# Patient Record
Sex: FEMALE | Race: WHITE | NOT HISPANIC OR LATINO | Employment: UNEMPLOYED | URBAN - METROPOLITAN AREA
[De-identification: names, ages, dates, MRNs, and addresses within clinical notes are randomized per-mention and may not be internally consistent; named-entity substitution may affect disease eponyms.]

---

## 2017-02-08 DIAGNOSIS — K21.9 GASTROESOPHAGEAL REFLUX DISEASE WITHOUT ESOPHAGITIS: Primary | ICD-10-CM

## 2017-02-08 NOTE — TELEPHONE ENCOUNTER
Omeprazole 20mg      Last Written Prescription Date: 8/25/16  Last Fill Quantity: 30,  # refills: 11  Last Office Visit with [facility], [facility] or [facility] prescribing provider: 12/14/16                                             Thank You-  Leslie Garcia, [facility] [place]

## 2017-02-10 RX ORDER — NICOTINE POLACRILEX 4 MG/1
20 GUM, CHEWING ORAL DAILY
Qty: 90 TABLET | Refills: 1 | Status: SHIPPED | OUTPATIENT
Start: 2017-02-10 | End: 2017-10-10

## 2017-02-10 NOTE — TELEPHONE ENCOUNTER
Prescription approved per [facility] Refill Protocol.    Ivis Boateng RN

## 2017-03-13 ENCOUNTER — OFFICE VISIT (OUTPATIENT)
Dept: FAMILY MEDICINE | Facility: CLINIC | Age: 52
End: 2017-03-13
Payer: COMMERCIAL

## 2017-03-13 VITALS
SYSTOLIC BLOOD PRESSURE: 94 MMHG | DIASTOLIC BLOOD PRESSURE: 60 MMHG | HEART RATE: 60 BPM | HEIGHT: 62 IN | WEIGHT: 120 LBS | TEMPERATURE: 98.1 F | BODY MASS INDEX: 22.08 KG/M2

## 2017-03-13 DIAGNOSIS — K82.9 GALLBLADDER DISEASE: ICD-10-CM

## 2017-03-13 DIAGNOSIS — Z23 ENCOUNTER FOR IMMUNIZATION: ICD-10-CM

## 2017-03-13 DIAGNOSIS — R10.11 RUQ ABDOMINAL PAIN: Primary | ICD-10-CM

## 2017-03-13 PROCEDURE — 99214 OFFICE O/P EST MOD 30 MIN: CPT | Mod: 25 | Performed by: FAMILY MEDICINE

## 2017-03-13 PROCEDURE — 90715 TDAP VACCINE 7 YRS/> IM: CPT | Performed by: FAMILY MEDICINE

## 2017-03-13 PROCEDURE — 90471 IMMUNIZATION ADMIN: CPT | Performed by: FAMILY MEDICINE

## 2017-03-13 NOTE — MR AVS SNAPSHOT
After Visit Summary   3/13/2017    Fatou Simental    MRN: [id number]           Patient Information     Date Of Birth          1965        Visit Information        Provider Department      3/13/2017 2:40 PM Post, BETHANY Rankin MD [facility]        Today's Diagnoses     RUQ abdominal pain    -  1    Encounter for immunization          Care Instructions    Avoid fatty foods and spicy foods until you can arrange for the biliary scan        Follow-ups after your visit        Future tests that were ordered for you today     Open Future Orders        Priority Expected Expires Ordered    NM HepatOBiliary Scan w CCK Routine  3/13/2018 3/13/2017            Who to contact     If you have questions or need follow up information about today's clinic visit or your schedule please contact [facility] directly at [phone].  Normal or non-critical lab and imaging results will be communicated to you by MyChart, letter or phone within 4 business days after the clinic has received the results. If you do not hear from us within 7 days, please contact the clinic through MyChart or phone. If you have a critical or abnormal lab result, we will notify you by phone as soon as possible.  Submit refill requests through [employer] or call your pharmacy and they will forward the refill request to us. Please allow 3 business days for your refill to be completed.          Additional Information About Your Visit        MyChart Information     [employer] gives you secure access to your electronic health record. If you see a primary care provider, you can also send messages to your care team and make appointments. If you have questions, please call your primary care clinic.  If you do not have a primary care provider, please call [phone] and they will assist you.        Care EveryWhere ID     This is your Care EveryWhere ID. This could be used by other organizations to access your [facility]  "medical records  [id number]        Your Vitals Were     Pulse Temperature Height BMI (Body Mass Index)          60 98.1  F (36.7  C) (Tympanic) 5' 2\" (1.575 m) 21.95 kg/m2         Blood Pressure from Last 3 Encounters:   03/13/17 94/60   12/14/16 94/62   12/01/16 93/62    Weight from Last 3 Encounters:   03/13/17 120 lb (54.4 kg)   12/14/16 118 lb (53.5 kg)   12/01/16 117 lb (53.1 kg)              We Performed the Following     TDAP (ADACEL AGES 11-64)     VACCINE ADMINISTRATION, INITIAL        Primary Care Provider Office Phone # Fax #    R Chucho Kelly [phone]       [place] [id number]        Thank you!     Thank you for choosing [facility]  for your care. Our goal is always to provide you with excellent care. Hearing back from our patients is one way we can continue to improve our services. Please take a few minutes to complete the written survey that you may receive in the mail after your visit with us. Thank you!             Your Updated Medication List - Protect others around you: Learn how to safely use, store and throw away your medicines at www.disposemymeds.org.          This list is accurate as of: 3/13/17  3:17 PM.  Always use your most recent med list.                   Brand Name Dispense Instructions for use    BENADRYL PO      Take 50 mg by mouth nightly as needed       cetirizine 10 MG tablet    zyrTEC    30 tablet    Take 1 tablet (10 mg) by mouth every evening       estradiol 1 MG tablet    ESTRACE    90 tablet    Take 1.5 tablets (1.5 mg) by mouth daily       gabapentin 100 MG capsule    NEURONTIN    90 capsule    Take 1 capsule (100 mg) by mouth At Bedtime       LORazepam 0.5 MG tablet    ATIVAN    60 tablet    Take 1 tablet by mouth twice daily as needed       multivitamin, therapeutic Tabs tablet      Take 1 tablet by mouth daily       omeprazole 20 MG tablet     90 tablet    Take 1 tablet (20 mg) by mouth " daily Take 30-60 minutes before a meal.       polyethylene glycol powder    MIRALAX    527 g    Take 17 g (1 capful) by mouth daily       SUMATRIPTAN SUCCINATE PO      Take 50 mg by mouth every 8 hours as needed for migraine       VITAMIN D3 PO      Take 1 capsule by mouth daily

## 2017-03-13 NOTE — NURSING NOTE
"Chief Complaint   Patient presents with     Abdominal Pain       Initial BP 94/60  Pulse 60  Temp 98.1  F (36.7  C) (Tympanic)  Ht 5' 2\" (1.575 m)  Wt 120 lb (54.4 kg)  BMI 21.95 kg/m2 Estimated body mass index is 21.95 kg/(m^2) as calculated from the following:    Height as of this encounter: 5' 2\" (1.575 m).    Weight as of this encounter: 120 lb (54.4 kg).  Medication Reconciliation: complete    Health Maintenance that is potentially due pending provider review:  NONE    n/a    "

## 2017-03-13 NOTE — LETTER
My Depression Action Plan  Name: Fatou Simental   Date of Birth 1965  Date: 3/13/2017    My doctor: BETHANY Kelly   My clinic: [facility]  [address]  [facility] [address]  [phone]          GREEN    ZONE   Good Control    What it looks like:     Things are going generally well. You have normal up s and down s. You may even feel depressed from time to time, but bad moods usually last less than a day.   What you need to do:  1. Continue to care for yourself (see self care plan)  2. Check your depression survival kit and update it as needed  3. Follow your physician s recommendations including any medication.  4. Do not stop taking medication unless you consult with your physician first.           YELLOW         ZONE Getting Worse    What it looks like:     Depression is starting to interfere with your life.     It may be hard to get out of bed; you may be starting to isolate yourself from others.    Symptoms of depression are starting to last most all day and this has happened for several days.     You may have suicidal thoughts but they are not constant.   What you need to do:     1. Call your care team, your response to treatment will improve if you keep your care team informed of your progress. Yellow periods are signs an adjustment may need to be made.     2. Continue your self-care, even if you have to fake it!    3. Talk to someone in your support network    4. Open up your depression survival kit           RED    ZONE Medical Alert - Get Help    What it looks like:     Depression is seriously interfering with your life.     You may experience these or other symptoms: You can t get out of bed most days, can t work or engage in other necessary activities, you have trouble taking care of basic hygiene, or basic responsibilities, thoughts of suicide or death that will not go away, self-injurious behavior.     What you need to do:  1. Call your care team  and request a same-day appointment. If they are not available (weekends or after hours) call your local crisis line, emergency room or 911.      Electronically signed by: Lilly Rajput, March 13, 2017    Depression Self Care Plan / Survival Kit    Self-Care for Depression  Here s the deal. Your body and mind are really not as separate as most people think.  What you do and think affects how you feel and how you feel influences what you do and think. This means if you do things that people who feel good do, it will help you feel better.  Sometimes this is all it takes.  There is also a place for medication and therapy depending on how severe your depression is, so be sure to consult with your medical provider and/ or Behavioral Health Consultant if your symptoms are worsening or not improving.     In order to better manage my stress, I will:    Exercise  Get some form of exercise, every day. This will help reduce pain and release endorphins, the  feel good  chemicals in your brain. This is almost as good as taking antidepressants!  This is not the same as joining a gym and then never going! (they count on that by the way ) It can be as simple as just going for a walk or doing some gardening, anything that will get you moving.      Hygiene   Maintain good hygiene (Get out of bed in the morning, Make your bed, Brush your teeth, Take a shower, and Get dressed like you were going to work, even if you are unemployed).  If your clothes don't fit try to get ones that do.    Diet  I will strive to eat foods that are good for me, drink plenty of water, and avoid excessive sugar, caffeine, alcohol, and other mood-altering substances.  Some foods that are helpful in depression are: complex carbohydrates, B vitamins, flaxseed, fish or fish oil, fresh fruits and vegetables.    Psychotherapy  I agree to participate in Individual Therapy (if recommended).    Medication  If prescribed medications, I agree to take them.  Missing  doses can result in serious side effects.  I understand that drinking alcohol, or other illicit drug use, may cause potential side effects.  I will not stop my medication abruptly without first discussing it with my provider.    Staying Connected With Others  I will stay in touch with my friends, family members, and my primary care provider/team.    Use your imagination  Be creative.  We all have a creative side; it doesn t matter if it s oil painting, sand castles, or mud pies! This will also kick up the endorphins.    Witness Beauty  (AKA stop and smell the roses) Take a look outside, even in mid-winter. Notice colors, textures. Watch the squirrels and birds.     Service to others  Be of service to others.  There is always someone else in need.  By helping others we can  get out of ourselves  and remember the really important things.  This also provides opportunities for practicing all the other parts of the program.    Humor  Laugh and be silly!  Adjust your TV habits for less news and crime-drama and more comedy.    Control your stress  Try breathing deep, massage therapy, biofeedback, and meditation. Find time to relax each day.     My support system    Clinic Contact:  Phone number:    Contact 1:  Phone number:    Contact 2:  Phone number:    Sabianism/:  Phone number:    Therapist:  Phone number:    Local crisis center:    Phone number:    Other community support:  Phone number:

## 2017-03-13 NOTE — PROGRESS NOTES
"  SUBJECTIVE:                                                    Fatou Simental is a 51 year old female who presents to clinic today for the following health issues:      ABDOMINAL PAIN     Onset: month plus maybe 2    Description:   Character: Sharp and Dull ache  Location: right upper quadrant  Radiation: back  ? Spasm like sensation    Intensity: 0/10  But can be 9/10    Progression of Symptoms:  intermittent    Accompanying Signs & Symptoms:  Fever/Chills?: no   Gas/Bloating: no   Nausea: no   Vomitting: no   Diarrhea?: nothing new  Constipation:nothing new  Dysuria or Hematuria: no    History:   Trauma: no   Previous similar pain: YES   Previous tests done: none    Precipitating factors:   Does the pain change with:     Food: YES  Can be worse 1/2 hour after eating    BM: no     Urination: no     Alleviating factors:      Therapies Tried and outcome: Ibuprofen not helpful    LMP:  Hysterectomy           Problem list and histories reviewed & adjusted, as indicated.  Additional history: This is somewhat different than the pain she has had in the past which we have attributed to irritable bowel syndrome. It is located in the right upper quadrant as opposed to the left lower quadrant, and seems to be brought on by spicy or greasy foods. Her gallbladder was visualized in November 2016 with a CT scan and was read as normal at that time        Reviewed and updated as needed this visit by clinical staff  Tobacco  Allergies       Reviewed and updated as needed this visit by Provider               ROS:  Constitutional, HEENT, cardiovascular, pulmonary, gi and gu systems are negative, except as otherwise noted.        OBJECTIVE:                                                    BP 94/60  Pulse 60  Temp 98.1  F (36.7  C) (Tympanic)  Ht 5' 2\" (1.575 m)  Wt 120 lb (54.4 kg)  BMI 21.95 kg/m2    GENERAL: healthy, alert and no distress  EYES: Eyes grossly normal to inspection, extraocular movements - intact, and " PERRL  NECK: no tenderness, no adenopathy, no asymmetry, no masses, no stiffness; thyroid- normal to palpation  RESP: lungs clear to auscultation - no rales, no rhonchi, no wheezes  CV: regular rates and rhythm, normal S1 S2, no S3 or S4 and no murmur, no click or rub -  ABDOMEN: bowel sounds normal, no palpable masses, no organomegaly and tender in the right upper quadrant but no guarding, rigidity, or rebound  MS: extremities- no gross deformities noted, no edema         ASSESSMENT/PLAN:                                                    ASSESSMENT:  1. RUQ abdominal pain    2. Encounter for immunization      Differential diagnosis would include acalculous cholecystitis versus irritable bowel syndrome. We will do the hepatobiliary scan and then decide on further workup after that    PLAN:  Orders Placed This Encounter     NM HepatOBiliary Scan w CCK     TDAP (ADACEL AGES 11-64)     VACCINE ADMINISTRATION, INITIAL       Patient Instructions   Avoid fatty foods and spicy foods until you can arrange for the biliary scan      BETHANY Kelly  [facility]

## 2017-03-23 ENCOUNTER — HOSPITAL ENCOUNTER (OUTPATIENT)
Dept: NUCLEAR MEDICINE | Facility: CLINIC | Age: 52
Setting detail: NUCLEAR MEDICINE
Discharge: HOME | End: 2017-03-23
Attending: FAMILY MEDICINE | Admitting: FAMILY MEDICINE
Payer: COMMERCIAL

## 2017-03-23 DIAGNOSIS — R10.11 RUQ ABDOMINAL PAIN: ICD-10-CM

## 2017-03-23 PROCEDURE — 25000128 H RX IP 250 OP 636: Performed by: FAMILY MEDICINE

## 2017-03-23 PROCEDURE — A9537 TC99M MEBROFENIN: HCPCS | Performed by: FAMILY MEDICINE

## 2017-03-23 PROCEDURE — 78227 HEPATOBIL SYST IMAGE W/DRUG: CPT

## 2017-03-23 PROCEDURE — 34300033 ZZH RX 343: Performed by: FAMILY MEDICINE

## 2017-03-23 RX ORDER — KIT FOR THE PREPARATION OF TECHNETIUM TC 99M MEBROFENIN 45 MG/10ML
5.1 INJECTION, POWDER, LYOPHILIZED, FOR SOLUTION INTRAVENOUS ONCE
Status: COMPLETED | OUTPATIENT
Start: 2017-03-23 | End: 2017-03-23

## 2017-03-23 RX ADMIN — MEBROFENIN 5.1 MILLICURIE: 45 INJECTION, POWDER, LYOPHILIZED, FOR SOLUTION INTRAVENOUS at 08:14

## 2017-03-23 RX ADMIN — SODIUM CHLORIDE 1.1 MCG: 9 INJECTION, SOLUTION INTRAVENOUS at 09:20

## 2017-03-23 NOTE — PROGRESS NOTES
Fatou,  The hepatobiliary scan was abnormal and this was suggest that your gallbladder is not functioning normally. I will put in a referral for you to see one of the general surgeons at [place] for recommendations. Call [phone] to schedule this      Chucho Kelly MD

## 2017-03-29 ENCOUNTER — TELEPHONE (OUTPATIENT)
Dept: SURGERY | Facility: CLINIC | Age: 52
End: 2017-03-29

## 2017-03-29 ENCOUNTER — OFFICE VISIT (OUTPATIENT)
Dept: SURGERY | Facility: CLINIC | Age: 52
End: 2017-03-29
Payer: COMMERCIAL

## 2017-03-29 VITALS
DIASTOLIC BLOOD PRESSURE: 64 MMHG | TEMPERATURE: 97.9 F | BODY MASS INDEX: 21.53 KG/M2 | WEIGHT: 117 LBS | HEIGHT: 62 IN | HEART RATE: 56 BPM | SYSTOLIC BLOOD PRESSURE: 110 MMHG

## 2017-03-29 DIAGNOSIS — R10.10 PAIN OF UPPER ABDOMEN: Primary | ICD-10-CM

## 2017-03-29 DIAGNOSIS — K82.8 BILIARY DYSKINESIA: Primary | ICD-10-CM

## 2017-03-29 PROCEDURE — 99214 OFFICE O/P EST MOD 30 MIN: CPT | Performed by: SURGERY

## 2017-03-29 NOTE — NURSING NOTE
"Initial /64 (BP Location: Right arm, Patient Position: Chair, Cuff Size: Adult Regular)  Pulse 56  Temp 97.9  F (36.6  C) (Oral)  Ht 1.575 m (5' 2\")  Wt 53.1 kg (117 lb)  BMI 21.4 kg/m2 Estimated body mass index is 21.4 kg/(m^2) as calculated from the following:    Height as of this encounter: 1.575 m (5' 2\").    Weight as of this encounter: 53.1 kg (117 lb). .    Sowmya Pike MA    "

## 2017-03-29 NOTE — PROGRESS NOTES
52-year-old female complaining of six-month history of right upper quadrant abdominal pain with radiation to the back. Patient reports chronic nausea as well. Pain is worse with eating, 4-6 out of 10. Pain is crampy and dull. Aggravated by eating and alleviated by rest. Patient denies any history of scleral icterus. She does report a bloated feeling.    Patient Active Problem List   Diagnosis     Raynaud's syndrome     Tobacco use disorder     Mild major depression (H)     Need for prophylactic hormone replacement therapy (postmenopausal)     Generalized anxiety disorder     Esophageal reflux     Breast screening     Ganglion of joint     Ovarian cyst     IBS (irritable bowel syndrome)     CARDIOVASCULAR SCREENING; LDL GOAL LESS THAN 160     Screening for malignant neoplasm of cervix     Diverticulosis       Past Medical History:   Diagnosis Date     Cocaine abuse, unspecified      Dysplasia of cervix, unspecified      Esophageal reflux 2006     Generalized anxiety disorder 10/30/2006    Buspar prescribed 07     IBS (irritable bowel syndrome) 2008     PONV (postoperative nausea and vomiting)        Past Surgical History:   Procedure Laterality Date     ABLATE VEIN VARICOSE RADIO FREQUENCY WITHOUT PHLEBECTOMY MULTIPLE STAB  2014    Procedure: ABLATE VEIN VARICOSE RADIO FREQUENCY WITHOUT PHLEBECTOMY MULTIPLE STAB;  Surgeon: Khanh Bunch MD;  Location: [facility] OR       DELIVERY ONLY  ,,    , Low Cervical     HYSTERECTOMY SUPRACERVICAL, BILATERAL SALPINGO-OOPHORECTOMY, COMBINED       LAPAROSCOPIC ASSISTED COLECTOMY N/A 2015    Procedure: LAPAROSCOPIC ASSISTED COLECTOMY;  Surgeon: Khanh Bunch MD;  Location: [facility] OR     NERVE BLOCK PERIPHERAL Bilateral 2015    Procedure: NERVE BLOCK PERIPHERAL;  Surgeon: Generic Anesthesia Provider;  Location: [facility] OR     SURGICAL HISTORY OF -       Breast implants     SURGICAL HISTORY OF -        Carpal  tunnel r hand       Family History   Problem Relation Age of Onset     CANCER Father      liver     CANCER Brother      lung, asbestos     CANCER Mother      uterine     CANCER Sister      uterine     Breast Cancer Other      Breast Cancer Other        Social History   Substance Use Topics     Smoking status: Former Smoker     Packs/day: 0.25     Years: 20.00     Types: Cigarettes     Quit date: 1/26/2015     Smokeless tobacco: Never Used     Alcohol use No      Comment: Social. 1-2 cans of beer or 2 glasses of wine once per month        Smoking - Counseled patient on the effects of smoking on surgical issues such as wound healing and infection rates.    History   Drug Use No       Current Outpatient Prescriptions   Medication Sig Dispense Refill     omeprazole 20 MG tablet Take 1 tablet (20 mg) by mouth daily Take 30-60 minutes before a meal. 90 tablet 1     polyethylene glycol (MIRALAX) powder Take 17 g (1 capful) by mouth daily 527 g 3     SUMATRIPTAN SUCCINATE PO Take 50 mg by mouth every 8 hours as needed for migraine       LORazepam (ATIVAN) 0.5 MG tablet Take 1 tablet by mouth twice daily as needed 60 tablet 4     Cholecalciferol (VITAMIN D3 PO) Take 1 capsule by mouth daily       gabapentin (NEURONTIN) 100 MG capsule Take 1 capsule (100 mg) by mouth At Bedtime 90 capsule 3     cetirizine (ZYRTEC) 10 MG tablet Take 1 tablet (10 mg) by mouth every evening 30 tablet 5     multivitamin, therapeutic (THERA-VIT) TABS Take 1 tablet by mouth daily       estradiol (ESTRACE) 1 MG tablet Take 1.5 tablets (1.5 mg) by mouth daily 90 tablet 3     DiphenhydrAMINE HCl (BENADRYL PO) Take 50 mg by mouth nightly as needed          Allergies   Allergen Reactions     Ciprofloxacin Hives     Got rash when taken with metronidazole + Vicodin     Lactose Diarrhea     Metronidazole Hives     Rash when taken with cipro + Vicodin     No Clinical Screening - See Comments      Pt developed hives when taking flagyl, cipro, and  vicodin, doesn't know which she is allergic to      Vicodin [Hydrocodone-Acetaminophen] Hives     Rash when taken with cipro and metronidazole   CBC  Recent Labs   Lab Test  11/14/16   1504   WBC  4.0   RBC  4.24   HGB  12.8   HCT  37.6   MCV  89   MCH  30.2   MCHC  34.0   RDW  12.4   PLT  254       BMP  Recent Labs   Lab Test  11/14/16   1504   NA  140   POTASSIUM  3.8   YON  8.7   CHLORIDE  105   CO2  30   BUN  14   CR  0.59   GLC  88       LFTs  Recent Labs   Lab Test  11/14/16   1504   PROTTOTAL  7.0   ALBUMIN  3.9   BILITOTAL  0.3   ALKPHOS  53   AST  26   ALT  30       Results for orders placed or performed during the hospital encounter of 03/23/17   NM HepatOBiliary Scan w CCK    Narrative    NM HEPATOBILIARY SCAN WITH GB EF 3/23/2017 10:12 AM    HISTORY: Right upper quadrant pain.    PROCEDURE: 5.1 mCi of Tc 99m Mebrofenin is given intravenously for  this study. For the gallbladder ejection fraction portion of the  study, 1.1 mcg of CCK is given intravenously.    FINDINGS: Gallbladder activity is identified at 35 minutes into the  study. Bowel activity is identified at 20 minutes into the study. The  gallbladder ejection fraction is calculated to be 11%. Usually greater  than 35% is considered normal.      Impression    IMPRESSION:  1. Normal visualization of gallbladder.  2. Gallbladder ejection fraction is abnormally low, suggesting  gallbladder dyskinesis.  3. The patient had there is similar pain on a scale of 10+/10 with the  CCK injection.    SHANTEL SIMS MD     ROS  Constitutional - Denies fevers, weight loss, malaise, lethargy  Neuro - Denies tremors or seizures  Pulmon - Denies SOB, dyspnea, hemoptysis, chronic cough or use of an inhaler  CV - Denies CP, SOB, lower extremity edema, difficulty w/ stairs, has never used NTG  GI - Denies hematemesis, BRBPR, melena, chronic diarrhea or epigastric pain   - Denies hematuria, difficulty voiding, h/o STDs  Hematology - Denies blood clotting disorders,  "chronic anemias  Dermatology - No melanomas or skin cancers  Rheumatology - No h/o RA  Pysch - Denies depression, bipolar d/o or schizophrenia    Exam:  Patient Vitals for the past 24 hrs:   BP Temp Temp src Pulse Height Weight   03/29/17 1115 110/64 97.9  F (36.6  C) Oral 56 1.575 m (5' 2\") 53.1 kg (117 lb)       General - Alert and Oriented X4, NAD, well nourished  HEENT - Normocephalic, atraumatic, PERRLA, Nose midline, Throat without lesions  Neck - supple, no LAD, Thyroid normal, Carotids without bruits  Lungs - Clear to auscultation bilaterally with good inspiratory effort, no tactile fremitus  CV - Heart RRR, no lift's, thrills, murmurs, rubs, or gallops. Carotid, radial, and femoral pulses 2+ bilaterally  Abdomen - Soft, non-tender, +BS, no hepatosplenomegaly, no palpable masses  Neuro - Full ROM, Strength 5/5 and major muscle groups, sensation intact  Extremities - No cyanosis, clubbing or edema    Assessment and plan: 52-year-old female with biliary dyskinesia. Patient is good candidate for laparoscopic cholecystectomy. Risks benefits alternatives and complications were discussed with the patient including the possibility of infection, bleeding, or bile leak. Patient understood and wished to proceed. PATIENT IS CLEARED FOR SURGERY.    Levi Louis MD   "

## 2017-03-29 NOTE — LETTER
SURGERYPLANNING/SCHEDULING WORKSHEET                              [facility]  [address] [address] [phone]                          Fatou Simental                :  1965  MRN:  [id number]  Phone: [phone] (home)     Same Day Surgery   Surgeon: Levi Louis MD  Diagnosis:   Biliary dyskinesia  Allergies:  Ciprofloxacin; Lactose; Metronidazole; No clinical screening - see comments; and Vicodin [hydrocodone-acetaminophen]   A preoperative evaluation and physical will be done by this office.   ====================================================  Surgical Procedure:  General Surgery:lap radha  Length of Procedure:  1 hour  Type of anesthesia:  General  The proposed surgical procedure is considered LOW risk.  Date of Procedure:__17_____    Time: ___2:00________________       Special Equipment: None  Informed Consent Obtained and Signed:  NO  ====================================================  Instructions to Same Day Surgery Staff  Pneumoboots  Preop Antibiotic:  Ancef 2 gm IV  pre-op within one hour prior to incision For > 80 kg  Preop Pain Meds:  None  Preop Orders:  Routine Standing Orders.  ====================================================  Instructions to the patient:  Preop physical exam scheduled (within 30 days or 7 days prior) with:  Dr. Ponce__________________  Clinic:  ____________________                                         Date______________Time_________________________  Come to the hospital at: _[facility] will call with arrival time_______________  HOME PREPARATION:   Shower with Hibiclens the night before or the morning of surgery, gently cleaning skin from neck to feet  Bathe and brush teeth the morning of surgery.  Take medications with a sip of water the morning of surgery:   Check with  if taking insulin.  May have  a light meal, toast and clear liquids, up to 8 hrs before  surgery  May have clear liquids (liquids one can read through) up to 4 hrs before surgery  NOTHING after 4 hrs before surgery  Stop aspirin 7-10 days before surgery  Stop NSAIDS (Ibuproven, Naproxen, etc) 5 days before surgery  Stop Plavix 7-10 days before surgery  Need to arrange for a     Levi Louis MD    3/29/2017  This form was electronically signed at chart closure                                                                        Chart Copy

## 2017-03-29 NOTE — TELEPHONE ENCOUNTER
Pt calling to see if the RX Vicodin was sent?  She would like it sent to the [facility] pharmacy.      Please call~    Sowmya Calderon  Clinic Station

## 2017-03-29 NOTE — MR AVS SNAPSHOT
After Visit Summary   3/29/2017    Fatou Simental    MRN: [id number]           Patient Information     Date Of Birth          1965        Visit Information        Provider Department      3/29/2017 11:00 AM Levi Louis MD [facility]        Today's Diagnoses     Biliary dyskinesia    -  1      Care Instructions    Per Dr. Louis's instructions        Follow-ups after your visit        Your next 10 appointments already scheduled     Apr 11, 2017   Procedure with Levi Louis MD   [facility] PeriOP [facility] (--)    [address] [id number] [phone]           The medical center is located at [address] (between [address] and [address] in [place], [place] miles north of [place]).              Who to contact     If you have questions or need follow up information about today's clinic visit or your schedule please contact [facility] directly at [phone].  Normal or non-critical lab and imaging results will be communicated to you by MyChart, letter or phone within 4 business days after the clinic has received the results. If you do not hear from us within 7 days, please contact the clinic through iCat[employer]ulthart or phone. If you have a critical or abnormal lab result, we will notify you by phone as soon as possible.  Submit refill requests through [employer] or call your pharmacy and they will forward the refill request to us. Please allow 3 business days for your refill to be completed.          Additional Information About Your Visit        MyChart Information     [employer] gives you secure access to your electronic health record. If you see a primary care provider, you can also send messages to your care team and make appointments. If you have questions, please call your primary care clinic.  If you do not have a primary care provider, please call [phone] and they will assist you.        Care EveryWhere ID     This is your Care EveryWhere  "ID. This could be used by other organizations to access your [facility] medical records  [id number]        Your Vitals Were     Pulse Temperature Height BMI (Body Mass Index)          56 97.9  F (36.6  C) (Oral) 1.575 m (5' 2\") 21.4 kg/m2         Blood Pressure from Last 3 Encounters:   03/29/17 110/64   03/13/17 94/60   12/14/16 94/62    Weight from Last 3 Encounters:   03/29/17 53.1 kg (117 lb)   03/13/17 54.4 kg (120 lb)   12/14/16 53.5 kg (118 lb)              Today, you had the following     No orders found for display         Today's Medication Changes          These changes are accurate as of: 3/29/17 11:59 PM.  If you have any questions, ask your nurse or doctor.               Start taking these medicines.        Dose/Directions    HYDROcodone-acetaminophen 5-325 MG per tablet   Commonly known as:  NORCO   Used for:  Pain of upper abdomen   Started by:  Levi Louis MD        Dose:  1-2 tablet   Take 1-2 tablets by mouth every 4 hours as needed for moderate to severe pain   Quantity:  30 tablet   Refills:  0            Where to get your medicines      Some of these will need a paper prescription and others can be bought over the counter.  Ask your nurse if you have questions.     Bring a paper prescription for each of these medications     HYDROcodone-acetaminophen 5-325 MG per tablet                Primary Care Provider Office Phone # Fax #    R Chucho Kelly [phone]       [place] [address]        Thank you!     Thank you for choosing [facility]  for your care. Our goal is always to provide you with excellent care. Hearing back from our patients is one way we can continue to improve our services. Please take a few minutes to complete the written survey that you may receive in the mail after your visit with us. Thank you!             Your Updated Medication List - Protect others around you: Learn how to safely use, store and " throw away your medicines at www.disposemymeds.org.          This list is accurate as of: 3/29/17 11:59 PM.  Always use your most recent med list.                   Brand Name Dispense Instructions for use    BENADRYL PO      Take 50 mg by mouth nightly as needed       cetirizine 10 MG tablet    zyrTEC    30 tablet    Take 1 tablet (10 mg) by mouth every evening       estradiol 1 MG tablet    ESTRACE    90 tablet    Take 1.5 tablets (1.5 mg) by mouth daily       gabapentin 100 MG capsule    NEURONTIN    90 capsule    Take 1 capsule (100 mg) by mouth At Bedtime       HYDROcodone-acetaminophen 5-325 MG per tablet    NORCO    30 tablet    Take 1-2 tablets by mouth every 4 hours as needed for moderate to severe pain       LORazepam 0.5 MG tablet    ATIVAN    60 tablet    Take 1 tablet by mouth twice daily as needed       multivitamin, therapeutic Tabs tablet      Take 1 tablet by mouth daily       omeprazole 20 MG tablet     90 tablet    Take 1 tablet (20 mg) by mouth daily Take 30-60 minutes before a meal.       polyethylene glycol powder    MIRALAX    527 g    Take 17 g (1 capful) by mouth daily       SUMATRIPTAN SUCCINATE PO      Take 50 mg by mouth every 8 hours as needed for migraine       VITAMIN D3 PO      Take 1 capsule by mouth daily

## 2017-03-30 RX ORDER — HYDROCODONE BITARTRATE AND ACETAMINOPHEN 5; 325 MG/1; MG/1
1-2 TABLET ORAL EVERY 4 HOURS PRN
Qty: 30 TABLET | Refills: 0 | Status: SHIPPED | OUTPATIENT
Start: 2017-03-30 | End: 2017-04-13

## 2017-03-30 NOTE — TELEPHONE ENCOUNTER
No mention of Pain med on her med list  No mention of providing Hydrocodone pain med  Nor providing it despite the listed   (but unconfirmed)  allergy to it in her EMR,  and not providing the previously tolerated Percocet.    Call placed to patient and she states they had a quick conversation about it and that he wanted to provide Vicodin to give a trial to see if truly allergic.  And that the Advil was not cutting it,  despite avoiding certain food  (patient states she can  hardly eat anything)  But then never got an rx given to her at end of visit.  She assumed it was just sent to the Pharmacy and not done on a paper script to hand carry as usual.    Advised Provider not available and not on call,  Advised we could speak with the on call provider but could not provide any solid confirmation we could prove the rx today.    Holly Auguste RN  [facility]

## 2017-04-10 ENCOUNTER — ANESTHESIA EVENT (OUTPATIENT)
Dept: SURGERY | Facility: CLINIC | Age: 52
End: 2017-04-10
Payer: COMMERCIAL

## 2017-04-11 ENCOUNTER — TELEPHONE (OUTPATIENT)
Dept: SURGERY | Facility: CLINIC | Age: 52
End: 2017-04-11

## 2017-04-11 ENCOUNTER — HOSPITAL ENCOUNTER (OUTPATIENT)
Facility: CLINIC | Age: 52
Discharge: HOME | End: 2017-04-11
Attending: SURGERY | Admitting: SURGERY
Payer: COMMERCIAL

## 2017-04-11 ENCOUNTER — ANESTHESIA (OUTPATIENT)
Dept: SURGERY | Facility: CLINIC | Age: 52
End: 2017-04-11
Payer: COMMERCIAL

## 2017-04-11 VITALS
SYSTOLIC BLOOD PRESSURE: 98 MMHG | TEMPERATURE: 97.7 F | RESPIRATION RATE: 16 BRPM | HEIGHT: 62 IN | HEART RATE: 79 BPM | BODY MASS INDEX: 21.16 KG/M2 | DIASTOLIC BLOOD PRESSURE: 60 MMHG | WEIGHT: 115 LBS | OXYGEN SATURATION: 96 %

## 2017-04-11 DIAGNOSIS — G89.18 POST-OP PAIN: Primary | ICD-10-CM

## 2017-04-11 PROCEDURE — 71000012 ZZH RECOVERY PHASE 1 LEVEL 1 FIRST HR: Performed by: SURGERY

## 2017-04-11 PROCEDURE — 27110028 ZZH OR GENERAL SUPPLY NON-STERILE: Performed by: SURGERY

## 2017-04-11 PROCEDURE — 25000125 ZZHC RX 250: Performed by: NURSE ANESTHETIST, CERTIFIED REGISTERED

## 2017-04-11 PROCEDURE — 71000027 ZZH RECOVERY PHASE 2 EACH 15 MINS: Performed by: SURGERY

## 2017-04-11 PROCEDURE — 36000058 ZZH SURGERY LEVEL 3 EA 15 ADDTL MIN: Performed by: SURGERY

## 2017-04-11 PROCEDURE — 25800025 ZZH RX 258: Performed by: NURSE ANESTHETIST, CERTIFIED REGISTERED

## 2017-04-11 PROCEDURE — 27210995 ZZH RX 272: Performed by: SURGERY

## 2017-04-11 PROCEDURE — 47562 LAPAROSCOPIC CHOLECYSTECTOMY: CPT | Mod: AS | Performed by: PHYSICIAN ASSISTANT

## 2017-04-11 PROCEDURE — 37000008 ZZH ANESTHESIA TECHNICAL FEE, 1ST 30 MIN: Performed by: SURGERY

## 2017-04-11 PROCEDURE — 25000128 H RX IP 250 OP 636: Performed by: NURSE ANESTHETIST, CERTIFIED REGISTERED

## 2017-04-11 PROCEDURE — 37000009 ZZH ANESTHESIA TECHNICAL FEE, EACH ADDTL 15 MIN: Performed by: SURGERY

## 2017-04-11 PROCEDURE — 71000013 ZZH RECOVERY PHASE 1 LEVEL 1 EA ADDTL HR: Performed by: SURGERY

## 2017-04-11 PROCEDURE — 40000305 ZZH STATISTIC PRE PROC ASSESS I: Performed by: SURGERY

## 2017-04-11 PROCEDURE — 36000056 ZZH SURGERY LEVEL 3 1ST 30 MIN: Performed by: SURGERY

## 2017-04-11 PROCEDURE — 88304 TISSUE EXAM BY PATHOLOGIST: CPT | Mod: 26 | Performed by: SURGERY

## 2017-04-11 PROCEDURE — 25000132 ZZH RX MED GY IP 250 OP 250 PS 637: Performed by: SURGERY

## 2017-04-11 PROCEDURE — 88304 TISSUE EXAM BY PATHOLOGIST: CPT | Performed by: SURGERY

## 2017-04-11 PROCEDURE — 27210794 ZZH OR GENERAL SUPPLY STERILE: Performed by: SURGERY

## 2017-04-11 PROCEDURE — 47562 LAPAROSCOPIC CHOLECYSTECTOMY: CPT | Performed by: SURGERY

## 2017-04-11 PROCEDURE — 25000128 H RX IP 250 OP 636: Performed by: SURGERY

## 2017-04-11 PROCEDURE — 25000125 ZZHC RX 250: Performed by: SURGERY

## 2017-04-11 RX ORDER — SCOLOPAMINE TRANSDERMAL SYSTEM 1 MG/1
1 PATCH, EXTENDED RELEASE TRANSDERMAL ONCE
Status: DISCONTINUED | OUTPATIENT
Start: 2017-04-11 | End: 2017-04-11 | Stop reason: HOSPADM

## 2017-04-11 RX ORDER — NALOXONE HYDROCHLORIDE 0.4 MG/ML
.1-.4 INJECTION, SOLUTION INTRAMUSCULAR; INTRAVENOUS; SUBCUTANEOUS
Status: DISCONTINUED | OUTPATIENT
Start: 2017-04-11 | End: 2017-04-11 | Stop reason: HOSPADM

## 2017-04-11 RX ORDER — DEXAMETHASONE SODIUM PHOSPHATE 4 MG/ML
INJECTION, SOLUTION INTRA-ARTICULAR; INTRALESIONAL; INTRAMUSCULAR; INTRAVENOUS; SOFT TISSUE PRN
Status: DISCONTINUED | OUTPATIENT
Start: 2017-04-11 | End: 2017-04-11

## 2017-04-11 RX ORDER — DROPERIDOL 2.5 MG/ML
0.62 INJECTION, SOLUTION INTRAMUSCULAR; INTRAVENOUS
Status: DISCONTINUED | OUTPATIENT
Start: 2017-04-11 | End: 2017-04-11 | Stop reason: RX

## 2017-04-11 RX ORDER — SODIUM CHLORIDE, SODIUM LACTATE, POTASSIUM CHLORIDE, CALCIUM CHLORIDE 600; 310; 30; 20 MG/100ML; MG/100ML; MG/100ML; MG/100ML
INJECTION, SOLUTION INTRAVENOUS CONTINUOUS
Status: DISCONTINUED | OUTPATIENT
Start: 2017-04-11 | End: 2017-04-11 | Stop reason: HOSPADM

## 2017-04-11 RX ORDER — OXYCODONE AND ACETAMINOPHEN 5; 325 MG/1; MG/1
1-2 TABLET ORAL EVERY 6 HOURS PRN
Qty: 45 TABLET | Refills: 0 | Status: SHIPPED | OUTPATIENT
Start: 2017-04-11 | End: 2017-06-26

## 2017-04-11 RX ORDER — GLYCOPYRROLATE 0.2 MG/ML
INJECTION, SOLUTION INTRAMUSCULAR; INTRAVENOUS PRN
Status: DISCONTINUED | OUTPATIENT
Start: 2017-04-11 | End: 2017-04-11

## 2017-04-11 RX ORDER — ONDANSETRON 2 MG/ML
4 INJECTION INTRAMUSCULAR; INTRAVENOUS EVERY 30 MIN PRN
Status: DISCONTINUED | OUTPATIENT
Start: 2017-04-11 | End: 2017-04-11 | Stop reason: HOSPADM

## 2017-04-11 RX ORDER — LIDOCAINE HYDROCHLORIDE 10 MG/ML
INJECTION, SOLUTION INFILTRATION; PERINEURAL PRN
Status: DISCONTINUED | OUTPATIENT
Start: 2017-04-11 | End: 2017-04-11

## 2017-04-11 RX ORDER — KETOROLAC TROMETHAMINE 30 MG/ML
30 INJECTION, SOLUTION INTRAMUSCULAR; INTRAVENOUS EVERY 6 HOURS PRN
Status: DISCONTINUED | OUTPATIENT
Start: 2017-04-11 | End: 2017-04-11 | Stop reason: HOSPADM

## 2017-04-11 RX ORDER — DEXAMETHASONE SODIUM PHOSPHATE 4 MG/ML
4 INJECTION, SOLUTION INTRA-ARTICULAR; INTRALESIONAL; INTRAMUSCULAR; INTRAVENOUS; SOFT TISSUE EVERY 10 MIN PRN
Status: DISCONTINUED | OUTPATIENT
Start: 2017-04-11 | End: 2017-04-11 | Stop reason: HOSPADM

## 2017-04-11 RX ORDER — ONDANSETRON 2 MG/ML
INJECTION INTRAMUSCULAR; INTRAVENOUS PRN
Status: DISCONTINUED | OUTPATIENT
Start: 2017-04-11 | End: 2017-04-11

## 2017-04-11 RX ORDER — KETOROLAC TROMETHAMINE 30 MG/ML
INJECTION, SOLUTION INTRAMUSCULAR; INTRAVENOUS PRN
Status: DISCONTINUED | OUTPATIENT
Start: 2017-04-11 | End: 2017-04-11

## 2017-04-11 RX ORDER — HYDROMORPHONE HYDROCHLORIDE 1 MG/ML
.3-.5 INJECTION, SOLUTION INTRAMUSCULAR; INTRAVENOUS; SUBCUTANEOUS EVERY 10 MIN PRN
Status: DISCONTINUED | OUTPATIENT
Start: 2017-04-11 | End: 2017-04-11 | Stop reason: HOSPADM

## 2017-04-11 RX ORDER — FENTANYL CITRATE 50 UG/ML
25-50 INJECTION, SOLUTION INTRAMUSCULAR; INTRAVENOUS
Status: DISCONTINUED | OUTPATIENT
Start: 2017-04-11 | End: 2017-04-11 | Stop reason: HOSPADM

## 2017-04-11 RX ORDER — CEFAZOLIN SODIUM 1 G/3ML
1 INJECTION, POWDER, FOR SOLUTION INTRAMUSCULAR; INTRAVENOUS SEE ADMIN INSTRUCTIONS
Status: DISCONTINUED | OUTPATIENT
Start: 2017-04-11 | End: 2017-04-11 | Stop reason: HOSPADM

## 2017-04-11 RX ORDER — LIDOCAINE 40 MG/G
CREAM TOPICAL
Status: DISCONTINUED | OUTPATIENT
Start: 2017-04-11 | End: 2017-04-11 | Stop reason: HOSPADM

## 2017-04-11 RX ORDER — FENTANYL CITRATE 50 UG/ML
INJECTION, SOLUTION INTRAMUSCULAR; INTRAVENOUS PRN
Status: DISCONTINUED | OUTPATIENT
Start: 2017-04-11 | End: 2017-04-11

## 2017-04-11 RX ORDER — BUPIVACAINE HYDROCHLORIDE AND EPINEPHRINE 2.5; 5 MG/ML; UG/ML
INJECTION, SOLUTION INFILTRATION; PERINEURAL PRN
Status: DISCONTINUED | OUTPATIENT
Start: 2017-04-11 | End: 2017-04-11 | Stop reason: HOSPADM

## 2017-04-11 RX ORDER — MEPERIDINE HYDROCHLORIDE 25 MG/ML
12.5 INJECTION INTRAMUSCULAR; INTRAVENOUS; SUBCUTANEOUS
Status: DISCONTINUED | OUTPATIENT
Start: 2017-04-11 | End: 2017-04-11 | Stop reason: HOSPADM

## 2017-04-11 RX ORDER — CEFAZOLIN SODIUM 2 G/100ML
2 INJECTION, SOLUTION INTRAVENOUS
Status: COMPLETED | OUTPATIENT
Start: 2017-04-11 | End: 2017-04-11

## 2017-04-11 RX ORDER — OXYCODONE AND ACETAMINOPHEN 10; 325 MG/1; MG/1
1 TABLET ORAL EVERY 4 HOURS PRN
Status: DISCONTINUED | OUTPATIENT
Start: 2017-04-11 | End: 2017-04-11 | Stop reason: HOSPADM

## 2017-04-11 RX ORDER — ONDANSETRON 4 MG/1
4 TABLET, ORALLY DISINTEGRATING ORAL EVERY 30 MIN PRN
Status: DISCONTINUED | OUTPATIENT
Start: 2017-04-11 | End: 2017-04-11 | Stop reason: HOSPADM

## 2017-04-11 RX ORDER — PROPOFOL 10 MG/ML
INJECTION, EMULSION INTRAVENOUS PRN
Status: DISCONTINUED | OUTPATIENT
Start: 2017-04-11 | End: 2017-04-11

## 2017-04-11 RX ORDER — PROPOFOL 10 MG/ML
INJECTION, EMULSION INTRAVENOUS CONTINUOUS PRN
Status: DISCONTINUED | OUTPATIENT
Start: 2017-04-11 | End: 2017-04-11

## 2017-04-11 RX ADMIN — SODIUM CHLORIDE, POTASSIUM CHLORIDE, SODIUM LACTATE AND CALCIUM CHLORIDE: 600; 310; 30; 20 INJECTION, SOLUTION INTRAVENOUS at 13:03

## 2017-04-11 RX ADMIN — GLYCOPYRROLATE 0.2 MG: 0.2 INJECTION, SOLUTION INTRAMUSCULAR; INTRAVENOUS at 13:28

## 2017-04-11 RX ADMIN — DEXAMETHASONE SODIUM PHOSPHATE 4 MG: 4 INJECTION, SOLUTION INTRA-ARTICULAR; INTRALESIONAL; INTRAMUSCULAR; INTRAVENOUS; SOFT TISSUE at 13:34

## 2017-04-11 RX ADMIN — PROPOFOL 80 MG: 10 INJECTION, EMULSION INTRAVENOUS at 14:33

## 2017-04-11 RX ADMIN — PROPOFOL 150 MCG/KG/MIN: 10 INJECTION, EMULSION INTRAVENOUS at 13:29

## 2017-04-11 RX ADMIN — PROPOFOL 150 MG: 10 INJECTION, EMULSION INTRAVENOUS at 13:28

## 2017-04-11 RX ADMIN — FENTANYL CITRATE 50 MCG: 50 INJECTION INTRAMUSCULAR; INTRAVENOUS at 15:02

## 2017-04-11 RX ADMIN — KETOROLAC TROMETHAMINE 30 MG: 30 INJECTION, SOLUTION INTRAMUSCULAR at 14:09

## 2017-04-11 RX ADMIN — FENTANYL CITRATE 50 MCG: 50 INJECTION INTRAMUSCULAR; INTRAVENOUS at 15:23

## 2017-04-11 RX ADMIN — ROCURONIUM BROMIDE 25 MG: 10 INJECTION INTRAVENOUS at 13:28

## 2017-04-11 RX ADMIN — HYDROMORPHONE HYDROCHLORIDE 1 MG: 1 INJECTION, SOLUTION INTRAMUSCULAR; INTRAVENOUS; SUBCUTANEOUS at 13:47

## 2017-04-11 RX ADMIN — MIDAZOLAM HYDROCHLORIDE 2 MG: 1 INJECTION, SOLUTION INTRAMUSCULAR; INTRAVENOUS at 13:25

## 2017-04-11 RX ADMIN — FENTANYL CITRATE 100 MCG: 50 INJECTION, SOLUTION INTRAMUSCULAR; INTRAVENOUS at 13:28

## 2017-04-11 RX ADMIN — FENTANYL CITRATE 100 MCG: 50 INJECTION, SOLUTION INTRAMUSCULAR; INTRAVENOUS at 13:26

## 2017-04-11 RX ADMIN — LIDOCAINE HYDROCHLORIDE 50 MG: 10 INJECTION, SOLUTION INFILTRATION; PERINEURAL at 13:28

## 2017-04-11 RX ADMIN — MIDAZOLAM HYDROCHLORIDE 2 MG: 1 INJECTION, SOLUTION INTRAMUSCULAR; INTRAVENOUS at 13:28

## 2017-04-11 RX ADMIN — LIDOCAINE HYDROCHLORIDE 1 ML: 10 INJECTION, SOLUTION INFILTRATION; PERINEURAL at 13:03

## 2017-04-11 RX ADMIN — HYDROMORPHONE HYDROCHLORIDE 0.5 MG: 1 INJECTION, SOLUTION INTRAMUSCULAR; INTRAVENOUS; SUBCUTANEOUS at 15:31

## 2017-04-11 RX ADMIN — SODIUM CHLORIDE, POTASSIUM CHLORIDE, SODIUM LACTATE AND CALCIUM CHLORIDE: 600; 310; 30; 20 INJECTION, SOLUTION INTRAVENOUS at 13:56

## 2017-04-11 RX ADMIN — OXYCODONE AND ACETAMINOPHEN 1 TABLET: 10; 325 TABLET ORAL at 17:08

## 2017-04-11 RX ADMIN — ONDANSETRON 4 MG: 2 INJECTION INTRAMUSCULAR; INTRAVENOUS at 13:34

## 2017-04-11 RX ADMIN — CEFAZOLIN SODIUM 2 G: 2 INJECTION, SOLUTION INTRAVENOUS at 13:25

## 2017-04-11 RX ADMIN — FENTANYL CITRATE 50 MCG: 50 INJECTION, SOLUTION INTRAMUSCULAR; INTRAVENOUS at 13:25

## 2017-04-11 RX ADMIN — MIDAZOLAM HYDROCHLORIDE 1 MG: 1 INJECTION, SOLUTION INTRAMUSCULAR; INTRAVENOUS at 13:34

## 2017-04-11 ASSESSMENT — LIFESTYLE VARIABLES: TOBACCO_USE: 1

## 2017-04-11 NOTE — DISCHARGE INSTRUCTIONS
Same Day Surgery Discharge Instructions  Special Precautions After Surgery - Adult    1. It is not unusual to feel lightheaded or faint, up to 24 hours after surgery or while taking pain medication.  If you have these symptoms; sit for a few minutes before standing and have someone assist you when getting up.  2. You should rest and relax for the next 24 hours and must have someone stay with you for at least 24 hours after your discharge.  3. DO NOT DRIVE any vehicle or operate mechanical equipment for 24 hours following the end of your surgery.  DO NOT DRIVE while taking narcotic pain medications that have been prescribed by your physician.  If you had a limb operated on, you must be able to use it fully to drive.  4. DO NOT drink alcoholic beverages for 24 hours following surgery or while taking prescription pain medication.  5. Drink clear liquids (apple juice, ginger ale, broth, 7-Up, etc.).  Progress to your regular diet as you feel able.  6. Any questions call your physician and do not make important decisions for 24 hours.      Surgery Specialty Clinic:  [phone]     Same Day Surgery [phone], Monday thru Friday 6am-9pm.      Wash incisions daily with soap and water. Some mild redness or swelling is expected. If draining, cover with dry gauze.    No heavy lifting (less than 30 pounds) for 1 month.    Okay to use ice pack over wound as necessary for comfort.    Use pain medication as necessary but avoid constipating side effects. Ibuprofen okay but avoid Tylenol as your pain medication already contains this.    Diet as tolerated. No restrictions.    Follow up with Dr Louis in 1-2 weeks.    Most people take the rest of the week off and return to work the following Monday. You may return sooner as pain allows. During your follow-up appointment, Dr. Louis will give you a formal letter for your work with any restrictions detailed.  All disability or other such paperwork will be addressed at  that time.

## 2017-04-11 NOTE — IP AVS SNAPSHOT
[facility] PreOP/Phase II    [address] [address]    Phone:  [phone]    Fax:  [phone]                                       After Visit Summary   4/11/2017    Fatou Simental    MRN: [id number]           After Visit Summary Signature Page     I have received my discharge instructions, and my questions have been answered. I have discussed any challenges I see with this plan with the nurse or doctor.    ..........................................................................................................................................  Patient/Patient Representative Signature      ..........................................................................................................................................  Patient Representative Print Name and Relationship to Patient    ..................................................               ................................................  Date                                            Time    ..........................................................................................................................................  Reviewed by Signature/Title    ...................................................              ..............................................  Date                                                            Time

## 2017-04-11 NOTE — IP AVS SNAPSHOT
MRN:[id number]                      After Visit Summary   4/11/2017    Fatou Simental    MRN: [id number]           Thank you!     Thank you for choosing [facility] for your care. Our goal is always to provide you with excellent care. Hearing back from our patients is one way we can continue to improve our services. Please take a few minutes to complete the written survey that you may receive in the mail after you visit with us. Thank you!        Patient Information     Date Of Birth          1965        About your hospital stay     You were admitted on:  April 11, 2017 You last received care in the:  [facility] PreOP/Phase II    You were discharged on:  April 11, 2017       Who to Call     For medical emergencies, please call 911.  For non-urgent questions about your medical care, please call your primary care provider or clinic, [phone]  For questions related to your surgery, please call your surgery clinic        Attending Provider     Provider Specialty    Levi Louis MD Surgery       Primary Care Provider Office Phone # Fax #    R Chucho Kelly [phone]       [place] [address]        Further instructions from your care team                         Same Day Surgery Discharge Instructions  Special Precautions After Surgery - Adult    1. It is not unusual to feel lightheaded or faint, up to 24 hours after surgery or while taking pain medication.  If you have these symptoms; sit for a few minutes before standing and have someone assist you when getting up.  2. You should rest and relax for the next 24 hours and must have someone stay with you for at least 24 hours after your discharge.  3. DO NOT DRIVE any vehicle or operate mechanical equipment for 24 hours following the end of your surgery.  DO NOT DRIVE while taking narcotic pain medications that have been prescribed by your physician.  If you had a limb  "operated on, you must be able to use it fully to drive.  4. DO NOT drink alcoholic beverages for 24 hours following surgery or while taking prescription pain medication.  5. Drink clear liquids (apple juice, ginger ale, broth, 7-Up, etc.).  Progress to your regular diet as you feel able.  6. Any questions call your physician and do not make important decisions for 24 hours.      Surgery Specialty Clinic:  [phone]     Same Day Surgery [phone], Monday thru Friday 6am-9pm.      Wash incisions daily with soap and water. Some mild redness or swelling is expected. If draining, cover with dry gauze.    No heavy lifting (less than 30 pounds) for 1 month.    Okay to use ice pack over wound as necessary for comfort.    Use pain medication as necessary but avoid constipating side effects. Ibuprofen okay but avoid Tylenol as your pain medication already contains this.    Diet as tolerated. No restrictions.    Follow up with Dr Louis in 1-2 weeks.    Most people take the rest of the week off and return to work the following Monday. You may return sooner as pain allows. During your follow-up appointment, Dr. Louis will give you a formal letter for your work with any restrictions detailed.  All disability or other such paperwork will be addressed at that time.                  Pending Results     Date and Time Order Name Status Description    4/11/2017 1428 Surgical pathology exam In process             Admission Information     Date & Time Provider Department Dept. Phone    4/11/2017 Levi Louis MD [facility] PreOP/Phase [phone]      Your Vitals Were     Blood Pressure Pulse Temperature Respirations Height Weight    98/60 79 97.7  F (36.5  C) (Oral) 16 1.575 m (5' 2.01\") 52.2 kg (115 lb)    Pulse Oximetry BMI (Body Mass Index)                96% 21.03 kg/m2          [facility] Information     [employer] gives you secure access to your electronic health record. If you see a primary care provider, you can also send " messages to your care team and make appointments. If you have questions, please call your primary care clinic.  If you do not have a primary care provider, please call [phone] and they will assist you.        Care EveryWhere ID     This is your Care EveryWhere ID. This could be used by other organizations to access your [place] medical records  [id number]           Review of your medicines      START taking        Dose / Directions    oxyCODONE-acetaminophen 5-325 MG per tablet   Commonly known as:  PERCOCET   Used for:  Post-op pain   Notes to Patient:  You had 1 percocet at 5:15 pm. 10mg/325mg.        Dose:  1-2 tablet   Take 1-2 tablets by mouth every 6 hours as needed   Quantity:  45 tablet   Refills:  0         CONTINUE these medicines which have NOT CHANGED        Dose / Directions    BENADRYL PO        Dose:  50 mg   Take 50 mg by mouth nightly as needed   Refills:  0       cetirizine 10 MG tablet   Commonly known as:  zyrTEC   Used for:  Seasonal allergic rhinitis        Dose:  10 mg   Take 1 tablet (10 mg) by mouth every evening   Quantity:  30 tablet   Refills:  5       estradiol 1 MG tablet   Commonly known as:  ESTRACE   Used for:  Need for prophylactic hormone replacement therapy (postmenopausal)        Dose:  1.5 mg   Take 1.5 tablets (1.5 mg) by mouth daily   Quantity:  90 tablet   Refills:  3       gabapentin 100 MG capsule   Commonly known as:  NEURONTIN   Used for:  Vulvodynia        Dose:  100 mg   Take 1 capsule (100 mg) by mouth At Bedtime   Quantity:  90 capsule   Refills:  3       HYDROcodone-acetaminophen 5-325 MG per tablet   Commonly known as:  NORCO   Used for:  Pain of upper abdomen        Dose:  1-2 tablet   Take 1-2 tablets by mouth every 4 hours as needed for moderate to severe pain   Quantity:  30 tablet   Refills:  0       LORazepam 0.5 MG tablet   Commonly known as:  ATIVAN   Used for:  Generalized anxiety disorder        Take 1 tablet by mouth twice daily as needed    Quantity:  60 tablet   Refills:  4       multivitamin, therapeutic Tabs tablet        Dose:  1 tablet   Take 1 tablet by mouth daily   Refills:  0       omeprazole 20 MG tablet   Used for:  Gastroesophageal reflux disease without esophagitis        Dose:  20 mg   Take 1 tablet (20 mg) by mouth daily Take 30-60 minutes before a meal.   Quantity:  90 tablet   Refills:  1       polyethylene glycol powder   Commonly known as:  MIRALAX   Used for:  Irritable bowel syndrome, unspecified type        Dose:  1 capful   Take 17 g (1 capful) by mouth daily   Quantity:  527 g   Refills:  3       SUMATRIPTAN SUCCINATE PO        Dose:  50 mg   Take 50 mg by mouth every 8 hours as needed for migraine   Refills:  0       VITAMIN D3 PO        Dose:  1 capsule   Take 1 capsule by mouth daily   Refills:  0            Where to get your medicines      Some of these will need a paper prescription and others can be bought over the counter. Ask your nurse if you have questions.     Bring a paper prescription for each of these medications     oxyCODONE-acetaminophen 5-325 MG per tablet                Protect others around you: Learn how to safely use, store and throw away your medicines at www.disposemymeds.org.             Medication List: This is a list of all your medications and when to take them. Check marks below indicate your daily home schedule. Keep this list as a reference.      Medications           Morning Afternoon Evening Bedtime As Needed    BENADRYL PO   Take 50 mg by mouth nightly as needed                                cetirizine 10 MG tablet   Commonly known as:  zyrTEC   Take 1 tablet (10 mg) by mouth every evening                                estradiol 1 MG tablet   Commonly known as:  ESTRACE   Take 1.5 tablets (1.5 mg) by mouth daily                                gabapentin 100 MG capsule   Commonly known as:  NEURONTIN   Take 1 capsule (100 mg) by mouth At Bedtime                                 HYDROcodone-acetaminophen 5-325 MG per tablet   Commonly known as:  NORCO   Take 1-2 tablets by mouth every 4 hours as needed for moderate to severe pain                                LORazepam 0.5 MG tablet   Commonly known as:  ATIVAN   Take 1 tablet by mouth twice daily as needed                                multivitamin, therapeutic Tabs tablet   Take 1 tablet by mouth daily                                omeprazole 20 MG tablet   Take 1 tablet (20 mg) by mouth daily Take 30-60 minutes before a meal.                                oxyCODONE-acetaminophen 5-325 MG per tablet   Commonly known as:  PERCOCET   Take 1-2 tablets by mouth every 6 hours as needed   Notes to Patient:  You had 1 percocet at 5:15 pm. 10mg/325mg.                                polyethylene glycol powder   Commonly known as:  MIRALAX   Take 17 g (1 capful) by mouth daily                                SUMATRIPTAN SUCCINATE PO   Take 50 mg by mouth every 8 hours as needed for migraine                                VITAMIN D3 PO   Take 1 capsule by mouth daily

## 2017-04-11 NOTE — ANESTHESIA CARE TRANSFER NOTE
Patient: Fatou Simental    Procedure(s):  Laparoscopic Cholecystectomy - Wound Class: II-Clean Contaminated    Diagnosis: BILIARY DYSKINESIA  Diagnosis Additional Information: No value filed.    Anesthesia Type:   General, ETT     Note:  Airway :Nasal Cannula  Patient transferred to:PACU        Vitals: (Last set prior to Anesthesia Care Transfer)    CRNA VITALS  4/11/2017 1411 - 4/11/2017 1441      4/11/2017             Pulse: 76    SpO2: 98 %                Electronically Signed By: ANGEL Berkowitz CRNA  April 11, 2017  2:41 PM

## 2017-04-11 NOTE — TELEPHONE ENCOUNTER
Called and spoke with pt regarding note below. Pt states she has a chronic headache and would like to take Tylenol. Instructed that this is fine with sips of water up to 4 hrs prior to surgery. Pt also has concerns over chronic constipation and would like this addressed after surgery. Instructed pt to talk with pre and post op nurses. Pt states she did take Miralax and 2 stool softners yesterday with very little results.   No further questions or concerns.  Yulia BUNCH RN  Specialty Flex

## 2017-04-11 NOTE — TELEPHONE ENCOUNTER
Reason for Call:  Other prescription    Detailed comments: pt calling stating she is having surgery at 2 pm today, she has a headache and would like to know what she can take to help w/ this.   She has also been constipated this morning as well.     Phone Number Patient can be reached at: Home number on file [phone] (home)    Best Time: any     Can we leave a detailed message on this number? YES    Call taken on 4/11/2017 at 8:52 AM by Sydney Gregory

## 2017-04-11 NOTE — ANESTHESIA POSTPROCEDURE EVALUATION
Patient: Fatou Simental    Procedure(s):  Laparoscopic Cholecystectomy - Wound Class: II-Clean Contaminated    Diagnosis:BILIARY DYSKINESIA  Diagnosis Additional Information: No value filed.    Anesthesia Type:  General, ETT    Note:  Anesthesia Post Evaluation    Patient location during evaluation: Phase 2 and Bedside  Patient participation: Able to fully participate in evaluation  Level of consciousness: awake and alert  Pain management: adequate  Airway patency: patent  Cardiovascular status: acceptable  Respiratory status: acceptable  Hydration status: acceptable  PONV: none     Anesthetic complications: None          Last vitals:  Vitals:    04/11/17 1545 04/11/17 1604 04/11/17 1624   BP: (!) 122/99 111/61 104/51   Pulse: 79     Resp: 16 16 16   Temp:      SpO2: 100% 100% 97%         Electronically Signed By: Keshav Ling CRNA, APRN CRNA  April 11, 2017  4:30 PM

## 2017-04-11 NOTE — ANESTHESIA PREPROCEDURE EVALUATION
Anesthesia Evaluation     . Pt has had prior anesthetic. Type: General and Regional    History of anesthetic complications   - PONV        ROS/MED HX    ENT/Pulmonary:     (+)tobacco use, Past use , . .   Asthma: Quit 2015.   Neurologic:  - neg neurologic ROS     Cardiovascular:         METS/Exercise Tolerance:  >4 METS   Hematologic:  - neg hematologic  ROS       Musculoskeletal:  - neg musculoskeletal ROS       GI/Hepatic:     (+) GERD Asymptomatic on medication,       Renal/Genitourinary:  - ROS Renal section negative       Endo:  - neg endo ROS       Psychiatric:     (+) psychiatric history depression and anxiety      Infectious Disease:  - neg infectious disease ROS       Malignancy:      - no malignancy   Other:    - neg other ROS                 Physical Exam  Normal systems: cardiovascular and dental    Airway   Mallampati: II  TM distance: >3 FB  Neck ROM: full    Dental     Cardiovascular       Pulmonary                     Anesthesia Plan      History & Physical Review  History and physical reviewed and following examination; no interval change.    ASA Status:  2 .    NPO Status:  > 8 hours    Plan for General and ETT with Intravenous and Propofol induction. Maintenance will be TIVA.    PONV prophylaxis:  Ondansetron (or other 5HT-3) and Dexamethasone or Solumedrol  Additional equipment: Videolaryngoscope      Postoperative Care  Postoperative pain management:  IV analgesics and Oral pain medications.      Consents  Anesthetic plan, risks, benefits and alternatives discussed with:  Patient..                          .

## 2017-04-11 NOTE — BRIEF OP NOTE
PreOp Dx: Biliary dyskinesia    PostOp Dx: Same    Procedure: lap radha    Surgeon: VICTOR MANUEL Louis    Anesthesia: VERONICA anglin CRNA    Findings: Gb with adhered omentum    IVF: 1500ml    UOP: n/a    EBL: 100ml      Levi Louis MD

## 2017-04-12 ENCOUNTER — TELEPHONE (OUTPATIENT)
Dept: SURGERY | Facility: CLINIC | Age: 52
End: 2017-04-12

## 2017-04-12 NOTE — TELEPHONE ENCOUNTER
Reason for Call:  Other call back    Detailed comments: pt calling stating she would like Dr. Louis to look at the photos from the scope done yesterday. She states there is a spot on her liver she wants him to look at and let her know if she needs to be seen     Phone Number Patient can be reached at: Home number on file [phone] (home)    Best Time: any     Can we leave a detailed message on this number? YES    Call taken on 4/12/2017 at 9:09 AM by Sydney Gregory

## 2017-04-12 NOTE — OP NOTE
DATE OF PROCEDURE:  04/11/2017      PREOPERATIVE DIAGNOSIS:  Biliary dyskinesia.      POSTOPERATIVE DIAGNOSIS:  Biliary dyskinesia.      PROCEDURE:  Laparoscopic cholecystectomy.      SURGEON:  Levi Louis MD      ASSISTANT:  YAMILEX Burr (needed for expertise in camera operation, retraction, hemostasis and wound closure).      ANESTHESIA:  General.      ANESTHESIOLOGIST:  Izzy Peña CRNA      FINDINGS:  Uninflamed gallbladder scarred to omentum, successfully removed laparoscopically.      INDICATIONS:  Fatou Simental is a 52-year-old female seen in clinic complaining of symptoms consistent with biliary dyskinesia.  She had a HIDA scan done showing a low ejection fraction.      CONSENT:  Risks, benefits, alternatives and complications were discussed with the patient, including the possibility of infection, bleeding or bile leak.  The patient understood and wished to proceed.      PROCEDURE:  The patient was taken to the operating room, placed in supine position.  General endotracheal anesthesia was induced.  Surgical timeout was performed.  Two grams of Ancef were used as perioperative antibiotics.  Her abdomen was cleaned and draped in a sterile manner.  Marcaine 0.25% with epinephrine was used to anesthetize all port sites.  A small subumbilical curvilinear incision was made and the subcutaneous tissues dissected to the fascia.  The fascia was grasped with two Kocher clamps, elevated and opened sharply.  A 12 mm Ita trocar was inserted and carbon dioxide was insufflated to a pressure of 15 mmHg.  Under direct vision, a separate subxiphoid 11 mm trocar was placed as were 2 right-sided 5 mm trocars.  Attention was then turned to the right upper quadrant.  The gallbladder was immediately located was grasped and elevated.  There was quite a bit of adhered omentum scarred down to the gallbladder.  This was taken down using both blunt dissection and electrocautery.  The duodenum was also adhered  to the gallbladder.  The fat surrounding the neck of the gallbladder was dissected free until the cystic duct was located.  It was encircled, clipped twice proximally, once distally and ligated.  In a similar fashion, 2 separate branches used the cystic artery were both clipped and ligated.  The gallbladder was then removed from the liver bed using electrocautery.  There was a moderate amount of bleeding from the liver bed in 2 locations.  These were both controlled with electrocautery and Surgicel.  In addition, a large piece of Surgicel was draped in and around the liver bed and FloSeal was used in and around the radha hepatitis where the larger of the two bleeding spots was.  When complete however, there was no additional bleeding and we had adequate hemostasis.  Finally, all trocars were removed under direct vision and the air allowed to desufflate.  The fascial defect of the subumbilical port was closed using an 0 Vicryl suture in a figure-of-eight fashion, and this was bolstered with a second 0 Vicryl on top of that and reinjected with Marcaine.  All wounds were irrigated with normal saline and the skin closed using 4-0 Vicryl in a subcuticular fashion and dressed with Dermabond.  The patient tolerated the procedure well, was extubated on the table and transferred to the PACU in stable condition.      PLAN:  Following a stable postoperative course, the patient will be discharged home with a regular diet.      ACTIVITY:  No heavy lifting for 1 month.      DISABILITY:  None.      MEDICATIONS:  Prescription written for Percocet.      INSTRUCTIONS:  The patient advised to wash her wounds daily with soap and water.  Follow up with me in 1-2 weeks.      ESTIMATED BLOOD LOSS:  100 mL.      INTRAVENOUS FLUID:  1500 mL.         ROXIE HUA             D: 2017 14:35   T: 2017 23:45   MT: [id number]      Name:     AUGUSTO ROSS   MRN:      [id number]        Account:        [id number]   :       1965           Procedure Date: 04/11/2017      Document: [id number]       cc: BETHANY Kelly MD

## 2017-04-12 NOTE — TELEPHONE ENCOUNTER
I spoke with patient on the phone. Unfortunately, the images from surgery have not been scanned into [facility] yet. Patient will come in tomorrow at 1 PM with her copies of the photos and we will review them together.    Levi Louis MD

## 2017-04-13 ENCOUNTER — OFFICE VISIT (OUTPATIENT)
Dept: SURGERY | Facility: CLINIC | Age: 52
End: 2017-04-13
Payer: COMMERCIAL

## 2017-04-13 VITALS
SYSTOLIC BLOOD PRESSURE: 97 MMHG | DIASTOLIC BLOOD PRESSURE: 57 MMHG | HEART RATE: 80 BPM | TEMPERATURE: 98.5 F | RESPIRATION RATE: 14 BRPM

## 2017-04-13 DIAGNOSIS — K58.9 IRRITABLE BOWEL SYNDROME, UNSPECIFIED TYPE: ICD-10-CM

## 2017-04-13 DIAGNOSIS — R11.2 POST-OPERATIVE NAUSEA AND VOMITING: Primary | ICD-10-CM

## 2017-04-13 DIAGNOSIS — K82.8 BILIARY DYSKINESIA: ICD-10-CM

## 2017-04-13 DIAGNOSIS — Z98.890 POST-OPERATIVE NAUSEA AND VOMITING: Primary | ICD-10-CM

## 2017-04-13 PROCEDURE — 99024 POSTOP FOLLOW-UP VISIT: CPT | Performed by: SURGERY

## 2017-04-13 RX ORDER — ONDANSETRON 4 MG/1
4-8 TABLET, ORALLY DISINTEGRATING ORAL EVERY 8 HOURS PRN
Qty: 20 TABLET | Refills: 1 | Status: SHIPPED | OUTPATIENT
Start: 2017-04-13 | End: 2017-06-26

## 2017-04-13 RX ORDER — POLYETHYLENE GLYCOL 3350 17 G/17G
1 POWDER, FOR SOLUTION ORAL DAILY
Qty: 527 G | Refills: 3 | Status: SHIPPED | OUTPATIENT
Start: 2017-04-13 | End: 2017-06-26

## 2017-04-13 ASSESSMENT — PAIN SCALES - GENERAL: PAINLEVEL: EXTREME PAIN (8)

## 2017-04-13 NOTE — NURSING NOTE
"Chief Complaint   Patient presents with     Surgical Followup     lap radha 4/11/2017       Initial BP 97/57 (BP Location: Left arm, Patient Position: Chair, Cuff Size: Adult Regular)  Pulse 80  Temp 98.5  F (36.9  C) (Oral)  Resp 14 Estimated body mass index is 21.03 kg/(m^2) as calculated from the following:    Height as of 4/11/17: 5' 2.01\" (1.575 m).    Weight as of 4/11/17: 115 lb (52.2 kg).  Medication Reconciliation: complete   Sowmya Villafana LPN    "

## 2017-04-13 NOTE — MR AVS SNAPSHOT
After Visit Summary   4/13/2017    Fatou Simental    MRN: [id number]           Patient Information     Date Of Birth          1965        Visit Information        Provider Department      4/13/2017 1:00 PM Levi Louis MD [facility]        Today's Diagnoses     Post-operative nausea and vomiting    -  1    Irritable bowel syndrome, unspecified type        Biliary dyskinesia           Follow-ups after your visit        Who to contact     If you have questions or need follow up information about today's clinic visit or your schedule please contact [facility] directly at [phone].  Normal or non-critical lab and imaging results will be communicated to you by [employer], letter or phone within 4 business days after the clinic has received the results. If you do not hear from us within 7 days, please contact the clinic through [employer] or phone. If you have a critical or abnormal lab result, we will notify you by phone as soon as possible.  Submit refill requests through [employer] or call your pharmacy and they will forward the refill request to us. Please allow 3 business days for your refill to be completed.          Additional Information About Your Visit        MyChart Information     [employer] gives you secure access to your electronic health record. If you see a primary care provider, you can also send messages to your care team and make appointments. If you have questions, please call your primary care clinic.  If you do not have a primary care provider, please call [phone] and they will assist you.        Care EveryWhere ID     This is your Care EveryWhere ID. This could be used by other organizations to access your [facility] medical records  [id number]        Your Vitals Were     Pulse Temperature Respirations             80 98.5  F (36.9  C) (Oral) 14          Blood Pressure from Last 3 Encounters:   04/13/17 97/57   04/11/17 98/60   03/29/17 110/64     Weight from Last 3 Encounters:   04/11/17 52.2 kg (115 lb)   03/29/17 53.1 kg (117 lb)   03/13/17 54.4 kg (120 lb)              Today, you had the following     No orders found for display         Today's Medication Changes          These changes are accurate as of: 4/13/17  1:28 PM.  If you have any questions, ask your nurse or doctor.               Start taking these medicines.        Dose/Directions    ondansetron 4 MG ODT tab   Commonly known as:  ZOFRAN ODT   Used for:  Post-operative nausea and vomiting   Started by:  Levi Louis MD        Dose:  4-8 mg   Take 1-2 tablets (4-8 mg) by mouth every 8 hours as needed for nausea   Quantity:  20 tablet   Refills:  1            Where to get your medicines      These medications were sent to [facility] [address]  [address] [address]     Phone:  [phone]     ondansetron 4 MG ODT tab    polyethylene glycol powder                Primary Care Provider Office Phone # Fax #    R Chucho Kelyl [phone]       [address] [address]        Thank you!     Thank you for choosing [facility]  for your care. Our goal is always to provide you with excellent care. Hearing back from our patients is one way we can continue to improve our services. Please take a few minutes to complete the written survey that you may receive in the mail after your visit with us. Thank you!             Your Updated Medication List - Protect others around you: Learn how to safely use, store and throw away your medicines at www.disposemymeds.org.          This list is accurate as of: 4/13/17  1:28 PM.  Always use your most recent med list.                   Brand Name Dispense Instructions for use    BENADRYL PO      Take 50 mg by mouth nightly as needed       cetirizine 10 MG tablet    zyrTEC    30 tablet    Take 1 tablet (10 mg) by mouth every evening       estradiol  1 MG tablet    ESTRACE    90 tablet    Take 1.5 tablets (1.5 mg) by mouth daily       gabapentin 100 MG capsule    NEURONTIN    90 capsule    Take 1 capsule (100 mg) by mouth At Bedtime       LORazepam 0.5 MG tablet    ATIVAN    60 tablet    Take 1 tablet by mouth twice daily as needed       multivitamin, therapeutic Tabs tablet      Take 1 tablet by mouth daily       omeprazole 20 MG tablet     90 tablet    Take 1 tablet (20 mg) by mouth daily Take 30-60 minutes before a meal.       ondansetron 4 MG ODT tab    ZOFRAN ODT    20 tablet    Take 1-2 tablets (4-8 mg) by mouth every 8 hours as needed for nausea       oxyCODONE-acetaminophen 5-325 MG per tablet    PERCOCET    45 tablet    Take 1-2 tablets by mouth every 6 hours as needed       polyethylene glycol powder    MIRALAX    527 g    Take 17 g (1 capful) by mouth daily       SUMATRIPTAN SUCCINATE PO      Take 50 mg by mouth every 8 hours as needed for migraine       VITAMIN D3 PO      Take 1 capsule by mouth daily

## 2017-04-13 NOTE — PROGRESS NOTES
52-year-old female now postoperative day 2 following laparoscopic cholecystectomy. Patient has some concerns about the photos that were taken intraoperatively and I assured her that the lesion on her liver was a liver capsule tear from the falciform ligament and nothing to worry about. She does complain of constipation and I have renewed her prescription for MiraLAX also advised her to start a high-fiber diet as well as Metamucil. I wrote her prescription for Zofran. She will follow-up with me next week.    Levi Louis MD

## 2017-04-17 DIAGNOSIS — Z79.890 NEED FOR PROPHYLACTIC HORMONE REPLACEMENT THERAPY (POSTMENOPAUSAL): ICD-10-CM

## 2017-04-17 LAB — COPATH REPORT: NORMAL

## 2017-04-17 NOTE — TELEPHONE ENCOUNTER
Estradiol 1mg      Last Written Prescription Date: 4/10/16  Last Fill Quantity: 90, # refills: 3  Last Office Visit with [facility], [facility] or [facility] prescribing provider: 11/11/16  Next 5 appointments (look out 90 days)     Apr 19, 2017  1:15 PM CDT   [facility] MD   [facility]    [address] [address]   [phone]                   BP Readings from Last 3 Encounters:   04/13/17 97/57   04/11/17 98/60   03/29/17 110/64     Thank You-  Leslie Garcia CPhT  [facility]- [place]

## 2017-04-18 RX ORDER — ESTRADIOL 1 MG/1
1.5 TABLET ORAL DAILY
Qty: 90 TABLET | Refills: 3 | Status: SHIPPED | OUTPATIENT
Start: 2017-04-18 | End: 2017-10-10

## 2017-04-21 ENCOUNTER — OFFICE VISIT (OUTPATIENT)
Dept: SURGERY | Facility: CLINIC | Age: 52
End: 2017-04-21
Payer: COMMERCIAL

## 2017-04-21 VITALS
BODY MASS INDEX: 21.16 KG/M2 | SYSTOLIC BLOOD PRESSURE: 98 MMHG | HEART RATE: 64 BPM | TEMPERATURE: 97.6 F | WEIGHT: 115 LBS | DIASTOLIC BLOOD PRESSURE: 64 MMHG | HEIGHT: 62 IN

## 2017-04-21 DIAGNOSIS — G89.18 POST-OP PAIN: Primary | ICD-10-CM

## 2017-04-21 DIAGNOSIS — K82.8 BILIARY DYSKINESIA: ICD-10-CM

## 2017-04-21 PROCEDURE — 99024 POSTOP FOLLOW-UP VISIT: CPT | Performed by: SURGERY

## 2017-04-21 RX ORDER — OXYCODONE AND ACETAMINOPHEN 5; 325 MG/1; MG/1
1 TABLET ORAL EVERY 4 HOURS PRN
Qty: 30 TABLET | Refills: 0 | Status: SHIPPED | OUTPATIENT
Start: 2017-04-21 | End: 2017-06-26

## 2017-04-21 NOTE — MR AVS SNAPSHOT
"              After Visit Summary   4/21/2017    Fatou Simental    MRN: [id number]           Patient Information     Date Of Birth          1965        Visit Information        Provider Department      4/21/2017 7:30 AM Levi Louis MD [facility]        Today's Diagnoses     Post-op pain    -  1    Biliary dyskinesia          Care Instructions    Per Dr. Louis's instructions        Follow-ups after your visit        Follow-up notes from your care team     Return if symptoms worsen or fail to improve.      Who to contact     If you have questions or need follow up information about today's clinic visit or your schedule please contact [facility] directly at [phone].  Normal or non-critical lab and imaging results will be communicated to you by MyChart, letter or phone within 4 business days after the clinic has received the results. If you do not hear from us within 7 days, please contact the clinic through [employer] or phone. If you have a critical or abnormal lab result, we will notify you by phone as soon as possible.  Submit refill requests through [employer] or call your pharmacy and they will forward the refill request to us. Please allow 3 business days for your refill to be completed.          Additional Information About Your Visit        MyChart Information     [employer] gives you secure access to your electronic health record. If you see a primary care provider, you can also send messages to your care team and make appointments. If you have questions, please call your primary care clinic.  If you do not have a primary care provider, please call [phone] and they will assist you.        Care EveryWhere ID     This is your Care EveryWhere ID. This could be used by other organizations to access your [facility] medical records  [id number]        Your Vitals Were     Pulse Temperature Height BMI (Body Mass Index)          64 97.6  F (36.4  C) (Oral) 1.575 m (5' 2\") 21.03 " kg/m2         Blood Pressure from Last 3 Encounters:   04/21/17 98/64   04/13/17 97/57   04/11/17 98/60    Weight from Last 3 Encounters:   04/21/17 52.2 kg (115 lb)   04/11/17 52.2 kg (115 lb)   03/29/17 53.1 kg (117 lb)              Today, you had the following     No orders found for display         Today's Medication Changes          These changes are accurate as of: 4/21/17  7:42 AM.  If you have any questions, ask your nurse or doctor.               These medicines have changed or have updated prescriptions.        Dose/Directions    * oxyCODONE-acetaminophen 5-325 MG per tablet   Commonly known as:  PERCOCET   This may have changed:  Another medication with the same name was added. Make sure you understand how and when to take each.   Used for:  Post-op pain        Dose:  1-2 tablet   Take 1-2 tablets by mouth every 6 hours as needed   Quantity:  45 tablet   Refills:  0       * oxyCODONE-acetaminophen 5-325 MG per tablet   Commonly known as:  PERCOCET   This may have changed:  You were already taking a medication with the same name, and this prescription was added. Make sure you understand how and when to take each.   Used for:  Post-op pain   Changed by:  Levi Louis MD        Dose:  1 tablet   Take 1 tablet by mouth every 4 hours as needed for pain   Quantity:  30 tablet   Refills:  0       * Notice:  This list has 2 medication(s) that are the same as other medications prescribed for you. Read the directions carefully, and ask your doctor or other care provider to review them with you.         Where to get your medicines      Some of these will need a paper prescription and others can be bought over the counter.  Ask your nurse if you have questions.     Bring a paper prescription for each of these medications     oxyCODONE-acetaminophen 5-325 MG per tablet                Primary Care Provider Office Phone # Fax #    BETHANY Kelly [phone]       [facility] [address]  [facility] [address]        Thank you!     Thank you for choosing [facility]  for your care. Our goal is always to provide you with excellent care. Hearing back from our patients is one way we can continue to improve our services. Please take a few minutes to complete the written survey that you may receive in the mail after your visit with us. Thank you!             Your Updated Medication List - Protect others around you: Learn how to safely use, store and throw away your medicines at www.disposemymeds.org.          This list is accurate as of: 4/21/17  7:42 AM.  Always use your most recent med list.                   Brand Name Dispense Instructions for use    BENADRYL PO      Take 50 mg by mouth nightly as needed       cetirizine 10 MG tablet    zyrTEC    30 tablet    Take 1 tablet (10 mg) by mouth every evening       estradiol 1 MG tablet    ESTRACE    90 tablet    Take 1.5 tablets (1.5 mg) by mouth daily       gabapentin 100 MG capsule    NEURONTIN    90 capsule    Take 1 capsule (100 mg) by mouth At Bedtime       LORazepam 0.5 MG tablet    ATIVAN    60 tablet    Take 1 tablet by mouth twice daily as needed       multivitamin, therapeutic Tabs tablet      Take 1 tablet by mouth daily       omeprazole 20 MG tablet     90 tablet    Take 1 tablet (20 mg) by mouth daily Take 30-60 minutes before a meal.       ondansetron 4 MG ODT tab    ZOFRAN ODT    20 tablet    Take 1-2 tablets (4-8 mg) by mouth every 8 hours as needed for nausea       * oxyCODONE-acetaminophen 5-325 MG per tablet    PERCOCET    45 tablet    Take 1-2 tablets by mouth every 6 hours as needed       * oxyCODONE-acetaminophen 5-325 MG per tablet    PERCOCET    30 tablet    Take 1 tablet by mouth every 4 hours as needed for pain       polyethylene glycol powder    MIRALAX    527 g    Take 17 g (1 capful) by mouth daily       SUMATRIPTAN SUCCINATE PO      Take 50 mg by mouth every 8 hours as needed for migraine       VITAMIN D3  PO      Take 1 capsule by mouth daily       * Notice:  This list has 2 medication(s) that are the same as other medications prescribed for you. Read the directions carefully, and ask your doctor or other care provider to review them with you.

## 2017-04-21 NOTE — PROGRESS NOTES
"No complaints.  Pain controlled with oral pain meds.    Smoking Intervention:  Patient was counseled today on the ill effects of smoking and it's effects on wound healing as well as other post-surgical morbidities.    BP 98/64 (BP Location: Right arm, Patient Position: Chair, Cuff Size: Adult Regular)  Pulse 64  Temp 97.6  F (36.4  C) (Oral)  Ht 1.575 m (5' 2\")  Wt 52.2 kg (115 lb)  BMI 21.03 kg/m2    Exam  OSC1GBS  CTAB  RRR  S&NTND+BS, wounds - cdi s erythema  No CCE    A/P s/p lap cdhole healing well.  No heavy lifting for 2 weeks.  RTC prn.    Levi Louis MD   "

## 2017-04-21 NOTE — NURSING NOTE
"Initial BP 98/64 (BP Location: Right arm, Patient Position: Chair, Cuff Size: Adult Regular)  Pulse 64  Temp 97.6  F (36.4  C) (Oral)  Ht 1.575 m (5' 2\")  Wt 52.2 kg (115 lb)  BMI 21.03 kg/m2 Estimated body mass index is 21.03 kg/(m^2) as calculated from the following:    Height as of this encounter: 1.575 m (5' 2\").    Weight as of this encounter: 52.2 kg (115 lb). .    Sowmya Pike MA    "

## 2017-06-19 DIAGNOSIS — F41.1 GENERALIZED ANXIETY DISORDER: ICD-10-CM

## 2017-06-19 RX ORDER — LORAZEPAM 0.5 MG/1
TABLET ORAL
Qty: 60 TABLET | Refills: 5 | Status: SHIPPED | OUTPATIENT
Start: 2017-06-19 | End: 2017-10-10

## 2017-06-19 NOTE — TELEPHONE ENCOUNTER
Ativan 0.5mg      Last Written Prescription Date:  11/28/16  Last Fill Quantity: 60,   # refills: 4  Last Office Visit with [facility], [facility] or [facility] prescribing provider: 3/13/17  Future Office visit:       Routing refill request to provider for review/approval because:  Drug not on the [facility], [facility] or [facility] refill protocol or controlled substance    Thank You-  Leslie Weber, [facility]- [place]

## 2017-06-26 ENCOUNTER — OFFICE VISIT (OUTPATIENT)
Dept: FAMILY MEDICINE | Facility: CLINIC | Age: 52
End: 2017-06-26
Payer: COMMERCIAL

## 2017-06-26 VITALS
DIASTOLIC BLOOD PRESSURE: 62 MMHG | WEIGHT: 117 LBS | HEART RATE: 64 BPM | BODY MASS INDEX: 21.53 KG/M2 | HEIGHT: 62 IN | SYSTOLIC BLOOD PRESSURE: 100 MMHG | RESPIRATION RATE: 18 BRPM

## 2017-06-26 DIAGNOSIS — H81.11 BENIGN PAROXYSMAL POSITIONAL VERTIGO OF RIGHT EAR: Primary | ICD-10-CM

## 2017-06-26 PROCEDURE — 99213 OFFICE O/P EST LOW 20 MIN: CPT | Performed by: FAMILY MEDICINE

## 2017-06-26 RX ORDER — MECLIZINE HYDROCHLORIDE 25 MG/1
25 TABLET ORAL EVERY 6 HOURS PRN
Qty: 30 TABLET | Refills: 1 | Status: SHIPPED | OUTPATIENT
Start: 2017-06-26 | End: 2018-08-03

## 2017-06-26 NOTE — NURSING NOTE
"Chief Complaint   Patient presents with     Dizziness       Initial BP 96/62  Pulse 63  Resp 18  Ht 5' 2\" (1.575 m)  Wt 117 lb (53.1 kg)  Breastfeeding? No  BMI 21.4 kg/m2 Estimated body mass index is 21.4 kg/(m^2) as calculated from the following:    Height as of this encounter: 5' 2\" (1.575 m).    Weight as of this encounter: 117 lb (53.1 kg).  Medication Reconciliation: complete    "

## 2017-06-26 NOTE — LETTER
My Depression Action Plan  Name: Fatou Simental   Date of Birth 1965  Date: 6/26/2017    My doctor: BETHANY Kelly   My clinic: [facility]  [address]  [facility] [address]  [phone]          GREEN    ZONE   Good Control    What it looks like:     Things are going generally well. You have normal up s and down s. You may even feel depressed from time to time, but bad moods usually last less than a day.   What you need to do:  1. Continue to care for yourself (see self care plan)  2. Check your depression survival kit and update it as needed  3. Follow your physician s recommendations including any medication.  4. Do not stop taking medication unless you consult with your physician first.           YELLOW         ZONE Getting Worse    What it looks like:     Depression is starting to interfere with your life.     It may be hard to get out of bed; you may be starting to isolate yourself from others.    Symptoms of depression are starting to last most all day and this has happened for several days.     You may have suicidal thoughts but they are not constant.   What you need to do:     1. Call your care team, your response to treatment will improve if you keep your care team informed of your progress. Yellow periods are signs an adjustment may need to be made.     2. Continue your self-care, even if you have to fake it!    3. Talk to someone in your support network    4. Open up your depression survival kit           RED    ZONE Medical Alert - Get Help    What it looks like:     Depression is seriously interfering with your life.     You may experience these or other symptoms: You can t get out of bed most days, can t work or engage in other necessary activities, you have trouble taking care of basic hygiene, or basic responsibilities, thoughts of suicide or death that will not go away, self-injurious behavior.     What you need to do:  1. Call your care team  and request a same-day appointment. If they are not available (weekends or after hours) call your local crisis line, emergency room or 911.      Electronically signed by: Maday Thomas, June 26, 2017    Depression Self Care Plan / Survival Kit    Self-Care for Depression  Here s the deal. Your body and mind are really not as separate as most people think.  What you do and think affects how you feel and how you feel influences what you do and think. This means if you do things that people who feel good do, it will help you feel better.  Sometimes this is all it takes.  There is also a place for medication and therapy depending on how severe your depression is, so be sure to consult with your medical provider and/ or Behavioral Health Consultant if your symptoms are worsening or not improving.     In order to better manage my stress, I will:    Exercise  Get some form of exercise, every day. This will help reduce pain and release endorphins, the  feel good  chemicals in your brain. This is almost as good as taking antidepressants!  This is not the same as joining a gym and then never going! (they count on that by the way ) It can be as simple as just going for a walk or doing some gardening, anything that will get you moving.      Hygiene   Maintain good hygiene (Get out of bed in the morning, Make your bed, Brush your teeth, Take a shower, and Get dressed like you were going to work, even if you are unemployed).  If your clothes don't fit try to get ones that do.    Diet  I will strive to eat foods that are good for me, drink plenty of water, and avoid excessive sugar, caffeine, alcohol, and other mood-altering substances.  Some foods that are helpful in depression are: complex carbohydrates, B vitamins, flaxseed, fish or fish oil, fresh fruits and vegetables.    Psychotherapy  I agree to participate in Individual Therapy (if recommended).    Medication  If prescribed medications, I agree to take them.  Missing  doses can result in serious side effects.  I understand that drinking alcohol, or other illicit drug use, may cause potential side effects.  I will not stop my medication abruptly without first discussing it with my provider.    Staying Connected With Others  I will stay in touch with my friends, family members, and my primary care provider/team.    Use your imagination  Be creative.  We all have a creative side; it doesn t matter if it s oil painting, sand castles, or mud pies! This will also kick up the endorphins.    Witness Beauty  (AKA stop and smell the roses) Take a look outside, even in mid-winter. Notice colors, textures. Watch the squirrels and birds.     Service to others  Be of service to others.  There is always someone else in need.  By helping others we can  get out of ourselves  and remember the really important things.  This also provides opportunities for practicing all the other parts of the program.    Humor  Laugh and be silly!  Adjust your TV habits for less news and crime-drama and more comedy.    Control your stress  Try breathing deep, massage therapy, biofeedback, and meditation. Find time to relax each day.     My support system    Clinic Contact:  Phone number:    Contact 1:  Phone number:    Contact 2:  Phone number:    Voodoo/:  Phone number:    Therapist:  Phone number:    Local crisis center:    Phone number:    Other community support:  Phone number:

## 2017-06-26 NOTE — MR AVS SNAPSHOT
After Visit Summary   6/26/2017    Fatou Simental    MRN: [id number]           Patient Information     Date Of Birth          1965        Visit Information        Provider Department      6/26/2017 12:40 PM Sowmya Sylvester MD [facility][note 3]        Today's Diagnoses     Benign paroxysmal positional vertigo of right ear    -  1      Care Instructions               Positional Vertigo        What is positional vertigo?   Positional vertigo is an inner ear problem. It causes brief but sometimes severe feelings of spinning. Some people feel that their head or body is spinning. Others feel the room is spinning. People often say they are dizzy, but dizzy is a very general term. Vertigo, on the other hand, is the very specific feeling of uncontrollable spinning.   Symptoms of positional vertigo happen suddenly when you change the position of your head.   How does it occur?   In the inner part of your ear are 3 semicircular canals. Movement of the fluid in these canals helps your brain maintain your balance and know what position you are in (for example, standing up, lying down, or standing on your head).   Sometimes small crystals of calcium develop and float in the fluid in the inner ear. This can happen after a head injury, with a severe cold, or simply as a part of normal aging. The crystals can cause vertigo when you change head position and they strike against nerve endings in the semicircular canals. Usually the calcium crystals dissolve in a few weeks and stop causing vertigo. However, sometimes the crystals do not dissolve and the vertigo returns from time to time.   What are the symptoms?   A sudden feeling that you are spinning, or that the room is spinning, is the main symptom. You may feel the vertigo when you first wake up. It may seem that any turn of your head brings on brief but intense spells of vertigo. It may happen when you tilt your head, look up or down, or roll  over in bed.   You may have nausea and vomiting along with the vertigo. Even if a spell of vertigo is brief, you may have a feeling of queasiness for several minutes or even hours afterward.   How is it diagnosed?   Your healthcare provider will ask about your symptoms and examine you. You may also be given a Gillian-Hallpike position test.   You start the Gillian-Hallpike test by sitting upright on the examining table. Your healthcare provider slowly brings your head down over the edge of the table and turns your head to one side. If you have positional vertigo, your provider will see your eyes making fast, jerky movements called nystagmus. If no nystagmus is seen, your provider will repeat the test, this time turning your head to the opposite side, to test the other inner ear. If you then have nystagmus and vertigo, the ear that is pointing toward the floor is the one causing the problem. The nystagmus and vertigo will slow down and stop after 15 to 20 seconds. If you do not move your head, no more symptoms will occur. When you sit back up, you will have vertigo again, but for a shorter time.   Other tests you may have are:   an ear exam   an audiogram to check your hearing   a test of your nerve responses   an electronystagmogram (ENG) test.   How is it treated?   Mild vertigo is often treated with medicine. The most common medicine for this problem is meclizine. It is taken up to 4 times a day for the vertigo and nausea or vomiting. One of the problems with this medicine is that it causes drowsiness. This is not as much of a problem if you have severe vertigo, which usually requires bed rest. Then the medicine can help you sleep and get relief from the vertigo while you sleep.   Your healthcare provider may recommend techniques that use gravity to move the crystals away from the nerve endings into an area of the inner ear that won't cause any problems. These are called repositioning techniques.   One repositioning  technique is the Epley maneuver. It can be very helpful. Your healthcare provider will move your head into 4 positions. You will hold each position for about 30 seconds.   Your healthcare provider may also suggest that you do Degroot-Daroff exercises. Your provider may recommend that you do these exercises 3 times a day for 2 weeks. To do these exercises:   Start by sitting upright on your bed.   Lie on your left side, with your head angled upward about intermediate. (Imagine that you are looking at the head of someone standing about 6 feet in front of you.) Stay in this position for 30 seconds, or, if you are having vertigo, until the vertigo stops.   Return to the sitting position for 30 seconds.   Lie on your right side, and follow the same routine.   Your healthcare provider may refer you to a physical therapist to learn and practice these repositioning techniques.   Rarely, when repositioning techniques don't help and the vertigo has not gone away after a few weeks, severe cases may eventually require surgery.   How long will the effects last?   Even without treatment, positional vertigo usually goes away within several weeks. Sometimes it recurs despite treatment.   How do I take care of myself?   If your vertigo is mild, you may be able to continue your usual activities, especially if you have opportunities to sit and rest when you have vertigo.   If your vertigo does not allow you to continue your usual routine, you should rest at home.   Use medicine as prescribed by your healthcare provider to help stop symptoms of dizziness, nausea, and vomiting.   Follow your instructions for using the repositioning techniques.   Do not try to drive, operate tools or machinery, or do other tasks, even cooking, that could endanger yourself or others if you suddenly become dizzy.   Follow your healthcare provider's recommendations for follow-up visits.   Contact your healthcare provider if:   Your symptoms seem to be getting  worse, more frequent, or longer lasting.   You develop new symptoms, such as a loss of hearing or severe headache.     Published by [employer].  This content is reviewed periodically and is subject to change as new health information becomes available. The information is intended to inform and educate and is not a replacement for medical evaluation, advice, diagnosis or treatment by a healthcare professional.   Developed by [employer].   ? 2010 [facility] and/or its affiliates. All Rights Reserved.   Copyright   [employer] Reference Systems 2011  Adult Health Advisor            Thank you for choosing [facility].  You may be receiving a survey in the mail from [employer] regarding your visit today.  Please take a few minutes to complete and return the survey to let us know how we are doing.      Our Clinic hours are:  Mondays    7:20 am - 7 pm  Tues -  Fri  7:20 am - 5 pm    Clinic Phone: [phone]    The clinic lab opens at 7:30 am Mon - Fri and appointments are required.    [facility]. [phone]  Monday-Thursday 8 am - 7pm  Tues/Wed/Fri 8 am - 5:30 pm                 Follow-ups after your visit        Additional Services     PHYSICAL THERAPY REFERRAL       *This therapy referral will be filtered to a centralized scheduling office at [facility] and the patient will receive a call to schedule an appointment at a [facility] location most convenient for them. *     [facility] provides Physical Therapy evaluation and treatment and many specialty services across the [facility].  If requesting a specialty program, please choose from the list below.    If you have not heard from the scheduling office within 2 business days, please call [phone] for all locations, with the exception of [place], please call [phone].  Treatment: Evaluation & Treatment  Special Instructions/Modalities:    Special Programs: Balance/Vestibular    Please  "be aware that coverage of these services is subject to the terms and limitations of your health insurance plan.  Call member services at your health plan with any benefit or coverage questions.      **Note to Provider:  If you are referring outside of [place] for the therapy appointment, please list the name of the location in the \"special instructions\" above, print the referral and give to the patient to schedule the appointment.                  Who to contact     If you have questions or need follow up information about today's clinic visit or your schedule please contact [facility] directly at [phone].  Normal or non-critical lab and imaging results will be communicated to you by [employer], letter or phone within 4 business days after the clinic has received the results. If you do not hear from us within 7 days, please contact the clinic through [employer] or phone. If you have a critical or abnormal lab result, we will notify you by phone as soon as possible.  Submit refill requests through [employer] or call your pharmacy and they will forward the refill request to us. Please allow 3 business days for your refill to be completed.          Additional Information About Your Visit        MyChart Information     [employer] gives you secure access to your electronic health record. If you see a primary care provider, you can also send messages to your care team and make appointments. If you have questions, please call your primary care clinic.  If you do not have a primary care provider, please call [phone] and they will assist you.        Care EveryWhere ID     This is your Care EveryWhere ID. This could be used by other organizations to access your [place] medical records  [id number]        Your Vitals Were     Pulse Respirations Height Breastfeeding? BMI (Body Mass Index)       63 18 5' 2\" (1.575 m) No 21.4 kg/m2        Blood Pressure from Last 3 Encounters:   06/26/17 96/62   04/21/17 98/64 "   04/13/17 97/57    Weight from Last 3 Encounters:   06/26/17 117 lb (53.1 kg)   04/21/17 115 lb (52.2 kg)   04/11/17 115 lb (52.2 kg)              We Performed the Following     DEPRESSION ACTION PLAN (DAP)     PHYSICAL THERAPY REFERRAL          Today's Medication Changes          These changes are accurate as of: 6/26/17  1:08 PM.  If you have any questions, ask your nurse or doctor.               Start taking these medicines.        Dose/Directions    meclizine 25 MG tablet   Commonly known as:  ANTIVERT   Used for:  Benign paroxysmal positional vertigo of right ear   Started by:  Sowmya Sylvester MD        Dose:  25 mg   Take 1 tablet (25 mg) by mouth every 6 hours as needed for dizziness   Quantity:  30 tablet   Refills:  1            Where to get your medicines      These medications were sent to [address] [address]     Phone:  [phone]     meclizine 25 MG tablet                Primary Care Provider Office Phone # Fax #    R Chucho Kelly [phone]       [address] [address]        Equal Access to Services     LAURA SHARIF AH: Hadii katelyn ku hadasho Soomaali, waaxda luqadaha, qaybta kaalmada adeegyada, waxay idiin haykarlan devonte farias. So [facility] [phone].    ATENCIÓN: Si habla español, tiene a saldaña disposición servicios gratuitos de asistencia lingüística. Llame al [phone].    We comply with applicable federal civil rights laws and [place] laws. We do not discriminate on the basis of race, color, national origin, age, disability sex, sexual orientation or gender identity.            Thank you!     Thank you for choosing [facility]  for your care. Our goal is always to provide you with excellent care. Hearing back from our patients is one way we can continue to improve our services. Please take a few minutes to  complete the written survey that you may receive in the mail after your visit with us. Thank you!             Your Updated Medication List - Protect others around you: Learn how to safely use, store and throw away your medicines at www.disposemymeds.org.          This list is accurate as of: 6/26/17  1:08 PM.  Always use your most recent med list.                   Brand Name Dispense Instructions for use Diagnosis    BENADRYL PO      Take 50 mg by mouth nightly as needed        cetirizine 10 MG tablet    zyrTEC    30 tablet    Take 1 tablet (10 mg) by mouth every evening    Seasonal allergic rhinitis       estradiol 1 MG tablet    ESTRACE    90 tablet    Take 1.5 tablets (1.5 mg) by mouth daily    Need for prophylactic hormone replacement therapy (postmenopausal)       gabapentin 100 MG capsule    NEURONTIN    90 capsule    Take 1 capsule (100 mg) by mouth At Bedtime    Vulvodynia       LORazepam 0.5 MG tablet    ATIVAN    60 tablet    Take 1 tablet by mouth twice daily as needed    Generalized anxiety disorder       meclizine 25 MG tablet    ANTIVERT    30 tablet    Take 1 tablet (25 mg) by mouth every 6 hours as needed for dizziness    Benign paroxysmal positional vertigo of right ear       multivitamin, therapeutic Tabs tablet      Take 1 tablet by mouth daily        omeprazole 20 MG tablet     90 tablet    Take 1 tablet (20 mg) by mouth daily Take 30-60 minutes before a meal.    Gastroesophageal reflux disease without esophagitis       VITAMIN D3 PO      Take 1 capsule by mouth daily

## 2017-06-26 NOTE — PATIENT INSTRUCTIONS
Positional Vertigo        What is positional vertigo?   Positional vertigo is an inner ear problem. It causes brief but sometimes severe feelings of spinning. Some people feel that their head or body is spinning. Others feel the room is spinning. People often say they are dizzy, but dizzy is a very general term. Vertigo, on the other hand, is the very specific feeling of uncontrollable spinning.   Symptoms of positional vertigo happen suddenly when you change the position of your head.   How does it occur?   In the inner part of your ear are 3 semicircular canals. Movement of the fluid in these canals helps your brain maintain your balance and know what position you are in (for example, standing up, lying down, or standing on your head).   Sometimes small crystals of calcium develop and float in the fluid in the inner ear. This can happen after a head injury, with a severe cold, or simply as a part of normal aging. The crystals can cause vertigo when you change head position and they strike against nerve endings in the semicircular canals. Usually the calcium crystals dissolve in a few weeks and stop causing vertigo. However, sometimes the crystals do not dissolve and the vertigo returns from time to time.   What are the symptoms?   A sudden feeling that you are spinning, or that the room is spinning, is the main symptom. You may feel the vertigo when you first wake up. It may seem that any turn of your head brings on brief but intense spells of vertigo. It may happen when you tilt your head, look up or down, or roll over in bed.   You may have nausea and vomiting along with the vertigo. Even if a spell of vertigo is brief, you may have a feeling of queasiness for several minutes or even hours afterward.   How is it diagnosed?   Your healthcare provider will ask about your symptoms and examine you. You may also be given a Gillian-Hallpike position test.   You start the Napier-Hallpike test by sitting upright on  the examining table. Your healthcare provider slowly brings your head down over the edge of the table and turns your head to one side. If you have positional vertigo, your provider will see your eyes making fast, jerky movements called nystagmus. If no nystagmus is seen, your provider will repeat the test, this time turning your head to the opposite side, to test the other inner ear. If you then have nystagmus and vertigo, the ear that is pointing toward the floor is the one causing the problem. The nystagmus and vertigo will slow down and stop after 15 to 20 seconds. If you do not move your head, no more symptoms will occur. When you sit back up, you will have vertigo again, but for a shorter time.   Other tests you may have are:   an ear exam   an audiogram to check your hearing   a test of your nerve responses   an electronystagmogram (ENG) test.   How is it treated?   Mild vertigo is often treated with medicine. The most common medicine for this problem is meclizine. It is taken up to 4 times a day for the vertigo and nausea or vomiting. One of the problems with this medicine is that it causes drowsiness. This is not as much of a problem if you have severe vertigo, which usually requires bed rest. Then the medicine can help you sleep and get relief from the vertigo while you sleep.   Your healthcare provider may recommend techniques that use gravity to move the crystals away from the nerve endings into an area of the inner ear that won't cause any problems. These are called repositioning techniques.   One repositioning technique is the Epley maneuver. It can be very helpful. Your healthcare provider will move your head into 4 positions. You will hold each position for about 30 seconds.   Your healthcare provider may also suggest that you do Degroot-Daroff exercises. Your provider may recommend that you do these exercises 3 times a day for 2 weeks. To do these exercises:   Start by sitting upright on your bed.    Lie on your left side, with your head angled upward about snf. (Imagine that you are looking at the head of someone standing about 6 feet in front of you.) Stay in this position for 30 seconds, or, if you are having vertigo, until the vertigo stops.   Return to the sitting position for 30 seconds.   Lie on your right side, and follow the same routine.   Your healthcare provider may refer you to a physical therapist to learn and practice these repositioning techniques.   Rarely, when repositioning techniques don't help and the vertigo has not gone away after a few weeks, severe cases may eventually require surgery.   How long will the effects last?   Even without treatment, positional vertigo usually goes away within several weeks. Sometimes it recurs despite treatment.   How do I take care of myself?   If your vertigo is mild, you may be able to continue your usual activities, especially if you have opportunities to sit and rest when you have vertigo.   If your vertigo does not allow you to continue your usual routine, you should rest at home.   Use medicine as prescribed by your healthcare provider to help stop symptoms of dizziness, nausea, and vomiting.   Follow your instructions for using the repositioning techniques.   Do not try to drive, operate tools or machinery, or do other tasks, even cooking, that could endanger yourself or others if you suddenly become dizzy.   Follow your healthcare provider's recommendations for follow-up visits.   Contact your healthcare provider if:   Your symptoms seem to be getting worse, more frequent, or longer lasting.   You develop new symptoms, such as a loss of hearing or severe headache.     Published by [employer].  This content is reviewed periodically and is subject to change as new health information becomes available. The information is intended to inform and educate and is not a replacement for medical evaluation, advice, diagnosis or treatment by a healthcare  professional.   Developed by [employer].   ? 2010 [employer] and/or its affiliates. All Rights Reserved.   Copyright   Clinical Reference Systems 2011  Adult Health Advisor            Thank you for choosing [facility].  You may be receiving a survey in the mail from [employer] regarding your visit today.  Please take a few minutes to complete and return the survey to let us know how we are doing.      Our Clinic hours are:  Mondays    7:20 am - 7 pm  Tues -  Fri  7:20 am - 5 pm    Clinic Phone: [phone]    The clinic lab opens at 7:30 am Mon - Fri and appointments are required.    [facility]  Ph. [phone]  Monday-Thursday 8 am - 7pm  Tues/Wed/Fri 8 am - 5:30 pm

## 2017-06-26 NOTE — PROGRESS NOTES
"  SUBJECTIVE:                                                    Fatou Simental is a 52 year old female who presents to clinic today for the following health issues:      Dizziness  Onset: started a couple hours ago    Description:   Do you feel faint:  YES  Does it feel like the surroundings (bed, room) are moving: YES  Unsteady/off balance: YES  Have you passed out or fallen: no     Intensity: moderate    Progression of Symptoms:  worsening    Accompanying Signs & Symptoms:  Heart palpitations: YES- on and off  Nausea, vomiting: no   Weakness in arms or legs: no   Fatigue: YES  Vision or speech changes: no   Ringing in ears (Tinnitus): no   Has a sore in right ear X5 days that will have drainage and itching.  Hearing Loss: no     History:   Head trauma/concussion hx: yes - yesterday had a metal lid fall from a shelf 3 feet above her head and landed on the top of her head.  No LOC, sore on the top her head today.    Previous similar symptoms: no   Recent bleeding history: no     Precipitating factors:   Worse with activity or head movement: YES  Any new medications (BP?): no   Alcohol/drug abuse/withdrawal: no     Alleviating factors:   Does staying in a fixed position give relief:  Some what      BP 96/62  Pulse 63  Resp 18  Ht 5' 2\" (1.575 m)  Wt 117 lb (53.1 kg)  Breastfeeding? No  BMI 21.4 kg/m2  EXAM: GENERAL APPEARANCE: Alert, no acute distress  HENT: right TM normal, left TM normal, ear canal: normal.      RESP: lungs clear to auscultation   CV: normal rate, regular rhythm, no murmur or gallop  ABDOMEN: soft, no organomegaly, masses or tenderness  NEURO: Alert, oriented, speech and mentation normal, Cranial nerves 2-12 are normal., Strength normal and symmetrical in upper and lower extremities., Reflexes 2+ and symmetrical at biceps, triceps, brachioradialis, knees and ankles, rhomberg negative.   +Gillian hallpike to the right      ASSESSMENT/PLAN:      ICD-10-CM    1. Benign paroxysmal positional " vertigo of right ear H81.11 PHYSICAL THERAPY REFERRAL     meclizine (ANTIVERT) 25 MG tablet     For many people this type of dizziness is self-limited over a period of days or weeks.  Antivert may help but does not treat the problem itself.  Canalith repositioning can solve this problem often in one visit.  A referral to physical therapy along with a summary of the problem were reviewed with the patient.  If the dizziness persists a radiographic work-up or electronystagmogram can be pursued.    Sowmya Sylvester M.D.      Patient Instructions              Positional Vertigo        What is positional vertigo?   Positional vertigo is an inner ear problem. It causes brief but sometimes severe feelings of spinning. Some people feel that their head or body is spinning. Others feel the room is spinning. People often say they are dizzy, but dizzy is a very general term. Vertigo, on the other hand, is the very specific feeling of uncontrollable spinning.   Symptoms of positional vertigo happen suddenly when you change the position of your head.   How does it occur?   In the inner part of your ear are 3 semicircular canals. Movement of the fluid in these canals helps your brain maintain your balance and know what position you are in (for example, standing up, lying down, or standing on your head).   Sometimes small crystals of calcium develop and float in the fluid in the inner ear. This can happen after a head injury, with a severe cold, or simply as a part of normal aging. The crystals can cause vertigo when you change head position and they strike against nerve endings in the semicircular canals. Usually the calcium crystals dissolve in a few weeks and stop causing vertigo. However, sometimes the crystals do not dissolve and the vertigo returns from time to time.   What are the symptoms?   A sudden feeling that you are spinning, or that the room is spinning, is the main symptom. You may feel the vertigo when you first wake  up. It may seem that any turn of your head brings on brief but intense spells of vertigo. It may happen when you tilt your head, look up or down, or roll over in bed.   You may have nausea and vomiting along with the vertigo. Even if a spell of vertigo is brief, you may have a feeling of queasiness for several minutes or even hours afterward.   How is it diagnosed?   Your healthcare provider will ask about your symptoms and examine you. You may also be given a Gillian-Hallpike position test.   You start the Gillian-Hallpike test by sitting upright on the examining table. Your healthcare provider slowly brings your head down over the edge of the table and turns your head to one side. If you have positional vertigo, your provider will see your eyes making fast, jerky movements called nystagmus. If no nystagmus is seen, your provider will repeat the test, this time turning your head to the opposite side, to test the other inner ear. If you then have nystagmus and vertigo, the ear that is pointing toward the floor is the one causing the problem. The nystagmus and vertigo will slow down and stop after 15 to 20 seconds. If you do not move your head, no more symptoms will occur. When you sit back up, you will have vertigo again, but for a shorter time.   Other tests you may have are:   an ear exam   an audiogram to check your hearing   a test of your nerve responses   an electronystagmogram (ENG) test.   How is it treated?   Mild vertigo is often treated with medicine. The most common medicine for this problem is meclizine. It is taken up to 4 times a day for the vertigo and nausea or vomiting. One of the problems with this medicine is that it causes drowsiness. This is not as much of a problem if you have severe vertigo, which usually requires bed rest. Then the medicine can help you sleep and get relief from the vertigo while you sleep.   Your healthcare provider may recommend techniques that use gravity to move the crystals  away from the nerve endings into an area of the inner ear that won't cause any problems. These are called repositioning techniques.   One repositioning technique is the Epley maneuver. It can be very helpful. Your healthcare provider will move your head into 4 positions. You will hold each position for about 30 seconds.   Your healthcare provider may also suggest that you do Degroot-Daroff exercises. Your provider may recommend that you do these exercises 3 times a day for 2 weeks. To do these exercises:   Start by sitting upright on your bed.   Lie on your left side, with your head angled upward about shelter. (Imagine that you are looking at the head of someone standing about 6 feet in front of you.) Stay in this position for 30 seconds, or, if you are having vertigo, until the vertigo stops.   Return to the sitting position for 30 seconds.   Lie on your right side, and follow the same routine.   Your healthcare provider may refer you to a physical therapist to learn and practice these repositioning techniques.   Rarely, when repositioning techniques don't help and the vertigo has not gone away after a few weeks, severe cases may eventually require surgery.   How long will the effects last?   Even without treatment, positional vertigo usually goes away within several weeks. Sometimes it recurs despite treatment.   How do I take care of myself?   If your vertigo is mild, you may be able to continue your usual activities, especially if you have opportunities to sit and rest when you have vertigo.   If your vertigo does not allow you to continue your usual routine, you should rest at home.   Use medicine as prescribed by your healthcare provider to help stop symptoms of dizziness, nausea, and vomiting.   Follow your instructions for using the repositioning techniques.   Do not try to drive, operate tools or machinery, or do other tasks, even cooking, that could endanger yourself or others if you suddenly become dizzy.    Follow your healthcare provider's recommendations for follow-up visits.   Contact your healthcare provider if:   Your symptoms seem to be getting worse, more frequent, or longer lasting.   You develop new symptoms, such as a loss of hearing or severe headache.     Published by [employer].  This content is reviewed periodically and is subject to change as new health information becomes available. The information is intended to inform and educate and is not a replacement for medical evaluation, advice, diagnosis or treatment by a healthcare professional.   Developed by [employer].   ? 2010 [employer] and/or its affiliates. All Rights Reserved.   Copyright   Clinical Reference Systems 2011  Adult Health Advisor            Thank you for choosing [facility].  You may be receiving a survey in the mail from [employer] regarding your visit today.  Please take a few minutes to complete and return the survey to let us know how we are doing.      Our Clinic hours are:  Mondays    7:20 am - 7 pm  Tues -  Fri  7:20 am - 5 pm    Clinic Phone: [phone]    The clinic lab opens at 7:30 am Mon - Fri and appointments are required.    [facility]. [phone]  Monday-Thursday 8 am - 7pm  Tues/Wed/Fri 8 am - 5:30 pm

## 2017-06-27 ENCOUNTER — HOSPITAL ENCOUNTER (OUTPATIENT)
Dept: PHYSICAL THERAPY | Facility: CLINIC | Age: 52
Setting detail: THERAPIES SERIES
End: 2017-06-27
Attending: FAMILY MEDICINE
Payer: COMMERCIAL

## 2017-06-27 PROCEDURE — 97112 NEUROMUSCULAR REEDUCATION: CPT | Mod: GP | Performed by: PHYSICAL THERAPIST

## 2017-06-27 PROCEDURE — 97161 PT EVAL LOW COMPLEX 20 MIN: CPT | Mod: GP | Performed by: PHYSICAL THERAPIST

## 2017-06-27 PROCEDURE — 40000840 ZZHC STATISTIC PT VESTIBULAR VISIT: Performed by: PHYSICAL THERAPIST

## 2017-06-27 ASSESSMENT — PATIENT HEALTH QUESTIONNAIRE - PHQ9: SUM OF ALL RESPONSES TO PHQ QUESTIONS 1-9: 0

## 2017-06-27 NOTE — PROGRESS NOTES
"Fatou TINAJERO Hola  1965 Physical Therapy Initial Evaluation  06/27/17 0700   Quick Adds   Quick Adds Vestibular Eval   General Information   Start of Care Date 06/27/17   Referring Physician Sowmya Sylvester   Orders Evaluate and Treat as Indicated   Order Date 06/26/17   Medical Diagnosis Benign paroxysmal positional vertigo of right ear    Onset of illness/injury or Date of Surgery 06/25/17   Precautions/Limitations no known precautions/limitations   Surgical/Medical history reviewed Yes   Pertinent history of current vestibular problem (include personal factors and/or comorbidities that impact the POC)  Anxiety;Depression   Pertinent history of current problem (include personal factors and/or comorbidities that impact the POC) Started having dizziness yesterday. \"Feels kind of wavy.\" Had dizziness after got to work. Got out of a car and \"things were off\". Feels very unbalanced. Has been moving lately and a lid from a pot hit her on the head 2 days ago. Turning head fast or putting shoes on will increase dizziness. Laying down and sitting up will increase dizziness. Going from sit to stand will also increase dizziness. / Comorbidities - Mild major depression, Generalized anxiety   Prior level of function comment Ind   Patient role/Employment history Employed  (Cleaning)   Living environment [place]   Patient/Family Goals Statement Work without dizziness   Fall Risk Screen   Fall screen completed by PT   Per patient - Fall 2 or more times in past year? No   Per patient - Fall with injury in past year? No   Timed Up and Go score (seconds) 9   Is patient a fall risk? No   System Outcome Measures   Outcome Measures BPPV   Dizziness Handicap Inventory (score out of 100) A decrease in score by 17.18 or greater indicates a clinically significant change in symptoms. 61   Pain   Patient currently in pain No   Cognitive Status Examination   Orientation orientation to person, place and time   Level of Consciousness " alert   Follows Commands and Answers Questions 100% of the time   Personal Safety and Judgment intact   Memory intact   Posture   Posture Other   Posture Comments In sitting - Body sways with head movements   Balance   Balance Comments mCTSIB - Condition 1 - Normal / Condition 2 - Normal / Condition 4 - Normal / Condition 5 - Able to perform for 30 seconds with increased sway   Cervicogenic Screen   Neck ROM Symmetrical B   Vertebral Artery Test Abnormal  (Slight increase in dizziness)   Alar Ligament Test Normal   Transverse Ligament Test Normal   Neck Torsion Test (head still, body rotating) Abnormal   Neck Torsion Test (head still, body rotating) Comments Increase in dizziness   Neck Torsion Test (head and body rotating) Abnormal   Neck Torsion Test (head and body rotating) Comments Increase in dizziness   Oculomotor Exam   Smooth Pursuit Normal   Saccades Abnormal   Saccades Comments Corrective saccade when looks to right   VOR Abnormal   VOR Comments Corrective saccades with head movement to left and right   VOR Cancellation Normal   VOR Cancellation Comments Corrective saccades with head movement to left and right   Rapid Head Thrust Other   Rapid Head Thrust Comments Corrective saccade with head thrust to right and left   Convergence Testing Normal   Infrared Goggle Exam or Frenzel Lense Exam   Vestibular Suppressant in Last 24 Hours? No   Exam completed with Room Light   Spontaneous Nystagmus Negative   Gaze Evoked Nystagmus Other   Gaze Evoked Nystagmus comments Difficulty focusing on left - Eye movement back to center   Dynamic Visual Acuity (DVA)   Static Acuity (LogMar) Line 10   Horizontal Head Movement at 1 Hz (LogMar) Line 8   Modality Interventions   Planned Modality Interventions Comments As needed   Planned Therapy Interventions   Planned Therapy Interventions balance training;gait training;neuromuscular re-education;visual perception;manual therapy;stretching;strengthening;ROM;joint mobilization    Clinical Impression   Criteria for Skilled Therapeutic Interventions Met yes, treatment indicated   PT Diagnosis Dizziness resulting in difficulty changing position and turning head.   Influenced by the following impairments Dizziness   Functional limitations due to impairments Difficulty standing, working, putting shoes on   Clinical Presentation Evolving/Changing   Clinical Presentation Rationale 2 Comorbidities impacting PT / 1 body system / Evolving   Clinical Decision Making (Complexity) Low complexity   Therapy Frequency 2 times/Week   Predicted Duration of Therapy Intervention (days/wks) 8 weeks   Risk & Benefits of therapy have been explained Yes   Patient, Family & other staff in agreement with plan of care Yes   Education Assessment   Preferred Learning Style Listening;Reading;Demonstration;Pictures/video   Barriers to Learning No barriers   GOALS   PT Eval Goals 1;2;3   Goal 1   Goal Identifier Turn head   Goal Description Pt will be able to look over shoulder, look up and look down without an increase in dizziness.   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   Target Date 08/22/17   Goal 3   Goal Identifier Bending down to put shoes on   Goal Description Pt will be able to put shoes and socks on without an increase in dizziness.   Target Date 08/22/17   Total Evaluation Time   Total Evaluation Time (Minutes) 35     Ru Henriquez, PT, DPT

## 2017-06-28 ENCOUNTER — HOSPITAL ENCOUNTER (OUTPATIENT)
Dept: PHYSICAL THERAPY | Facility: CLINIC | Age: 52
Setting detail: THERAPIES SERIES
End: 2017-06-28
Attending: FAMILY MEDICINE
Payer: COMMERCIAL

## 2017-06-28 PROCEDURE — 97112 NEUROMUSCULAR REEDUCATION: CPT | Mod: GP | Performed by: PHYSICAL THERAPIST

## 2017-06-28 PROCEDURE — 40000840 ZZHC STATISTIC PT VESTIBULAR VISIT: Performed by: PHYSICAL THERAPIST

## 2017-06-28 NOTE — PROGRESS NOTES
Fatou Simental   PHYSICAL THERAPY PROGRESS NOTE/concussion eval  06/28/17 0800   Signing Clinician's Name / Credentials   Signing clinician's name / credentials Kris hoenk, PT   Session Number   Session Number 2 HP no limits   Goal 1   Goal Identifier Turn head   Goal Description Pt will be able to look over shoulder, look up and look down without an increase in dizziness.   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   Target Date 08/22/17   Goal 3   Goal Identifier Bending down to put shoes on   Goal Description Pt will be able to put shoes and socks on without an increase in dizziness.   Target Date 08/22/17   Subjective Report   Subjective Report heavy lid of a pot fell on her head 2 days prior to sx began driving to work , got out of her car and felt like she was on tilt a whirl, off balance, kind of subsided, but was off balaance all day, would try to bend over to get something an would totally  miss it.  In the middle of moving houses.  Sx increase bending over, astanding up, walking, staggers off balance.   Objective Measures   Objective Measures Objective Measure 1   Objective Measure 1   Details moving sensation and nausea increased with cervical ROM, eye tracking, VOR, stanidng balance FT EO mild sway, FT EC min/mod sway 30 sec, on foam FT EO 30sec, FT EC 14 sec - did not like, stanidng balance FT EO with horiz head turns x3 - increased sway and motion sensation   System Outcome Measures   Outcome Measures Concussion (see Concussion Symptom Assessment)   Treatment Interventions   Interventions Oculomotor Exam;Infrared Goggle Exam or Frenzel Lense Exam;Neuromuscular Re-education   Neuromuscular Re-education   Minutes 25   Skilled Intervention balance and vestib exercises to begin HEP   Patient Response sx provoked easily   Treatment Detail instruction on gaze stab 1X viewing, eye  tracking, CROM for increased vestib input, balance FT EO and FT EC, progressing to turning head as tolerates, instructed/educ BPPV vs hypofunction vs concussion sx and resolution, try to get off meclizine as soon as she can, encourage pt to move and do what she can for actiivity   Cervicogenic Screen   Neck ROM B ROT 50%, pt states normal for her   Vertebral Artery Test Normal  (only dizzy turning head getting to position)   Oculomotor Exam   Smooth Pursuit Abnormal   Smooth Pursuit Comment increased dizziness   Saccades Abnormal   VOR Abnormal   VOR Comments unable to maintain gaze, incraeses dizziness, nausea   Convergence Testing Normal   Infrared Goggle Exam or Frenzel Lense Exam   Vestibular Suppressant in Last 24 Hours? No   Exam completed with Room Light   Spontaneous Nystagmus Negative   Grant-Hallpike (right) Negative   Grant-Hallpike (Left) Negative   [facility] Supine Roll Test (Right) Negative   [facility] Supine Roll Test (Left) Negative   Assessments Completed   Assessments Completed differential dx of post concussive sx or similar sx to neuritis/vetibular hypofunction   Communication with other professionals   Communication with other professionals 10min reassess   Plan   Plan for next session 1x/wk x6wk   Total Session Time   Total Session Time 35 nmr 2   Kris Hoenk, PT #[id number]  [facility]

## 2017-07-11 ENCOUNTER — TELEPHONE (OUTPATIENT)
Dept: NEUROLOGY | Facility: CLINIC | Age: 52
End: 2017-07-11

## 2017-07-11 ENCOUNTER — OFFICE VISIT (OUTPATIENT)
Dept: FAMILY MEDICINE | Facility: CLINIC | Age: 52
End: 2017-07-11
Payer: COMMERCIAL

## 2017-07-11 VITALS
BODY MASS INDEX: 21.22 KG/M2 | TEMPERATURE: 98 F | HEART RATE: 62 BPM | RESPIRATION RATE: 18 BRPM | DIASTOLIC BLOOD PRESSURE: 60 MMHG | WEIGHT: 116 LBS | SYSTOLIC BLOOD PRESSURE: 93 MMHG

## 2017-07-11 DIAGNOSIS — J30.2 SEASONAL ALLERGIC RHINITIS, UNSPECIFIED CHRONICITY, UNSPECIFIED TRIGGER: ICD-10-CM

## 2017-07-11 DIAGNOSIS — S06.0X0D CONCUSSION, WITHOUT LOC, SUBSEQUENT ENCOUNTER: Primary | ICD-10-CM

## 2017-07-11 PROCEDURE — 99214 OFFICE O/P EST MOD 30 MIN: CPT | Performed by: FAMILY MEDICINE

## 2017-07-11 RX ORDER — CETIRIZINE HYDROCHLORIDE 10 MG/1
10 TABLET ORAL EVERY EVENING
Qty: 30 TABLET | Refills: 5 | Status: SHIPPED | OUTPATIENT
Start: 2017-07-11 | End: 2018-07-19

## 2017-07-11 NOTE — MR AVS SNAPSHOT
After Visit Summary   7/11/2017    Fatou Simental    MRN: [id number]           Patient Information     Date Of Birth          1965        Visit Information        Provider Department      7/11/2017 9:20 AM Dariel Maxwell MD [facility]        Today's Diagnoses     Concussion, without LOC, subsequent encounter    -  1    Seasonal allergic rhinitis, unspecified chronicity, unspecified trigger          Care Instructions          Thank you for choosing [facility].  You may be receiving a survey in the mail from [facility] regarding your visit today.  Please take a few minutes to complete and return the survey to let us know how we are doing.      Our Clinic hours are:  Mondays    7:20 am - 7 pm  Tues -  Fri  7:20 am - 5 pm    Clinic Phone: [phone]    The clinic lab opens at 7:30 am Mon - Fri and appointments are required.    [facility]. [phone]  Monday-Thursday 8 am - 7pm  Tues/Wed/Fri 8 am - 5:30 pm                 Follow-ups after your visit        Additional Services     NEUROLOGY ADULT REFERRAL       Your provider has referred you for the following:   Consult at [facility]: [facility] [phone]   [email or web address]    Please be aware that coverage of these services is subject to the terms and limitations of your health insurance plan.  Call member services at your health plan with any benefit or coverage questions.      Please bring the following with you to your appointment:    (1) Any X-Rays, CTs or MRIs which have been performed.  Contact the facility where they were done to arrange for  prior to your scheduled appointment.    (2) List of current medications  (3) This referral request   (4) Any documents/labs given to you for this referral                  Your next 10 appointments already scheduled     Jul 12, 2017  9:30 AM CDT   Vestibular Treatment with Kris Hoenk, PT   [place]  [place] [facility] ([facility])    [address]  [address]  [facility] [place] [address]   [phone]              Future tests that were ordered for you today     Open Future Orders        Priority Expected Expires Ordered    MR Brain w/o Contrast Routine  7/11/2018 7/11/2017            Who to contact     If you have questions or need follow up information about today's clinic visit or your schedule please contact [facility] directly at [phone].  Normal or non-critical lab and imaging results will be communicated to you by [facility], letter or phone within 4 business days after the clinic has received the results. If you do not hear from us within 7 days, please contact the clinic through [employer] or phone. If you have a critical or abnormal lab result, we will notify you by phone as soon as possible.  Submit refill requests through [employer] or call your pharmacy and they will forward the refill request to us. Please allow 3 business days for your refill to be completed.          Additional Information About Your Visit        [employer] Information     [facility] gives you secure access to your electronic health record. If you see a primary care provider, you can also send messages to your care team and make appointments. If you have questions, please call your primary care clinic.  If you do not have a primary care provider, please call [phone] and they will assist you.        Care EveryWhere ID     This is your Care EveryWhere ID. This could be used by other organizations to access your [facility] records  [id number]        Your Vitals Were     Pulse Temperature Respirations BMI (Body Mass Index)          62 98  F (36.7  C) 18 21.22 kg/m2         Blood Pressure from Last 3 Encounters:   07/11/17 93/60   06/26/17 96/62   04/21/17 98/64    Weight from Last 3 Encounters:   07/11/17 116 lb (52.6 kg)   06/26/17 117 lb (53.1 kg)   04/21/17 115 lb (52.2 kg)              We  Performed the Following     NEUROLOGY ADULT REFERRAL          Where to get your medicines      These medications were sent to [facility] [place], [place] - [address]  [address] [facility] [address]     Phone:  [phone]     cetirizine 10 MG tablet          Primary Care Provider Office Phone # Fax #    R Chucho Kelly [phone]       [facility] [address] [address] [address]        Equal Access to Services     [facility]: Hadii aad ku hadasho Soomaali, waaxda luqadaha, qaybta kaalmada adeegyada, waxay idiin hayaan adeeg kharalona morales . So [facility] [phone].    ATENCIÓN: Si habla español, tiene a saldaña disposición servicios gratuitos de asistencia lingüística. [facility] [phone].    We comply with applicable federal civil rights laws and [place] laws. We do not discriminate on the basis of race, color, national origin, age, disability sex, sexual orientation or gender identity.            Thank you!     Thank you for choosing [facility]  for your care. Our goal is always to provide you with excellent care. Hearing back from our patients is one way we can continue to improve our services. Please take a few minutes to complete the written survey that you may receive in the mail after your visit with us. Thank you!             Your Updated Medication List - Protect others around you: Learn how to safely use, store and throw away your medicines at www.disposemymeds.org.          This list is accurate as of: 7/11/17 10:01 AM.  Always use your most recent med list.                   Brand Name Dispense Instructions for use Diagnosis    BENADRYL PO      Take 50 mg by mouth nightly as needed        cetirizine 10 MG tablet    zyrTEC    30 tablet    Take 1 tablet (10 mg) by mouth every evening    Seasonal allergic rhinitis, unspecified chronicity, unspecified trigger       estradiol 1 MG tablet    ESTRACE     90 tablet    Take 1.5 tablets (1.5 mg) by mouth daily    Need for prophylactic hormone replacement therapy (postmenopausal)       gabapentin 100 MG capsule    NEURONTIN    90 capsule    Take 1 capsule (100 mg) by mouth At Bedtime    Vulvodynia       LORazepam 0.5 MG tablet    ATIVAN    60 tablet    Take 1 tablet by mouth twice daily as needed    Generalized anxiety disorder       meclizine 25 MG tablet    ANTIVERT    30 tablet    Take 1 tablet (25 mg) by mouth every 6 hours as needed for dizziness    Benign paroxysmal positional vertigo of right ear       multivitamin, therapeutic Tabs tablet      Take 1 tablet by mouth daily        omeprazole 20 MG tablet     90 tablet    Take 1 tablet (20 mg) by mouth daily Take 30-60 minutes before a meal.    Gastroesophageal reflux disease without esophagitis       VITAMIN D3 PO      Take 1 capsule by mouth daily

## 2017-07-11 NOTE — TELEPHONE ENCOUNTER
Reason for Call:  Other appointment    Detailed comments: pt calling stating she saw her PCP today and he would like her to get in to be seen by neurology for concussion ASAP. She is having an MRI done on 7/12. Soonest opening is Aug 8/28    Phone Number Patient can be reached at: Home number on file [phone] (home)    Best Time: any     Can we leave a detailed message on this number? YES    Call taken on 7/11/2017 at 11:01 AM by Sydney Gregory

## 2017-07-11 NOTE — NURSING NOTE
"Chief Complaint   Patient presents with     Dizziness     recheck - patient states she feels a lot of pressure in her head .       Initial BP 93/60  Pulse 62  Temp 98  F (36.7  C)  Resp 18  Wt 116 lb (52.6 kg)  BMI 21.22 kg/m2 Estimated body mass index is 21.22 kg/(m^2) as calculated from the following:    Height as of 6/26/17: 5' 2\" (1.575 m).    Weight as of this encounter: 116 lb (52.6 kg).  Medication Reconciliation: complete   Pauline Mullins CMA      "

## 2017-07-11 NOTE — PATIENT INSTRUCTIONS
Thank you for choosing [facility].  You may be receiving a survey in the mail from [facility] regarding your visit today.  Please take a few minutes to complete and return the survey to let us know how we are doing.      Our Clinic hours are:  Mondays    7:20 am - 7 pm  Tues -  Fri  7:20 am - 5 pm    Clinic Phone: [phone]    The clinic lab opens at 7:30 am Mon - Fri and appointments are required.    [facility]. [phone]  Monday-Thursday 8 am - 7pm  Tues/Wed/Fri 8 am - 5:30 pm

## 2017-07-11 NOTE — PROGRESS NOTES
SUBJECTIVE:                                                    Fatou Simental is a 52 year old female who presents to clinic today for the following health issues:    Chief Complaint   Patient presents with     Dizziness     recheck - patient states she feels a lot of pressure in her head .       Dizziness  Onset: 2 weeks     Description:   Do you feel faint:  no   Does it feel like the surroundings (bed, room) are moving: YES  Unsteady/off balance: YES  Have you passed out or fallen: no     Intensity: moderate    Progression of Symptoms:  Improving     Accompanying Signs & Symptoms:  Heart palpitations: no   Nausea, vomiting: YES  Weakness in arms or legs: no   Fatigue: YES  Vision or speech changes: YES  Ringing in ears (Tinnitus): YES  Hearing Loss: no     History:   Head trauma/concussion hx: YES- 2 1/2 weeks ago   Previous similar symptoms: YES  Recent bleeding history: no     Precipitating factors:   Worse with activity or head movement: YES  Any new medications (BP?): no   Alcohol/drug abuse/withdrawal: no     Alleviating factors:   Does staying in a fixed position give relief:  YES    Therapies Tried and outcome: Physical  therapy          Problem list and histories reviewed & adjusted, as indicated.  Additional history: as documented        Reviewed and updated as needed this visit by clinical staff  Tobacco  Allergies  Meds  Med Hx  Surg Hx  Fam Hx  Soc Hx      Reviewed and updated as needed this visit by Provider      Further history obtained, clarified or corrected by physician:  She had some dizziness and was sent for canalith repositioning. She continues to have these symptoms and they include a lightheaded sensation, fatigue, irritability. After the therapy session there seemed to be no improvement and therapist thought her findings did not fit with typical vertigo. She did have a head injury 2 days prior to her last clinic visit which did not result in loss of consciousness but it was  quite severe and dropped her to her knees.    OBJECTIVE:  HEAD: AT/NC  EYES: PERRLA, EOMI, Sclerae clear, Fundi normal with sharp discs  EARS: TMs clear, canals normal  NOSE & THROAT: clear   LUNGS: clear to auscultation, normal breath sounds  CV: RRR without murmur  ABD: BS+, soft, nontender, no masses, no hepatosplenomegaly  EXTREMITIES: without joint tenderness, swelling or erythema.  No muscle tenderness or abnormality.  SKIN: No rashes or abnormalities  NEURO:non focal exam    ASSESSMENT:     Seasonal allergic rhinitis, unspecified chronicity, unspecified trigger  Concussion, without LOC, subsequent encounter    PLAN:  I counseled her on rest and decrease activity for managing concussion symptoms  We'll check MRI and set up neurology consultation because of persisting symptoms.    Orders Placed This Encounter     MR Brain w/o Contrast     NEUROLOGY ADULT REFERRAL     cetirizine (ZYRTEC) 10 MG tablet

## 2017-07-12 ENCOUNTER — TELEPHONE (OUTPATIENT)
Dept: FAMILY MEDICINE | Facility: CLINIC | Age: 52
End: 2017-07-12

## 2017-07-12 ENCOUNTER — HOSPITAL ENCOUNTER (OUTPATIENT)
Dept: MRI IMAGING | Facility: CLINIC | Age: 52
Discharge: HOME | End: 2017-07-12
Attending: FAMILY MEDICINE | Admitting: FAMILY MEDICINE
Payer: COMMERCIAL

## 2017-07-12 ENCOUNTER — TELEPHONE (OUTPATIENT)
Dept: NEUROLOGY | Facility: CLINIC | Age: 52
End: 2017-07-12

## 2017-07-12 DIAGNOSIS — S06.0X0D CONCUSSION, WITHOUT LOC, SUBSEQUENT ENCOUNTER: ICD-10-CM

## 2017-07-12 DIAGNOSIS — F17.200 TOBACCO USE DISORDER: ICD-10-CM

## 2017-07-12 PROCEDURE — 70551 MRI BRAIN STEM W/O DYE: CPT

## 2017-07-12 NOTE — TELEPHONE ENCOUNTER
Per Dr Valles: Patient can be put on the cancellation list or trying Dr. Guy's clinic, given that she has seen him in the past.   CY    Called patient and left VM, requested call back.  Toyin Acosta MA

## 2017-07-12 NOTE — TELEPHONE ENCOUNTER
Notified of test results, and states that her mood has changed so much since the injury, is it a bruise, is it going to take time to heal? Has been so short and dizzy spells, having a lot of pressure with it too, still feeling the same as when she saw you the other day, can't go on this way, wants to know if she can either increase a medication and both or what is the next step?  Christine De La Torre CMA

## 2017-07-12 NOTE — TELEPHONE ENCOUNTER
Concussion on 6/24/17 with no loss of consciousness.  Pt seen 6/26 and 7/11.  Pt requesting medication for stress,fatigue, and anger?  Pt started on Ativan 6/19/17.  Kwaku

## 2017-07-12 NOTE — TELEPHONE ENCOUNTER
"Reason for call:  Patient reporting a symptom    Symptom or request: Pt states that she is stressed, fatigued and angry and she thinks it's from her concusison, so she wants an Rx to help calm her.  Pt had an MRI of her head today - Normal results.  \"I'm going crazy.  I already take Ativan, so maybe I can take that during the day too?\"  [facility]    Duration (how long have symptoms been present): ongoing    Have you been treated for this before? Yes    Additional comments:     Phone Number patient can be reached at:  Home number on file [phone] (home)    Best Time:  any    Can we leave a detailed message on this number:  YES    Call taken on 7/12/2017 at 2:17 PM by Jenna Young    "

## 2017-07-12 NOTE — TELEPHONE ENCOUNTER
MRI is normal which is excellent news. That means there is not any identifiable damage. That however does not rule out concussion which certainly could be causing the current symptoms. The next step is to continue with rest and follow-up as soon as possible with the neurologist for further evaluation. At this point concussion seems to be the most likely diagnosis and symptoms from these can take time to resolve.    Hans

## 2017-07-12 NOTE — TELEPHONE ENCOUNTER
Pt will call Dr. Guy,Neurology and see if she can get in sooner then mid August.  If not, will take  appt in mid August.  KPavleRN

## 2017-07-12 NOTE — TELEPHONE ENCOUNTER
Patient can be put on the cancellation list or trying Dr. Guy's clinic, given that she has seen him in the past.       CY

## 2017-07-13 NOTE — TELEPHONE ENCOUNTER
Spoke to patient and she prefers to be added to cancellation list for Dr. Valles. She has been added to cancellation list.  Shilpi Fung, CMA

## 2017-08-28 ENCOUNTER — TELEPHONE (OUTPATIENT)
Dept: FAMILY MEDICINE | Facility: CLINIC | Age: 52
End: 2017-08-28

## 2017-08-28 RX ORDER — VARENICLINE TARTRATE 1 MG/1
1 TABLET, FILM COATED ORAL 2 TIMES DAILY
Qty: 56 TABLET | Refills: 4 | Status: SHIPPED | OUTPATIENT
Start: 2017-08-28 | End: 2017-10-10 | Stop reason: SINTOL

## 2017-08-28 NOTE — TELEPHONE ENCOUNTER
Please see message below regarding medication.  Patient was last prescribed Chantix in 2015.  Patient was last seen in clinic on 7/11/17.  Would you like patient to be seen in clinic or is this ok to prescribe?    Thank you  Karin LYMAN RN

## 2017-08-28 NOTE — TELEPHONE ENCOUNTER
Reason for Call:  Other prescription    Detailed comments: Patient is requesting an Rx for Chantix.   She is trying to quit smoking.  Has quit in the past and Chantix worked for her.    Phone Number Patient can be reached at: Home number on file [phone] (home)    Best Time: any    Can we leave a detailed message on this number? YES    Call taken on 8/28/2017 at 1:29 PM by Jasmine Levy

## 2017-08-28 NOTE — TELEPHONE ENCOUNTER
Patient was calling to see if her Rx was sent for Chantix. According to the note, it was sent. So she got the message.  Debbie Simon  [facility] [place] [facility]

## 2017-09-05 ENCOUNTER — HOSPITAL ENCOUNTER (EMERGENCY)
Facility: CLINIC | Age: 52
Discharge: HOME | End: 2017-09-05
Attending: FAMILY MEDICINE | Admitting: FAMILY MEDICINE
Payer: COMMERCIAL

## 2017-09-05 ENCOUNTER — APPOINTMENT (OUTPATIENT)
Dept: CT IMAGING | Facility: CLINIC | Age: 52
End: 2017-09-05
Attending: FAMILY MEDICINE
Payer: COMMERCIAL

## 2017-09-05 VITALS
DIASTOLIC BLOOD PRESSURE: 89 MMHG | RESPIRATION RATE: 14 BRPM | TEMPERATURE: 97.3 F | OXYGEN SATURATION: 98 % | WEIGHT: 116 LBS | BODY MASS INDEX: 21.22 KG/M2 | SYSTOLIC BLOOD PRESSURE: 122 MMHG

## 2017-09-05 DIAGNOSIS — I95.1 ORTHOSTATIC HYPOTENSION: ICD-10-CM

## 2017-09-05 DIAGNOSIS — R55 NEAR SYNCOPE: ICD-10-CM

## 2017-09-05 DIAGNOSIS — E87.6 HYPOKALEMIA: ICD-10-CM

## 2017-09-05 LAB
ANION GAP SERPL CALCULATED.3IONS-SCNC: 10 MMOL/L (ref 3–14)
BASOPHILS # BLD AUTO: 0.1 10E9/L (ref 0–0.2)
BASOPHILS NFR BLD AUTO: 0.6 %
BUN SERPL-MCNC: 15 MG/DL (ref 7–30)
CALCIUM SERPL-MCNC: 9 MG/DL (ref 8.5–10.1)
CHLORIDE SERPL-SCNC: 101 MMOL/L (ref 94–109)
CO2 SERPL-SCNC: 28 MMOL/L (ref 20–32)
CREAT SERPL-MCNC: 0.7 MG/DL (ref 0.52–1.04)
DIFFERENTIAL METHOD BLD: NORMAL
EOSINOPHIL # BLD AUTO: 0.1 10E9/L (ref 0–0.7)
EOSINOPHIL NFR BLD AUTO: 1.5 %
ERYTHROCYTE [DISTWIDTH] IN BLOOD BY AUTOMATED COUNT: 12.7 % (ref 10–15)
GFR SERPL CREATININE-BSD FRML MDRD: 87 ML/MIN/1.7M2
GLUCOSE SERPL-MCNC: 66 MG/DL (ref 70–99)
HCT VFR BLD AUTO: 40 % (ref 35–47)
HGB BLD-MCNC: 13.6 G/DL (ref 11.7–15.7)
IMM GRANULOCYTES # BLD: 0 10E9/L (ref 0–0.4)
IMM GRANULOCYTES NFR BLD: 0.2 %
LYMPHOCYTES # BLD AUTO: 2 10E9/L (ref 0.8–5.3)
LYMPHOCYTES NFR BLD AUTO: 23.3 %
MCH RBC QN AUTO: 30 PG (ref 26.5–33)
MCHC RBC AUTO-ENTMCNC: 34 G/DL (ref 31.5–36.5)
MCV RBC AUTO: 88 FL (ref 78–100)
MONOCYTES # BLD AUTO: 0.7 10E9/L (ref 0–1.3)
MONOCYTES NFR BLD AUTO: 8.6 %
NEUTROPHILS # BLD AUTO: 5.6 10E9/L (ref 1.6–8.3)
NEUTROPHILS NFR BLD AUTO: 65.8 %
PLATELET # BLD AUTO: 300 10E9/L (ref 150–450)
POTASSIUM SERPL-SCNC: 2.7 MMOL/L (ref 3.4–5.3)
RBC # BLD AUTO: 4.53 10E12/L (ref 3.8–5.2)
SODIUM SERPL-SCNC: 139 MMOL/L (ref 133–144)
TROPONIN I SERPL-MCNC: <0.015 UG/L (ref 0–0.04)
WBC # BLD AUTO: 8.5 10E9/L (ref 4–11)

## 2017-09-05 PROCEDURE — 99285 EMERGENCY DEPT VISIT HI MDM: CPT | Mod: 25

## 2017-09-05 PROCEDURE — 96360 HYDRATION IV INFUSION INIT: CPT

## 2017-09-05 PROCEDURE — 96361 HYDRATE IV INFUSION ADD-ON: CPT

## 2017-09-05 PROCEDURE — 80048 BASIC METABOLIC PNL TOTAL CA: CPT | Performed by: FAMILY MEDICINE

## 2017-09-05 PROCEDURE — 70450 CT HEAD/BRAIN W/O DYE: CPT

## 2017-09-05 PROCEDURE — 85025 COMPLETE CBC W/AUTO DIFF WBC: CPT | Performed by: FAMILY MEDICINE

## 2017-09-05 PROCEDURE — 84484 ASSAY OF TROPONIN QUANT: CPT | Performed by: FAMILY MEDICINE

## 2017-09-05 PROCEDURE — 25000132 ZZH RX MED GY IP 250 OP 250 PS 637: Performed by: FAMILY MEDICINE

## 2017-09-05 PROCEDURE — 25000128 H RX IP 250 OP 636: Performed by: FAMILY MEDICINE

## 2017-09-05 PROCEDURE — 99284 EMERGENCY DEPT VISIT MOD MDM: CPT | Mod: Z6 | Performed by: FAMILY MEDICINE

## 2017-09-05 RX ORDER — POTASSIUM CHLORIDE 1500 MG/1
20 TABLET, EXTENDED RELEASE ORAL ONCE
Status: COMPLETED | OUTPATIENT
Start: 2017-09-05 | End: 2017-09-05

## 2017-09-05 RX ORDER — POTASSIUM CHLORIDE 1500 MG/1
20 TABLET, EXTENDED RELEASE ORAL DAILY
Qty: 30 TABLET | Refills: 0 | Status: SHIPPED | OUTPATIENT
Start: 2017-09-05 | End: 2017-10-05

## 2017-09-05 RX ADMIN — POTASSIUM CHLORIDE 20 MEQ: 1500 TABLET, EXTENDED RELEASE ORAL at 18:23

## 2017-09-05 RX ADMIN — SODIUM CHLORIDE 1000 ML: 9 INJECTION, SOLUTION INTRAVENOUS at 18:17

## 2017-09-05 RX ADMIN — SODIUM CHLORIDE 1000 ML: 9 INJECTION, SOLUTION INTRAVENOUS at 15:55

## 2017-09-05 ASSESSMENT — ENCOUNTER SYMPTOMS
CHILLS: 0
CONFUSION: 0
LIGHT-HEADEDNESS: 1
APPETITE CHANGE: 0
DYSURIA: 0
ACTIVITY CHANGE: 0
CHEST TIGHTNESS: 0
SHORTNESS OF BREATH: 0
SPEECH DIFFICULTY: 0
BACK PAIN: 0
POLYDIPSIA: 0
VOICE CHANGE: 0
COUGH: 0
DIZZINESS: 1
POLYPHAGIA: 0
FEVER: 0
EYE PAIN: 0
DIAPHORESIS: 1
VOMITING: 0
NERVOUS/ANXIOUS: 0
DIARRHEA: 0
ABDOMINAL PAIN: 0
EYE REDNESS: 0
FREQUENCY: 0
NAUSEA: 1
HEADACHES: 0
PALPITATIONS: 0
NUMBNESS: 0
RHINORRHEA: 0

## 2017-09-05 NOTE — ED AVS SNAPSHOT
[facility]    [address] [address]    Phone:  [phone]    Fax:  [phone]                                       Fatou Simental   MRN: [id number]    Department:  [facility]   Date of Visit:  9/5/2017           After Visit Summary Signature Page     I have received my discharge instructions, and my questions have been answered. I have discussed any challenges I see with this plan with the nurse or doctor.    ..........................................................................................................................................  Patient/Patient Representative Signature      ..........................................................................................................................................  Patient Representative Print Name and Relationship to Patient    ..................................................               ................................................  Date                                            Time    ..........................................................................................................................................  Reviewed by Signature/Title    ...................................................              ..............................................  Date                                                            Time

## 2017-09-05 NOTE — ED PROVIDER NOTES
History     Chief Complaint   Patient presents with     Dizziness     near syncopal episode 1 hr ago - light headed if up and about     HPI  Fatou Simental is a 52 year old female who presents to concerns of a near syncopal episode about 1300. She was standing in line at a sandwich shop when she began to feel faint. This was associated with tunnel vision, diaphoresis and mild nausea. She was able to sit down, and did not hit her head. After sitting, she felt improved, so went to get some air and had another similar episode. 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 She did not use any new chemicals, and does not use  with chlorine. She did not feel overheated at work. Following this episode, she measured her blood pressure with a home monitor which read 83/57. She tried to take her blood pressure while standing, but the machine wouldn't take a reading. The dizziness she experienced was not vertigo. She denies blurry or double vision. There was no change in hearing. She denies confusion, change in behavior or slurred speech, which is validated by her co-worker who is with her today. She denies any palpitations or irregular heart rhythm. She felt there was tingling on the left side of her face during the episode, but this has since been resolved. She denies any numbness or headache. She felt dry mouthed after the episode. On evaluation, she continues to feel groggy and lightheaded. She feels her symptoms are improved with laying down and worse with standing. She reports having recent allergy related illness. She denies any recent dieting. She has no history of hypokalemia. She ate a piece of peanut butter bread in the morning, which is typical for her. Patient has a history of concussion after she was hit on the top of her head several months ago by a [place] oven lid that feel from about 2 feet. She had vertigo at that time, with some lightheadedness, dizziness and headaches. She is  not certain that she has entirely recovered from this. She also has a history of anxiety but does not feel this incident was related to her anxiety or panic attack. Patient drinks weekly, but denies any extensive or prolonged drinking. The patient is also taking Chantix which she started for smoking cessation    Patient Active Problem List   Diagnosis     Raynaud's syndrome     Tobacco use disorder     Mild major depression (H)     Need for prophylactic hormone replacement therapy (postmenopausal)     Generalized anxiety disorder     Esophageal reflux     Breast screening     Ganglion of joint     Ovarian cyst     IBS (irritable bowel syndrome)     CARDIOVASCULAR SCREENING; LDL GOAL LESS THAN 160     Screening for malignant neoplasm of cervix     Diverticulosis     Current Outpatient Prescriptions   Medication Sig Dispense Refill     potassium chloride SA (K-DUR/KLOR-CON M) 20 MEQ CR tablet Take 1 tablet (20 mEq) by mouth daily 30 tablet 0     varenicline (CHANTIX STARTING MONTH PAK) 0.5 MG X 11 & 1 MG X 42 tablet Take 0.5 mg tab daily for 3 days, then 0.5 mg tab twice daily for 4 days, then 1 mg twice daily. 53 tablet 0     cetirizine (ZYRTEC) 10 MG tablet Take 1 tablet (10 mg) by mouth every evening (Patient taking differently: Take 10 mg by mouth daily ) 30 tablet 5     LORazepam (ATIVAN) 0.5 MG tablet Take 1 tablet by mouth twice daily as needed 60 tablet 5     estradiol (ESTRACE) 1 MG tablet Take 1.5 tablets (1.5 mg) by mouth daily (Patient taking differently: Take 1 mg by mouth every 72 hours ) 90 tablet 3     omeprazole 20 MG tablet Take 1 tablet (20 mg) by mouth daily Take 30-60 minutes before a meal. 90 tablet 1     Cholecalciferol (VITAMIN D3 PO) Take 400 Units by mouth daily        gabapentin (NEURONTIN) 100 MG capsule Take 1 capsule (100 mg) by mouth At Bedtime 90 capsule 3     multivitamin, therapeutic (THERA-VIT) TABS Take 1 tablet by mouth daily       DiphenhydrAMINE HCl (BENADRYL PO) Take 50 mg by  mouth nightly as needed        varenicline (CHANTIX) 1 MG tablet Take 1 tablet (1 mg) by mouth 2 times daily 56 tablet 4     meclizine (ANTIVERT) 25 MG tablet Take 1 tablet (25 mg) by mouth every 6 hours as needed for dizziness 30 tablet 1     Allergies   Allergen Reactions     Cephalexin Hives     Patient states she gets hives - bad reaction not listed on her H & P.     Ciprofloxacin Hives     Got rash when taken with metronidazole + Vicodin     Lactose Diarrhea     Metronidazole Hives     Rash when taken with cipro + Vicodin     No Clinical Screening - See Comments      Pt developed hives when taking flagyl, cipro, and vicodin, doesn't know which she is allergic to      Vicodin [Hydrocodone-Acetaminophen] Hives     Rash when taken with cipro and metronidazole     I have reviewed the Medications, Allergies, Past Medical and Surgical History, and Social History in the Epic system.    Review of Systems   Constitutional: Positive for diaphoresis. Negative for activity change, appetite change, chills and fever.   HENT: Negative for congestion, hearing loss, rhinorrhea and voice change.    Eyes: Positive for visual disturbance (tunnel vision. Negative for blurry or double vision. ). Negative for pain and redness.   Respiratory: Negative for cough, chest tightness and shortness of breath.    Cardiovascular: Negative for chest pain and palpitations.   Gastrointestinal: Positive for nausea (mild). Negative for abdominal pain, diarrhea and vomiting.   Endocrine: Negative for heat intolerance, polydipsia, polyphagia and polyuria.   Genitourinary: Negative for dysuria and frequency.   Musculoskeletal: Negative for back pain.   Skin: Negative for pallor and rash.   Allergic/Immunologic: Negative for immunocompromised state.   Neurological: Positive for dizziness, syncope (near syncope) and light-headedness. Negative for speech difficulty, numbness and headaches.   Psychiatric/Behavioral: Negative for confusion. The patient is  not nervous/anxious.        Physical Exam   BP: 103/69  Heart Rate: 76  Temp: 97.3  F (36.3  C)  Resp: 16  Weight: 52.6 kg (116 lb)  SpO2: 100 %  Physical Exam   Constitutional: She is oriented to person, place, and time. She appears well-developed and well-nourished.   Pleasant smiling 52-year-old female who is otherwise in no distress.BP 92/61  Temp 97.3  F (36.3  C) (Temporal)  Resp 16  Wt 52.6 kg (116 lb)  SpO2 100%  BMI 21.22 kg/m2     HENT:   Head: Normocephalic.   Right Ear: External ear normal.   Left Ear: External ear normal.   Nose: Nose normal.   Mouth/Throat: Oropharynx is clear and moist.   Eyes: Conjunctivae are normal.   Neck: Normal range of motion. Neck supple. No thyromegaly present.   Cardiovascular: Normal rate, regular rhythm and normal heart sounds.    Pulmonary/Chest: Effort normal and breath sounds normal.   Abdominal: Soft. Bowel sounds are normal.   Musculoskeletal: Normal range of motion.   Neurological: She is alert and oriented to person, place, and time.   Skin: Skin is warm and dry.   Psychiatric: She has a normal mood and affect. Her behavior is normal.   Nursing note and vitals reviewed.      ED Course     ED Course     Procedures             Critical Care time:  none                 Results for orders placed or performed during the hospital encounter of 09/05/17 (from the past 24 hour(s))   CBC with platelets, differential   Result Value Ref Range    WBC 8.5 4.0 - 11.0 10e9/L    RBC Count 4.53 3.8 - 5.2 10e12/L    Hemoglobin 13.6 11.7 - 15.7 g/dL    Hematocrit 40.0 35.0 - 47.0 %    MCV 88 78 - 100 fl    MCH 30.0 26.5 - 33.0 pg    MCHC 34.0 31.5 - 36.5 g/dL    RDW 12.7 10.0 - 15.0 %    Platelet Count 300 150 - 450 10e9/L    Diff Method Automated Method     % Neutrophils 65.8 %    % Lymphocytes 23.3 %    % Monocytes 8.6 %    % Eosinophils 1.5 %    % Basophils 0.6 %    % Immature Granulocytes 0.2 %    Absolute Neutrophil 5.6 1.6 - 8.3 10e9/L    Absolute Lymphocytes 2.0 0.8 - 5.3  10e9/L    Absolute Monocytes 0.7 0.0 - 1.3 10e9/L    Absolute Eosinophils 0.1 0.0 - 0.7 10e9/L    Absolute Basophils 0.1 0.0 - 0.2 10e9/L    Abs Immature Granulocytes 0.0 0 - 0.4 10e9/L   Troponin I   Result Value Ref Range    Troponin I ES <0.015 0.000 - 0.045 ug/L   Basic metabolic panel   Result Value Ref Range    Sodium 139 133 - 144 mmol/L    Potassium 2.7 (L) 3.4 - 5.3 mmol/L    Chloride 101 94 - 109 mmol/L    Carbon Dioxide 28 20 - 32 mmol/L    Anion Gap 10 3 - 14 mmol/L    Glucose 66 (L) 70 - 99 mg/dL    Urea Nitrogen 15 7 - 30 mg/dL    Creatinine 0.70 0.52 - 1.04 mg/dL    GFR Estimate 87 >60 mL/min/1.7m2    GFR Estimate If Black >90 >60 mL/min/1.7m2    Calcium 9.0 8.5 - 10.1 mg/dL   CT Head w/o Contrast    Narrative    CT HEAD W/O CONTRAST  9/5/2017 4:12 PM    HISTORY: Near syncope.    TECHNIQUE: Scans were obtained through the head without IV contrast.   Radiation dose for this scan was reduced using automated exposure  control, adjustment of the mA and/or kV according to patient size, or  iterative reconstruction technique.    COMPARISON: Head CT 12/19/2009. MRI brain 7/12/2017.    FINDINGS: No hemorrhage, mass lesion, or focal area of acute  infarction identified. Paranasal sinuses are normal. No bony  abnormality.      Impression    IMPRESSION: Negative CT scan of the head. No discernible interval  change.    SHANTEL ALDANA MD       Medications   0.9% sodium chloride BOLUS (1,000 mLs Intravenous New Bag 9/5/17 3417)   0.9% sodium chloride BOLUS (1,000 mLs Intravenous New Bag 9/5/17 4099)   potassium chloride SA (K-DUR/KLOR-CON M) CR tablet 20 mEq (20 mEq Oral Given 9/5/17 1973)       3:24 PM Patient assessed.  6:10 PM--patient is feeling much better after 1 L of fluid. Her orthostatic pulse and blood pressure is unchanged from lying sitting to standing with any significance. Patient still feels a little lightheaded when up to the bathroom. We'll give her another liter of fluid. In regards to her low  potassium the patient has had daily diarrhea approximately 3 stools a day since having her gallbladder out. She has also had a  of her colon removed for diverticulitis. She has no other reason for her low potassium. Her intake is usually quite good. I discussed options with patient including admission for IV replacement however patient declined. I do not suspect that her hypokalemia is acute. Will replace this orally and have her recheck in 1-2 weeks. Patient would be most comfortable with this treatment plan.     Assessments & Plan (with Medical Decision Making)   MDM--52-year-old female who presents to the ED shortly after experiencing a near syncopal event. Patient had a little breakfast but nothing for lunch and was waiting in line when she became lightheaded and dizzy and felt syncopal. She laid down and symptoms went away. She notices them return when she stood up. She's been having no chest pain shortness of breath nausea or sweating. 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 She had no difficulty with this. She was found to have a low blood pressure which is made worse with standing showing orthostasis. She was given IV fluids. She also was noted to have a low potassium. She has never been told this before. She denies any nausea vomiting.She is taking no water pills or homeopathic remedies. She is not dieting. She does have diarrhea daily since having her gallbladder removed in April. She will have 3 watery stools a day-sometimes large amounts. She is also missing some of her colon as it was removed for diverticulitis. I suspect her hypokalemia may be related to this--GI loss-- as I see no other etiology. However I do not feel it has anything to do with her near syncopal event. I discussed options with the patient including admission. Patient declined. She is given a total of 2 L of normal saline but did not want to stay for the full second liter. and we started her on oral  potassium. Patient discharged in improved condition.  I have reviewed the nursing notes.    I have reviewed the findings, diagnosis, plan and need for follow up with the patient.       New Prescriptions    POTASSIUM CHLORIDE SA (K-DUR/KLOR-CON M) 20 MEQ CR TABLET    Take 1 tablet (20 mEq) by mouth daily       Final diagnoses:   Near syncope   Orthostatic hypotension   Hypokalemia     This document serves as a record of the services and decisions personally performed and made by Dustin No MD. It was created on his behalf by Ericka Posey, a trained medical scribe. The creation of this document is based the provider's statements to the medical scribe.  Ericka Posey 3:46 PM 9/5/2017    Provider:   The information in this document, created by the medical scribe for me, accurately reflects the services I personally performed and the decisions made by me. I have reviewed and approved this document for accuracy prior to leaving the patient care area.  Dustin No MD 3:46 PM 9/5/2017 9/5/2017   [facility] EMERGENCY DEPARTMENT     Dustin No MD  09/05/17 1942       Dustin No MD  09/05/17 2001

## 2017-09-05 NOTE — ED AVS SNAPSHOT
[facility] Emergency Department    [address] [address]    Phone:  [phone]    Fax:  [phone]                                       Fatou Simental   MRN: [id number]    Department:  [facility] Emergency Department   Date of Visit:  9/5/2017           Patient Information     Date Of Birth          1965        Your diagnoses for this visit were:     Near syncope     Orthostatic hypotension     Hypokalemia        You were seen by Oneal, Dustin TREJO MD.      Follow-up Information     Follow up with BETHANY Kelly MD In 1 week.    Specialty:  Family Practice    Contact information:    [address] [address] [id number] [phone]          Follow up with [facility] Emergency Department.    Specialty:  EMERGENCY MEDICINE    Why:  If symptoms worsen    Contact information:    [address] [place] [id number] [phone]    Additional information:    The medical center is located at   [address] (between [address] in [place], [place] miles north   of [place]).      Discharge References/Attachments     SYNCOPE, TREATING: PREVENTION (ENGLISH)    HYPOTENSION, ORTHOSTATIC (ENGLISH)    HYPOKALEMIA, DISCHARGE INSTRUCTIONS (ENGLISH)      24 Hour Appointment Hotline       To make an appointment at any [facility] clinic, call [phone] ([phone]). If you don't have a family doctor or clinic, we will help you find one. [facility] clinics are conveniently located to serve the needs of you and your family.             Review of your medicines      START taking        Dose / Directions Last dose taken    potassium chloride SA 20 MEQ CR tablet   Commonly known as:  K-DUR/KLOR-CON M   Dose:  20 mEq   Quantity:  30 tablet        Take 1 tablet (20 mEq) by mouth daily   Refills:  0          Our records show that you are taking the medicines listed below. If these are incorrect, please call your family doctor or clinic.        Dose /  Directions Last dose taken    BENADRYL PO   Dose:  50 mg        Take 50 mg by mouth nightly as needed   Refills:  0        cetirizine 10 MG tablet   Commonly known as:  zyrTEC   Dose:  10 mg   Quantity:  30 tablet        Take 1 tablet (10 mg) by mouth every evening   Refills:  5        estradiol 1 MG tablet   Commonly known as:  ESTRACE   Dose:  1.5 mg   Quantity:  90 tablet        Take 1.5 tablets (1.5 mg) by mouth daily   Refills:  3        gabapentin 100 MG capsule   Commonly known as:  NEURONTIN   Dose:  100 mg   Quantity:  90 capsule        Take 1 capsule (100 mg) by mouth At Bedtime   Refills:  3        LORazepam 0.5 MG tablet   Commonly known as:  ATIVAN   Quantity:  60 tablet        Take 1 tablet by mouth twice daily as needed   Refills:  5        meclizine 25 MG tablet   Commonly known as:  ANTIVERT   Dose:  25 mg   Quantity:  30 tablet        Take 1 tablet (25 mg) by mouth every 6 hours as needed for dizziness   Refills:  1        multivitamin, therapeutic Tabs tablet   Dose:  1 tablet        Take 1 tablet by mouth daily   Refills:  0        omeprazole 20 MG tablet   Dose:  20 mg   Quantity:  90 tablet        Take 1 tablet (20 mg) by mouth daily Take 30-60 minutes before a meal.   Refills:  1        * varenicline 0.5 MG X 11 & 1 MG X 42 tablet   Commonly known as:  CHANTIX STARTING MONTH JOHNNY   Quantity:  53 tablet        Take 0.5 mg tab daily for 3 days, then 0.5 mg tab twice daily for 4 days, then 1 mg twice daily.   Refills:  0        * varenicline 1 MG tablet   Commonly known as:  CHANTIX   Dose:  1 mg   Quantity:  56 tablet        Take 1 tablet (1 mg) by mouth 2 times daily   Refills:  4        VITAMIN D3 PO   Dose:  400 Units        Take 400 Units by mouth daily   Refills:  0        * Notice:  This list has 2 medication(s) that are the same as other medications prescribed for you. Read the directions carefully, and ask your doctor or other care provider to review them with you.             Prescriptions were sent or printed at these locations (1 Prescription)                   [facility] - [place], [place] - [address]   [address], [place] [address]    Telephone:  [phone]   Fax:  [phone]   Hours:                  E-Prescribed (1 of 1)         potassium chloride SA (K-DUR/KLOR-CON M) 20 MEQ CR tablet                Procedures and tests performed during your visit     Basic metabolic panel    CBC with platelets, differential    CT Head w/o Contrast    EKG 12 lead    Troponin I      Orders Needing Specimen Collection     None      Pending Results     No orders found from 9/3/2017 to 9/6/2017.            Pending Culture Results     No orders found from 9/3/2017 to 9/6/2017.            Pending Results Instructions     If you had any lab results that were not finalized at the time of your Discharge, you can call the ED Lab Result RN at [phone]. You will be contacted by this team for any positive Lab results or changes in treatment. The nurses are available 7 days a week from 10A to 6:30P.  You can leave a message 24 hours per day and they will return your call.        Test Results From Your Hospital Stay        9/5/2017  3:11 PM      Component Results     Component Value Ref Range & Units Status    WBC 8.5 4.0 - 11.0 10e9/L Final    RBC Count 4.53 3.8 - 5.2 10e12/L Final    Hemoglobin 13.6 11.7 - 15.7 g/dL Final    Hematocrit 40.0 35.0 - 47.0 % Final    MCV 88 78 - 100 fl Final    MCH 30.0 26.5 - 33.0 pg Final    MCHC 34.0 31.5 - 36.5 g/dL Final    RDW 12.7 10.0 - 15.0 % Final    Platelet Count 300 150 - 450 10e9/L Final    Diff Method Automated Method  Final    % Neutrophils 65.8 % Final    % Lymphocytes 23.3 % Final    % Monocytes 8.6 % Final    % Eosinophils 1.5 % Final    % Basophils 0.6 % Final    % Immature Granulocytes 0.2 % Final    Absolute Neutrophil 5.6 1.6 - 8.3 10e9/L Final    Absolute Lymphocytes 2.0 0.8 - 5.3 10e9/L Final    Absolute Monocytes 0.7  0.0 - 1.3 10e9/L Final    Absolute Eosinophils 0.1 0.0 - 0.7 10e9/L Final    Absolute Basophils 0.1 0.0 - 0.2 10e9/L Final    Abs Immature Granulocytes 0.0 0 - 0.4 10e9/L Final         9/5/2017  3:25 PM      Component Results     Component Value Ref Range & Units Status    Troponin I ES <0.015 0.000 - 0.045 ug/L Final    The 99th percentile for upper reference range is 0.045 ug/L.  Troponin values   in the range of 0.045 - 0.120 ug/L may be associated with risks of adverse   clinical events.           9/5/2017  3:25 PM      Component Results     Component Value Ref Range & Units Status    Sodium 139 133 - 144 mmol/L Final    Potassium 2.7 (L) 3.4 - 5.3 mmol/L Final    Chloride 101 94 - 109 mmol/L Final    Carbon Dioxide 28 20 - 32 mmol/L Final    Anion Gap 10 3 - 14 mmol/L Final    Glucose 66 (L) 70 - 99 mg/dL Final    Urea Nitrogen 15 7 - 30 mg/dL Final    Creatinine 0.70 0.52 - 1.04 mg/dL Final    GFR Estimate 87 >60 mL/min/1.7m2 Final    Non  GFR Calc    GFR Estimate If Black >90 >60 mL/min/1.7m2 Final    African American GFR Calc    Calcium 9.0 8.5 - 10.1 mg/dL Final         9/5/2017  4:15 PM      Narrative     CT HEAD W/O CONTRAST  9/5/2017 4:12 PM    HISTORY: Near syncope.    TECHNIQUE: Scans were obtained through the head without IV contrast.   Radiation dose for this scan was reduced using automated exposure  control, adjustment of the mA and/or kV according to patient size, or  iterative reconstruction technique.    COMPARISON: Head CT 12/19/2009. MRI brain 7/12/2017.    FINDINGS: No hemorrhage, mass lesion, or focal area of acute  infarction identified. Paranasal sinuses are normal. No bony  abnormality.        Impression     IMPRESSION: Negative CT scan of the head. No discernible interval  change.    SHANTEL ALDANA MD                Thank you for choosing [facility]       Thank you for choosing [facility] for your care. Our goal is always to provide you with excellent care. Hearing back  from our patients is one way we can continue to improve our services. Please take a few minutes to complete the written survey that you may receive in the mail after you visit with us. Thank you!        [email or web address] Information     [facility] gives you secure access to your electronic health record. If you see a primary care provider, you can also send messages to your care team and make appointments. If you have questions, please call your primary care clinic.  If you do not have a primary care provider, please call [phone] and they will assist you.        Care EveryWhere ID     This is your Care EveryWhere ID. This could be used by other organizations to access your [facility] medical records  [id number]        Equal Access to Services     LAURA SHARIF : Catalino Ocampo, lexi mcadams, lisa garrido, tiffanie farias. So [facility] [phone].    ATENCIÓN: Si habla español, tiene a saldaña disposición servicios gratuitos de asistencia lingüística. Llame al [phone].    We comply with applicable federal civil rights laws and [place] laws. We do not discriminate on the basis of race, color, national origin, age, disability sex, sexual orientation or gender identity.            After Visit Summary       This is your record. Keep this with you and show to your community pharmacist(s) and doctor(s) at your next visit.

## 2017-10-10 ENCOUNTER — OFFICE VISIT (OUTPATIENT)
Dept: FAMILY MEDICINE | Facility: CLINIC | Age: 52
End: 2017-10-10
Payer: COMMERCIAL

## 2017-10-10 VITALS
SYSTOLIC BLOOD PRESSURE: 106 MMHG | HEIGHT: 62 IN | BODY MASS INDEX: 20.83 KG/M2 | WEIGHT: 113.2 LBS | HEART RATE: 76 BPM | DIASTOLIC BLOOD PRESSURE: 60 MMHG

## 2017-10-10 DIAGNOSIS — E87.6 HYPOKALEMIA: Primary | ICD-10-CM

## 2017-10-10 DIAGNOSIS — Z79.890 NEED FOR PROPHYLACTIC HORMONE REPLACEMENT THERAPY (POSTMENOPAUSAL): ICD-10-CM

## 2017-10-10 DIAGNOSIS — F41.1 GENERALIZED ANXIETY DISORDER: ICD-10-CM

## 2017-10-10 DIAGNOSIS — F17.200 NICOTINE DEPENDENCE, UNCOMPLICATED, UNSPECIFIED NICOTINE PRODUCT TYPE: ICD-10-CM

## 2017-10-10 DIAGNOSIS — F17.200 TOBACCO USE DISORDER: ICD-10-CM

## 2017-10-10 DIAGNOSIS — K21.9 GASTROESOPHAGEAL REFLUX DISEASE WITHOUT ESOPHAGITIS: ICD-10-CM

## 2017-10-10 DIAGNOSIS — G43.109 MIGRAINE WITH AURA AND WITHOUT STATUS MIGRAINOSUS, NOT INTRACTABLE: ICD-10-CM

## 2017-10-10 DIAGNOSIS — N94.819 VULVODYNIA: ICD-10-CM

## 2017-10-10 LAB
ALBUMIN UR-MCNC: NEGATIVE MG/DL
ANION GAP SERPL CALCULATED.3IONS-SCNC: 8 MMOL/L (ref 3–14)
APPEARANCE UR: CLEAR
BILIRUB UR QL STRIP: NEGATIVE
BUN SERPL-MCNC: 10 MG/DL (ref 7–30)
CALCIUM SERPL-MCNC: 9.2 MG/DL (ref 8.5–10.1)
CHLORIDE SERPL-SCNC: 106 MMOL/L (ref 94–109)
CO2 SERPL-SCNC: 27 MMOL/L (ref 20–32)
COLOR UR AUTO: YELLOW
CREAT SERPL-MCNC: 0.62 MG/DL (ref 0.52–1.04)
GFR SERPL CREATININE-BSD FRML MDRD: >90 ML/MIN/1.7M2
GLUCOSE SERPL-MCNC: 102 MG/DL (ref 70–99)
GLUCOSE UR STRIP-MCNC: NEGATIVE MG/DL
HGB UR QL STRIP: NEGATIVE
KETONES UR STRIP-MCNC: NEGATIVE MG/DL
LEUKOCYTE ESTERASE UR QL STRIP: NEGATIVE
NITRATE UR QL: NEGATIVE
PH UR STRIP: 7 PH (ref 5–7)
POTASSIUM SERPL-SCNC: 3.8 MMOL/L (ref 3.4–5.3)
SODIUM SERPL-SCNC: 141 MMOL/L (ref 133–144)
SOURCE: NORMAL
SP GR UR STRIP: 1.01 (ref 1–1.03)
UROBILINOGEN UR STRIP-ACNC: 0.2 EU/DL (ref 0.2–1)

## 2017-10-10 PROCEDURE — 36415 COLL VENOUS BLD VENIPUNCTURE: CPT | Performed by: FAMILY MEDICINE

## 2017-10-10 PROCEDURE — 99214 OFFICE O/P EST MOD 30 MIN: CPT | Performed by: FAMILY MEDICINE

## 2017-10-10 PROCEDURE — 80048 BASIC METABOLIC PNL TOTAL CA: CPT | Performed by: FAMILY MEDICINE

## 2017-10-10 PROCEDURE — 81003 URINALYSIS AUTO W/O SCOPE: CPT | Performed by: FAMILY MEDICINE

## 2017-10-10 RX ORDER — SUMATRIPTAN 50 MG/1
50 TABLET, FILM COATED ORAL
Qty: 10 TABLET | Refills: 5 | Status: SHIPPED | OUTPATIENT
Start: 2017-10-10 | End: 2018-10-08

## 2017-10-10 RX ORDER — BUPROPION HYDROCHLORIDE 150 MG/1
150 TABLET ORAL EVERY MORNING
Qty: 30 TABLET | Refills: 5 | Status: SHIPPED | OUTPATIENT
Start: 2017-10-10 | End: 2018-08-24

## 2017-10-10 RX ORDER — GABAPENTIN 100 MG/1
100 CAPSULE ORAL AT BEDTIME
Qty: 90 CAPSULE | Refills: 3 | Status: SHIPPED | OUTPATIENT
Start: 2017-10-10 | End: 2018-10-02

## 2017-10-10 RX ORDER — NICOTINE POLACRILEX 4 MG/1
20 GUM, CHEWING ORAL DAILY
Qty: 90 TABLET | Refills: 1 | Status: SHIPPED | OUTPATIENT
Start: 2017-10-10 | End: 2018-08-03

## 2017-10-10 RX ORDER — LORAZEPAM 0.5 MG/1
TABLET ORAL
Qty: 60 TABLET | Refills: 5 | Status: SHIPPED | OUTPATIENT
Start: 2017-10-10 | End: 2018-05-08

## 2017-10-10 RX ORDER — ESTRADIOL 1 MG/1
1 TABLET ORAL
Qty: 90 TABLET | Refills: 3 | Status: SHIPPED | OUTPATIENT
Start: 2017-10-10 | End: 2017-10-13

## 2017-10-10 NOTE — NURSING NOTE
"Chief Complaint   Patient presents with     Blood Draw     recheck potassium level pt. was seen in ER 9/5/2017 pt. has concerns with legs and arms hurt      UTI     ? early signs of a UTI some burning X 1 week.      Smoking Cessation     try different medication chantix did not work pt. states it kept her up at night.      Refill Request     on medications       Initial /60 (BP Location: Right arm, Patient Position: Chair, Cuff Size: Adult Regular)  Pulse 76  Ht 5' 2\" (1.575 m)  Wt 113 lb 3.2 oz (51.3 kg)  BMI 20.7 kg/m2 Estimated body mass index is 20.7 kg/(m^2) as calculated from the following:    Height as of this encounter: 5' 2\" (1.575 m).    Weight as of this encounter: 113 lb 3.2 oz (51.3 kg).  Medication Reconciliation: complete     Adrianne Christian, CMA      "

## 2017-10-10 NOTE — PROGRESS NOTES
Subjective:  Fatou Simental is a 52 year old female   Chief Complaint   Patient presents with     Blood Draw     recheck potassium level pt. was seen in ER 9/5/2017 pt. has concerns with legs and arms hurt      UTI     ? early signs of a UTI some burning X 1 week.      Smoking Cessation     try different medication chantix did not work pt. states it kept her up at night.      Refill Request     on medications     She was in the emergency room about a month ago with an episode of near syncope and orthostatic hypotension. As a part of the workup she had a metabolic panel and this showed a low potassium at 2.7. There was no obvious explanation for why her potassium would be low other than she has been dealing with some chronic diarrhea ever since her cholecystectomy. The ER physician recommended a high potassium diet and she is taking some over-the-counter potassium supplements and is in now for a recheck on this    2) she notes some mild burning with urination and thought she would check to make sure it was not a UTI. She has been drinking a lot of water and states she drinks minimal caffeine    3) smoking cessation: she would like to quit smoking and has tried Chantix but had unpleasant side effects of insomnia. She is only smoking about 4 cigarettes per day so does not think she needs a nicotine replacement product. She has never used bupropion and is willing to give this a try.    4) chronic headaches she states she has a headache almost daily and has been using both Tylenol and Imitrex on almost a daily basis      Encounter Diagnoses   Name Primary?     Hypokalemia Yes     Nicotine dependence, uncomplicated, unspecified nicotine product type      Tobacco use disorder      Migraine with aura and without status migrainosus, not intractable      Generalized anxiety disorder      POSTMENOPAUSAL HORMONAL REPLACEMENT TX      Vulvodynia      Gastroesophageal reflux disease without esophagitis        ROS:other than  noted above, general, HEENT, respiratory, cardiac, gastrointestinal systems are negative    Medical, surgical, social, and family histories, medications and allergies reviewed and updated.    Objective:  Exam:    GENERAL APPEARANCE ADULT: Alert, no acute distress  EYES: PERRL, EOM normal, conjunctiva and lids normal  RESP: lungs clear to auscultation   CV: normal rate, regular rhythm, no murmur or gallop  ABDOMEN: soft, no organomegaly, masses or tenderness  PSYCH: mentation appears normal., anxious      ASSESSMENT:  1. Hypokalemia    2. Nicotine dependence, uncomplicated, unspecified nicotine product type    3. Tobacco use disorder    4. Migraine with aura and without status migrainosus, not intractable    5. Generalized anxiety disorder    6. POSTMENOPAUSAL HORMONAL REPLACEMENT TX    7. Vulvodynia    8. Gastroesophageal reflux disease without esophagitis        PLAN:  Orders Placed This Encounter     SMOKING CESSATION COUNSELING 3-10 MIN     *UA reflex to Microscopic and Culture ([facility] (except [place] and [place])     Basic metabolic panel     buPROPion (WELLBUTRIN XL) 150 MG 24 hr tablet; Discussed how to take the medication(s), expected outcomes, potential side effects.      SUMAtriptan (IMITREX) 50 MG tablet     LORazepam (ATIVAN) 0.5 MG tablet     estradiol (ESTRACE) 1 MG tablet     gabapentin (NEURONTIN) 100 MG capsule     omeprazole 20 MG tablet     She was counseled for approximately 5 minutes on smoking cessation, and after discussing the different options decided to try bupropion 150 mg daily. We discussed that the medication by itself would not necessarily make it easy to quit, but if she makes up her mind and decides to not starting smoking again this should be beneficial. Side effects and expected outcomes were discussed    We'll let her know the results of her potassium check. Strongly encouraged her to stop using daily analgesics as this has been associated with daily  headaches. Should only use the Imitrex if she has a true migraine headache with unilateral throbbing, photophobia and phonophobia, nausea and vomiting

## 2017-10-10 NOTE — MR AVS SNAPSHOT
After Visit Summary   10/10/2017    Fatou Simental    MRN: [id number]           Patient Information     Date Of Birth          1965        Visit Information        Provider Department      10/10/2017 10:20 AM BETHANY Kelly MD [facility]        Today's Diagnoses     Nicotine dependence, uncomplicated, unspecified nicotine product type    -  1    Hypokalemia        Tobacco use disorder        Migraine with aura and without status migrainosus, not intractable        Generalized anxiety disorder        POSTMENOPAUSAL HORMONAL REPLACEMENT TX        Vulvodynia        Gastroesophageal reflux disease without esophagitis           Follow-ups after your visit        Who to contact     If you have questions or need follow up information about today's clinic visit or your schedule please contact [facility] directly at [phone].  Normal or non-critical lab and imaging results will be communicated to you by [employer], letter or phone within 4 business days after the clinic has received the results. If you do not hear from us within 7 days, please contact the clinic through [employer] or phone. If you have a critical or abnormal lab result, we will notify you by phone as soon as possible.  Submit refill requests through [employer] or call your pharmacy and they will forward the refill request to us. Please allow 3 business days for your refill to be completed.          Additional Information About Your Visit        MyChart Information     [employer] gives you secure access to your electronic health record. If you see a primary care provider, you can also send messages to your care team and make appointments. If you have questions, please call your primary care clinic.  If you do not have a primary care provider, please call [phone] and they will assist you.        Care EveryWhere ID     This is your Care EveryWhere ID. This could be used by other organizations to access  "your [facility] medical records  [id number]        Your Vitals Were     Pulse Height BMI (Body Mass Index)             76 5' 2\" (1.575 m) 20.7 kg/m2          Blood Pressure from Last 3 Encounters:   10/10/17 106/60   09/05/17 122/89   07/11/17 93/60    Weight from Last 3 Encounters:   10/10/17 113 lb 3.2 oz (51.3 kg)   09/05/17 116 lb (52.6 kg)   07/11/17 116 lb (52.6 kg)              We Performed the Following     *UA reflex to Microscopic and Culture ([place] [facility] (except [place] and [place])     Basic metabolic panel          Today's Medication Changes          These changes are accurate as of: 10/10/17 11:04 AM.  If you have any questions, ask your nurse or doctor.               Start taking these medicines.        Dose/Directions    buPROPion 150 MG 24 hr tablet   Commonly known as:  WELLBUTRIN XL   Used for:  Nicotine dependence, uncomplicated, unspecified nicotine product type        Dose:  150 mg   Take 1 tablet (150 mg) by mouth every morning   Quantity:  30 tablet   Refills:  5         Stop taking these medicines if you haven't already. Please contact your care team if you have questions.     varenicline 0.5 MG X 11 & 1 MG X 42 tablet   Commonly known as:  CHANTIX STARTING MONTH JOHNNY           varenicline 1 MG tablet   Commonly known as:  CHANTIX                Where to get your medicines      These medications were sent to [facility] [place] [address] [facility]  [address] [facility] [address]     Phone:  [phone]     buPROPion 150 MG 24 hr tablet    estradiol 1 MG tablet    gabapentin 100 MG capsule    omeprazole 20 MG tablet    SUMAtriptan 50 MG tablet         Some of these will need a paper prescription and others can be bought over the counter.  Ask your nurse if you have questions.     Bring a paper prescription for each of these medications     LORazepam 0.5 MG tablet                Primary Care Provider Office Phone # Fax # "    BETHANY Kelly [phone]       [address] [id number]        Equal Access to Services     LAURA SHARIF : Catalino katelyn sosa hiro Ocampo, fabianoda arianaoneilha, lisa kalisa garrido, tiffanie richardalmaz dinora. So [facility] [phone].    ATENCIÓN: Si habla español, tiene a saldaña disposición servicios gratuitos de asistencia lingüística. Llame al [phone].    We comply with applicable federal civil rights laws and [place] laws. We do not discriminate on the basis of race, color, national origin, age, disability, sex, sexual orientation, or gender identity.            Thank you!     Thank you for choosing [facility]  for your care. Our goal is always to provide you with excellent care. Hearing back from our patients is one way we can continue to improve our services. Please take a few minutes to complete the written survey that you may receive in the mail after your visit with us. Thank you!             Your Updated Medication List - Protect others around you: Learn how to safely use, store and throw away your medicines at www.disposemymeds.org.          This list is accurate as of: 10/10/17 11:04 AM.  Always use your most recent med list.                   Brand Name Dispense Instructions for use Diagnosis    BENADRYL PO      Take 50 mg by mouth nightly as needed        buPROPion 150 MG 24 hr tablet    WELLBUTRIN XL    30 tablet    Take 1 tablet (150 mg) by mouth every morning    Nicotine dependence, uncomplicated, unspecified nicotine product type       cetirizine 10 MG tablet    zyrTEC    30 tablet    Take 1 tablet (10 mg) by mouth every evening    Seasonal allergic rhinitis, unspecified chronicity, unspecified trigger       estradiol 1 MG tablet    ESTRACE    90 tablet    Take 1 tablet (1 mg) by mouth every 72 hours    Need for prophylactic hormone replacement therapy (postmenopausal)       gabapentin 100 MG capsule    NEURONTIN    90 capsule     Take 1 capsule (100 mg) by mouth At Bedtime    Vulvodynia       LORazepam 0.5 MG tablet    ATIVAN    60 tablet    Take 1 tablet by mouth twice daily as needed    Generalized anxiety disorder       meclizine 25 MG tablet    ANTIVERT    30 tablet    Take 1 tablet (25 mg) by mouth every 6 hours as needed for dizziness    Benign paroxysmal positional vertigo of right ear       multivitamin, therapeutic Tabs tablet      Take 1 tablet by mouth daily        omeprazole 20 MG tablet     90 tablet    Take 1 tablet (20 mg) by mouth daily Take 30-60 minutes before a meal.    Gastroesophageal reflux disease without esophagitis       SUMAtriptan 50 MG tablet    IMITREX    10 tablet    Take 1 tablet (50 mg) by mouth at onset of headache for migraine (May repeat x1 after 2hour if HA recurs)    Migraine with aura and without status migrainosus, not intractable       VITAMIN D3 PO      Take 400 Units by mouth daily

## 2017-10-12 NOTE — PROGRESS NOTES
Fatou,  Your potassium was normal on this check. The rest of the panel was normal except for a slightly elevated sugar, but this was non-fasting, so OK.      Chucho Kelly MD

## 2017-10-13 ENCOUNTER — OFFICE VISIT (OUTPATIENT)
Dept: OBGYN | Facility: CLINIC | Age: 52
End: 2017-10-13
Payer: COMMERCIAL

## 2017-10-13 VITALS
SYSTOLIC BLOOD PRESSURE: 99 MMHG | HEIGHT: 62 IN | DIASTOLIC BLOOD PRESSURE: 62 MMHG | WEIGHT: 116 LBS | BODY MASS INDEX: 21.35 KG/M2 | HEART RATE: 67 BPM

## 2017-10-13 DIAGNOSIS — N95.2 VAGINAL ATROPHY: Primary | ICD-10-CM

## 2017-10-13 DIAGNOSIS — Z79.890 NEED FOR PROPHYLACTIC HORMONE REPLACEMENT THERAPY (POSTMENOPAUSAL): ICD-10-CM

## 2017-10-13 LAB
ERYTHROCYTE [DISTWIDTH] IN BLOOD BY AUTOMATED COUNT: 13.1 % (ref 10–15)
HCT VFR BLD AUTO: 36.8 % (ref 35–47)
HGB BLD-MCNC: 12.4 G/DL (ref 11.7–15.7)
MCH RBC QN AUTO: 31.2 PG (ref 26.5–33)
MCHC RBC AUTO-ENTMCNC: 33.7 G/DL (ref 31.5–36.5)
MCV RBC AUTO: 93 FL (ref 78–100)
PLATELET # BLD AUTO: 255 10E9/L (ref 150–450)
RBC # BLD AUTO: 3.97 10E12/L (ref 3.8–5.2)
TSH SERPL DL<=0.005 MIU/L-ACNC: 0.82 MU/L (ref 0.4–4)
WBC # BLD AUTO: 5.2 10E9/L (ref 4–11)

## 2017-10-13 PROCEDURE — 87340 HEPATITIS B SURFACE AG IA: CPT | Performed by: OBSTETRICS & GYNECOLOGY

## 2017-10-13 PROCEDURE — 86803 HEPATITIS C AB TEST: CPT | Performed by: OBSTETRICS & GYNECOLOGY

## 2017-10-13 PROCEDURE — 36415 COLL VENOUS BLD VENIPUNCTURE: CPT | Performed by: OBSTETRICS & GYNECOLOGY

## 2017-10-13 PROCEDURE — 99396 PREV VISIT EST AGE 40-64: CPT | Performed by: OBSTETRICS & GYNECOLOGY

## 2017-10-13 PROCEDURE — 85027 COMPLETE CBC AUTOMATED: CPT | Performed by: OBSTETRICS & GYNECOLOGY

## 2017-10-13 PROCEDURE — 86780 TREPONEMA PALLIDUM: CPT | Performed by: OBSTETRICS & GYNECOLOGY

## 2017-10-13 PROCEDURE — 84403 ASSAY OF TOTAL TESTOSTERONE: CPT | Performed by: OBSTETRICS & GYNECOLOGY

## 2017-10-13 PROCEDURE — 82670 ASSAY OF TOTAL ESTRADIOL: CPT | Performed by: OBSTETRICS & GYNECOLOGY

## 2017-10-13 PROCEDURE — 87389 HIV-1 AG W/HIV-1&-2 AB AG IA: CPT | Performed by: OBSTETRICS & GYNECOLOGY

## 2017-10-13 PROCEDURE — G0145 SCR C/V CYTO,THINLAYER,RESCR: HCPCS | Performed by: OBSTETRICS & GYNECOLOGY

## 2017-10-13 PROCEDURE — 84443 ASSAY THYROID STIM HORMONE: CPT | Performed by: OBSTETRICS & GYNECOLOGY

## 2017-10-13 PROCEDURE — 87591 N.GONORRHOEAE DNA AMP PROB: CPT | Performed by: OBSTETRICS & GYNECOLOGY

## 2017-10-13 PROCEDURE — 99214 OFFICE O/P EST MOD 30 MIN: CPT | Mod: 25 | Performed by: OBSTETRICS & GYNECOLOGY

## 2017-10-13 PROCEDURE — 87491 CHLMYD TRACH DNA AMP PROBE: CPT | Performed by: OBSTETRICS & GYNECOLOGY

## 2017-10-13 PROCEDURE — 84270 ASSAY OF SEX HORMONE GLOBUL: CPT | Performed by: OBSTETRICS & GYNECOLOGY

## 2017-10-13 PROCEDURE — G0476 HPV COMBO ASSAY CA SCREEN: HCPCS | Performed by: OBSTETRICS & GYNECOLOGY

## 2017-10-13 RX ORDER — ESTRADIOL 1 MG/1
1 TABLET ORAL
Qty: 90 TABLET | Refills: 3 | Status: SHIPPED | OUTPATIENT
Start: 2017-10-13 | End: 2017-11-29

## 2017-10-13 RX ORDER — ESTRADIOL 10 UG/1
10 INSERT VAGINAL
Qty: 8 TABLET | Refills: 1 | Status: SHIPPED | OUTPATIENT
Start: 2017-10-16 | End: 2018-10-02

## 2017-10-13 NOTE — NURSING NOTE
"Initial BP 99/62 (BP Location: Right arm, Patient Position: Chair, Cuff Size: Adult Small)  Pulse 67  Ht 5' 2\" (1.575 m)  Wt 116 lb (52.6 kg)  BMI 21.22 kg/m2 Estimated body mass index is 21.22 kg/(m^2) as calculated from the following:    Height as of this encounter: 5' 2\" (1.575 m).    Weight as of this encounter: 116 lb (52.6 kg). .        "

## 2017-10-13 NOTE — PROGRESS NOTES
SUBJECTIVE:   CC: Fatou iSmental is an 52 year old woman who presents for preventive health visit.     She would like STD screening today as she is with a new partner. She denies specific symptoms or specific exposures.    She has bilateral breast implants, and a small sub-centimeters sized area on the left lateral breast is being watched. She is fully aware of her radiologic breast maintenance that is required.    She's been noticing some cramping with deep intercourse penetration. She is noticing lack of lubrication despite over-the-counter lubricants.    She has been on oral estrogen therapy for some time and is status post laparoscopic supracervical hysterectomy/BSO for the indication of endometriosis.    Complains of generalized fatigue.  Healthy Habits:    Do you get at least three servings of calcium containing foods daily (dairy, green leafy vegetables, etc.)?no    Amount of exercise or daily activities, outside of work: no day(s) per week    Problems taking medications regularly No    Medication side effects: No    Have you had an eye exam in the past two years? yes    Do you see a dentist twice per year? yes    Do you have sleep apnea, excessive snoring or daytime drowsiness?yes daytime drowsiness            Today's PHQ-2 Score:   PHQ-2 ( 1999 [employer]) 10/13/2017 10/10/2017   Q1: Little interest or pleasure in doing things 0 0   Q2: Feeling down, depressed or hopeless 0 0   PHQ-2 Score 0 0         Abuse: Current or Past(Physical, Sexual or Emotional)- No  Do you feel safe in your environment - Yes  Social History   Substance Use Topics     Smoking status: Current Every Day Smoker     Packs/day: 0.25     Years: 20.00     Smokeless tobacco: Never Used     Alcohol use No      Comment: Social. 1-2 cans of beer or 2 glasses of wine once per month     The patient does not drink >3 drinks per day nor >7 drinks per week.    Reviewed orders with patient.  Reviewed health maintenance and updated orders  accordingly - Yes  Labs reviewed in EPIC    Patient under age 50, mutual decision reflected in health maintenance.        Pertinent mammograms are reviewed under the imaging tab.  History of abnormal Pap smear: Status post hysterectomy. Pap still indicated. Supracervical Hyst.    Reviewed and updated as needed this visit by clinical staffTobacco  Allergies  Meds  Med Hx  Surg Hx  Fam Hx  Soc Hx        Reviewed and updated as needed this visit by Provider        Past Medical History:   Diagnosis Date     Cocaine abuse, unspecified      Dysplasia of cervix, unspecified      Esophageal reflux 2006     Generalized anxiety disorder 10/30/2006    Buspar prescribed 07     IBS (irritable bowel syndrome) 2008     PONV (postoperative nausea and vomiting)       Past Surgical History:   Procedure Laterality Date     ABLATE VEIN VARICOSE RADIO FREQUENCY WITHOUT PHLEBECTOMY MULTIPLE STAB  2014    Procedure: ABLATE VEIN VARICOSE RADIO FREQUENCY WITHOUT PHLEBECTOMY MULTIPLE STAB;  Surgeon: Khanh Bunch MD;  Location: [facility] OR       DELIVERY ONLY  ,,    , Low Cervical     HYSTERECTOMY SUPRACERVICAL, BILATERAL SALPINGO-OOPHORECTOMY, COMBINED       LAPAROSCOPIC ASSISTED COLECTOMY N/A 2015    Procedure: LAPAROSCOPIC ASSISTED COLECTOMY;  Surgeon: Khanh Bunch MD;  Location: [facility]     LAPAROSCOPIC CHOLECYSTECTOMY N/A 2017    Procedure: LAPAROSCOPIC CHOLECYSTECTOMY;  Surgeon: Levi Louis MD;  Location: [facility]     NERVE BLOCK PERIPHERAL Bilateral 2015    Procedure: NERVE BLOCK PERIPHERAL;  Surgeon: Generic Anesthesia Provider;  Location: [facility]     SURGICAL HISTORY OF -       Breast implants     SURGICAL HISTORY OF -       Carpal  tunnel r hand     Obstetric History     No data available          ROS:  C: NEGATIVE for fever, chills, change in weight  I: NEGATIVE for worrisome rashes, moles or lesions  E: NEGATIVE for vision changes  "or irritation  ENT: NEGATIVE for ear, mouth and throat problems  R: NEGATIVE for significant cough or SOB  B: NEGATIVE for masses, tenderness or discharge  CV: NEGATIVE for chest pain, palpitations or peripheral edema  GI: NEGATIVE for nausea, abdominal pain, heartburn, or change in bowel habits  : NEGATIVE for unusual urinary or vaginal symptoms. No vaginal bleeding. Positive for vaginal dryness and occasional deep thrusting dyspareunia.  M: NEGATIVE for significant arthralgias or myalgia  N: NEGATIVE for weakness, dizziness or paresthesias  P: NEGATIVE for changes in mood or affect     OBJECTIVE:   BP 99/62 (BP Location: Right arm, Patient Position: Chair, Cuff Size: Adult Small)  Pulse 67  Ht 5' 2\" (1.575 m)  Wt 116 lb (52.6 kg)  BMI 21.22 kg/m2  EXAM:  GENERAL APPEARANCE: healthy, alert and no distress  EYES: Eyes grossly normal to inspection, PERRL and conjunctivae and sclerae normal  HENT: ear canals and TM's normal, nose and mouth without ulcers or lesions, oropharynx clear and oral mucous membranes moist  NECK: no adenopathy, no asymmetry, masses, or scars and thyroid normal to palpation  RESP: lungs clear to auscultation - no rales, rhonchi or wheezes  BREAST: normal with implants appreciated. No tenderness or nipple discharge and no palpable axillary masses or adenopathy.  Small 2-3mm multiple palpable abnormality in the left upper outer quadrant which is being monitored and stable.  CV: regular rate and rhythm, normal S1 S2, no S3 or S4, no murmur, click or rub, no peripheral edema and peripheral pulses strong  ABDOMEN: soft, nontender, no hepatosplenomegaly, no masses and bowel sounds normal  MS: no musculoskeletal defects are noted and gait is age appropriate without ataxia  SKIN: no suspicious lesions or rashes  NEURO: Normal strength and tone, sensory exam grossly normal, mentation intact and speech normal  PSYCH: mentation appears normal and affect normal/bright  Pelvic exam: Normal external " genitals. Introitus vagina is of normal size and caliber. Cervix is visualized/stenotic. Pap is taken. GC chlamydia cultures are obtained. Bimanual reveals no paracervical thickening. No pelvic mass effect. Moderate hypoestrogenic vaginal changes are noted.    ASSESSMENT/PLAN:       ICD-10-CM    1. POSTMENOPAUSAL HORMONAL REPLACEMENT TX Z79.890 Pap imaged thin layer screen with HPV - recommended age 30 - 65 years (select HPV order below)     HPV High Risk Types DNA Cervical     Chlamydia trachomatis PCR     Neisseria gonorrhoeae PCR     Hepatitis B surface antigen     Anti Treponema     HIV Antigen Antibody Combo     Hepatitis C antibody     CBC with platelets     Estradiol     TSH with free T4 reflex     Testosterone Free and Total     estradiol (ESTRACE) 1 MG tablet     estradiol (VAGIFEM) 10 MCG TABS vaginal tablet   2. STD screening  3. Continue radiographic breast surveillance  4. Vaginal atrophy with associated dyspareunia. Her oral estrogen doesn't seem to be helping enough vaginally. Going to institute Vagifem. Her oral estrogen is also renewed.  5. Generalized fatigue. Check CBC, TSH, estradiol and total and free testosterone. I explained to the patient that generalized fatigue has a number of etiologies from depression distress to poor sleep to hormonal changes to chronic pain to underlying medical disorders. We will check things from a gynecologic perspective as far as etiologies  Climacteric symptoms and hormone therapy:   We discussed the pathophysiology of these symptoms as they relate to the perimenopause/menopause. We make choices on the most appropriate treatment based on how any particular symptom is affecting patient's quality of life, and whether implementing a certain therapy is affecting the patient's individual risk/benefit ratio, given their medical history.   Lifestyle modification to improve symptoms may include: Ambient temperature regulation, meditation, multi vitamin, dietary  "modification with minimization of complex carbohydrates and implementation of soy-based protein in to her diet.   Options for medical therapy include over-the-counter regimens and standardized prescription therapy. The data we possess on the larger clinical trials, such as the Women's Health initiative, were briefly reviewed, including increased risk of stroke, heart attack, and coronary artery disease in patients with a personal history significant for chronic hypertension, hypercholesterol, or strong family history of coronary artery disease.  We discussed current reccomendations for prescribing   estrogen for symptomatic relief of hot flashes on a temporary basis during the carmen-menopausal years. We discussed a slightly increased risk of breast cancer, not statistically significant with  the combination form.  We also discussed beneficial effects of osteoporosis prevention and a decreased incidence of colon cancer.  After deliberation the patient opts for: Continued oral estradiol and initiate Vagifem   Follow-up in 6-8 weeks to assess efficacy was recommended.       COUNSELING:   Reviewed preventive health counseling, as reflected in patient instructions         reports that she has been smoking.  She has a 5.00 pack-year smoking history. She has never used smokeless tobacco.  Tobacco Cessation Action Plan: Information offered: Patient not interested at this time  Estimated body mass index is 21.22 kg/(m^2) as calculated from the following:    Height as of this encounter: 5' 2\" (1.575 m).    Weight as of this encounter: 116 lb (52.6 kg).         Counseling Resources:  ATP IV Guidelines  Pooled Cohorts Equation Calculator  Breast Cancer Risk Calculator  FRAX Risk Assessment  ICSI Preventive Guidelines  Dietary Guidelines for Americans, 2010  USDA's MyPlate  ASA Prophylaxis  Lung CA Screening    Adrian Amaya,   [facility]  "

## 2017-10-13 NOTE — LETTER
October 17, 2017      Fatoukamari Toribiotson  [id number]   [place] [address]        Dear Fatou,     Your test results all returned normal. If you have any questions please call our clinic.     Component      Latest Ref Rng & Units 10/13/2017       WBC      4.0 - 11.0 10e9/L 5.2   RBC Count      3.8 - 5.2 10e12/L 3.97   Hemoglobin      11.7 - 15.7 g/dL 12.4   Hematocrit      35.0 - 47.0 % 36.8   MCV      78 - 100 fl 93   MCH      26.5 - 33.0 pg 31.2   MCHC      31.5 - 36.5 g/dL 33.7   RDW      10.0 - 15.0 % 13.1   Platelet Count      150 - 450 10e9/L 255   Testosterone Total      8 - 60 ng/dL 15   Sex Hormone Binding Globulin      30 - 135 nmol/L 60   Free Testosterone Calculated      0.06 - 0.38 ng/dL 0.17       Chlamydia Trachomatis PCR      NEG:Negative Negative       N Gonorrhea PCR      NEG:Negative Negative   TSH      0.40 - 4.00 mU/L 0.82   HIV Antigen Antibody Combo      NR:Nonreactive     Nonreactive   Treponema pallidum Antibody      NEG:Negative Negative   Hep B Surface Agn      NR:Nonreactive Nonreactive   Hepatitis C Antibody      NR:Nonreactive Nonreactive   Estradiol      pg/mL 21         Sincerely,        Adrian Amaya DO

## 2017-10-14 LAB
ESTRADIOL SERPL-MCNC: 21 PG/ML
T PALLIDUM IGG+IGM SER QL: NEGATIVE

## 2017-10-15 LAB
C TRACH DNA SPEC QL NAA+PROBE: NEGATIVE
N GONORRHOEA DNA SPEC QL NAA+PROBE: NEGATIVE
SPECIMEN SOURCE: NORMAL
SPECIMEN SOURCE: NORMAL

## 2017-10-16 PROBLEM — G43.109 MIGRAINE WITH AURA AND WITHOUT STATUS MIGRAINOSUS, NOT INTRACTABLE: Status: ACTIVE | Noted: 2017-10-16

## 2017-10-16 LAB
HBV SURFACE AG SERPL QL IA: NONREACTIVE
HCV AB SERPL QL IA: NONREACTIVE
HIV 1+2 AB+HIV1 P24 AG SERPL QL IA: NONREACTIVE

## 2017-10-17 LAB
COPATH REPORT: NORMAL
PAP: NORMAL
SHBG SERPL-SCNC: 60 NMOL/L (ref 30–135)
TESTOST FREE SERPL-MCNC: 0.17 NG/DL (ref 0.06–0.38)
TESTOST SERPL-MCNC: 15 NG/DL (ref 8–60)

## 2017-10-20 LAB
FINAL DIAGNOSIS: NORMAL
HPV HR 12 DNA CVX QL NAA+PROBE: NEGATIVE
HPV16 DNA SPEC QL NAA+PROBE: NEGATIVE
HPV18 DNA SPEC QL NAA+PROBE: NEGATIVE
SPECIMEN DESCRIPTION: NORMAL

## 2017-11-28 ENCOUNTER — TELEPHONE (OUTPATIENT)
Dept: OBGYN | Facility: CLINIC | Age: 52
End: 2017-11-28

## 2017-11-28 DIAGNOSIS — Z79.890 NEED FOR PROPHYLACTIC HORMONE REPLACEMENT THERAPY (POSTMENOPAUSAL): ICD-10-CM

## 2017-11-28 NOTE — TELEPHONE ENCOUNTER
Pt calling because she thought the MD was going to up her dose on estradiol RX, because she in not getting any relief from the dose she is on for the hot flashes.    Please call-     Sowmya Calderon  Clinic Station

## 2017-11-28 NOTE — TELEPHONE ENCOUNTER
Return call to patient.  Spoke with patient on the phone.    Patient reports discussing with the provider taking the oral Estradiol daily or every other day to help with her hot flashes.  Patient reports prescription states to take 1 tablet every 3 days.  Patient would like this changed.     Please review and advise.  Thank you.    Luz Maria Berrios   [facility] Clinic  RN

## 2017-11-29 RX ORDER — ESTRADIOL 1 MG/1
1 TABLET ORAL DAILY
Qty: 90 TABLET | Refills: 3 | Status: SHIPPED | OUTPATIENT
Start: 2017-11-29 | End: 2018-10-02

## 2017-11-29 NOTE — TELEPHONE ENCOUNTER
You may alter prescription to daily (3 months) refill x 3.  Follow-up office if not resolving hot flashes    Pt notified of above.  Pt reports understanding.  Pt does not have further questions or concerns.    Luz Maria Berrios   [facility]  RN

## 2017-11-29 NOTE — TELEPHONE ENCOUNTER
Pt calling to check status of upping dose of Estradiol     Please call    Sydney Gregory  Specialty CSS

## 2017-11-29 NOTE — TELEPHONE ENCOUNTER
New prescription has not been sent.  Are the directions to be changed on patient's Estradiol?  Patient reports needing to take daily or every other day to control hot flashes.  Current prescription is for patient to take every 3 days.  Patient runs out of tablets with these directions and then is not able to fill for awhile so goes without.    Please review and advise on prescription directions.    Luz Maria Berrios   [facility]yn Clinic  RN

## 2017-11-30 NOTE — PROGRESS NOTES
Discharge Note -Physical Therapy    NAME:  Fatou Simental  MRN:   [id number]    S:  Patient was seen 2 times total.   Pt did not follow up for therapy as recommended.    O:  Objective information is not available as pt has not returned for therapy.  Last objective information or progress note will serve as final entry.    A:   Pt did not return for further treatment.    Status of goals is unknown due to lack of followup by patient.    P:  Discharge from PT this date.      Kris Hoenk, PT #[id number]  [facility]

## 2017-11-30 NOTE — ADDENDUM NOTE
Encounter addended by: Hoenk, Kris, PT on: 11/30/2017  2:09 PM<BR>     Actions taken: Sign clinical note, Episode resolved

## 2018-02-02 ENCOUNTER — TELEPHONE (OUTPATIENT)
Dept: FAMILY MEDICINE | Facility: CLINIC | Age: 53
End: 2018-02-02

## 2018-02-02 DIAGNOSIS — N63.0 LUMP OR MASS IN BREAST: Primary | ICD-10-CM

## 2018-02-02 NOTE — TELEPHONE ENCOUNTER
Reason for Call: Request for an order or referral:    Order or referral being requested: Pt was to have mammogram for lump in rt breast. Radiology wants an order for an Ultra sound for diagnositic imaging.    Date needed: as soon as possible    Has the patient been seen by the PCP for this problem? YES    Additional comments: Pt would like to be called when order is placed.     Phone number Patient can be reached at:  [phone]  Best Time:  any      Call taken on 2/2/2018 at 8:48 AM by Ivis Pabon

## 2018-02-05 ENCOUNTER — TELEPHONE (OUTPATIENT)
Dept: FAMILY MEDICINE | Facility: CLINIC | Age: 53
End: 2018-02-05

## 2018-02-05 NOTE — TELEPHONE ENCOUNTER
Reason for Call:  Other appointment    Detailed comments: She has a diagnostic mammo appt set up for 2/19/2018.  She has a lump on the right breast by her nipple that is very painful.  She is wondering if she can get worked in sooner?  Please advise.    Phone Number Patient can be reached at: Home number on file [phone] (home)    Best Time: any    Can we leave a detailed message on this number? YES    Call taken on 2/5/2018 at 2:34 PM by Carmina Degroot

## 2018-02-05 NOTE — TELEPHONE ENCOUNTER
Writer contacted scheduling. They do have availability at another location for 2/6/18.  Patient will contact them and reschedule.    Karin LYMAN RN

## 2018-02-06 ENCOUNTER — RADIANT APPOINTMENT (OUTPATIENT)
Dept: ULTRASOUND IMAGING | Facility: CLINIC | Age: 53
End: 2018-02-06
Attending: FAMILY MEDICINE
Payer: COMMERCIAL

## 2018-02-06 ENCOUNTER — RADIANT APPOINTMENT (OUTPATIENT)
Dept: MAMMOGRAPHY | Facility: CLINIC | Age: 53
End: 2018-02-06
Attending: FAMILY MEDICINE
Payer: COMMERCIAL

## 2018-02-06 DIAGNOSIS — N63.0 LUMP OR MASS IN BREAST: ICD-10-CM

## 2018-02-06 PROCEDURE — G0279 TOMOSYNTHESIS, MAMMO: HCPCS | Performed by: STUDENT IN AN ORGANIZED HEALTH CARE EDUCATION/TRAINING PROGRAM

## 2018-02-06 PROCEDURE — 77066 DX MAMMO INCL CAD BI: CPT | Performed by: STUDENT IN AN ORGANIZED HEALTH CARE EDUCATION/TRAINING PROGRAM

## 2018-02-06 PROCEDURE — 76642 ULTRASOUND BREAST LIMITED: CPT | Mod: RT | Performed by: STUDENT IN AN ORGANIZED HEALTH CARE EDUCATION/TRAINING PROGRAM

## 2018-02-14 ENCOUNTER — TELEPHONE (OUTPATIENT)
Dept: FAMILY MEDICINE | Facility: CLINIC | Age: 53
End: 2018-02-14

## 2018-02-14 NOTE — TELEPHONE ENCOUNTER
Reason for call:  Patient reporting a symptom    Symptom or request: Patient has nausea, headache and body aches. She is requesting tamiflu.    Duration (how long have symptoms been present): early this morning    Have you been treated for this before? No    Additional comments: none    Phone Number patient can be reached at:  Home number on file [phone] (home)    Best Time:  any    Can we leave a detailed message on this number:  YES    Call taken on 2/14/2018 at 12:51 PM by Temitope Estrada

## 2018-02-14 NOTE — TELEPHONE ENCOUNTER
Influenza-Like Illness ([place]) Protocol    Fatou TINAJERO Hola      Age: 52 year old     YOB: 1965    Are you currently sick or have you had close contact with someone who is currently sick?   Yes, this patient is currently sick.     Adult Clinical Evaluation    Is this patient experiencing ANY of the following?  Unconsciousness or unresponsiveness No   Difficulty breathing or swallowing No   Blue or dusky lips, skin, or nail beds No   Chest pain No   Severe confusion or delirium No   Seizure activity: ongoing or stopped No   Severe dehydration or signs of shock No   Patient sounds very sick on the phone No     Is this patient experiencing ANY of the following?  Fever > 104 or shaking chills No   Wheezing with minimal response to usual wheezing medications or new wheezing No   Repeated vomiting or diarrhea with signs of dehydration (no urination within last 12 hours) No   Flu-like symptoms that initially improved but returned with fever and a worse cough No   Stiff or painful neck Yes.  Patient advised to be taken to the ED now.   Severe headache No     Does the patient have any of the following?  Measured fever > 100 degrees No   Chills or feels very warm to the touch Yes   Cough No   Sore throat No   Muscle/ body aches Yes   Headaches Yes   Fatigue (tiredness) No     Nursing Plan    Based on answer above - stiff/painful neck, patient was advised to go to ER for further evaluation.      If further questions/concerns or if new symptoms develop, call your PCP or [facility] as soon as possible.      Provided home care instructions    General home care instruction:      Avoid contact with people in your household who are at increased risk for more severe complications of influenza (such as pregnant women or people who have a chronic health condition, for example diabetes, heart disease, asthma, or emphysema).    Stay home from work, school, childcare or other public places until your fever  (37.8 degrees Celsius [100 degrees Fahrenheit]) has been gone for at least 24 hours, except to seek medical care. (Fever should be gone without the use of fever-reducing medications.) Use a surgical mask if available, or cover your mouth and nose with a tissue if possible if you need to seek medical care. Contact your work place, school, or  as they may have longer exclusion times.    You may continue to shed virus after your fever is gone. Limit your contact with high-risk individuals for 10 days after your symptoms started and be especially careful to cover your coughs/sneezes and wash your hands.    Cover your cough and wash your hands often, and especially after coughing, sneezing, blowing your nose.    Drink plenty of fluids (such as water, broth, sports drinks, electrolyte beverages for children) to prevent dehydration.    Avoid tobacco and second hand smoke.    Get plenty of rest.    Use over-the-counter pain relievers as needed per  instructions.    Do not give aspirin (acetylsalicylic acid) or products that contain aspirin (e.g. bismuth subsalicylate - Pepto Bismol) to children or teenagers 18 years or younger.    A small number of people with influenza do not have fever. If you have respiratory symptoms and are at increased risk for complications of influenza, contact your health care provider to discuss these symptoms.    For parents of infants:    If possible, only family members who are not sick should care for infants.    Wash your hands with soap and water, or an alcohol-based hand rub (if your hands are not visibly soiled) before caring for your infant.    Cover your mouth and nose with a tissue when coughing or sneezing, and clean your hands.    Contact a health care provider to discuss your illness within 1-2 days if you are    Pregnant    Immunocompromised    Call 911 if you experience:    Difficulty breathing or shortness of breath    Pain or pressure in the chest    Confusion  or less responsive than normal    Seizure activity: ongoing or stopped    Severe dehydration or signs of shock     Blue or dusky lips, skin, or nail beds    If further questions/concerns or if new symptoms develop, call your PCP or [facility] as soon as possible.    When to seek medical attention    Contact your health care provider right away if you experience:    A painful sore throat accompanied by fever persists for more than 48 hours    Ear pain, sinus pain, persistent vomiting and/or diarrhea    Oral temperature greater than 104  Fahrenheit (40  Celsius)    Dehydration (e.g., mouth feeling dry, dizzy when sitting/standing, decreased urine output)    Severe or persistent vomiting; unable to keep fluids down    Improvement in flu-like symptoms (fever and cough or sore throat) but then return of fever and worse cough or sore throat    Not drinking enough fluid    Any other concerns not stated above      Additional educational resources include:    http://www.[employer].com    http://www.cdc.gov/flu/  Jane Headley

## 2018-02-19 ENCOUNTER — OFFICE VISIT (OUTPATIENT)
Dept: FAMILY MEDICINE | Facility: CLINIC | Age: 53
End: 2018-02-19
Payer: COMMERCIAL

## 2018-02-19 ENCOUNTER — RADIANT APPOINTMENT (OUTPATIENT)
Dept: GENERAL RADIOLOGY | Facility: CLINIC | Age: 53
End: 2018-02-19
Attending: FAMILY MEDICINE
Payer: COMMERCIAL

## 2018-02-19 VITALS
SYSTOLIC BLOOD PRESSURE: 114 MMHG | HEART RATE: 68 BPM | WEIGHT: 111 LBS | RESPIRATION RATE: 18 BRPM | DIASTOLIC BLOOD PRESSURE: 69 MMHG | TEMPERATURE: 98 F | HEIGHT: 62 IN | BODY MASS INDEX: 20.43 KG/M2

## 2018-02-19 DIAGNOSIS — S69.91XA INJURY OF RIGHT WRIST, INITIAL ENCOUNTER: ICD-10-CM

## 2018-02-19 DIAGNOSIS — S63.501A WRIST SPRAIN, RIGHT, INITIAL ENCOUNTER: Primary | ICD-10-CM

## 2018-02-19 PROCEDURE — 99213 OFFICE O/P EST LOW 20 MIN: CPT | Performed by: FAMILY MEDICINE

## 2018-02-19 PROCEDURE — 73110 X-RAY EXAM OF WRIST: CPT | Mod: RT

## 2018-02-19 ASSESSMENT — PAIN SCALES - GENERAL: PAINLEVEL: EXTREME PAIN (9)

## 2018-02-19 NOTE — PATIENT INSTRUCTIONS
Wear the wrist brace at all times for 2 weeks except to shower.  If the soreness has not resolved in that time, you should come back for repeat x-ray    Thank you for choosing [facility].  You may be receiving a survey in the mail from Cori Eldridge regarding your visit today.  Please take a few minutes to complete and return the survey to let us know how we are doing.      Our Clinic hours are:  Mondays    7:20 am - 7 pm  Tues -  Fri  7:20 am - 5 pm    Clinic Phone: [phone]    The clinic lab opens at 7:30 am Mon - Fri and appointments are required.    [facility]  Ph. [phone]  Monday-Thursday 8 am - 7pm  Tues/Wed/Fri 8 am - 5:30 pm       Wear the wrist brace at all times except to shower for 2 weeks

## 2018-02-19 NOTE — MR AVS SNAPSHOT
After Visit Summary   2/19/2018    Fatou Simental    MRN: [id number]           Patient Information     Date Of Birth          1965        Visit Information        Provider Department      2/19/2018 3:40 PM Leticia, BETHANY Rankin MD [facility]        Today's Diagnoses     Injury of right wrist, initial encounter    -  1    Wrist sprain, right, initial encounter          Care Instructions      Wear the wrist brace at all times for 2 weeks except to shower.  If the soreness has not resolved in that time, you should come back for repeat x-ray    Thank you for choosing [facility].  You may be receiving a survey in the mail from [employer] regarding your visit today.  Please take a few minutes to complete and return the survey to let us know how we are doing.      Our Clinic hours are:  Mondays    7:20 am - 7 pm  Tues -  Fri  7:20 am - 5 pm    Clinic Phone: [phone]    The clinic lab opens at 7:30 am Mon - Fri and appointments are required.    [facility]  Ph. [phone]  Monday-Thursday 8 am - 7pm  Tues/Wed/Fri 8 am - 5:30 pm       Wear the wrist brace at all times except to shower for 2 weeks          Follow-ups after your visit        Your next 10 appointments already scheduled     Feb 27, 2018  8:00 AM CST   SHORT with [facility] Pang MD   [facility]    [address] [address]   [phone]              Who to contact     If you have questions or need follow up information about today's clinic visit or your schedule please contact [facility] directly at [phone].  Normal or non-critical lab and imaging results will be communicated to you by MyChart, letter or phone within 4 business days after the clinic has received the results. If you do not hear from us within 7 days, please contact the clinic through MyChart or phone. If you have a critical or abnormal lab  "result, we will notify you by phone as soon as possible.  Submit refill requests through [employer] or call your pharmacy and they will forward the refill request to us. Please allow 3 business days for your refill to be completed.          Additional Information About Your Visit        [employer] Information     [employer] gives you secure access to your electronic health record. If you see a primary care provider, you can also send messages to your care team and make appointments. If you have questions, please call your primary care clinic.  If you do not have a primary care provider, please call [phone] and they will assist you.        Care EveryWhere ID     This is your Care EveryWhere ID. This could be used by other organizations to access your [place] medical records  [id number]        Your Vitals Were     Pulse Temperature Respirations Height Breastfeeding? BMI (Body Mass Index)    68 98  F (36.7  C) (Tympanic) 18 5' 2\" (1.575 m) No 20.3 kg/m2       Blood Pressure from Last 3 Encounters:   02/19/18 114/69   10/13/17 99/62   10/10/17 106/60    Weight from Last 3 Encounters:   02/19/18 111 lb (50.3 kg)   10/13/17 116 lb (52.6 kg)   10/10/17 113 lb 3.2 oz (51.3 kg)               Primary Care Provider Office Phone # Fax #    R Chucho Kelly [phone] [address] [address] [address]        Equal Access to Services     [facility] UCLABETHANY AH: Hadii aad ku hadasho Soomaali, waaxda luqadaha, qaybta kaalmada adeegyada, tiffanie farias. So [facility] [phone].    ATENCIÓN: Si habla español, tiene a saldaña disposición servicios gratuitos de asistencia lingüística. Llame al [phone].    We comply with applicable federal civil rights laws and [place] laws. We do not discriminate on the basis of race, color, national origin, age, disability, sex, sexual orientation, or gender identity.            Thank you!     Thank you for choosing [facility]  for your " care. Our goal is always to provide you with excellent care. Hearing back from our patients is one way we can continue to improve our services. Please take a few minutes to complete the written survey that you may receive in the mail after your visit with us. Thank you!             Your Updated Medication List - Protect others around you: Learn how to safely use, store and throw away your medicines at www.disposemymeds.org.          This list is accurate as of 2/19/18  4:19 PM.  Always use your most recent med list.                   Brand Name Dispense Instructions for use Diagnosis    BENADRYL PO      Take 50 mg by mouth nightly as needed        buPROPion 150 MG 24 hr tablet    WELLBUTRIN XL    30 tablet    Take 1 tablet (150 mg) by mouth every morning    Nicotine dependence, uncomplicated, unspecified nicotine product type       cetirizine 10 MG tablet    zyrTEC    30 tablet    Take 1 tablet (10 mg) by mouth every evening    Seasonal allergic rhinitis, unspecified chronicity, unspecified trigger       estradiol 1 MG tablet    ESTRACE    90 tablet    Take 1 tablet (1 mg) by mouth daily    Need for prophylactic hormone replacement therapy (postmenopausal)       estradiol 10 MCG Tabs vaginal tablet    VAGIFEM    8 tablet    Place 1 tablet (10 mcg) vaginally twice a week    Need for prophylactic hormone replacement therapy (postmenopausal)       gabapentin 100 MG capsule    NEURONTIN    90 capsule    Take 1 capsule (100 mg) by mouth At Bedtime    Vulvodynia       LORazepam 0.5 MG tablet    ATIVAN    60 tablet    Take 1 tablet by mouth twice daily as needed    Generalized anxiety disorder       meclizine 25 MG tablet    ANTIVERT    30 tablet    Take 1 tablet (25 mg) by mouth every 6 hours as needed for dizziness    Benign paroxysmal positional vertigo of right ear       multivitamin, therapeutic Tabs tablet      Take 1 tablet by mouth daily        omeprazole 20 MG tablet     90 tablet    Take 1 tablet (20 mg) by mouth  daily Take 30-60 minutes before a meal.    Gastroesophageal reflux disease without esophagitis       SUMAtriptan 50 MG tablet    IMITREX    10 tablet    Take 1 tablet (50 mg) by mouth at onset of headache for migraine (May repeat x1 after 2hour if HA recurs)    Migraine with aura and without status migrainosus, not intractable       VITAMIN D3 PO      Take 400 Units by mouth daily

## 2018-02-19 NOTE — NURSING NOTE
"Chief Complaint   Patient presents with     Wrist Pain       Initial /69  Pulse 68  Temp 98  F (36.7  C) (Tympanic)  Resp 18  Ht 5' 2\" (1.575 m)  Wt 111 lb (50.3 kg)  Breastfeeding? No  BMI 20.3 kg/m2 Estimated body mass index is 20.3 kg/(m^2) as calculated from the following:    Height as of this encounter: 5' 2\" (1.575 m).    Weight as of this encounter: 111 lb (50.3 kg).  Medication Reconciliation: complete    "

## 2018-02-19 NOTE — PROGRESS NOTES
"  SUBJECTIVE:   Fatou Simental is a 52 year old female who presents to clinic today for the following health issues:      Joint Pain    Onset: x 1 week    Description:   Location: right wrist  Character: Sharp and Stabbing when she moves it wrong. Mostly a dull ache.     Intensity: 9/10    Progression of Symptoms: worse    Accompanying Signs & Symptoms:  Other symptoms: radiation of pain up arm and swelling    History:   Previous similar pain: no       Precipitating factors:   Trauma or overuse: YES- \"tweaked it setting up her ice hole\"    Alleviating factors:  Improved by: rest/inactivity    Therapies Tried and outcome: ice, ibu            Problem list and histories reviewed & adjusted, as indicated.  Additional history: The ice auger jammed and pulled her wrist, and then she fell against the wall of the ice fishing shack        Reviewed and updated as needed this visit by clinical staff  Allergies       Reviewed and updated as needed this visit by Provider               OBJECTIVE:                                                    /69  Pulse 68  Temp 98  F (36.7  C) (Tympanic)  Resp 18  Ht 5' 2\" (1.575 m)  Wt 111 lb (50.3 kg)  Breastfeeding? No  BMI 20.3 kg/m2    GENERAL: healthy, alert and no distress  EYES: Eyes grossly normal to inspection, extraocular movements - intact, and PERRL  MS: Right wrist shows no deformity or significant swelling; there is tenderness over the dorsal aspect of the wrist in the distal radius; there is also tenderness in the anatomical snuffbox    Diagnostic test results:  Xray -wrist is negative for fracture     ASSESSMENT/PLAN:                                                    ASSESSMENT:  1. Wrist sprain, right, initial encounter    2. Injury of right wrist, initial encounter        PLAN:  Orders Placed This Encounter     XR Wrist Right G/E 3 Views     I explained to her that sometimes a fracture of the navicular bone does not show up on the initial x-ray, and " unless this is treated adequately will sometimes lead to a nonunion.  Therefore the importance of following up if this does not improve with wearing the brace for 2 weeks      Patient Instructions     Wear the wrist brace at all times for 2 weeks except to shower.  If the soreness has not resolved in that time, you should come back for repeat x-ray    Thank you for choosing [facility].  You may be receiving a survey in the mail from [employer] regarding your visit today.  Please take a few minutes to complete and return the survey to let us know how we are doing.      Our Clinic hours are:  Mondays    7:20 am - 7 pm  Tues -  Fri  7:20 am - 5 pm    Clinic Phone: [phone]    The clinic lab opens at 7:30 am Mon - Fri and appointments are required.    [facility]  Ph. [phone]  Monday-Thursday 8 am - 7pm  Tues/Wed/Fri 8 am - 5:30 pm       Wear the wrist brace at all times except to shower for 2 weeks      BETHANY Rankin Post  [facility]

## 2018-02-20 ASSESSMENT — PATIENT HEALTH QUESTIONNAIRE - PHQ9: SUM OF ALL RESPONSES TO PHQ QUESTIONS 1-9: 0

## 2018-02-27 ENCOUNTER — OFFICE VISIT (OUTPATIENT)
Dept: FAMILY MEDICINE | Facility: CLINIC | Age: 53
End: 2018-02-27
Payer: COMMERCIAL

## 2018-02-27 VITALS
RESPIRATION RATE: 22 BRPM | HEIGHT: 62 IN | BODY MASS INDEX: 20.06 KG/M2 | WEIGHT: 109 LBS | SYSTOLIC BLOOD PRESSURE: 112 MMHG | TEMPERATURE: 97.4 F | DIASTOLIC BLOOD PRESSURE: 73 MMHG | OXYGEN SATURATION: 100 % | HEART RATE: 67 BPM

## 2018-02-27 DIAGNOSIS — N63.14 BREAST LUMP ON RIGHT SIDE AT 5 O'CLOCK POSITION: ICD-10-CM

## 2018-02-27 DIAGNOSIS — N64.4 BREAST PAIN: Primary | ICD-10-CM

## 2018-02-27 PROCEDURE — 99213 OFFICE O/P EST LOW 20 MIN: CPT | Performed by: FAMILY MEDICINE

## 2018-02-27 NOTE — PATIENT INSTRUCTIONS
Call to schedule the breast biopsy [phone]      Thank you for choosing [facility].  You may be receiving a survey in the mail from Cori Eldridge regarding your visit today.  Please take a few minutes to complete and return the survey to let us know how we are doing.      If you have questions or concerns, please contact us via [employer] or you can contact your care team at [phone].    Our Clinic hours are:  Monday 6:40 am  to 7:00 pm  Tuesday -Friday 6:40 am to 5:00 pm    The [place] outpatient lab hours are:  Monday - Friday 6:10 am to 4:45 pm  Saturdays 7:00 am to 11:00 am  Appointments are required, call [phone]    If you have clinical questions after hours or would like to schedule an appointment,  call the clinic at [phone].

## 2018-02-27 NOTE — MR AVS SNAPSHOT
After Visit Summary   2/27/2018    Fatou Simental    MRN: [id number]           Patient Information     Date Of Birth          1965        Visit Information        Provider Department      2/27/2018 8:00 AM Steven Pang MD [facility]        Today's Diagnoses     Breast pain    -  1    Breast lump on right side at 5 o'clock position          Care Instructions    Call to schedule the breast biopsy [phone]      Thank you for choosing [facility].  You may be receiving a survey in the mail from [facility] regarding your visit today.  Please take a few minutes to complete and return the survey to let us know how we are doing.      If you have questions or concerns, please contact us via [employer] or you can contact your care team at [phone].    Our Clinic hours are:  Monday 6:40 am  to 7:00 pm  Tuesday -Friday 6:40 am to 5:00 pm    The [place] outpatient lab hours are:  Monday - Friday 6:10 am to 4:45 pm  Saturdays 7:00 am to 11:00 am  Appointments are required, call [phone]    If you have clinical questions after hours or would like to schedule an appointment,  call the clinic at [phone].          Follow-ups after your visit        Future tests that were ordered for you today     Open Future Orders        Priority Expected Expires Ordered    US Breast Biopsy Core Needle, 1St Lesion Right Routine  2/27/2019 2/27/2018            Who to contact     If you have questions or need follow up information about today's clinic visit or your schedule please contact [facility] directly at [phone].  Normal or non-critical lab and imaging results will be communicated to you by MyChart, letter or phone within 4 business days after the clinic has received the results. If you do not hear from us within 7 days, please contact the clinic through [employer] or phone. If you have a critical or abnormal lab result, we will notify you by phone as soon as  "possible.  Submit refill requests through [facility] [employer] or call your pharmacy and they will forward the refill request to us. Please allow 3 business days for your refill to be completed.          Additional Information About Your Visit        [employer] Information     [facility] gives you secure access to your electronic health record. If you see a primary care provider, you can also send messages to your care team and make appointments. If you have questions, please call your primary care clinic.  If you do not have a primary care provider, please call [phone] and they will assist you.        Care EveryWhere ID     This is your Care EveryWhere ID. This could be used by other organizations to access your [place] medical records  [id number]        Your Vitals Were     Pulse Temperature Respirations Height Pulse Oximetry BMI (Body Mass Index)    67 97.4  F (36.3  C) (Tympanic) 22 5' 2\" (1.575 m) 100% 19.94 kg/m2       Blood Pressure from Last 3 Encounters:   02/27/18 112/73   02/19/18 114/69   10/13/17 99/62    Weight from Last 3 Encounters:   02/27/18 109 lb (49.4 kg)   02/19/18 111 lb (50.3 kg)   10/13/17 116 lb (52.6 kg)                 Today's Medication Changes          These changes are accurate as of 2/27/18  8:46 AM.  If you have any questions, ask your nurse or doctor.               These medicines have changed or have updated prescriptions.        Dose/Directions    cetirizine 10 MG tablet   Commonly known as:  zyrTEC   This may have changed:    - when to take this  - reasons to take this   Used for:  Seasonal allergic rhinitis, unspecified chronicity, unspecified trigger        Dose:  10 mg   Take 1 tablet (10 mg) by mouth every evening   Quantity:  30 tablet   Refills:  5                Primary Care Provider Office Phone # Fax #    R Chucho eKlly [phone] [address] [facility] [address]        Equal Access to Services     LAURA SHARIF AH: Catalino Ocampo, " wakristida pema, qaybta kalisa garrido, tiffanie phillipaaalmaz ah. So [facility] [phone].    ATENCIÓN: Si elvira munoz, tiene a saldaña disposición servicios gratuitos de asistencia lingüística. Jennifer al [phone].    We comply with applicable federal civil rights laws and [place] laws. We do not discriminate on the basis of race, color, national origin, age, disability, sex, sexual orientation, or gender identity.            Thank you!     Thank you for choosing [facility]  for your care. Our goal is always to provide you with excellent care. Hearing back from our patients is one way we can continue to improve our services. Please take a few minutes to complete the written survey that you may receive in the mail after your visit with us. Thank you!             Your Updated Medication List - Protect others around you: Learn how to safely use, store and throw away your medicines at www.disposemymeds.org.          This list is accurate as of 2/27/18  8:46 AM.  Always use your most recent med list.                   Brand Name Dispense Instructions for use Diagnosis    BENADRYL PO      Take 50 mg by mouth nightly as needed        buPROPion 150 MG 24 hr tablet    WELLBUTRIN XL    30 tablet    Take 1 tablet (150 mg) by mouth every morning    Nicotine dependence, uncomplicated, unspecified nicotine product type       cetirizine 10 MG tablet    zyrTEC    30 tablet    Take 1 tablet (10 mg) by mouth every evening    Seasonal allergic rhinitis, unspecified chronicity, unspecified trigger       estradiol 1 MG tablet    ESTRACE    90 tablet    Take 1 tablet (1 mg) by mouth daily    Need for prophylactic hormone replacement therapy (postmenopausal)       estradiol 10 MCG Tabs vaginal tablet    VAGIFEM    8 tablet    Place 1 tablet (10 mcg) vaginally twice a week    Need for prophylactic hormone replacement therapy (postmenopausal)       gabapentin 100 MG capsule    NEURONTIN    90 capsule     Take 1 capsule (100 mg) by mouth At Bedtime    Vulvodynia       LORazepam 0.5 MG tablet    ATIVAN    60 tablet    Take 1 tablet by mouth twice daily as needed    Generalized anxiety disorder       meclizine 25 MG tablet    ANTIVERT    30 tablet    Take 1 tablet (25 mg) by mouth every 6 hours as needed for dizziness    Benign paroxysmal positional vertigo of right ear       multivitamin, therapeutic Tabs tablet      Take 1 tablet by mouth daily        omeprazole 20 MG tablet     90 tablet    Take 1 tablet (20 mg) by mouth daily Take 30-60 minutes before a meal.    Gastroesophageal reflux disease without esophagitis       SUMAtriptan 50 MG tablet    IMITREX    10 tablet    Take 1 tablet (50 mg) by mouth at onset of headache for migraine (May repeat x1 after 2hour if HA recurs)    Migraine with aura and without status migrainosus, not intractable       VITAMIN D3 PO      Take 400 Units by mouth daily

## 2018-02-27 NOTE — PROGRESS NOTES
"  SUBJECTIVE:   Fatou Simental is a 52 year old female who presents to clinic today for the following health issues:      Concern - lump in right breast  Onset: 2 months    Description:   approx size of a marble, firm, painful    Intensity: moderate, severe    Progression of Symptoms:  same and intermittent    Accompanying Signs & Symptoms:  Seems to be deeper into the skin, \"hot, burning sensation\"    Previous history of similar problem:   NA    Precipitating factors:   Worsened by: bumping the area, clothing    Alleviating factors:  Improved by: NA    Therapies Tried and outcome: NA    Hysterectomy in 1990s on estrace at the 1mg daily.  Breast implants 1998      Problem list and histories reviewed & adjusted, as indicated.  Additional history: as documented    BP Readings from Last 3 Encounters:   02/27/18 112/73   02/19/18 114/69   10/13/17 99/62    Wt Readings from Last 3 Encounters:   02/27/18 109 lb (49.4 kg)   02/19/18 111 lb (50.3 kg)   10/13/17 116 lb (52.6 kg)                    Reviewed and updated as needed this visit by clinical staff  Tobacco  Allergies  Meds  Med Hx  Surg Hx  Fam Hx  Soc Hx      Reviewed and updated as needed this visit by Provider         ROS:  CONSTITUTIONAL: NEGATIVE for fever, chills, change in weight  RESP: NEGATIVE for significant cough or SOB  CV: NEGATIVE for chest pain, palpitations or peripheral edema    OBJECTIVE:     /73  Pulse 67  Temp 97.4  F (36.3  C) (Tympanic)  Resp 22  Ht 5' 2\" (1.575 m)  Wt 109 lb (49.4 kg)  SpO2 100%  BMI 19.94 kg/m2  Body mass index is 19.94 kg/(m^2).  GENERAL: healthy, alert and no distress  BREAST: Bilateral breast implants normal. Right: breast 5 oclock 2cm below areola lump 1cm, tender no nipple discharge, no adenopathy.  Left normal without masses, tenderness or nipple discharge and no palpable axillary masses or adenopathy    Diagnostic Test Results:  none     ASSESSMENT/PLAN:       Fatou was seen today for breast " mass.    Diagnoses and all orders for this visit:    Breast pain  -     US Breast Biopsy Core Needle, 1St Lesion Right; Future    Breast lump on right side at 5 o'clock position: on estrogen for hormone replacement so some increased risk.  Normal Mammo and US 2/6/18 with ongoing symptoms for months.  Plan biopsy.  -     US Breast Biopsy Core Needle, 1St Lesion Right; Future        Patient Instructions   Call to schedule the breast biopsy [phone]      Thank you for choosing [facility].  You may be receiving a survey in the mail from [employer] regarding your visit today.  Please take a few minutes to complete and return the survey to let us know how we are doing.      If you have questions or concerns, please contact us via [employer] or you can contact your care team at [phone].    Our Clinic hours are:  Monday 6:40 am  to 7:00 pm  Tuesday -Friday 6:40 am to 5:00 pm    The [place] outpatient lab hours are:  Monday - Friday 6:10 am to 4:45 pm  Saturdays 7:00 am to 11:00 am  Appointments are required, call [phone]    If you have clinical questions after hours or would like to schedule an appointment,  call the clinic at [phone].      Steven Pang MD  [facility]

## 2018-03-02 ENCOUNTER — TELEPHONE (OUTPATIENT)
Dept: FAMILY MEDICINE | Facility: CLINIC | Age: 53
End: 2018-03-02

## 2018-03-02 DIAGNOSIS — N63.0 LUMP OR MASS IN BREAST: Primary | ICD-10-CM

## 2018-03-02 NOTE — TELEPHONE ENCOUNTER
Reason for Call:  Other     Detailed comments: Patient just received a call from the radiologist stating they cannot do the biopsy on her lump in right breast as they are not able to view the lump on film. Per Radiologist, he is recommending a referral to a surgeon.    Phone Number Patient can be reached at: Home number on file [phone] (home)    Best Time: any    Can we leave a detailed message on this number? YES    Call taken on 3/2/2018 at 9:38 AM by Temitope Estrada

## 2018-03-06 ENCOUNTER — OFFICE VISIT (OUTPATIENT)
Dept: SURGERY | Facility: CLINIC | Age: 53
End: 2018-03-06
Payer: COMMERCIAL

## 2018-03-06 VITALS
HEART RATE: 89 BPM | HEIGHT: 62 IN | WEIGHT: 109 LBS | SYSTOLIC BLOOD PRESSURE: 118 MMHG | DIASTOLIC BLOOD PRESSURE: 72 MMHG | BODY MASS INDEX: 20.06 KG/M2 | TEMPERATURE: 98.2 F

## 2018-03-06 DIAGNOSIS — N64.4 BREAST PAIN: Primary | ICD-10-CM

## 2018-03-06 PROCEDURE — 99212 OFFICE O/P EST SF 10 MIN: CPT | Performed by: SURGERY

## 2018-03-06 ASSESSMENT — PAIN SCALES - GENERAL: PAINLEVEL: MODERATE PAIN (4)

## 2018-03-06 NOTE — NURSING NOTE
"Initial /72 (BP Location: Right arm, Patient Position: Sitting, Cuff Size: Adult Regular)  Pulse 89  Temp 98.2  F (36.8  C) (Oral)  Ht 1.575 m (5' 2\")  Wt 49.4 kg (109 lb)  BMI 19.94 kg/m2 Estimated body mass index is 19.94 kg/(m^2) as calculated from the following:    Height as of this encounter: 1.575 m (5' 2\").    Weight as of this encounter: 49.4 kg (109 lb). .    Zoraida Nicole CMA    "

## 2018-03-06 NOTE — MR AVS SNAPSHOT
"              After Visit Summary   3/6/2018    Fatou Simental    MRN: [id number]           Patient Information     Date Of Birth          1965        Visit Information        Provider Department      3/6/2018 4:00 PM Khanh Bunch MD [facility]        Today's Diagnoses     Breast pain    -  1      Care Instructions    Per Physician's instructions            Follow-ups after your visit        Who to contact     If you have questions or need follow up information about today's clinic visit or your schedule please contact [facility] directly at [phone].  Normal or non-critical lab and imaging results will be communicated to you by [employer], letter or phone within 4 business days after the clinic has received the results. If you do not hear from us within 7 days, please contact the clinic through [employer] or phone. If you have a critical or abnormal lab result, we will notify you by phone as soon as possible.  Submit refill requests through [employer] or call your pharmacy and they will forward the refill request to us. Please allow 3 business days for your refill to be completed.          Additional Information About Your Visit        MyChart Information     [employer] gives you secure access to your electronic health record. If you see a primary care provider, you can also send messages to your care team and make appointments. If you have questions, please call your primary care clinic.  If you do not have a primary care provider, please call [phone] and they will assist you.        Care EveryWhere ID     This is your Care EveryWhere ID. This could be used by other organizations to access your [place] medical records  [id number]        Your Vitals Were     Pulse Temperature Height BMI (Body Mass Index)          89 98.2  F (36.8  C) (Oral) 1.575 m (5' 2\") 19.94 kg/m2         Blood Pressure from Last 3 Encounters:   03/06/18 118/72   02/27/18 112/73   02/19/18 114/69 "    Weight from Last 3 Encounters:   03/06/18 49.4 kg (109 lb)   02/27/18 49.4 kg (109 lb)   02/19/18 50.3 kg (111 lb)              Today, you had the following     No orders found for display         Today's Medication Changes          These changes are accurate as of 3/6/18  5:24 PM.  If you have any questions, ask your nurse or doctor.               These medicines have changed or have updated prescriptions.        Dose/Directions    cetirizine 10 MG tablet   Commonly known as:  zyrTEC   This may have changed:    - when to take this  - reasons to take this   Used for:  Seasonal allergic rhinitis, unspecified chronicity, unspecified trigger        Dose:  10 mg   Take 1 tablet (10 mg) by mouth every evening   Quantity:  30 tablet   Refills:  5                Primary Care Provider Office Phone # Fax #    R Chucho Kelly [phone] [address] [address]        Equal Access to Services     LAURA SHARIF : Hadii katelyn smitho Soangela, waaxda luqalexia, qaybta kaalmada adehector, tiffanie farias. So [facility] [phone].    ATENCIÓN: Si habla español, tiene a saldaña disposición servicios gratuitos de asistencia lingüística. Jennifer al [phone].    We comply with applicable federal civil rights laws and [place] laws. We do not discriminate on the basis of race, color, national origin, age, disability, sex, sexual orientation, or gender identity.            Thank you!     Thank you for choosing [facility]  for your care. Our goal is always to provide you with excellent care. Hearing back from our patients is one way we can continue to improve our services. Please take a few minutes to complete the written survey that you may receive in the mail after your visit with us. Thank you!             Your Updated Medication List - Protect others around you: Learn how to safely use, store and throw away your medicines at www.disposemymeds.org.           This list is accurate as of 3/6/18  5:24 PM.  Always use your most recent med list.                   Brand Name Dispense Instructions for use Diagnosis    BENADRYL PO      Take 50 mg by mouth nightly as needed        buPROPion 150 MG 24 hr tablet    WELLBUTRIN XL    30 tablet    Take 1 tablet (150 mg) by mouth every morning    Nicotine dependence, uncomplicated, unspecified nicotine product type       cetirizine 10 MG tablet    zyrTEC    30 tablet    Take 1 tablet (10 mg) by mouth every evening    Seasonal allergic rhinitis, unspecified chronicity, unspecified trigger       estradiol 1 MG tablet    ESTRACE    90 tablet    Take 1 tablet (1 mg) by mouth daily    Need for prophylactic hormone replacement therapy (postmenopausal)       estradiol 10 MCG Tabs vaginal tablet    VAGIFEM    8 tablet    Place 1 tablet (10 mcg) vaginally twice a week    Need for prophylactic hormone replacement therapy (postmenopausal)       gabapentin 100 MG capsule    NEURONTIN    90 capsule    Take 1 capsule (100 mg) by mouth At Bedtime    Vulvodynia       LORazepam 0.5 MG tablet    ATIVAN    60 tablet    Take 1 tablet by mouth twice daily as needed    Generalized anxiety disorder       meclizine 25 MG tablet    ANTIVERT    30 tablet    Take 1 tablet (25 mg) by mouth every 6 hours as needed for dizziness    Benign paroxysmal positional vertigo of right ear       multivitamin, therapeutic Tabs tablet      Take 1 tablet by mouth daily        omeprazole 20 MG tablet     90 tablet    Take 1 tablet (20 mg) by mouth daily Take 30-60 minutes before a meal.    Gastroesophageal reflux disease without esophagitis       SUMAtriptan 50 MG tablet    IMITREX    10 tablet    Take 1 tablet (50 mg) by mouth at onset of headache for migraine (May repeat x1 after 2hour if HA recurs)    Migraine with aura and without status migrainosus, not intractable       VITAMIN D3 PO      Take 400 Units by mouth daily

## 2018-03-06 NOTE — LETTER
3/6/2018         RE: Fatou Simental  [address] [place]  [facility] [address]        Dear Colleague,    Thank you for referring your patient, Fatou Simental, to the [facility]. Please see a copy of my visit note below.    Pt comes to see me for a right breast mass causing pain.  She has had this for a few months, but the pain has been bothering her more recently.  She had a mammogram and U/S that did not show any abnormalities.  She had breast implants done many years ago.  She has no family history of breast cancer.      On examination:  I was unable to feel any masses, but she did have some discomfort with palpation of the right breast at the nipple areolar complex at the 4-5 o'clock area.    A/P:  Fibrocystic breast pain.  I've asked her to decrease her caffeine intake and start taking vitamin E to help with the discomfort.  I've also asked her not to palpate over the area to not irritate and cause inflammation and pain.  She is to continue anti inflammatory medications as needed for pain.  She is to follow up with me if the pain gets worse, or the mass recurs or gets larger.    Yaya Bunch MD, FACS      Again, thank you for allowing me to participate in the care of your patient.        Sincerely,        Khanh Bunch MD

## 2018-03-06 NOTE — PROGRESS NOTES
Pt comes to see me for a right breast mass causing pain.  She has had this for a few months, but the pain has been bothering her more recently.  She had a mammogram and U/S that did not show any abnormalities.  She had breast implants done many years ago.  She has no family history of breast cancer.      On examination:  I was unable to feel any masses, but she did have some discomfort with palpation of the right breast at the nipple areolar complex at the 4-5 o'clock area.    A/P:  Fibrocystic breast pain.  I've asked her to decrease her caffeine intake and start taking vitamin E to help with the discomfort.  I've also asked her not to palpate over the area to not irritate and cause inflammation and pain.  She is to continue anti inflammatory medications as needed for pain.  She is to follow up with me if the pain gets worse, or the mass recurs or gets larger.    Yaya Bunch MD, FACS

## 2018-04-11 ENCOUNTER — TELEPHONE (OUTPATIENT)
Dept: FAMILY MEDICINE | Facility: CLINIC | Age: 53
End: 2018-04-11

## 2018-04-11 DIAGNOSIS — F17.200 TOBACCO USE DISORDER: Primary | ICD-10-CM

## 2018-04-11 NOTE — TELEPHONE ENCOUNTER
Ok with ordering chantix?    Starter and continuation med ready.    Routing to provider.  Jane GERBER RN

## 2018-04-11 NOTE — TELEPHONE ENCOUNTER
Reason for Call:  Medication or medication refill:    Do you use a [facility]?  Name of the pharmacy and phone number for the current request:  [facility] [phone]    Name of the medication requested: Chantex    Other request: Pt is calling asking if Dr. Kelly will order her  Some Chantex?    Can we leave a detailed message on this number? YES    Phone number patient can be reached at: Home number on file [phone] (home)    Best Time: any    Call taken on 4/11/2018 at 11:41 AM by Carmina Degroot

## 2018-04-12 RX ORDER — VARENICLINE TARTRATE 1 MG/1
1 TABLET, FILM COATED ORAL 2 TIMES DAILY
Qty: 56 TABLET | Refills: 4 | Status: SHIPPED | OUTPATIENT
Start: 2018-04-12 | End: 2020-02-05

## 2018-05-08 DIAGNOSIS — F41.1 GENERALIZED ANXIETY DISORDER: ICD-10-CM

## 2018-05-08 RX ORDER — LORAZEPAM 0.5 MG/1
TABLET ORAL
Qty: 60 TABLET | Refills: 5 | Status: SHIPPED | OUTPATIENT
Start: 2018-05-08 | End: 2018-12-04

## 2018-06-16 ENCOUNTER — TELEPHONE (OUTPATIENT)
Dept: OPHTHALMOLOGY | Facility: CLINIC | Age: 53
End: 2018-06-16

## 2018-06-16 NOTE — TELEPHONE ENCOUNTER
FUTURE VISIT INFORMATION      FUTURE VISIT INFORMATION:    Date: 08/14/18    Time: 800am    Location: [facility]  REFERRAL INFORMATION:    Referring provider:  Self    Referring providers clinic:  N/A    Reason for visit/diagnosis  Top part of left eye is drooping and causing vision issues

## 2018-06-22 ENCOUNTER — TELEPHONE (OUTPATIENT)
Dept: OPHTHALMOLOGY | Facility: CLINIC | Age: 53
End: 2018-06-22

## 2018-06-26 ENCOUNTER — PRE VISIT (OUTPATIENT)
Dept: OPHTHALMOLOGY | Facility: CLINIC | Age: 53
End: 2018-06-26

## 2018-07-02 ENCOUNTER — TELEPHONE (OUTPATIENT)
Dept: OPHTHALMOLOGY | Facility: CLINIC | Age: 53
End: 2018-07-02

## 2018-07-19 ENCOUNTER — OFFICE VISIT (OUTPATIENT)
Dept: FAMILY MEDICINE | Facility: CLINIC | Age: 53
End: 2018-07-19
Payer: COMMERCIAL

## 2018-07-19 ENCOUNTER — RADIANT APPOINTMENT (OUTPATIENT)
Dept: GENERAL RADIOLOGY | Facility: CLINIC | Age: 53
End: 2018-07-19
Attending: FAMILY MEDICINE
Payer: COMMERCIAL

## 2018-07-19 VITALS
WEIGHT: 105.4 LBS | RESPIRATION RATE: 16 BRPM | HEART RATE: 60 BPM | BODY MASS INDEX: 19.28 KG/M2 | OXYGEN SATURATION: 99 % | SYSTOLIC BLOOD PRESSURE: 96 MMHG | DIASTOLIC BLOOD PRESSURE: 62 MMHG | TEMPERATURE: 98 F

## 2018-07-19 DIAGNOSIS — J20.9 ACUTE BRONCHITIS WITH SYMPTOMS GREATER THAN 10 DAYS: ICD-10-CM

## 2018-07-19 DIAGNOSIS — R07.89 CHEST WALL PAIN: ICD-10-CM

## 2018-07-19 DIAGNOSIS — R63.4 UNINTENTIONAL WEIGHT LOSS: ICD-10-CM

## 2018-07-19 DIAGNOSIS — J30.2 SEASONAL ALLERGIC RHINITIS, UNSPECIFIED CHRONICITY, UNSPECIFIED TRIGGER: ICD-10-CM

## 2018-07-19 DIAGNOSIS — R07.89 CHEST WALL PAIN: Primary | ICD-10-CM

## 2018-07-19 PROCEDURE — 99214 OFFICE O/P EST MOD 30 MIN: CPT | Performed by: FAMILY MEDICINE

## 2018-07-19 PROCEDURE — 71046 X-RAY EXAM CHEST 2 VIEWS: CPT | Mod: FY

## 2018-07-19 RX ORDER — ALBUTEROL SULFATE 90 UG/1
2 AEROSOL, METERED RESPIRATORY (INHALATION) EVERY 4 HOURS PRN
Qty: 1 INHALER | Refills: 3 | Status: SHIPPED | OUTPATIENT
Start: 2018-07-19 | End: 2020-04-17

## 2018-07-19 RX ORDER — CETIRIZINE HYDROCHLORIDE 10 MG/1
TABLET ORAL
Qty: 30 TABLET | Refills: 5 | Status: SHIPPED | OUTPATIENT
Start: 2018-07-19 | End: 2018-10-08

## 2018-07-19 RX ORDER — PREDNISONE 20 MG/1
20 TABLET ORAL 2 TIMES DAILY
Qty: 14 TABLET | Refills: 0 | Status: SHIPPED | OUTPATIENT
Start: 2018-07-19 | End: 2019-02-13

## 2018-07-19 ASSESSMENT — PAIN SCALES - GENERAL: PAINLEVEL: NO PAIN (0)

## 2018-07-19 NOTE — MR AVS SNAPSHOT
After Visit Summary   7/19/2018    Fatou Simental    MRN: [id number]           Patient Information     Date Of Birth          1965        Visit Information        Provider Department      7/19/2018 9:40 AM BETHANY Kelly MD [facility]        Today's Diagnoses     Chest wall pain    -  1    Acute bronchitis with symptoms greater than 10 days          Care Instructions      Take the prednisone with food to avoid stomach distress. If it causes significant insomnia, stop early and just go with the albuterol.     Thank you for choosing [facility].  You may be receiving a survey in the mail from [employer] [facility] [employer] regarding your visit today.  Please take a few minutes to complete and return the survey to let us know how we are doing.      Our Clinic hours are:  Mondays    7:20 am - 7 pm  Tues -  Fri  7:20 am - 5 pm    Clinic Phone: [phone]    The clinic lab opens at 7:30 am Mon - Fri and appointments are required.    [facility]  Ph. [phone]  Monday  8 am - 7pm  Tues - Fri 8 am - 5:30 pm                 Follow-ups after your visit        Your next 10 appointments already scheduled     Aug 14, 2018  8:00 AM CDT   (Arrive by 7:45 AM)   NEW PLASTICS with Wilton Viramontes MD   [facility] ([facility])    [address] [place] [address] [phone]              Who to contact     If you have questions or need follow up information about today's clinic visit or your schedule please contact [facility] directly at [phone].  Normal or non-critical lab and imaging results will be communicated to you by MyChart, letter or phone within 4 business days after the clinic has received the results. If you do not hear from us within 7 days, please contact the clinic through MyChart or phone. If you have a critical or abnormal lab result, we will notify you by phone as soon as  possible.  Submit refill requests through [employer] or call your pharmacy and they will forward the refill request to us. Please allow 3 business days for your refill to be completed.          Additional Information About Your Visit        [employer] Information     [employer] gives you secure access to your electronic health record. If you see a primary care provider, you can also send messages to your care team and make appointments. If you have questions, please call your primary care clinic.  If you do not have a primary care provider, please call [phone] and they will assist you.        Care EveryWhere ID     This is your Care EveryWhere ID. This could be used by other organizations to access your [facility] medical records  [id number]        Your Vitals Were     Pulse Temperature Respirations Pulse Oximetry BMI (Body Mass Index)       60 98  F (36.7  C) (Tympanic) 16 99% 19.28 kg/m2        Blood Pressure from Last 3 Encounters:   07/19/18 96/62   03/06/18 118/72   02/27/18 112/73    Weight from Last 3 Encounters:   07/19/18 105 lb 6.4 oz (47.8 kg)   03/06/18 109 lb (49.4 kg)   02/27/18 109 lb (49.4 kg)                 Today's Medication Changes          These changes are accurate as of 7/19/18 10:46 AM.  If you have any questions, ask your nurse or doctor.               Start taking these medicines.        Dose/Directions    albuterol 108 (90 Base) MCG/ACT Inhaler   Commonly known as:  PROAIR HFA/PROVENTIL HFA/VENTOLIN HFA   Used for:  Acute bronchitis with symptoms greater than 10 days   Started by:  BETHANY Kelly MD        Dose:  2 puff   Inhale 2 puffs into the lungs every 4 hours as needed for shortness of breath / dyspnea or wheezing   Quantity:  1 Inhaler   Refills:  3       predniSONE 20 MG tablet   Commonly known as:  DELTASONE   Used for:  Acute bronchitis with symptoms greater than 10 days   Started by:  BETHANY Kelly MD        Dose:  20 mg   Take 1 tablet (20 mg) by mouth 2 times daily for 7 days    Quantity:  14 tablet   Refills:  0            Where to get your medicines      These medications were sent to [place], [place] - [address]  [address] [facility] [address]     Phone:  [phone]     albuterol 108 (90 Base) MCG/ACT Inhaler    predniSONE 20 MG tablet                Primary Care Provider Office Phone # Fax #    R Chucho Kelly [phone] [address] [facility] [address]        Equal Access to Services     [facility]: Hadii aad ku hadasho Soomaali, waaxda luqadaha, qaybta kaalmada adeegyada, waxglenys degroot hayvinay morales . [facility] [phone].    ATENCIÓN: Si habla español, tiene a saldaña disposición servicios gratuitos de asistencia lingüística. [facility] [phone].    We comply with applicable federal civil rights laws and [place] laws. We do not discriminate on the basis of race, color, national origin, age, disability, sex, sexual orientation, or gender identity.            Thank you!     Thank you for choosing [facility]  for your care. Our goal is always to provide you with excellent care. Hearing back from our patients is one way we can continue to improve our services. Please take a few minutes to complete the written survey that you may receive in the mail after your visit with us. Thank you!             Your Updated Medication List - Protect others around you: Learn how to safely use, store and throw away your medicines at www.disposemymeds.org.          This list is accurate as of 7/19/18 10:46 AM.  Always use your most recent med list.                   Brand Name Dispense Instructions for use Diagnosis    albuterol 108 (90 Base) MCG/ACT Inhaler    PROAIR HFA/PROVENTIL HFA/VENTOLIN HFA    1 Inhaler    Inhale 2 puffs into the lungs every 4 hours as needed for shortness of breath / dyspnea or wheezing    Acute bronchitis with symptoms greater than 10 days        BENADRYL PO      Take 50 mg by mouth nightly as needed        buPROPion 150 MG 24 hr tablet    WELLBUTRIN XL    30 tablet    Take 1 tablet (150 mg) by mouth every morning    Nicotine dependence, uncomplicated, unspecified nicotine product type       cetirizine 10 MG tablet    zyrTEC    30 tablet    Take 1 tablet (10 mg) by mouth every evening    Seasonal allergic rhinitis, unspecified chronicity, unspecified trigger       estradiol 1 MG tablet    ESTRACE    90 tablet    Take 1 tablet (1 mg) by mouth daily    Need for prophylactic hormone replacement therapy (postmenopausal)       estradiol 10 MCG Tabs vaginal tablet    VAGIFEM    8 tablet    Place 1 tablet (10 mcg) vaginally twice a week    Need for prophylactic hormone replacement therapy (postmenopausal)       gabapentin 100 MG capsule    NEURONTIN    90 capsule    Take 1 capsule (100 mg) by mouth At Bedtime    Vulvodynia       LORazepam 0.5 MG tablet    ATIVAN    60 tablet    Take 1 tablet by mouth twice daily as needed    Generalized anxiety disorder       meclizine 25 MG tablet    ANTIVERT    30 tablet    Take 1 tablet (25 mg) by mouth every 6 hours as needed for dizziness    Benign paroxysmal positional vertigo of right ear       multivitamin, therapeutic Tabs tablet      Take 1 tablet by mouth daily        omeprazole 20 MG tablet     90 tablet    Take 1 tablet (20 mg) by mouth daily Take 30-60 minutes before a meal.    Gastroesophageal reflux disease without esophagitis       predniSONE 20 MG tablet    DELTASONE    14 tablet    Take 1 tablet (20 mg) by mouth 2 times daily for 7 days    Acute bronchitis with symptoms greater than 10 days       SUMAtriptan 50 MG tablet    IMITREX    10 tablet    Take 1 tablet (50 mg) by mouth at onset of headache for migraine (May repeat x1 after 2hour if HA recurs)    Migraine with aura and without status migrainosus, not intractable       * varenicline 0.5 MG X 11 & 1 MG X 42 tablet    CHANTIX STARTING MONTH PAK    53 tablet     Take 0.5 mg tab daily for 3 days, then 0.5 mg tab twice daily for 4 days, then 1 mg twice daily.    Tobacco use disorder       * varenicline 1 MG tablet    CHANTIX    56 tablet    Take 1 tablet (1 mg) by mouth 2 times daily    Tobacco use disorder       VITAMIN D3 PO      Take 400 Units by mouth daily        * Notice:  This list has 2 medication(s) that are the same as other medications prescribed for you. Read the directions carefully, and ask your doctor or other care provider to review them with you.

## 2018-07-19 NOTE — PROGRESS NOTES
SUBJECTIVE:   Fatou Simental is a 53 year old female who presents to clinic today for the following health issues:      Chief Complaint   Patient presents with     Chest Pain     right sided chest and back pain, alsohas some congestion going on, thinks allergies     For over 2 weeks she has had a lingering cough and feeling of tightness in her chest.  It seems to be worse at night when she lies down.  The cough is minimally productive and she has not heard audible wheezing.  The last few days she is also having pain in the right side of the chest extending from the scapular area around under the right breast.  This is worse with taking a deep breath or coughing.  She is also concerned because she has been losing weight and states she is eating more and she usually does have          Problem list and histories reviewed & adjusted, as indicated.  Additional history: none        Reviewed and updated as needed this visit by clinical staff  Tobacco  Allergies  Meds  Med Hx  Surg Hx  Fam Hx  Soc Hx      Reviewed and updated as needed this visit by Provider               ROS:  Constitutional, HEENT, cardiovascular, pulmonary, gi and gu systems are negative, except as otherwise noted.        OBJECTIVE:                                                    BP 96/62 (Cuff Size: Adult Regular)  Pulse 60  Temp 98  F (36.7  C) (Tympanic)  Resp 16  Wt 105 lb 6.4 oz (47.8 kg)  SpO2 99%  BMI 19.28 kg/m2      Wt Readings from Last 5 Encounters:   07/19/18 105 lb 6.4 oz (47.8 kg)   03/06/18 109 lb (49.4 kg)   02/27/18 109 lb (49.4 kg)   02/19/18 111 lb (50.3 kg)   10/13/17 116 lb (52.6 kg)      GENERAL: healthy, alert and no distress  EYES: Eyes grossly normal to inspection, extraocular movements - intact, and PERRL  HENT: ear canals- normal; TMs- normal; Nose- normal; Mouth- no ulcers, no lesions  NECK: no tenderness, no adenopathy, no asymmetry, no masses, no stiffness; thyroid- normal to palpation  RESP: lungs clear  to auscultation - no rales, no rhonchi, no wheezes; tenderness of the chest wall just under the right breast in the anterior axillary line  CV: regular rates and rhythm, normal S1 S2, no S3 or S4 and no murmur, no click or rub -  MS: extremities- no gross deformities noted, no edema    Diagnostic test results:  CXR - negative     ASSESSMENT/PLAN:                                                    ASSESSMENT:  1. Chest wall pain    2. Acute bronchitis with symptoms greater than 10 days    3. Unintentional weight loss      Discussed pathophysiology of this condition and implications.  Questions answered.  The chest wall pain is probably from a strain of the chest wall muscles from coughing    PLAN:  Orders Placed This Encounter     XR Chest 2 Views     predniSONE (DELTASONE) 20 MG tablet     albuterol (PROAIR HFA/PROVENTIL HFA/VENTOLIN HFA) 108 (90 Base) MCG/ACT Inhaler   We will keep an eye on the weight loss and if she loses another 10 pounds will need to do further evaluation    Patient Instructions     Take the prednisone with food to avoid stomach distress. If it causes significant insomnia, stop early and just go with the albuterol.     Thank you for choosing [facility].  You may be receiving a survey in the mail from [employer] regarding your visit today.  Please take a few minutes to complete and return the survey to let us know how we are doing.      Our Clinic hours are:  Mondays    7:20 am - 7 pm  Tues -  Fri  7:20 am - 5 pm    Clinic Phone: [phone]    The clinic lab opens at 7:30 am Mon - Fri and appointments are required.    [facility]  Ph. [phone]  Monday  8 am - 7pm  Tues - Fri 8 am - 5:30 pm             BETHANY Rankin Post  [facility]

## 2018-07-19 NOTE — PATIENT INSTRUCTIONS
Take the prednisone with food to avoid stomach distress. If it causes significant insomnia, stop early and just go with the albuterol.     Thank you for choosing [facility].  You may be receiving a survey in the mail from [employer] regarding your visit today.  Please take a few minutes to complete and return the survey to let us know how we are doing.      Our Clinic hours are:  Mondays    7:20 am - 7 pm  Tues -  Fri  7:20 am - 5 pm    Clinic Phone: [phone]    The clinic lab opens at 7:30 am Mon - Fri and appointments are required.    [facility]  Ph. [phone]  Monday  8 am - 7pm  Tues - Fri 8 am - 5:30 pm

## 2018-07-19 NOTE — TELEPHONE ENCOUNTER
"Requested Prescriptions   Pending Prescriptions Disp Refills     cetirizine (ZYRTEC) 10 MG tablet [Pharmacy Med Name: CETIRIZINE HCL 10MG TABS]  Last Written Prescription Date:  07/11/17  Last Fill Quantity: 30,  # refills: 5   Last office visit: No previous visit found with prescribing provider:  07/19/18   Future Office Visit:     30 tablet 5     Sig: TAKE ONE TABLET BY MOUTH EVERY EVENING    Antihistamines Protocol Passed    7/19/2018 10:58 AM       Passed - Patient is 3-64 years of age    Apply weight-based dosing for peds patients age 3 - 12 years of age.    Forward request to provider for patients under the age of 3 or over the age of 64.         Passed - Recent (12 mo) or future (30 days) visit within the authorizing provider's specialty    Patient had office visit in the last 12 months or has a visit in the next 30 days with authorizing provider or within the authorizing provider's specialty.  See \"Patient Info\" tab in inbasket, or \"Choose Columns\" in Meds & Orders section of the refill encounter.              "

## 2018-07-24 DIAGNOSIS — F17.200 TOBACCO USE DISORDER: ICD-10-CM

## 2018-07-25 NOTE — TELEPHONE ENCOUNTER
"Requested Prescriptions   Pending Prescriptions Disp Refills     varenicline (CHANTIX STARTING MONTH JOHNNY) 0.5 MG X 11 & 1 MG X 42 tablet  Last Written Prescription Date:  04/12/2018  Last Fill Quantity: 53,  # refills: 0   Last office visit: 7/19/2018 with prescribing provider:  post   Future Office Visit:     53 tablet 0     Sig: Take 0.5 mg tab daily for 3 days, then 0.5 mg tab twice daily for 4 days, then 1 mg twice daily.    Partial Cholinergic Nicotinic Agonist Agents Passed    7/24/2018  4:48 PM       Passed - Blood pressure under 140/90 in past 12 months    BP Readings from Last 3 Encounters:   07/19/18 96/62   03/06/18 118/72   02/27/18 112/73                Passed - Recent (12 mo) or future (30 days) visit within the authorizing provider's specialty    Patient had office visit in the last 12 months or has a visit in the next 30 days with authorizing provider or within the authorizing provider's specialty.  See \"Patient Info\" tab in inbasket, or \"Choose Columns\" in Meds & Orders section of the refill encounter.           Passed - Patient is 18 years of age or older       Passed - Patient is not pregnant       Passed - No positive pregnancy test on file in past 12 months        Sj Manriquez RT (R)    "

## 2018-08-03 ENCOUNTER — OFFICE VISIT (OUTPATIENT)
Dept: FAMILY MEDICINE | Facility: CLINIC | Age: 53
End: 2018-08-03
Payer: COMMERCIAL

## 2018-08-03 VITALS
OXYGEN SATURATION: 97 % | DIASTOLIC BLOOD PRESSURE: 62 MMHG | RESPIRATION RATE: 12 BRPM | TEMPERATURE: 98.1 F | SYSTOLIC BLOOD PRESSURE: 108 MMHG | BODY MASS INDEX: 19.51 KG/M2 | HEIGHT: 62 IN | HEART RATE: 70 BPM | WEIGHT: 106 LBS

## 2018-08-03 DIAGNOSIS — K21.9 GASTROESOPHAGEAL REFLUX DISEASE WITHOUT ESOPHAGITIS: ICD-10-CM

## 2018-08-03 DIAGNOSIS — R30.0 DYSURIA: Primary | ICD-10-CM

## 2018-08-03 DIAGNOSIS — B96.89 BV (BACTERIAL VAGINOSIS): ICD-10-CM

## 2018-08-03 DIAGNOSIS — F32.0 MILD MAJOR DEPRESSION (H): ICD-10-CM

## 2018-08-03 DIAGNOSIS — N76.0 BV (BACTERIAL VAGINOSIS): ICD-10-CM

## 2018-08-03 LAB
ALBUMIN UR-MCNC: NEGATIVE MG/DL
APPEARANCE UR: CLEAR
BACTERIA #/AREA URNS HPF: ABNORMAL /HPF
BILIRUB UR QL STRIP: NEGATIVE
COLOR UR AUTO: YELLOW
GLUCOSE UR STRIP-MCNC: NEGATIVE MG/DL
HGB UR QL STRIP: NEGATIVE
KETONES UR STRIP-MCNC: NEGATIVE MG/DL
LEUKOCYTE ESTERASE UR QL STRIP: ABNORMAL
NITRATE UR QL: NEGATIVE
NON-SQ EPI CELLS #/AREA URNS LPF: ABNORMAL /LPF
PH UR STRIP: 7 PH (ref 5–7)
RBC #/AREA URNS AUTO: ABNORMAL /HPF
SOURCE: ABNORMAL
SP GR UR STRIP: 1.01 (ref 1–1.03)
SPECIMEN SOURCE: ABNORMAL
UROBILINOGEN UR STRIP-ACNC: 0.2 EU/DL (ref 0.2–1)
WBC #/AREA URNS AUTO: ABNORMAL /HPF
WET PREP SPEC: ABNORMAL

## 2018-08-03 PROCEDURE — 99214 OFFICE O/P EST MOD 30 MIN: CPT | Performed by: NURSE PRACTITIONER

## 2018-08-03 PROCEDURE — 87210 SMEAR WET MOUNT SALINE/INK: CPT | Performed by: NURSE PRACTITIONER

## 2018-08-03 PROCEDURE — 81001 URINALYSIS AUTO W/SCOPE: CPT | Performed by: NURSE PRACTITIONER

## 2018-08-03 RX ORDER — NICOTINE POLACRILEX 4 MG/1
20 GUM, CHEWING ORAL DAILY
Qty: 90 TABLET | Refills: 1 | Status: SHIPPED | OUTPATIENT
Start: 2018-08-03 | End: 2019-09-11

## 2018-08-03 RX ORDER — CLINDAMYCIN HCL 300 MG
300 CAPSULE ORAL 2 TIMES DAILY
Qty: 14 CAPSULE | Refills: 0 | Status: SHIPPED | OUTPATIENT
Start: 2018-08-03 | End: 2019-02-13

## 2018-08-03 RX ORDER — CLINDAMYCIN PHOSPHATE 20 MG/G
1 CREAM VAGINAL AT BEDTIME
Qty: 40 G | Refills: 0 | Status: SHIPPED | OUTPATIENT
Start: 2018-08-03 | End: 2019-02-13

## 2018-08-03 ASSESSMENT — PAIN SCALES - GENERAL: PAINLEVEL: MILD PAIN (2)

## 2018-08-03 NOTE — PATIENT INSTRUCTIONS
Apply cream vaginally at bedtime for a week      Bacterial Vaginosis    You have a vaginal infection called bacterial vaginosis (BV). Both good and bad bacteria are present in a healthy vagina. BV occurs when these bacteria get out of balance. The number of bad bacteria increase. And the number of good bacteria decrease. Although BV is associated with sexual activity, it is not a sexually transmitted disease.  BV may or may not cause symptoms. If symptoms do occur, they can include:    Thin, gray, milky-white, or sometimes green discharge    Unpleasant odor or  fishy  smell    Itching, burning, or pain in or around the vagina  It is not known what causes BV, but certain factors can make the problem more likely. This can include:    Douching    Having sex with a new partner    Having sex with more than one partner  BV will sometimes go away on its own. But treatment is usually recommended. This is because untreated BV can increase the risk of more serious health problems such as:    Pelvic inflammatory disease (PID)     delivery (giving birth to a baby early if you re pregnant)    HIV and certain other sexually transmitted diseases (STDs)    Infection after surgery on the reproductive organs  Home care  General care    BV is most often treated with medicines called antibiotics. These may be given as pills or as a vaginal cream. If antibiotics are prescribed, be sure to use them exactly as directed. Also, be sure to complete all of the medicine, even if your symptoms go away.    Don't douche or having sex during treatment.    If you have sex with a female partner, ask your healthcare provider if she should also be treated.  Prevention    Don't douche.    Don't have sex. If you do have sex, then take steps to lower your risk:  ? Use condoms when having sex.  ? Limit the number of sexual partners you have.  Follow-up care  Follow up with your healthcare provider, or as advised.  When to seek medical advice  Call  your healthcare provider right away if:    You have a fever of 100.4 F (38 C) or higher, or as directed by your provider.    Your symptoms worsen, or they don t go away within a few days of starting treatment.    You have new pain in the lower belly or pelvic region.    You have side effects that bother you or a reaction to the pills or cream you re prescribed.    You or any partners you have sex with have new symptoms, such as a rash, joint pain, or sores.  Date Last Reviewed: 10/1/2017    1831-9514 [employer]. [address] [place] [address]. All rights reserved. This information is not intended as a substitute for professional medical care. Always follow your healthcare professional's instructions.

## 2018-08-03 NOTE — LETTER
My Depression Action Plan  Name: Fatou Simental   Date of Birth 1965  Date: 8/3/2018    My doctor: BETHANY Kelly   My clinic: [facility]  [address]  [facility] [address]  [phone]          GREEN    ZONE   Good Control    What it looks like:     Things are going generally well. You have normal up s and down s. You may even feel depressed from time to time, but bad moods usually last less than a day.   What you need to do:  1. Continue to care for yourself (see self care plan)  2. Check your depression survival kit and update it as needed  3. Follow your physician s recommendations including any medication.  4. Do not stop taking medication unless you consult with your physician first.           YELLOW         ZONE Getting Worse    What it looks like:     Depression is starting to interfere with your life.     It may be hard to get out of bed; you may be starting to isolate yourself from others.    Symptoms of depression are starting to last most all day and this has happened for several days.     You may have suicidal thoughts but they are not constant.   What you need to do:     1. Call your care team, your response to treatment will improve if you keep your care team informed of your progress. Yellow periods are signs an adjustment may need to be made.     2. Continue your self-care, even if you have to fake it!    3. Talk to someone in your support network    4. Open up your depression survival kit           RED    ZONE Medical Alert - Get Help    What it looks like:     Depression is seriously interfering with your life.     You may experience these or other symptoms: You can t get out of bed most days, can t work or engage in other necessary activities, you have trouble taking care of basic hygiene, or basic responsibilities, thoughts of suicide or death that will not go away, self-injurious behavior.     What you need to do:  1. Call your care team and  request a same-day appointment. If they are not available (weekends or after hours) call your local crisis line, emergency room or 911.            Depression Self Care Plan / Survival Kit    Self-Care for Depression  Here s the deal. Your body and mind are really not as separate as most people think.  What you do and think affects how you feel and how you feel influences what you do and think. This means if you do things that people who feel good do, it will help you feel better.  Sometimes this is all it takes.  There is also a place for medication and therapy depending on how severe your depression is, so be sure to consult with your medical provider and/ or Behavioral Health Consultant if your symptoms are worsening or not improving.     In order to better manage my stress, I will:    Exercise  Get some form of exercise, every day. This will help reduce pain and release endorphins, the  feel good  chemicals in your brain. This is almost as good as taking antidepressants!  This is not the same as joining a gym and then never going! (they count on that by the way ) It can be as simple as just going for a walk or doing some gardening, anything that will get you moving.      Hygiene   Maintain good hygiene (Get out of bed in the morning, Make your bed, Brush your teeth, Take a shower, and Get dressed like you were going to work, even if you are unemployed).  If your clothes don't fit try to get ones that do.    Diet  I will strive to eat foods that are good for me, drink plenty of water, and avoid excessive sugar, caffeine, alcohol, and other mood-altering substances.  Some foods that are helpful in depression are: complex carbohydrates, B vitamins, flaxseed, fish or fish oil, fresh fruits and vegetables.    Psychotherapy  I agree to participate in Individual Therapy (if recommended).    Medication  If prescribed medications, I agree to take them.  Missing doses can result in serious side effects.  I understand that  drinking alcohol, or other illicit drug use, may cause potential side effects.  I will not stop my medication abruptly without first discussing it with my provider.    Staying Connected With Others  I will stay in touch with my friends, family members, and my primary care provider/team.    Use your imagination  Be creative.  We all have a creative side; it doesn t matter if it s oil painting, sand castles, or mud pies! This will also kick up the endorphins.    Witness Beauty  (AKA stop and smell the roses) Take a look outside, even in mid-winter. Notice colors, textures. Watch the squirrels and birds.     Service to others  Be of service to others.  There is always someone else in need.  By helping others we can  get out of ourselves  and remember the really important things.  This also provides opportunities for practicing all the other parts of the program.    Humor  Laugh and be silly!  Adjust your TV habits for less news and crime-drama and more comedy.    Control your stress  Try breathing deep, massage therapy, biofeedback, and meditation. Find time to relax each day.     My support system    Clinic Contact:  Phone number:    Contact 1:  Phone number:    Contact 2:  Phone number:    Alevism/:  Phone number:    Therapist:  Phone number:    Local crisis center:    Phone number:    Other community support:  Phone number:

## 2018-08-03 NOTE — PROGRESS NOTES
SUBJECTIVE:   Fatou Simental is a 53 year old female who presents to clinic today for the following health issues:      URINARY TRACT SYMPTOMS  Onset: 1 week    Description:   Painful urination (Dysuria): YES  Blood in urine (Hematuria): no   Delay in urine (Hesitency): no     Intensity: moderate    Progression of Symptoms:  worsening and intermittent    Accompanying Signs & Symptoms:  Fever/chills: no   Flank pain YES  Nausea and vomiting: no   Any vaginal symptoms: none  Abdominal/Pelvic Pain: no     History:   History of frequent UTI's: no   History of kidney stones: no   Sexually Active: YES  Possibility of pregnancy: No    Precipitating factors:   none    Therapies Tried and outcome: Increase fluid intake and OTC advil or tylenol       Problem list and histories reviewed & adjusted, as indicated.    Further history obtained, clarified or corrected by provider:  Symptoms are similar to UTIs in the past.  Dysuria, frequency, no urgency.  Reports no vaginal discharge, no itching.      Depression  Well managed with bupropion.  No concerns    GERD  Well managed with omeprazole.  No concerns    Patient Active Problem List   Diagnosis     Raynaud's syndrome     Tobacco use disorder     Mild major depression (H)     Need for prophylactic hormone replacement therapy (postmenopausal)     Generalized anxiety disorder     Esophageal reflux     Breast screening     Ganglion of joint     Ovarian cyst     IBS (irritable bowel syndrome)     CARDIOVASCULAR SCREENING; LDL GOAL LESS THAN 160     Screening for malignant neoplasm of cervix     Diverticulosis     Migraine with aura and without status migrainosus, not intractable     Past Surgical History:   Procedure Laterality Date     ABLATE VEIN VARICOSE RADIO FREQUENCY WITHOUT PHLEBECTOMY MULTIPLE STAB  2014    Procedure: ABLATE VEIN VARICOSE RADIO FREQUENCY WITHOUT PHLEBECTOMY MULTIPLE STAB;  Surgeon: Khanh Bunch MD;  Location: [place] OR     [facility]  DELIVERY ONLY  ,,    , Low Cervical     HYSTERECTOMY SUPRACERVICAL, BILATERAL SALPINGO-OOPHORECTOMY, COMBINED       LAPAROSCOPIC ASSISTED COLECTOMY N/A 2015    Procedure: LAPAROSCOPIC ASSISTED COLECTOMY;  Surgeon: Khanh Bunch MD;  Location: [facility] OR     LAPAROSCOPIC CHOLECYSTECTOMY N/A 2017    Procedure: LAPAROSCOPIC CHOLECYSTECTOMY;  Surgeon: Levi Louis MD;  Location: [facility] OR     NERVE BLOCK PERIPHERAL Bilateral 2015    Procedure: NERVE BLOCK PERIPHERAL;  Surgeon: Generic Anesthesia Provider;  Location: [place] OR     SURGICAL HISTORY OF -       Breast implants     SURGICAL HISTORY OF -       Carpal  tunnel r hand       Social History   Substance Use Topics     Smoking status: Current Every Day Smoker     Packs/day: 0.25     Years: 20.00     Smokeless tobacco: Never Used     Alcohol use No      Comment: Social. 1-2 cans of beer or 2 glasses of wine once per month     Family History   Problem Relation Age of Onset     Cancer Father      liver     Cancer Brother      lung, asbestos     Cancer Mother      uterine     Cancer Sister      uterine     Breast Cancer Other      Breast Cancer Other          BP Readings from Last 3 Encounters:   18 108/62   18 96/62   18 118/72    Wt Readings from Last 3 Encounters:   18 106 lb (48.1 kg)   18 105 lb 6.4 oz (47.8 kg)   18 109 lb (49.4 kg)                  Labs reviewed in EPIC    Reviewed and updated as needed this visit by clinical staff  Tobacco  Allergies  Meds  Problems  Med Hx  Surg Hx  Fam Hx  Soc Hx        Reviewed and updated as needed this visit by Provider  Allergies  Meds  Problems         ROS:  Constitutional, HEENT, cardiovascular, pulmonary, gi and gu systems are negative, except as otherwise noted.    OBJECTIVE:     /62 (BP Location: Right arm, Patient Position: Chair, Cuff Size: Adult Regular)  Pulse 70  Temp 98.1  F (36.7  C) (Tympanic)  Resp 12  Ht 5'  "2\" (1.575 m)  Wt 106 lb (48.1 kg)  SpO2 97%  Breastfeeding? No  BMI 19.39 kg/m2  Body mass index is 19.39 kg/(m^2).  GENERAL: healthy, alert and no distress  EYES: Eyes grossly normal to inspection and conjunctivae and sclerae normal  NECK: no adenopathy, no asymmetry, masses, or scars and thyroid normal to palpation  RESP: lungs clear to auscultation - no rales, rhonchi or wheezes  CV: regular rate and rhythm, normal S1 S2, no S3 or S4, no murmur, click or rub, no peripheral edema and peripheral pulses strong  ABDOMEN: tenderness suprapubic and bowel sounds normal  MS: no gross musculoskeletal defects noted, no edema    Diagnostic Test Results:  Results for orders placed or performed in visit on 08/03/18   *UA reflex to Microscopic and Culture ([place] [facility] (except [place] and [place])   Result Value Ref Range    Color Urine Yellow     Appearance Urine Clear     Glucose Urine Negative NEG^Negative mg/dL    Bilirubin Urine Negative NEG^Negative    Ketones Urine Negative NEG^Negative mg/dL    Specific Gravity Urine 1.015 1.003 - 1.035    Blood Urine Negative NEG^Negative    pH Urine 7.0 5.0 - 7.0 pH    Protein Albumin Urine Negative NEG^Negative mg/dL    Urobilinogen Urine 0.2 0.2 - 1.0 EU/dL    Nitrite Urine Negative NEG^Negative    Leukocyte Esterase Urine Trace (A) NEG^Negative    Source Midstream Urine    Urine Microscopic   Result Value Ref Range    WBC Urine 0 - 5 OTO5^0 - 5 /HPF    RBC Urine O - 2 OTO2^O - 2 /HPF    Squamous Epithelial /LPF Urine Few FEW^Few /LPF    Bacteria Urine Few (A) NEG^Negative /HPF   Wet prep   Result Value Ref Range    Specimen Description Vagina     Wet Prep No Trichomonas seen     Wet Prep No yeast seen     Wet Prep Clue cells seen (A)        ASSESSMENT/PLAN:     ASSESSMENT:  1.  BV (bacterial vaginosis).  Patient had a rash with metronidazole in the past, this was when it was taken with both Cipro and Vicodin.  Will use clindamycin cream   2. Mild major " depression (H).  Well managed.  Depression action plan reviewed and shared with patient.   3.  Dysuria.  Negative UA   4. Gastroesophageal reflux disease without esophagitis.  Well-controlled no change in plan of care       PLAN:  Orders Placed This Encounter     DEPRESSION ACTION PLAN (DAP)     *UA reflex to Microscopic and Culture ([place] [facility] (except [place] and [place])     Urine Microscopic     clindamycin (CLEOCIN) 2 % cream     omeprazole 20 MG tablet     clindamycin (CLEOCIN) 300 MG capsule       Patient Instructions   Apply cream vaginally at bedtime for a week      Bacterial Vaginosis    You have a vaginal infection called bacterial vaginosis (BV). Both good and bad bacteria are present in a healthy vagina. BV occurs when these bacteria get out of balance. The number of bad bacteria increase. And the number of good bacteria decrease. Although BV is associated with sexual activity, it is not a sexually transmitted disease.  BV may or may not cause symptoms. If symptoms do occur, they can include:    Thin, gray, milky-white, or sometimes green discharge    Unpleasant odor or  fishy  smell    Itching, burning, or pain in or around the vagina  It is not known what causes BV, but certain factors can make the problem more likely. This can include:    Douching    Having sex with a new partner    Having sex with more than one partner  BV will sometimes go away on its own. But treatment is usually recommended. This is because untreated BV can increase the risk of more serious health problems such as:    Pelvic inflammatory disease (PID)     delivery (giving birth to a baby early if you re pregnant)    HIV and certain other sexually transmitted diseases (STDs)    Infection after surgery on the reproductive organs  Home care  General care    BV is most often treated with medicines called antibiotics. These may be given as pills or as a vaginal cream. If antibiotics are prescribed, be sure to  use them exactly as directed. Also, be sure to complete all of the medicine, even if your symptoms go away.    Don't douche or having sex during treatment.    If you have sex with a female partner, ask your healthcare provider if she should also be treated.  Prevention    Don't douche.    Don't have sex. If you do have sex, then take steps to lower your risk:  ? Use condoms when having sex.  ? Limit the number of sexual partners you have.  Follow-up care  Follow up with your healthcare provider, or as advised.  When to seek medical advice  Call your healthcare provider right away if:    You have a fever of 100.4 F (38 C) or higher, or as directed by your provider.    Your symptoms worsen, or they don t go away within a few days of starting treatment.    You have new pain in the lower belly or pelvic region.    You have side effects that bother you or a reaction to the pills or cream you re prescribed.    You or any partners you have sex with have new symptoms, such as a rash, joint pain, or sores.  Date Last Reviewed: 10/1/2017    5234-0097 [employer]. [address] [place] [address]. All rights reserved. This information is not intended as a substitute for professional medical care. Always follow your healthcare professional's instructions.        Patient agrees with plan of care as outlined. Call or return to the clinic with any worsening of symptoms or no resolution. Medication side effects reviewed.  Chart documentation with Dragon Voice recognition Software. Although reviewed after completion, some words and grammatical errors may remain.        Rivka Tong, TEN, APRN [facility]

## 2018-08-03 NOTE — MR AVS SNAPSHOT
After Visit Summary   8/3/2018    Fatou Simental    MRN: [id number]           Patient Information     Date Of Birth          1965        Visit Information        Provider Department      8/3/2018 1:20 PM Rivka Tong APRN [facility]        Today's Diagnoses     Dysuria    -  1    Mild major depression (H)        BV (bacterial vaginosis)        Gastroesophageal reflux disease without esophagitis          Care Instructions    Apply cream vaginally at bedtime for a week      Bacterial Vaginosis    You have a vaginal infection called bacterial vaginosis (BV). Both good and bad bacteria are present in a healthy vagina. BV occurs when these bacteria get out of balance. The number of bad bacteria increase. And the number of good bacteria decrease. Although BV is associated with sexual activity, it is not a sexually transmitted disease.  BV may or may not cause symptoms. If symptoms do occur, they can include:    Thin, gray, milky-white, or sometimes green discharge    Unpleasant odor or  fishy  smell    Itching, burning, or pain in or around the vagina  It is not known what causes BV, but certain factors can make the problem more likely. This can include:    Douching    Having sex with a new partner    Having sex with more than one partner  BV will sometimes go away on its own. But treatment is usually recommended. This is because untreated BV can increase the risk of more serious health problems such as:    Pelvic inflammatory disease (PID)     delivery (giving birth to a baby early if you re pregnant)    HIV and certain other sexually transmitted diseases (STDs)    Infection after surgery on the reproductive organs  Home care  General care    BV is most often treated with medicines called antibiotics. These may be given as pills or as a vaginal cream. If antibiotics are prescribed, be sure to use them exactly as directed. Also, be sure to complete all of the  medicine, even if your symptoms go away.    Don't douche or having sex during treatment.    If you have sex with a female partner, ask your healthcare provider if she should also be treated.  Prevention    Don't douche.    Don't have sex. If you do have sex, then take steps to lower your risk:  ? Use condoms when having sex.  ? Limit the number of sexual partners you have.  Follow-up care  Follow up with your healthcare provider, or as advised.  When to seek medical advice  Call your healthcare provider right away if:    You have a fever of 100.4 F (38 C) or higher, or as directed by your provider.    Your symptoms worsen, or they don t go away within a few days of starting treatment.    You have new pain in the lower belly or pelvic region.    You have side effects that bother you or a reaction to the pills or cream you re prescribed.    You or any partners you have sex with have new symptoms, such as a rash, joint pain, or sores.  Date Last Reviewed: 10/1/2017    5678-6604 [employer]. [address] [place] [address]. All rights reserved. This information is not intended as a substitute for professional medical care. Always follow your healthcare professional's instructions.                Follow-ups after your visit        Your next 10 appointments already scheduled     Aug 14, 2018  8:00 AM CDT   (Arrive by 7:45 AM)   NEW PLASTICS with Wilton Viramontes MD   [facility] ([facility])    [address] [place] [address] [phone]              Who to contact     If you have questions or need follow up information about today's clinic visit or your schedule please contact [facility] directly at [phone].  Normal or non-critical lab and imaging results will be communicated to you by MyChart, letter or phone within 4 business days after the clinic has received the results. If you do not hear from us within  "7 days, please contact the clinic through [employer] or phone. If you have a critical or abnormal lab result, we will notify you by phone as soon as possible.  Submit refill requests through [employer] or call your pharmacy and they will forward the refill request to us. Please allow 3 business days for your refill to be completed.          Additional Information About Your Visit        [employer] Information     [employer] gives you secure access to your electronic health record. If you see a primary care provider, you can also send messages to your care team and make appointments. If you have questions, please call your primary care clinic.  If you do not have a primary care provider, please call [phone] and they will assist you.        Care EveryWhere ID     This is your Care EveryWhere ID. This could be used by other organizations to access your [facility] medical records  [id number]        Your Vitals Were     Pulse Temperature Respirations Height Pulse Oximetry Breastfeeding?    70 98.1  F (36.7  C) (Tympanic) 12 5' 2\" (1.575 m) 97% No    BMI (Body Mass Index)                   19.39 kg/m2            Blood Pressure from Last 3 Encounters:   08/03/18 108/62   07/19/18 96/62   03/06/18 118/72    Weight from Last 3 Encounters:   08/03/18 106 lb (48.1 kg)   07/19/18 105 lb 6.4 oz (47.8 kg)   03/06/18 109 lb (49.4 kg)              We Performed the Following     *UA reflex to Microscopic and Culture ([place] [facility] (except [place] and [place])     DEPRESSION ACTION PLAN (DAP)     Urine Microscopic     Wet prep          Today's Medication Changes          These changes are accurate as of 8/3/18  2:11 PM.  If you have any questions, ask your nurse or doctor.               Start taking these medicines.        Dose/Directions    clindamycin 2 % cream   Commonly known as:  CLEOCIN   Used for:  BV (bacterial vaginosis)   Started by:  Rivka Tong APRN CNP        Dose:  1 applicator   Place 1 applicator " vaginally At Bedtime for 7 days   Quantity:  40 g   Refills:  0            Where to get your medicines      These medications were sent to [facility] - [place], [place] - [address]  [address] [facility] [address]     Phone:  [phone]     clindamycin 2 % cream    omeprazole 20 MG tablet                Primary Care Provider Office Phone # Fax #    R Chucho Kelly [phone] [address] [facility] [address]        Equal Access to Services     [facility]: Hadii aad ku hadasho Soomaali, waaxda luqadaha, qaybta kaalmada adeegyada, waxay adrianain hayaan adeeg angeli morales . So [facility] [phone].    ATENCIÓN: Si habla español, tiene a saldaña disposición servicios gratuitos de asistencia lingüística. [facility] [phone].    We comply with applicable federal civil rights laws and [place] laws. We do not discriminate on the basis of race, color, national origin, age, disability, sex, sexual orientation, or gender identity.            Thank you!     Thank you for choosing [facility]  for your care. Our goal is always to provide you with excellent care. Hearing back from our patients is one way we can continue to improve our services. Please take a few minutes to complete the written survey that you may receive in the mail after your visit with us. Thank you!             Your Updated Medication List - Protect others around you: Learn how to safely use, store and throw away your medicines at www.disposemymeds.org.          This list is accurate as of 8/3/18  2:11 PM.  Always use your most recent med list.                   Brand Name Dispense Instructions for use Diagnosis    albuterol 108 (90 Base) MCG/ACT Inhaler    PROAIR HFA/PROVENTIL HFA/VENTOLIN HFA    1 Inhaler    Inhale 2 puffs into the lungs every 4 hours as needed for shortness of breath / dyspnea or wheezing    Acute bronchitis with symptoms greater than 10  days       BENADRYL PO      Take 50 mg by mouth nightly as needed        buPROPion 150 MG 24 hr tablet    WELLBUTRIN XL    30 tablet    Take 1 tablet (150 mg) by mouth every morning    Nicotine dependence, uncomplicated, unspecified nicotine product type       cetirizine 10 MG tablet    zyrTEC    30 tablet    TAKE ONE TABLET BY MOUTH EVERY EVENING    Seasonal allergic rhinitis, unspecified chronicity, unspecified trigger       clindamycin 2 % cream    CLEOCIN    40 g    Place 1 applicator vaginally At Bedtime for 7 days    BV (bacterial vaginosis)       estradiol 1 MG tablet    ESTRACE    90 tablet    Take 1 tablet (1 mg) by mouth daily    Need for prophylactic hormone replacement therapy (postmenopausal)       estradiol 10 MCG Tabs vaginal tablet    VAGIFEM    8 tablet    Place 1 tablet (10 mcg) vaginally twice a week    Need for prophylactic hormone replacement therapy (postmenopausal)       gabapentin 100 MG capsule    NEURONTIN    90 capsule    Take 1 capsule (100 mg) by mouth At Bedtime    Vulvodynia       LORazepam 0.5 MG tablet    ATIVAN    60 tablet    Take 1 tablet by mouth twice daily as needed    Generalized anxiety disorder       multivitamin, therapeutic Tabs tablet      Take 1 tablet by mouth daily        omeprazole 20 MG tablet     90 tablet    Take 1 tablet (20 mg) by mouth daily Take 30-60 minutes before a meal.    Gastroesophageal reflux disease without esophagitis       SUMAtriptan 50 MG tablet    IMITREX    10 tablet    Take 1 tablet (50 mg) by mouth at onset of headache for migraine (May repeat x1 after 2hour if HA recurs)    Migraine with aura and without status migrainosus, not intractable       * varenicline 1 MG tablet    CHANTIX    56 tablet    Take 1 tablet (1 mg) by mouth 2 times daily    Tobacco use disorder       * varenicline 0.5 MG X 11 & 1 MG X 42 tablet    CHANTIX STARTING MONTH PAK    53 tablet    Take 0.5 mg tab daily for 3 days, then 0.5 mg tab twice daily for 4 days, then 1 mg  twice daily.    Tobacco use disorder       VITAMIN D3 PO      Take 400 Units by mouth daily        * Notice:  This list has 2 medication(s) that are the same as other medications prescribed for you. Read the directions carefully, and ask your doctor or other care provider to review them with you.

## 2018-08-04 ASSESSMENT — PATIENT HEALTH QUESTIONNAIRE - PHQ9: SUM OF ALL RESPONSES TO PHQ QUESTIONS 1-9: 6

## 2018-08-07 ENCOUNTER — TELEPHONE (OUTPATIENT)
Dept: FAMILY MEDICINE | Facility: CLINIC | Age: 53
End: 2018-08-07

## 2018-08-07 DIAGNOSIS — N76.0 BV (BACTERIAL VAGINOSIS): Primary | ICD-10-CM

## 2018-08-07 DIAGNOSIS — B96.89 BV (BACTERIAL VAGINOSIS): Primary | ICD-10-CM

## 2018-08-07 RX ORDER — METRONIDAZOLE 500 MG/1
500 TABLET ORAL 2 TIMES DAILY
Qty: 14 TABLET | Refills: 0 | Status: SHIPPED | OUTPATIENT
Start: 2018-08-07 | End: 2018-08-24

## 2018-08-07 NOTE — TELEPHONE ENCOUNTER
Reason for call:  Patient reporting a symptom    Symptom or request: Pt was seen 8/3, by LOIS Tong, for a pelvic infection and was put on antibiotics and the pain and odor have now gotten worse. No discharge or fever.  Please call patient and advise.    [facility]    Duration (how long have symptoms been present): ongoing    Have you been treated for this before? Yes    Additional comments:     Phone Number patient can be reached at:  Cell number on file:    Telephone Information:   Mobile [phone]       Best Time:  any    Can we leave a detailed message on this number:  YES    Call taken on 8/7/2018 at 8:20 AM by Jenna Young

## 2018-08-07 NOTE — TELEPHONE ENCOUNTER
Will order Materna Sixto.  Past reaction was when taken with both Cipro and Vicodin.  Stop the clindamycin.Rivka Tong RNC, NP

## 2018-08-07 NOTE — TELEPHONE ENCOUNTER
Pt seen and treated for pelvic infection/clue cells on 8/3/18 with Clinda 300 mg 1 tab bid x 7 days and Clinda cream 2% 40 g tube.  Some cramping, increased odor and no vag discharge.  Advise.  PollyRN

## 2018-08-14 ENCOUNTER — DOCUMENTATION ONLY (OUTPATIENT)
Dept: OPHTHALMOLOGY | Facility: CLINIC | Age: 53
End: 2018-08-14

## 2018-08-14 ENCOUNTER — OFFICE VISIT (OUTPATIENT)
Dept: OPHTHALMOLOGY | Facility: CLINIC | Age: 53
End: 2018-08-14
Payer: COMMERCIAL

## 2018-08-14 VITALS — WEIGHT: 106 LBS | HEIGHT: 62 IN | BODY MASS INDEX: 19.51 KG/M2

## 2018-08-14 DIAGNOSIS — H02.403 INVOLUTIONAL PTOSIS, ACQUIRED, BILATERAL: Primary | ICD-10-CM

## 2018-08-14 ASSESSMENT — VISUAL ACUITY
OS_SC: 20/30
METHOD: SNELLEN - LINEAR
OS_SC+: -2
OD_SC+: -2
OD_SC: 20/30

## 2018-08-14 ASSESSMENT — TONOMETRY
OD_IOP_MMHG: 10
IOP_METHOD: ICARE
OS_IOP_MMHG: 11

## 2018-08-14 ASSESSMENT — CONF VISUAL FIELD
OS_NORMAL: 1
OD_NORMAL: 1

## 2018-08-14 ASSESSMENT — MARGIN REFLEX DISTANCE
OD_MRD1: 2
OS_MRD1: 1

## 2018-08-14 ASSESSMENT — SLIT LAMP EXAM - LIDS: COMMENTS: TRUE PTOSIS

## 2018-08-14 NOTE — NURSING NOTE
Chief Complaints and History of Present Illnesses   Patient presents with     Droopy Left Upper Lid     HPI    Affected eye(s):  Left   Location:  Upper   Symptoms:     Difficulty with reading   Difficulty watching television   Difficulty with driving   No floaters   No flashes         Do you have eye pain now?:  No      Comments:    Patient notes that she has had a ROSA droop for years, noticed it getting worse in the last 6 months, worse in the morning, cause decreased peripheral vision that affects driving,l reading , and watching tv    Esme Drake August 14, 2018 8:15 AM

## 2018-08-14 NOTE — PROGRESS NOTES
Met with patient to schedule surgery with Dr. Wilton Viramontes.  Surgery was scheduled on 10/11 at [facility]  Patient will have H&P at [facility]  Post-Op care appointment was scheduled on 10/23  Patient is aware a / is needed day of surgery.   Patient received surgery packet has my direct contact information for any further questions.

## 2018-08-14 NOTE — MR AVS SNAPSHOT
"              After Visit Summary   8/14/2018    Fatou Simental    MRN: [id number]           Patient Information     Date Of Birth          1965        Visit Information        Provider Department      8/14/2018 8:00 AM Wilton Viramontes MD M [facility] Ophthalmology        Today's Diagnoses     Involutional ptosis, acquired, bilateral    -  1       Follow-ups after your visit        Your next 10 appointments already scheduled     Oct 23, 2018  2:45 PM CDT   (Arrive by 2:30 PM)   Post-Op with MD TANVI Fletcher [facility] Ophthalmology ([facility] [place])    [address] [place] [id number] [phone]              Who to contact     Please call your clinic at [phone] to:    Ask questions about your health    Make or cancel appointments    Discuss your medicines    Learn about your test results    Speak to your doctor            Additional Information About Your Visit        MyChart Information     Neolane gives you secure access to your electronic health record. If you see a primary care provider, you can also send messages to your care team and make appointments. If you have questions, please call your primary care clinic.  If you do not have a primary care provider, please call [phone] and they will assist you.      Neolane is an electronic gateway that provides easy, online access to your medical records. With Neolane, you can request a clinic appointment, read your test results, renew a prescription or communicate with your care team.     To access your existing account, please contact your [facility] or call [phone] for assistance.        Care EveryWhere ID     This is your Care EveryWhere ID. This could be used by other organizations to access your [place] medical records  [id number]        Your Vitals Were     Height BMI (Body Mass Index)                1.575 m (5' 2\") 19.39 kg/m2           Blood Pressure " from Last 3 Encounters:   08/03/18 108/62   07/19/18 96/62   03/06/18 118/72    Weight from Last 3 Encounters:   08/14/18 48.1 kg (106 lb)   08/03/18 48.1 kg (106 lb)   07/19/18 47.8 kg (105 lb 6.4 oz)              We Performed the Following     External Photos OU (both eyes)     Man VF Ptosis OS     Grecia-Operative Worksheet (Plastics)        Primary Care Provider Office Phone # Fax #    R Chucho Kelly [phone] [address] [facility] [address]        Equal Access to Services     [facility]: Hadii katelyn sosa hadmeche Ocampo, waaxda lukiersten, qaybta kaalmada radhika, tiffanie farias. So [facility] [phone].    ATENCIÓN: Si habla español, tiene a saldaña disposición servicios gratuitos de asistencia lingüística. [facility] [phone].    We comply with applicable federal civil rights laws and [place] laws. We do not discriminate on the basis of race, color, national origin, age, disability, sex, sexual orientation, or gender identity.            Thank you!     Thank you for choosing [facility] OPHTHALMOLOGY  for your care. Our goal is always to provide you with excellent care. Hearing back from our patients is one way we can continue to improve our services. Please take a few minutes to complete the written survey that you may receive in the mail after your visit with us. Thank you!             Your Updated Medication List - Protect others around you: Learn how to safely use, store and throw away your medicines at www.disposemymeds.org.          This list is accurate as of 8/14/18  8:59 AM.  Always use your most recent med list.                   Brand Name Dispense Instructions for use Diagnosis    albuterol 108 (90 Base) MCG/ACT inhaler    PROAIR HFA/PROVENTIL HFA/VENTOLIN HFA    1 Inhaler    Inhale 2 puffs into the lungs every 4 hours as needed for shortness of breath / dyspnea or wheezing    Acute bronchitis with symptoms greater than 10 days        BENADRYL PO      Take 50 mg by mouth nightly as needed        buPROPion 150 MG 24 hr tablet    WELLBUTRIN XL    30 tablet    Take 1 tablet (150 mg) by mouth every morning    Nicotine dependence, uncomplicated, unspecified nicotine product type       cetirizine 10 MG tablet    zyrTEC    30 tablet    TAKE ONE TABLET BY MOUTH EVERY EVENING    Seasonal allergic rhinitis, unspecified chronicity, unspecified trigger       estradiol 1 MG tablet    ESTRACE    90 tablet    Take 1 tablet (1 mg) by mouth daily    Need for prophylactic hormone replacement therapy (postmenopausal)       estradiol 10 MCG Tabs vaginal tablet    VAGIFEM    8 tablet    Place 1 tablet (10 mcg) vaginally twice a week    Need for prophylactic hormone replacement therapy (postmenopausal)       gabapentin 100 MG capsule    NEURONTIN    90 capsule    Take 1 capsule (100 mg) by mouth At Bedtime    Vulvodynia       LORazepam 0.5 MG tablet    ATIVAN    60 tablet    Take 1 tablet by mouth twice daily as needed    Generalized anxiety disorder       metroNIDAZOLE 500 MG tablet    FLAGYL    14 tablet    Take 1 tablet (500 mg) by mouth 2 times daily    BV (bacterial vaginosis)       multivitamin, therapeutic Tabs tablet      Take 1 tablet by mouth daily        omeprazole 20 MG tablet     90 tablet    Take 1 tablet (20 mg) by mouth daily Take 30-60 minutes before a meal.    Gastroesophageal reflux disease without esophagitis       SUMAtriptan 50 MG tablet    IMITREX    10 tablet    Take 1 tablet (50 mg) by mouth at onset of headache for migraine (May repeat x1 after 2hour if HA recurs)    Migraine with aura and without status migrainosus, not intractable       * varenicline 1 MG tablet    CHANTIX    56 tablet    Take 1 tablet (1 mg) by mouth 2 times daily    Tobacco use disorder       * varenicline 0.5 MG X 11 & 1 MG X 42 tablet    CHANTIX STARTING MONTH JOHNNY    53 tablet    Take 0.5 mg tab daily for 3 days, then 0.5 mg tab twice daily for 4 days, then 1 mg twice  daily.    Tobacco use disorder       VITAMIN D3 PO      Take 400 Units by mouth daily        * Notice:  This list has 2 medication(s) that are the same as other medications prescribed for you. Read the directions carefully, and ask your doctor or other care provider to review them with you.

## 2018-08-24 ENCOUNTER — OFFICE VISIT (OUTPATIENT)
Dept: OBGYN | Facility: CLINIC | Age: 53
End: 2018-08-24
Payer: COMMERCIAL

## 2018-08-24 VITALS
WEIGHT: 106 LBS | SYSTOLIC BLOOD PRESSURE: 92 MMHG | HEART RATE: 69 BPM | DIASTOLIC BLOOD PRESSURE: 56 MMHG | TEMPERATURE: 98.2 F | BODY MASS INDEX: 19.39 KG/M2

## 2018-08-24 DIAGNOSIS — F17.200 NICOTINE DEPENDENCE, UNCOMPLICATED, UNSPECIFIED NICOTINE PRODUCT TYPE: ICD-10-CM

## 2018-08-24 DIAGNOSIS — Z11.3 SCREEN FOR STD (SEXUALLY TRANSMITTED DISEASE): ICD-10-CM

## 2018-08-24 DIAGNOSIS — M62.89 PELVIC FLOOR DYSFUNCTION: Primary | ICD-10-CM

## 2018-08-24 LAB
SPECIMEN SOURCE: ABNORMAL
WET PREP SPEC: ABNORMAL

## 2018-08-24 PROCEDURE — 87210 SMEAR WET MOUNT SALINE/INK: CPT | Performed by: OBSTETRICS & GYNECOLOGY

## 2018-08-24 PROCEDURE — 87591 N.GONORRHOEAE DNA AMP PROB: CPT | Performed by: OBSTETRICS & GYNECOLOGY

## 2018-08-24 PROCEDURE — 87491 CHLMYD TRACH DNA AMP PROBE: CPT | Performed by: OBSTETRICS & GYNECOLOGY

## 2018-08-24 PROCEDURE — 99214 OFFICE O/P EST MOD 30 MIN: CPT | Performed by: OBSTETRICS & GYNECOLOGY

## 2018-08-24 RX ORDER — BUPROPION HYDROCHLORIDE 150 MG/1
150 TABLET ORAL EVERY MORNING
Qty: 30 TABLET | Refills: 5 | Status: SHIPPED | OUTPATIENT
Start: 2018-08-24 | End: 2019-02-13

## 2018-08-24 NOTE — PROGRESS NOTES
Ms. Fatou Simental 53 year old P3 (C-sec x 3) presents for a month long duration of sporadic lower pelvic cramping. Occurs every other day and lasts for about 15 min at a time. Sometimes it occurs following intercourse too. Initially, she thought she had a UTI and sought evaluation for this and was told she did not have a UTI. She then noticed foul smelling vaginal discharge 3 weeks ago was treated for bacterial vaginitis after doing a self swab. She was treated initially with oral clindamycin but that did not resolve her symptoms, however, flagyl was prescribed and that seemed to eliminate the odor that she was having. Cramping episodes however still persisted.   Denies constipation, diarrhea.   Denies dysuria.   Does report vaginal dryness from time to time even though she is taking vagifem every other day.     Of note, she has had a supracervical hysterectomy, BSO    Past Medical History:   Diagnosis Date     Cocaine abuse, unspecified      Dysplasia of cervix, unspecified      Esophageal reflux 2006     Generalized anxiety disorder 10/30/2006    Buspar prescribed 07     IBS (irritable bowel syndrome) 2008     PONV (postoperative nausea and vomiting)      Past Surgical History:   Procedure Laterality Date     ABLATE VEIN VARICOSE RADIO FREQUENCY WITHOUT PHLEBECTOMY MULTIPLE STAB  2014    Procedure: ABLATE VEIN VARICOSE RADIO FREQUENCY WITHOUT PHLEBECTOMY MULTIPLE STAB;  Surgeon: Khanh Bunch MD;  Location: [facility] OR       DELIVERY ONLY  ,,    , Low Cervical     ENHANCE LASER REFRACTIVE BILATERAL EXISTING PT IN PARAMETERS Bilateral     LASIK     HYSTERECTOMY SUPRACERVICAL, BILATERAL SALPINGO-OOPHORECTOMY, COMBINED       LAPAROSCOPIC ASSISTED COLECTOMY N/A 2015    Procedure: LAPAROSCOPIC ASSISTED COLECTOMY;  Surgeon: Khanh Bunch MD;  Location: [facility] OR     LAPAROSCOPIC CHOLECYSTECTOMY N/A 2017    Procedure: LAPAROSCOPIC  CHOLECYSTECTOMY;  Surgeon: Levi Louis MD;  Location: [facility] OR     NERVE BLOCK PERIPHERAL Bilateral 2/6/2015    Procedure: NERVE BLOCK PERIPHERAL;  Surgeon: Generic Anesthesia Provider;  Location: [facility] OR     SURGICAL HISTORY OF -   1998    Breast implants     SURGICAL HISTORY OF -   1994    Carpal  tunnel r hand     Current Outpatient Prescriptions   Medication     albuterol (PROAIR HFA/PROVENTIL HFA/VENTOLIN HFA) 108 (90 Base) MCG/ACT Inhaler     buPROPion (WELLBUTRIN XL) 150 MG 24 hr tablet     cetirizine (ZYRTEC) 10 MG tablet     Cholecalciferol (VITAMIN D3 PO)     DiphenhydrAMINE HCl (BENADRYL PO)     estradiol (ESTRACE) 1 MG tablet     estradiol (VAGIFEM) 10 MCG TABS vaginal tablet     gabapentin (NEURONTIN) 100 MG capsule     LORazepam (ATIVAN) 0.5 MG tablet     multivitamin, therapeutic (THERA-VIT) TABS     omeprazole 20 MG tablet     SUMAtriptan (IMITREX) 50 MG tablet     varenicline (CHANTIX STARTING MONTH PAK) 0.5 MG X 11 & 1 MG X 42 tablet     varenicline (CHANTIX) 1 MG tablet     No current facility-administered medications for this visit.         Allergies   Allergen Reactions     Cephalexin Hives     Patient states she gets hives - bad reaction not listed on her H & P.     Ciprofloxacin Hives     Got rash when taken with metronidazole + Vicodin     Lactose Diarrhea     Metronidazole Hives     Rash when taken with cipro + Vicodin     No Clinical Screening - See Comments      Pt developed hives when taking flagyl, cipro, and vicodin, doesn't know which she is allergic to      Vicodin [Hydrocodone-Acetaminophen] Hives     Rash when taken with cipro and metronidazole     Social History   Substance Use Topics     Smoking status: Current Every Day Smoker     Packs/day: 0.25     Years: 20.00     Smokeless tobacco: Never Used     Alcohol use No      Comment: Social. 1-2 cans of beer or 2 glasses of wine once per month     Family History   Problem Relation Age of Onset     Cancer Father      liver     Cancer  Brother      lung, asbestos     Cancer Mother      uterine     Cancer Sister      uterine     Breast Cancer Other      Breast Cancer Other      Glaucoma No family hx of      Macular Degeneration No family hx of      C: NEGATIVE for fever, chills, change in weight  HEENT: NEGATIVE for visual changes, runny nose, epistaxis, ear pain, tinnitus, tooth ache, sore throat, difficulty with swallowing, sinus pain  R: NEGATIVE for significant cough or SOB  CV: NEGATIVE for chest pain, palpitations or peripheral edema  BREAST: NEGATIVE For soreness, pain, lumps or nipple discharge  GI: NEGATIVE for nausea, abdominal pain, heartburn, or change in bowel habits  : NEGATIVE for frequency, dysuria, hematuria, vaginal discharge, or irregular bleeding  Neuro: NEGATIVE for numbness, tingling, focal weakness, or headache  INT: NEGATIVE for rashes, lesions or pruritis   PSYCH: NEGATIVE for anxiety, depression, or halluciinations    Exam:   BP 92/56 (BP Location: Right arm, Patient Position: Chair, Cuff Size: Adult Regular)  Pulse 69  Temp 98.2  F (36.8  C) (Tympanic)  Wt 106 lb (48.1 kg)  Breastfeeding? No  BMI 19.39 kg/m2  General Appearance: Well nourished, well developed female, NAD, AOx3  Neurological: Mental Status Normal and Station and Gait Normal   Skin: Normal skin turgor  HEENT: Atraumatic, normocephalic, EOMI  Lungs: Good respiratory effort  Abdomen: Soft, NT, ND, no masses  Pelvic: Normal external female genitalia.  No external lesions, normal hair distribution, no adenopathy. Speculum exam reveals atrophic vaginal epithelium  with no abnormal discharge. Cervix appears smooth, pink, with no visible lesions. Bimanual exam reveals normal size uterus, anteverted, non-tender, and mobile. No adnexal masses or tenderness. No cervical motion tenderness. Pelvic floor muscles palpated and tender along the side wall bilaterally.   Extremities: No clubbing, no cyanosis and no edema    Wet prep: Positive for clue cells, negative  for yeast and trichomonas    A/P:   1) Pelvic floor dysfunction  -- clinical exam of lateral pelvic floor muscles mimicked cramping pain that patient had been complaining about  -- recommend pelvic physical floor therapy    2) Clue cells present  -- informed patient that this can also be seen in normal vaginal ziyad although to a lesser degree  -- given the absence of additional symptoms ie odor and itching, advised patient against treating again    3) STD Screening  -- GC/CT cultures collected as well      Saroj Robertson MD  [facility]

## 2018-08-24 NOTE — TELEPHONE ENCOUNTER
Prescription approved per [facility] Refill Protocol.  Jane GERBER RN    OV notes 8-3-18:  Mild major depression (H).  Well managed.  Depression action plan reviewed and shared with patient.

## 2018-08-24 NOTE — MR AVS SNAPSHOT
"              After Visit Summary   8/24/2018    Fatou Simental    MRN: [id number]           Patient Information     Date Of Birth          1965        Visit Information        Provider Department      8/24/2018 10:45 AM Saroj Robertson MD [facility]        Today's Diagnoses     Bacterial vaginitis    -  1    Screen for STD (sexually transmitted disease)        Pelvic floor dysfunction           Follow-ups after your visit        Additional Services     PHYSICAL THERAPY REFERRAL       *This therapy referral will be filtered to a centralized scheduling office at [facility] and the patient will receive a call to schedule an appointment at a [place] location most convenient for them. *     [facility] provides Physical Therapy evaluation and treatment and many specialty services across the [place] system.  If requesting a specialty program, please choose from the list below.    If you have not heard from the scheduling office within 2 business days, please call [phone] for all locations, with the exception of [place], please call [phone] and [facility], please call [phone]  Treatment: Evaluation & Treatment  Special Instructions/Modalities: pelvic floor    Special Programs: Incontinence Pelvic Floor Program    Please be aware that coverage of these services is subject to the terms and limitations of your health insurance plan.  Call member services at your health plan with any benefit or coverage questions.      **Note to Provider:  If you are referring outside of [place] for the therapy appointment, please list the name of the location in the \"special instructions\" above, print the referral and give to the patient to schedule the appointment.                  Your next 10 appointments already scheduled     Oct 11, 2018   Procedure with Wilton Viramontes MD   [facility] ([facility])    [address] [place] [address]  5th Floor  [facility] [place] [id number] [phone]           Located in the Clinics and Surgery Center at [address], [place] [address].   parking is very convenient and highly recommended.  is a $6 flat rate fee.  Both  and self parkers should enter the main arrival plaza from [facility]; parking attendants will direct you based on your parking preference.            Oct 30, 2018  2:30 PM CDT   (Arrive by 2:15 PM)   Post-Op with Wilton Viramontes MD   [facility] Ophthalmology ([facility] [place])    [address] [place] [address]  4th [facility] [id number] [phone]              Future tests that were ordered for you today     Open Standing Orders        Priority Remaining Interval Expires Ordered    PHYSICAL THERAPY REFERRAL Routine 15/15  8/24/2019 8/24/2018            Who to contact     If you have questions or need follow up information about today's clinic visit or your schedule please contact [facility] directly at [phone].  Normal or non-critical lab and imaging results will be communicated to you by [employer], letter or phone within 4 business days after the clinic has received the results. If you do not hear from us within 7 days, please contact the clinic through [employer] or phone. If you have a critical or abnormal lab result, we will notify you by phone as soon as possible.  Submit refill requests through [employer] or call your pharmacy and they will forward the refill request to us. Please allow 3 business days for your refill to be completed.          Additional Information About Your Visit        Cardiola Information     Cardiola gives you secure access to your electronic health record. If you see a primary care provider, you can also send messages to your care team and make appointments. If you have questions, please call your primary care clinic.  If you do not have a primary care provider, please call  [phone] and they will assist you.        Care EveryWhere ID     This is your Care EveryWhere ID. This could be used by other organizations to access your [place] medical records  [id number]        Your Vitals Were     Pulse Temperature Breastfeeding? BMI (Body Mass Index)          69 98.2  F (36.8  C) (Tympanic) No 19.39 kg/m2         Blood Pressure from Last 3 Encounters:   08/24/18 92/56   08/03/18 108/62   07/19/18 96/62    Weight from Last 3 Encounters:   08/24/18 106 lb (48.1 kg)   08/14/18 106 lb (48.1 kg)   08/03/18 106 lb (48.1 kg)              We Performed the Following     Chlamydia trachomatis PCR     Neisseria gonorrhoeae PCR     Wet prep        Primary Care Provider Office Phone # Fax #    R Chucho Kelly [phone] [address] [place] [address]        Equal Access to Services     [facility]: Hadii katelyn sosa hadasho Soomaali, waaxda luqadaha, qaybta kaalmada adeegyada, tiffanie morales . So [facility] [phone].    ATENCIÓN: Si bradlyla tammy, tiene a saldaña disposición servicios gratuitos de asistencia lingüística. Llame al [phone].    We comply with applicable federal civil rights laws and [place] laws. We do not discriminate on the basis of race, color, national origin, age, disability, sex, sexual orientation, or gender identity.            Thank you!     Thank you for choosing [facility]  for your care. Our goal is always to provide you with excellent care. Hearing back from our patients is one way we can continue to improve our services. Please take a few minutes to complete the written survey that you may receive in the mail after your visit with us. Thank you!             Your Updated Medication List - Protect others around you: Learn how to safely use, store and throw away your medicines at www.disposemymeds.org.          This list is accurate as of 8/24/18 11:19 AM.  Always use your most recent med list.                    Brand Name Dispense Instructions for use Diagnosis    albuterol 108 (90 Base) MCG/ACT inhaler    PROAIR HFA/PROVENTIL HFA/VENTOLIN HFA    1 Inhaler    Inhale 2 puffs into the lungs every 4 hours as needed for shortness of breath / dyspnea or wheezing    Acute bronchitis with symptoms greater than 10 days       BENADRYL PO      Take 50 mg by mouth nightly as needed        buPROPion 150 MG 24 hr tablet    WELLBUTRIN XL    30 tablet    Take 1 tablet (150 mg) by mouth every morning    Nicotine dependence, uncomplicated, unspecified nicotine product type       cetirizine 10 MG tablet    zyrTEC    30 tablet    TAKE ONE TABLET BY MOUTH EVERY EVENING    Seasonal allergic rhinitis, unspecified chronicity, unspecified trigger       estradiol 1 MG tablet    ESTRACE    90 tablet    Take 1 tablet (1 mg) by mouth daily    Need for prophylactic hormone replacement therapy (postmenopausal)       estradiol 10 MCG Tabs vaginal tablet    VAGIFEM    8 tablet    Place 1 tablet (10 mcg) vaginally twice a week    Need for prophylactic hormone replacement therapy (postmenopausal)       gabapentin 100 MG capsule    NEURONTIN    90 capsule    Take 1 capsule (100 mg) by mouth At Bedtime    Vulvodynia       LORazepam 0.5 MG tablet    ATIVAN    60 tablet    Take 1 tablet by mouth twice daily as needed    Generalized anxiety disorder       multivitamin, therapeutic Tabs tablet      Take 1 tablet by mouth daily        omeprazole 20 MG tablet     90 tablet    Take 1 tablet (20 mg) by mouth daily Take 30-60 minutes before a meal.    Gastroesophageal reflux disease without esophagitis       SUMAtriptan 50 MG tablet    IMITREX    10 tablet    Take 1 tablet (50 mg) by mouth at onset of headache for migraine (May repeat x1 after 2hour if HA recurs)    Migraine with aura and without status migrainosus, not intractable       * varenicline 1 MG tablet    CHANTIX    56 tablet    Take 1 tablet (1 mg) by mouth 2 times daily    Tobacco use disorder        * varenicline 0.5 MG X 11 & 1 MG X 42 tablet    CHANTIX STARTING MONTH JOHNNY    53 tablet    Take 0.5 mg tab daily for 3 days, then 0.5 mg tab twice daily for 4 days, then 1 mg twice daily.    Tobacco use disorder       VITAMIN D3 PO      Take 400 Units by mouth daily        * Notice:  This list has 2 medication(s) that are the same as other medications prescribed for you. Read the directions carefully, and ask your doctor or other care provider to review them with you.

## 2018-08-24 NOTE — TELEPHONE ENCOUNTER
"Requested Prescriptions   Pending Prescriptions Disp Refills     buPROPion (WELLBUTRIN XL) 150 MG 24 hr tablet  Last Written Prescription Date:  10/10/2017  Last Fill Quantity: 30,  # refills: 5   Last office visit: 7/19/2018 with prescribing provider:  Post   Future Office Visit:     30 tablet 5     Sig: Take 1 tablet (150 mg) by mouth every morning    SSRIs Protocol Passed    8/24/2018 11:33 AM       Passed - Recent (12 mo) or future (30 days) visit within the authorizing provider's specialty    Patient had office visit in the last 12 months or has a visit in the next 30 days with authorizing provider or within the authorizing provider's specialty.  See \"Patient Info\" tab in inbasket, or \"Choose Columns\" in Meds & Orders section of the refill encounter.           Passed - Medication is Bupropion    If the medication is Bupropion (Wellbutrin), and the patient is taking for smoking cessation; OK to refill.         Passed - Patient is age 18 or older       Passed - No active pregnancy on record       Passed - No positive pregnancy test in last 12 months          "

## 2018-08-27 ENCOUNTER — TELEPHONE (OUTPATIENT)
Dept: OBGYN | Facility: CLINIC | Age: 53
End: 2018-08-27

## 2018-08-27 DIAGNOSIS — B96.89 BV (BACTERIAL VAGINOSIS): ICD-10-CM

## 2018-08-27 DIAGNOSIS — N76.0 BV (BACTERIAL VAGINOSIS): ICD-10-CM

## 2018-08-27 NOTE — TELEPHONE ENCOUNTER
Reason for call:  Patient reporting a symptom    Symptom or request: Pt states she would like the prescription for pelvic infection  States she was going to try to hold off but her symptoms have gotten worse.    Duration (how long have symptoms been present):     Have you been treated for this before? No    Additional comments: Pharmacy [facility]    Phone Number patient can be reached at:  Home number on file [phone] (home)    Best Time:  any    Can we leave a detailed message on this number:  YES    Call taken on 8/27/2018 at 2:53 PM by Johanne Uriarte

## 2018-08-28 RX ORDER — METRONIDAZOLE 500 MG/1
500 TABLET ORAL 2 TIMES DAILY
Qty: 14 TABLET | Refills: 0 | Status: SHIPPED | OUTPATIENT
Start: 2018-08-28 | End: 2018-10-08

## 2018-10-02 ENCOUNTER — TELEPHONE (OUTPATIENT)
Dept: FAMILY MEDICINE | Facility: CLINIC | Age: 53
End: 2018-10-02

## 2018-10-02 ENCOUNTER — OFFICE VISIT (OUTPATIENT)
Dept: FAMILY MEDICINE | Facility: CLINIC | Age: 53
End: 2018-10-02
Payer: COMMERCIAL

## 2018-10-02 VITALS
HEART RATE: 69 BPM | SYSTOLIC BLOOD PRESSURE: 112 MMHG | DIASTOLIC BLOOD PRESSURE: 62 MMHG | RESPIRATION RATE: 12 BRPM | BODY MASS INDEX: 19.69 KG/M2 | HEIGHT: 62 IN | TEMPERATURE: 98.2 F | WEIGHT: 107 LBS | OXYGEN SATURATION: 98 %

## 2018-10-02 DIAGNOSIS — Z01.818 PREOP GENERAL PHYSICAL EXAM: Primary | ICD-10-CM

## 2018-10-02 DIAGNOSIS — F32.0 MILD MAJOR DEPRESSION (H): ICD-10-CM

## 2018-10-02 DIAGNOSIS — K58.9 IRRITABLE BOWEL SYNDROME, UNSPECIFIED TYPE: ICD-10-CM

## 2018-10-02 DIAGNOSIS — N94.819 VULVODYNIA: ICD-10-CM

## 2018-10-02 DIAGNOSIS — F17.200 TOBACCO USE DISORDER: ICD-10-CM

## 2018-10-02 DIAGNOSIS — H02.403 PTOSIS OF EYELID, BILATERAL: ICD-10-CM

## 2018-10-02 DIAGNOSIS — F41.1 GENERALIZED ANXIETY DISORDER: ICD-10-CM

## 2018-10-02 DIAGNOSIS — Z79.890 NEED FOR PROPHYLACTIC HORMONE REPLACEMENT THERAPY (POSTMENOPAUSAL): ICD-10-CM

## 2018-10-02 PROCEDURE — 99214 OFFICE O/P EST MOD 30 MIN: CPT | Performed by: FAMILY MEDICINE

## 2018-10-02 RX ORDER — GABAPENTIN 100 MG/1
100 CAPSULE ORAL AT BEDTIME
Qty: 90 CAPSULE | Refills: 3 | Status: SHIPPED | OUTPATIENT
Start: 2018-10-02 | End: 2018-10-05

## 2018-10-02 RX ORDER — ESTRADIOL 1 MG/1
1 TABLET ORAL DAILY
Qty: 90 TABLET | Refills: 3 | Status: SHIPPED | OUTPATIENT
Start: 2018-10-02 | End: 2018-12-03

## 2018-10-02 RX ORDER — GABAPENTIN 100 MG/1
CAPSULE ORAL
Qty: 90 CAPSULE | Status: CANCELLED | OUTPATIENT
Start: 2018-10-02

## 2018-10-02 RX ORDER — ESTRADIOL 10 UG/1
10 INSERT VAGINAL
Qty: 8 TABLET | Refills: 1 | Status: SHIPPED | OUTPATIENT
Start: 2018-10-04 | End: 2020-09-21

## 2018-10-02 NOTE — PATIENT INSTRUCTIONS
Before Your Surgery      Call your surgeon if there is any change in your health. This includes signs of a cold or flu (such as a sore throat, runny nose, cough, rash or fever).    Do not smoke, drink alcohol or take over the counter medicine (unless your surgeon or primary care doctor tells you to) for the 24 hours before and after surgery.    If you take prescribed drugs: Follow your doctor s orders about which medicines to take and which to stop until after surgery. Take all your usual meds the am of surgery, but stop ibuprofen 4 days before surgery    Eating and drinking prior to surgery: follow the instructions from your surgeon    Take a shower or bath the night before surgery. Use the soap your surgeon gave you to gently clean your skin. If you do not have soap from your surgeon, use your regular soap. Do not shave or scrub the surgery site.  Wear clean pajamas and have clean sheets on your bed.

## 2018-10-02 NOTE — PROGRESS NOTES
[facility]  [address] [facility] [address]  [phone]  Dept: [phone]    PRE-OP EVALUATION:  Today's date: 10/2/2018    Fatou Simental (: 1965) presents for pre-operative evaluation assessment as requested by Dr. Wilton Viramontes.  She requires evaluation and anesthesia risk assessment prior to undergoing surgery/procedure for treatment of bilateral upper eyelid ptosis repair .    Proposed Surgery/ Procedure:        Bilateral Upper Eyelid Ptosis Repair         Date of Surgery/ Procedure: 10/11/2018  Time of Surgery/ Procedure: to be determined  Hospital/Surgical [facility]: [facility]    Primary Physician: BETHANY Kelly  Type of Anesthesia Anticipated: Local with MAC    Patient has a Health Care Directive or Living Will:  NO    1. NO - Do you have a history of heart attack, stroke, stent, bypass or surgery on an artery in the head, neck, heart or legs?  2. NO - Do you ever have any pain or discomfort in your chest?  3. NO - Do you have a history of  Heart Failure?  4. NO - Are you troubled by shortness of breath when: walking on the level, up a slight hill or at night?  5. NO - Do you currently have a cold, bronchitis or other respiratory infection?  6. NO - Do you have a cough, shortness of breath or wheezing?  7. NO - Do you sometimes get pains in the calves of your legs when you walk?  8. NO - Do you or anyone in your family have previous history of blood clots?  9. NO - Do you or does anyone in your family have a serious bleeding problem such as prolonged bleeding following surgeries or cuts?  10. NO - Have you ever had problems with anemia or been told to take iron pills?  11. NO - Have you had any abnormal blood loss such as black, tarry or bloody stools, or abnormal vaginal bleeding?  12. NO - Have you ever had a blood transfusion?  13. NO - Have you or any of your relatives ever had problems with anesthesia?  14. NO - Do you have sleep apnea, excessive snoring  or daytime drowsiness?  15. NO - Do you have any prosthetic heart valves?  16. NO - Do you have prosthetic joints?  17. NO - Is there any chance that you may be pregnant?      HPI:     HPI related to upcoming procedure: She was in for a routine optometry exam and was noted to have a loss of visual field in the upper fields.  This was felt due to her upper eyelid ptosis and she was referred for the above surgery      See problem list for active medical problems.  Problems all longstanding and stable, except as noted/documented.  See ROS for pertinent symptoms related to these conditions.                                                                                                                                                          .    MEDICAL HISTORY:     Patient Active Problem List    Diagnosis Date Noted     Migraine with aura and without status migrainosus, not intractable 10/16/2017     Priority: Medium     Diverticulosis 02/05/2015     Priority: Medium     Screening for malignant neoplasm of cervix 07/17/2013     Priority: Medium     S/p hysterectomy for endometriosis, still has cervix  Distant history of cervical dysplasia 2000  Pap 2010:  NIL with negative HPV  Pap 2012:  NIL  Should not need repeat Pap before 2015    Problem list name updated by automated process. Provider to review       CARDIOVASCULAR SCREENING; LDL GOAL LESS THAN 160 10/31/2010     Priority: Medium     IBS (irritable bowel syndrome) 05/28/2008     Priority: Medium     Ovarian cyst 08/23/2007     Priority: Medium     Oophorectomy bilateral - premalignant tissue ? 1998  Problem list name updated by automated process. Provider to review       Breast screening 01/22/2007     Priority: Medium      implants with 4mm superficial subq lesion at 2:00 left breast - unchanged over last year. Cyst identified on US 1/07  Problem list name updated by automated process. Provider to review       Ganglion of joint 01/22/2007     Priority: Medium      Esophageal reflux 2006     Priority: Medium     Generalized anxiety disorder 10/30/2006     Priority: Medium     Buspar prescribed 07       Raynaud's syndrome 2006     Priority: Medium     Tobacco use disorder 2006     Priority: Medium     Mild major depression (H) 2006     Priority: Medium     Need for prophylactic hormone replacement therapy (postmenopausal) 2006     Priority: Medium      Past Medical History:   Diagnosis Date     Cocaine abuse, unspecified      Dysplasia of cervix, unspecified      Esophageal reflux 2006     Generalized anxiety disorder 10/30/2006    Buspar prescribed 07     IBS (irritable bowel syndrome) 2008     PONV (postoperative nausea and vomiting)      Past Surgical History:   Procedure Laterality Date     ABLATE VEIN VARICOSE RADIO FREQUENCY WITHOUT PHLEBECTOMY MULTIPLE STAB  2014    Procedure: ABLATE VEIN VARICOSE RADIO FREQUENCY WITHOUT PHLEBECTOMY MULTIPLE STAB;  Surgeon: Khanh Bunch MD;  Location: [facility]       DELIVERY ONLY  ,,    , Low Cervical     ENHANCE LASER REFRACTIVE BILATERAL EXISTING PT IN PARAMETERS Bilateral     LASIK     HYSTERECTOMY SUPRACERVICAL, BILATERAL SALPINGO-OOPHORECTOMY, COMBINED       LAPAROSCOPIC ASSISTED COLECTOMY N/A 2015    Procedure: LAPAROSCOPIC ASSISTED COLECTOMY;  Surgeon: Khanh Bunch MD;  Location: [facility]     LAPAROSCOPIC CHOLECYSTECTOMY N/A 2017    Procedure: LAPAROSCOPIC CHOLECYSTECTOMY;  Surgeon: Levi Louis MD;  Location: [facility]     NERVE BLOCK PERIPHERAL Bilateral 2015    Procedure: NERVE BLOCK PERIPHERAL;  Surgeon: Generic Anesthesia Provider;  Location: [facility] OR     SURGICAL HISTORY OF -       Breast implants     SURGICAL HISTORY OF -       Carpal  tunnel r hand     Current Outpatient Prescriptions   Medication Sig Dispense Refill     albuterol (PROAIR HFA/PROVENTIL HFA/VENTOLIN HFA) 108 (90 Base) MCG/ACT  Inhaler Inhale 2 puffs into the lungs every 4 hours as needed for shortness of breath / dyspnea or wheezing 1 Inhaler 3     buPROPion (WELLBUTRIN XL) 150 MG 24 hr tablet Take 1 tablet (150 mg) by mouth every morning 30 tablet 5     cetirizine (ZYRTEC) 10 MG tablet TAKE ONE TABLET BY MOUTH EVERY EVENING 30 tablet 5     Cholecalciferol (VITAMIN D3 PO) Take 400 Units by mouth daily        DiphenhydrAMINE HCl (BENADRYL PO) Take 50 mg by mouth nightly as needed        estradiol (ESTRACE) 1 MG tablet Take 1 tablet (1 mg) by mouth daily 90 tablet 3     estradiol (VAGIFEM) 10 MCG TABS vaginal tablet Place 1 tablet (10 mcg) vaginally twice a week 8 tablet 1     gabapentin (NEURONTIN) 100 MG capsule Take 1 capsule (100 mg) by mouth At Bedtime 90 capsule 3     LORazepam (ATIVAN) 0.5 MG tablet Take 1 tablet by mouth twice daily as needed 60 tablet 5     metroNIDAZOLE (FLAGYL) 500 MG tablet Take 1 tablet (500 mg) by mouth 2 times daily 14 tablet 0     multivitamin, therapeutic (THERA-VIT) TABS Take 1 tablet by mouth daily       omeprazole 20 MG tablet Take 1 tablet (20 mg) by mouth daily Take 30-60 minutes before a meal. 90 tablet 1     SUMAtriptan (IMITREX) 50 MG tablet Take 1 tablet (50 mg) by mouth at onset of headache for migraine (May repeat x1 after 2hour if HA recurs) 10 tablet 5     varenicline (CHANTIX STARTING MONTH JOHNNY) 0.5 MG X 11 & 1 MG X 42 tablet Take 0.5 mg tab daily for 3 days, then 0.5 mg tab twice daily for 4 days, then 1 mg twice daily. 53 tablet 0     varenicline (CHANTIX) 1 MG tablet Take 1 tablet (1 mg) by mouth 2 times daily 56 tablet 4     OTC products: None, except as noted above    Allergies   Allergen Reactions     Cephalexin Hives     Patient states she gets hives - bad reaction not listed on her H & P.     Ciprofloxacin Hives     Got rash when taken with metronidazole + Vicodin     Lactose Diarrhea     Metronidazole Hives     Rash when taken with cipro + Vicodin     No Clinical Screening - See  "Comments      Pt developed hives when taking flagyl, cipro, and vicodin, doesn't know which she is allergic to      Vicodin [Hydrocodone-Acetaminophen] Hives     Rash when taken with cipro and metronidazole      Latex Allergy: NO    Social History   Substance Use Topics     Smoking status: Current Every Day Smoker     Packs/day: 0.25     Years: 20.00     Smokeless tobacco: Never Used     Alcohol use No      Comment: Social. 1-2 cans of beer or 2 glasses of wine once per month     History   Drug Use No       REVIEW OF SYSTEMS:   CONSTITUTIONAL: NEGATIVE for fever, chills, change in weight  ENT/MOUTH: NEGATIVE for ear, mouth and throat problems  RESP: NEGATIVE for significant cough or SOB  CV: NEGATIVE for chest pain, palpitations or peripheral edema    EXAM:   /62 (BP Location: Right arm, Patient Position: Chair, Cuff Size: Adult Regular)  Pulse 69  Temp 98.2  F (36.8  C) (Tympanic)  Resp 12  Ht 5' 2\" (1.575 m)  Wt 107 lb (48.5 kg)  SpO2 98%  BMI 19.57 kg/m2  GENERAL APPEARANCE: healthy, alert and no distress  EYES: PERRL, conjunctivae and sclerae normal and eyelids- ptosis each eye, upper lids  HENT: ear canals and TM's normal and nose and mouth without ulcers or lesions  RESP: lungs clear to auscultation - no rales, rhonchi or wheezes  CV: regular rate and rhythm, normal S1 S2, no S3 or S4 and no murmur, click or rub   ABDOMEN: soft, nontender, no HSM or masses and bowel sounds normal  NEURO: Normal strength and tone, sensory exam grossly normal, mentation intact and speech normal    DIAGNOSTICS:   No labs or EKG required for low risk surgery (cataract, skin procedure, breast biopsy, etc)    Recent Labs   Lab Test  10/13/17   1604  10/10/17   1105  09/05/17   1455   08/12/15   0754   HGB  12.4   --   13.6   < >   --    PLT  255   --   300   < >   --    NA   --   141  139   < >   --    POTASSIUM   --   3.8  2.7*   < >   --    CR   --   0.62  0.70   < >   --    A1C   --    --    --    --   5.4    < > = " values in this interval not displayed.        IMPRESSION:   Reason for surgery/procedure: Bilateral upper eyelid ptosis causing visual field restriction  Diagnosis/reason for consult: Evaluate for anesthesia risk    The proposed surgical procedure is considered LOW risk.    REVISED CARDIAC RISK INDEX  The patient has the following serious cardiovascular risks for perioperative complications such as (MI, PE, VFib and 3  AV Block):  No serious cardiac risks  INTERPRETATION: 0 risks: Class I (very low risk - 0.4% complication rate)    The patient has the following additional risks for perioperative complications:  No identified additional risks      ICD-10-CM    1. Preop general physical exam Z01.818    2. Ptosis of eyelid, bilateral H02.403    3. 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 Tobacco use disorder F17.200    6. Generalized anxiety disorder F41.1    7. Mild major depression (H) F32.0    8. Irritable bowel syndrome, unspecified type K58.9        RECOMMENDATIONS:         --Patient is to take all scheduled medications on the day of surgery EXCEPT for modifications listed below.  Stop ibuprofen 4 days before surgery    APPROVAL GIVEN to proceed with proposed procedure, without further diagnostic evaluation       Signed Electronically by: BETHANY Kelly MD    Copy of this evaluation report is provided to requesting physician.    [facility] Preop Guidelines    Revised Cardiac Risk Index

## 2018-10-02 NOTE — MR AVS SNAPSHOT
After Visit Summary   10/2/2018    Fatou Simental    MRN: [id number]           Patient Information     Date Of Birth          1965        Visit Information        Provider Department      10/2/2018 3:20 PM Post, BETHANY Rankin MD [facility]        Today's Diagnoses     Preop general physical exam    -  1    POSTMENOPAUSAL HORMONAL REPLACEMENT TX        Vulvodynia          Care Instructions      Before Your Surgery      Call your surgeon if there is any change in your health. This includes signs of a cold or flu (such as a sore throat, runny nose, cough, rash or fever).    Do not smoke, drink alcohol or take over the counter medicine (unless your surgeon or primary care doctor tells you to) for the 24 hours before and after surgery.    If you take prescribed drugs: Follow your doctor s orders about which medicines to take and which to stop until after surgery. Take all your usual meds the am of surgery, but stop ibuprofen 4 days before surgery    Eating and drinking prior to surgery: follow the instructions from your surgeon    Take a shower or bath the night before surgery. Use the soap your surgeon gave you to gently clean your skin. If you do not have soap from your surgeon, use your regular soap. Do not shave or scrub the surgery site.  Wear clean pajamas and have clean sheets on your bed.           Follow-ups after your visit        Your next 10 appointments already scheduled     Oct 11, 2018   Procedure with Wilton Viramontes MD   [facility] ([facility])    [address] [place], [place] [id number]   [phone]           Located in the Clinics and Surgery Center at [address] [place] [address].   parking is very convenient and highly recommended.  is a $6 flat rate fee.  Both  and self parkers should enter the main arrival plaza from [facility]; parking attendants will direct  "you based on your parking preference.            Oct 30, 2018  2:30 PM CDT   (Arrive by 2:15 PM)   Post-Op with Wilton Viramontes MD   [facility] ([facility])    [address] [facility]  [address] [facility] [address] [phone]              Who to contact     If you have questions or need follow up information about today's clinic visit or your schedule please contact [facility] directly at [phone].  Normal or non-critical lab and imaging results will be communicated to you by [employer], letter or phone within 4 business days after the clinic has received the results. If you do not hear from us within 7 days, please contact the clinic through [employer] or phone. If you have a critical or abnormal lab result, we will notify you by phone as soon as possible.  Submit refill requests through [email or web address] or call your pharmacy and they will forward the refill request to us. Please allow 3 business days for your refill to be completed.          Additional Information About Your Visit        [email or web address] Information     [email or web address] gives you secure access to your electronic health record. If you see a primary care provider, you can also send messages to your care team and make appointments. If you have questions, please call your primary care clinic.  If you do not have a primary care provider, please call [phone] and they will assist you.        Care EveryWhere ID     This is your Care EveryWhere ID. This could be used by other organizations to access your [place] medical records  [id number]        Your Vitals Were     Pulse Temperature Respirations Height Pulse Oximetry BMI (Body Mass Index)    69 98.2  F (36.8  C) (Tympanic) 12 5' 2\" (1.575 m) 98% 19.57 kg/m2       Blood Pressure from Last 3 Encounters:   10/02/18 112/62   08/24/18 92/56   08/03/18 108/62    Weight from Last 3 Encounters:   10/02/18 107 lb (48.5 kg)   08/24/18 106 lb (48.1 kg)   08/14/18 106 lb " (48.1 kg)              Today, you had the following     No orders found for display         Where to get your medicines      These medications [place] [facility] [place] - [address]  [address] [facility] [address]     Phone:  [phone]     estradiol 1 MG tablet    estradiol 10 MCG Tabs vaginal tablet    gabapentin 100 MG capsule          Primary Care Provider Office Phone # Fax #    R Chucho Kelly [phone] [address] [facility] [address]        Equal Access to Services     [facility]: Hadii aad ku hadasho Soomaali, waaxda luqadaha, qaybta kaalmada adeegyada, tiffanie morales . So [facility] [phone].    ATENCIÓN: Si habla español, tiene a saldaña disposición servicios gratuitos de asistencia lingüística. [facility] [phone].    We comply with applicable federal civil rights laws and [place] laws. We do not discriminate on the basis of race, color, national origin, age, disability, sex, sexual orientation, or gender identity.            Thank you!     Thank you for choosing [facility]  for your care. Our goal is always to provide you with excellent care. Hearing back from our patients is one way we can continue to improve our services. Please take a few minutes to complete the written survey that you may receive in the mail after your visit with us. Thank you!             Your Updated Medication List - Protect others around you: Learn how to safely use, store and throw away your medicines at www.disposemymeds.org.          This list is accurate as of 10/2/18  3:38 PM.  Always use your most recent med list.                   Brand Name Dispense Instructions for use Diagnosis    albuterol 108 (90 Base) MCG/ACT inhaler    PROAIR HFA/PROVENTIL HFA/VENTOLIN HFA    1 Inhaler    Inhale 2 puffs into the lungs every 4 hours as needed for shortness of breath / dyspnea or wheezing     Acute bronchitis with symptoms greater than 10 days       BENADRYL PO      Take 50 mg by mouth nightly as needed        buPROPion 150 MG 24 hr tablet    WELLBUTRIN XL    30 tablet    Take 1 tablet (150 mg) by mouth every morning    Nicotine dependence, uncomplicated, unspecified nicotine product type       cetirizine 10 MG tablet    zyrTEC    30 tablet    TAKE ONE TABLET BY MOUTH EVERY EVENING    Seasonal allergic rhinitis, unspecified chronicity, unspecified trigger       estradiol 1 MG tablet    ESTRACE    90 tablet    Take 1 tablet (1 mg) by mouth daily    Need for prophylactic hormone replacement therapy (postmenopausal)       estradiol 10 MCG Tabs vaginal tablet   Start taking on:  10/4/2018    VAGIFEM    8 tablet    Place 1 tablet (10 mcg) vaginally twice a week    Need for prophylactic hormone replacement therapy (postmenopausal)       gabapentin 100 MG capsule    NEURONTIN    90 capsule    Take 1 capsule (100 mg) by mouth At Bedtime    Vulvodynia       LORazepam 0.5 MG tablet    ATIVAN    60 tablet    Take 1 tablet by mouth twice daily as needed    Generalized anxiety disorder       metroNIDAZOLE 500 MG tablet    FLAGYL    14 tablet    Take 1 tablet (500 mg) by mouth 2 times daily    BV (bacterial vaginosis)       multivitamin, therapeutic Tabs tablet      Take 1 tablet by mouth daily        omeprazole 20 MG tablet     90 tablet    Take 1 tablet (20 mg) by mouth daily Take 30-60 minutes before a meal.    Gastroesophageal reflux disease without esophagitis       SUMAtriptan 50 MG tablet    IMITREX    10 tablet    Take 1 tablet (50 mg) by mouth at onset of headache for migraine (May repeat x1 after 2hour if HA recurs)    Migraine with aura and without status migrainosus, not intractable       * varenicline 1 MG tablet    CHANTIX    56 tablet    Take 1 tablet (1 mg) by mouth 2 times daily    Tobacco use disorder       * varenicline 0.5 MG X 11 & 1 MG X 42 tablet    CHANTIX STARTING MONTH JOHNNY    53 tablet     Take 0.5 mg tab daily for 3 days, then 0.5 mg tab twice daily for 4 days, then 1 mg twice daily.    Tobacco use disorder       VITAMIN D3 PO      Take 400 Units by mouth daily        * Notice:  This list has 2 medication(s) that are the same as other medications prescribed for you. Read the directions carefully, and ask your doctor or other care provider to review them with you.

## 2018-10-05 RX ORDER — GABAPENTIN 100 MG/1
100 CAPSULE ORAL AT BEDTIME
Qty: 90 CAPSULE | Refills: 3 | Status: SHIPPED | OUTPATIENT
Start: 2018-10-05 | End: 2019-10-14

## 2018-10-05 NOTE — TELEPHONE ENCOUNTER
Dr. Kelly,    Please transfer gabapentin to [facility].    Order pending.    Routing refill request to provider for review/approval because:  Drug not on the [facility] refill protocol     Thanks,  Juliana NEAL RN

## 2018-10-10 ENCOUNTER — ANESTHESIA EVENT (OUTPATIENT)
Dept: SURGERY | Facility: AMBULATORY SURGERY CENTER | Age: 53
End: 2018-10-10

## 2018-10-11 ENCOUNTER — ANESTHESIA (OUTPATIENT)
Dept: SURGERY | Facility: AMBULATORY SURGERY CENTER | Age: 53
End: 2018-10-11

## 2018-10-11 ENCOUNTER — SURGERY (OUTPATIENT)
Age: 53
End: 2018-10-11

## 2018-10-11 ENCOUNTER — HOSPITAL ENCOUNTER (OUTPATIENT)
Facility: AMBULATORY SURGERY CENTER | Age: 53
End: 2018-10-11
Attending: OPHTHALMOLOGY
Payer: COMMERCIAL

## 2018-10-11 VITALS
RESPIRATION RATE: 16 BRPM | OXYGEN SATURATION: 99 % | BODY MASS INDEX: 19.14 KG/M2 | HEIGHT: 62 IN | HEART RATE: 75 BPM | SYSTOLIC BLOOD PRESSURE: 101 MMHG | TEMPERATURE: 97.6 F | DIASTOLIC BLOOD PRESSURE: 63 MMHG | WEIGHT: 104 LBS

## 2018-10-11 DIAGNOSIS — Z98.890 POSTOPERATIVE EYE STATE: Primary | ICD-10-CM

## 2018-10-11 RX ORDER — OXYCODONE HYDROCHLORIDE 5 MG/1
5 TABLET ORAL EVERY 6 HOURS PRN
Qty: 10 TABLET | Refills: 0 | Status: SHIPPED | OUTPATIENT
Start: 2018-10-11 | End: 2019-02-13

## 2018-10-11 RX ORDER — GABAPENTIN 300 MG/1
300 CAPSULE ORAL ONCE
Status: COMPLETED | OUTPATIENT
Start: 2018-10-11 | End: 2018-10-11

## 2018-10-11 RX ORDER — SODIUM CHLORIDE, SODIUM LACTATE, POTASSIUM CHLORIDE, CALCIUM CHLORIDE 600; 310; 30; 20 MG/100ML; MG/100ML; MG/100ML; MG/100ML
INJECTION, SOLUTION INTRAVENOUS CONTINUOUS
Status: DISCONTINUED | OUTPATIENT
Start: 2018-10-11 | End: 2018-10-11 | Stop reason: HOSPADM

## 2018-10-11 RX ORDER — PROPOFOL 10 MG/ML
INJECTION, EMULSION INTRAVENOUS PRN
Status: DISCONTINUED | OUTPATIENT
Start: 2018-10-11 | End: 2018-10-11

## 2018-10-11 RX ORDER — NALOXONE HYDROCHLORIDE 0.4 MG/ML
.1-.4 INJECTION, SOLUTION INTRAMUSCULAR; INTRAVENOUS; SUBCUTANEOUS
Status: DISCONTINUED | OUTPATIENT
Start: 2018-10-11 | End: 2018-10-12 | Stop reason: HOSPADM

## 2018-10-11 RX ORDER — FENTANYL CITRATE 50 UG/ML
25-50 INJECTION, SOLUTION INTRAMUSCULAR; INTRAVENOUS
Status: DISCONTINUED | OUTPATIENT
Start: 2018-10-11 | End: 2018-10-11 | Stop reason: HOSPADM

## 2018-10-11 RX ORDER — MEPERIDINE HYDROCHLORIDE 25 MG/ML
12.5 INJECTION INTRAMUSCULAR; INTRAVENOUS; SUBCUTANEOUS
Status: DISCONTINUED | OUTPATIENT
Start: 2018-10-11 | End: 2018-10-12 | Stop reason: HOSPADM

## 2018-10-11 RX ORDER — ONDANSETRON 2 MG/ML
4 INJECTION INTRAMUSCULAR; INTRAVENOUS EVERY 30 MIN PRN
Status: DISCONTINUED | OUTPATIENT
Start: 2018-10-11 | End: 2018-10-12 | Stop reason: HOSPADM

## 2018-10-11 RX ORDER — ACETAMINOPHEN 325 MG/1
975 TABLET ORAL ONCE
Status: COMPLETED | OUTPATIENT
Start: 2018-10-11 | End: 2018-10-11

## 2018-10-11 RX ORDER — ERYTHROMYCIN 5 MG/G
OINTMENT OPHTHALMIC PRN
Status: DISCONTINUED | OUTPATIENT
Start: 2018-10-11 | End: 2018-10-11 | Stop reason: HOSPADM

## 2018-10-11 RX ORDER — ERYTHROMYCIN 5 MG/G
OINTMENT OPHTHALMIC
Qty: 3.5 G | Refills: 0 | Status: SHIPPED | OUTPATIENT
Start: 2018-10-11 | End: 2020-04-17

## 2018-10-11 RX ORDER — ONDANSETRON 4 MG/1
4 TABLET, ORALLY DISINTEGRATING ORAL EVERY 30 MIN PRN
Status: DISCONTINUED | OUTPATIENT
Start: 2018-10-11 | End: 2018-10-12 | Stop reason: HOSPADM

## 2018-10-11 RX ORDER — TETRACAINE HYDROCHLORIDE 5 MG/ML
SOLUTION OPHTHALMIC PRN
Status: DISCONTINUED | OUTPATIENT
Start: 2018-10-11 | End: 2018-10-11 | Stop reason: HOSPADM

## 2018-10-11 RX ORDER — SODIUM CHLORIDE, SODIUM LACTATE, POTASSIUM CHLORIDE, CALCIUM CHLORIDE 600; 310; 30; 20 MG/100ML; MG/100ML; MG/100ML; MG/100ML
INJECTION, SOLUTION INTRAVENOUS CONTINUOUS
Status: DISCONTINUED | OUTPATIENT
Start: 2018-10-11 | End: 2018-10-12 | Stop reason: HOSPADM

## 2018-10-11 RX ORDER — OXYCODONE HYDROCHLORIDE 5 MG/1
5 TABLET ORAL EVERY 4 HOURS PRN
Status: DISCONTINUED | OUTPATIENT
Start: 2018-10-11 | End: 2018-10-12 | Stop reason: HOSPADM

## 2018-10-11 RX ORDER — LIDOCAINE 40 MG/G
CREAM TOPICAL
Status: DISCONTINUED | OUTPATIENT
Start: 2018-10-11 | End: 2018-10-11 | Stop reason: HOSPADM

## 2018-10-11 RX ADMIN — Medication 100 MCG: at 14:26

## 2018-10-11 RX ADMIN — SODIUM CHLORIDE, SODIUM LACTATE, POTASSIUM CHLORIDE, CALCIUM CHLORIDE: 600; 310; 30; 20 INJECTION, SOLUTION INTRAVENOUS at 11:39

## 2018-10-11 RX ADMIN — ERYTHROMYCIN 2 INCH: 5 OINTMENT OPHTHALMIC at 14:33

## 2018-10-11 RX ADMIN — PROPOFOL 20 MG: 10 INJECTION, EMULSION INTRAVENOUS at 14:14

## 2018-10-11 RX ADMIN — PROPOFOL 50 MG: 10 INJECTION, EMULSION INTRAVENOUS at 14:12

## 2018-10-11 RX ADMIN — OXYCODONE HYDROCHLORIDE 5 MG: 5 TABLET ORAL at 15:04

## 2018-10-11 RX ADMIN — PROPOFOL 30 MG: 10 INJECTION, EMULSION INTRAVENOUS at 14:13

## 2018-10-11 RX ADMIN — PROPOFOL 50 MG: 10 INJECTION, EMULSION INTRAVENOUS at 14:15

## 2018-10-11 RX ADMIN — PROPOFOL 50 MG: 10 INJECTION, EMULSION INTRAVENOUS at 14:16

## 2018-10-11 RX ADMIN — TETRACAINE HYDROCHLORIDE 2 DROP: 5 SOLUTION OPHTHALMIC at 14:19

## 2018-10-11 RX ADMIN — Medication 100 MCG: at 14:38

## 2018-10-11 RX ADMIN — Medication 100 MCG: at 14:32

## 2018-10-11 RX ADMIN — ACETAMINOPHEN 975 MG: 325 TABLET ORAL at 11:39

## 2018-10-11 RX ADMIN — GABAPENTIN 300 MG: 300 CAPSULE ORAL at 11:39

## 2018-10-11 ASSESSMENT — LIFESTYLE VARIABLES: TOBACCO_USE: 1

## 2018-10-11 NOTE — IP AVS SNAPSHOT
[facility]    [address] [place] [address]    Phone:  [phone]    Fax:  [phone]                                       After Visit Summary   10/11/2018    Fatou Simental    MRN: [id number]           After Visit Summary Signature Page     I have received my discharge instructions, and my questions have been answered. I have discussed any challenges I see with this plan with the nurse or doctor.    ..........................................................................................................................................  Patient/Patient Representative Signature      ..........................................................................................................................................  Patient Representative Print Name and Relationship to Patient    ..................................................               ................................................  Date                                   Time    ..........................................................................................................................................  Reviewed by Signature/Title    ...................................................              ..............................................  Date                                               Time          22EPIC Rev 08/18

## 2018-10-11 NOTE — ANESTHESIA POSTPROCEDURE EVALUATION
Patient: Fatou Simental    Procedure(s):  Bilateral Upper Eyelid Ptosis Repair - Wound Class: I-Clean    Diagnosis:Ptosis  Diagnosis Additional Information: No value filed.    Anesthesia Type:  MAC    Note:  Anesthesia Post Evaluation    Patient location during evaluation: bedside  Patient participation: Able to fully participate in evaluation  Level of consciousness: awake  Pain management: adequate  Airway patency: patent  Cardiovascular status: acceptable  Respiratory status: acceptable  Hydration status: acceptable  PONV: controlled     Anesthetic complications: None          Last vitals:  Vitals:    10/11/18 1452 10/11/18 1507 10/11/18 1522   BP: 95/59 95/58 101/63   Pulse: 63 60 75   Resp: 14 15 16   Temp: 36.1  C (96.9  F) 36.2  C (97.2  F) 36.4  C (97.6  F)   SpO2: 100% 99% 99%         Electronically Signed By: Dimitrios Ware MD, MD  October 11, 2018  5:10 PM

## 2018-10-11 NOTE — IP AVS SNAPSHOT
MRN:[id number]                      After Visit Summary   10/11/2018    Fatou Simental    MRN: [id number]           Thank you!     Thank you for choosing [facility] for your care. Our goal is always to provide you with excellent care. Hearing back from our patients is one way we can continue to improve our services. Please take a few minutes to complete the written survey that you may receive in the mail after you visit with us. Thank you!        Patient Information     Date Of Birth          1965        About your hospital stay     You were admitted on:  October 11, 2018 You last received care in the:  [facility]    You were discharged on:  October 11, 2018       Who to Call     For medical emergencies, please call 911.  For non-urgent questions about your medical care, please call your primary care provider or clinic, [phone]  For questions related to your surgery, please call your surgery clinic        Attending Provider     Provider Wilton Coon MD Ophthalmology       Primary Care Provider Office Phone # Fax #    BETHANY Chucho Kelly [phone]      After Care Instructions     Discharge Medication Instructions       Do NOT take aspirin or medications containing NSAIDS for 72 hours after procedure.            Ice to affected area       Apply cold pack for 15 minutes on, 15 minutes off, for 48 hours while awake.                  Your next 10 appointments already scheduled     Oct 30, 2018  2:30 PM CDT   (Arrive by 2:15 PM)   Post-Op with Wilton Viramontes MD   [facility] ([facility])    [address] [place] [id number] [phone]              Further instructions from your care team       Post-operative Instructions  Ophthalmic Plastic and Reconstructive Surgery    Wilton Viramontes M.D.     All instructions apply to the operated eye(s) or eyelid(s).    Wound care  and personal care  ? If a patch or bandage has been placed, please leave this in place until seen by your physician. Ensure that the bandage does not get wet when you take a shower.  ? Apply ice compresses 15 minutes on 15 minutes off while awake for 2 days, then switch to warm water compresses 4 times a day until seen by your physician. For warm packs you can place a cup of dry uncooked rice in a clean cotton sock. Then place sock in microwave 30 seconds to one minute. Next place the warm sock into a plastic bag and wrap the bag with clean warm wet washcloth and place over operated eye.    ? You may shower or wash your hair the day after surgery. Do not bathe or go swimming for 1 week to prevent contamination of your wounds.  ? Do not apply make-up to the eyes or eyelids for 2 weeks after surgery.  ? Expect some swelling, bruising, black eye (even into the lower eyelids and cheeks). Also expect serum caking, crusting and discharge from the eye and/or incisions. A small amount of surface bleeding is normal for the first 48 hours.  ? Your eye(s) and eyelid(s) may be painful and tender. This is normal after surgery.      Contact information and follow-up  ? Return to the Eye Clinic for a follow-up appointment with your physician as  scheduled. If no appointment has been scheduled:   - [facility] eye clinic: [phone] for an appointment with Dr. Viramontes within 1 to 2 weeks from your date of surgery.   -  [facility] eye clinic: [phone] for an appointment with Dr. Viramontes within 1 to 2 weeks from your date of surgery.     ? For severe pain, bleeding, or loss of vision, call the [facility] at [phone] or [facility] at [phone].     After hours or on weekends and holidays, call [phone] and ask to speak with the ophthalmologist on call.    An on call person can be reached after hours for concerns. The on call doctor should not call  in medication refill requests after hours or on weekends, so please plan accordingly. An effort has been made to provide adequate pain medications following every surgery, and refills will not be provided in most instances. Narcotic pain medications cannot be called in.     Activity restrictions and driving  ? Avoid heavy lifting, bending, exercise or strenuous activity for 1 week after surgery.  You may resume other activities and return to work as tolerated.  ? You may not resume driving until have you stopped using narcotic pain medications (such as Norco, Percocet, Tylenol #3).    Medications  ? Restart all your regular home medications and eye drops. If you take Plavix or  Aspirin on a regular basis, wait for 72 hours after your surgery before restarting these in order to decrease the risk of bleeding complications.  ? Avoid aspirin and aspirin-like medications (Motrin, Aleve, Ibuprofen, [place]-  [place] etc) for 72 hours to reduce the risk of bleeding. You may take Tylenol  (acetaminophen) for pain.  ? In addition to your home medications, take the following post-operative medications as prescribed by your physician.    ? Apply antibiotic ointment to all sutures three times a day, and into the operated eye(s) at night.    ? WARNING:  You must not take more than 4,000 mg of acetaminophen per  24-hour period.     [facility]  Home Care Following Anesthesia  For 24 hours after surgery:  1. Get plenty of rest.  A responsible adult must stay with you for at least 24 hours after you leave the surgery center.  2. Do not drive or use heavy equipment.  If you have weakness or tingling, don't drive or use heavy equipment until this feeling goes away.   3. Do not drink alcohol.   4. Avoid strenuous or risky activities.  Ask for help when climbing stairs.  5. You may feel lightheaded.  IF so, sit for a few minutes before standing.  Have someone help you get up.   6. If you have nausea  "(feel sick to your stomach): Drink only clear liquids such as apple juice, ginger ale, broth or 7-Up.  Rest may also help.  Be sure to drink enough fluids.  Move to a regular diet as you feel able.   7. You may have a slight fever.  Call the doctor if your fever is over 100 F (37.7 C) (taken under the tongue) or lasts longer than 24 hours.  8. You may have a dry mouth, a sore throat, muscle aches or trouble sleeping. These should go away after 24 hours.  9. Do not make important or legal decisions.        Today you received a Marcaine or bupivacaine block to numb the nerves near your surgery site.  This is a block using local anesthetic or \"numbing\" medication injected around the nerves to anesthetize or \"numb\" the area supplied by those nerves.  This block is injected into the muscle layer near your surgical site.  The medication may numb the location where you had surgery for 6-18 hours, but may last up to 24 hours.  If your surgical site is an arm or leg you should be careful with your affected limb, since it is possible to injure your limb without being aware of it due to the numbing.  Until full feeling returns, you should guard against bumping or hitting your limb, and avoid extreme hot or cold temperatures on the skin.  As the block wears off, the feeling will return as a tingling or prickly sensation near your surgical site.  You will experience more discomfort from your incision as the feeling returns.  You may want to take a pain pill (a narcotic or Tylenol if this was prescribed by your surgeon) when you start to experience mild pain before the pain beccomes more severe.  If your pain medications do not control your pain you should notifiy your surgeon.    Tips for taking pain medications  To get the best pain relief possible, remember these points:    Take pain medications as directed, before pain becomes severe.    Pain medication can upset your stomach: taking it with food may help.    Constipation is " a common side effect of pain medication. Drink plenty of  fluids.    Eat foods high in fiber. Take a stool softener if recommended by your doctor or pharmacist.    Do not drink alcohol, drive or operate machinery while taking pain medications.    Ask about other ways to control pain, such as with heat, ice or relaxation.    Tylenol/Acetaminophen Consumption  To help encourage the safe use of acetaminophen, the makers of TYLENOL  have lowered the maximum daily dose for single-ingredient Extra Strength TYLENOL  (acetaminophen) products sold in the [place] from 8 pills per day (4,000 mg) to 6 pills per day (3,000 mg). The dosing interval has also changed from 2 pills every 4-6 hours to 2 pills every 6 hours.    If you feel your pain relief is insufficient, you may take Tylenol/Acetaminophen in addition to your narcotic pain medication.     Be careful not to exceed 3,000 mg of Tylenol/Acetaminophen in a 24 hour period from all sources.    If you are taking extra strength Tylenol/acetaminophen (500 mg), the maximum dose is 6 tablets in 24 hours.    If you are taking regular strength acetaminophen (325 mg), the maximum dose is 9 tablets in 24 hours.    Call a doctor for any of the followin. Signs of infection (fever, growing tenderness at the surgery site, a large amount of drainage or bleeding, severe pain, foul-smelling drainage, redness, swelling).  2. It has been over 8 to 10 hours since surgery and you are still not able to urinate (pass water).  3. Headache for over 24 hours.  4. Numbness, tingling or weakness the day after surgery (if you had spinal anesthesia).  Your doctor is:       Dr. Wilton Viramontes, Ophthalmology: [phone]               Or dial [phone] and ask for the resident on call for:  Ophthalmology  For emergency care, call the:  [facility]:  [phone] (TTY for hearing impaired: [phone])                  Pending Results     No orders found from 10/9/2018 to  "10/12/2018.            Admission Information     Date & Time Provider Department Dept. Phone    10/11/2018 Wilton Viramontes MD [facility] [phone]      Your Vitals Were     Blood Pressure Pulse Temperature Respirations Height Weight    95/59 63 96.9  F (36.1  C) (Temporal) 14 1.575 m (5' 2\") 47.2 kg (104 lb)    Pulse Oximetry BMI (Body Mass Index)                100% 19.02 kg/m2          [facility] Information     [employer] gives you secure access to your electronic health record. If you see a primary care provider, you can also send messages to your care team and make appointments. If you have questions, please call your primary care clinic.  If you do not have a primary care provider, please call [phone] and they will assist you.      [employer] is an electronic gateway that provides easy, online access to your medical records. With [employer], you can request a clinic appointment, read your test results, renew a prescription or communicate with your care team.     To access your existing account, please contact your [facility] or call [phone] for assistance.        Care EveryWhere ID     This is your Care EveryWhere ID. This could be used by other organizations to access your [facility] medical records  [id number]        Equal Access to Services     LAURA SHARIF : Catalino Ocampo, waaxda luqadaha, qaybta kaalmada radhika, tiffanie farias. So [facility] [phone].    ATENCIÓN: Si habla español, tiene a saldaña disposición servicios gratuitos de asistencia lingüística. Llame al [phone].    We comply with applicable federal civil rights laws and [place] laws. We do not discriminate on the basis of race, color, national origin, age, disability, sex, sexual orientation, or gender identity.               Review of your medicines      START taking        Dose / Directions    erythromycin ophthalmic ointment   Commonly " known as:  ROMYCIN   Used for:  Postoperative eye state        Apply small amount to incision sites three times daily, then apply to inner lower lid of operative eye(s) at bedtime   Quantity:  3.5 g   Refills:  0       oxyCODONE IR 5 MG tablet   Commonly known as:  ROXICODONE   Used for:  Postoperative eye state        Dose:  5 mg   Take 1 tablet (5 mg) by mouth every 6 hours as needed for pain   Quantity:  10 tablet   Refills:  0         CONTINUE these medicines which have NOT CHANGED        Dose / Directions    albuterol 108 (90 Base) MCG/ACT inhaler   Commonly known as:  PROAIR HFA/PROVENTIL HFA/VENTOLIN HFA   Used for:  Acute bronchitis with symptoms greater than 10 days        Dose:  2 puff   Inhale 2 puffs into the lungs every 4 hours as needed for shortness of breath / dyspnea or wheezing   Quantity:  1 Inhaler   Refills:  3       BENADRYL PO        Dose:  50 mg   Take 50 mg by mouth nightly as needed   Refills:  0       buPROPion 150 MG 24 hr tablet   Commonly known as:  WELLBUTRIN XL   Used for:  Nicotine dependence, uncomplicated, unspecified nicotine product type        Dose:  150 mg   Take 1 tablet (150 mg) by mouth every morning   Quantity:  30 tablet   Refills:  5       estradiol 1 MG tablet   Commonly known as:  ESTRACE   Used for:  Need for prophylactic hormone replacement therapy (postmenopausal)        Dose:  1 mg   Take 1 tablet (1 mg) by mouth daily   Quantity:  90 tablet   Refills:  3       estradiol 10 MCG Tabs vaginal tablet   Commonly known as:  VAGIFEM   Used for:  Need for prophylactic hormone replacement therapy (postmenopausal)        Dose:  10 mcg   Place 1 tablet (10 mcg) vaginally twice a week   Quantity:  8 tablet   Refills:  1       gabapentin 100 MG capsule   Commonly known as:  NEURONTIN   Used for:  Vulvodynia        Dose:  100 mg   Take 1 capsule (100 mg) by mouth At Bedtime   Quantity:  90 capsule   Refills:  3       LORazepam 0.5 MG tablet   Commonly known as:  ATIVAN   Used  for:  Generalized anxiety disorder        Take 1 tablet by mouth twice daily as needed   Quantity:  60 tablet   Refills:  5       multivitamin, therapeutic Tabs tablet        Dose:  1 tablet   Take 1 tablet by mouth daily   Refills:  0       omeprazole 20 MG tablet   Used for:  Gastroesophageal reflux disease without esophagitis        Dose:  20 mg   Take 1 tablet (20 mg) by mouth daily Take 30-60 minutes before a meal.   Quantity:  90 tablet   Refills:  1       * varenicline 1 MG tablet   Commonly known as:  CHANTIX   Used for:  Tobacco use disorder        Dose:  1 mg   Take 1 tablet (1 mg) by mouth 2 times daily   Quantity:  56 tablet   Refills:  4       * varenicline 0.5 MG X 11 & 1 MG X 42 tablet   Commonly known as:  CHANTIX STARTING MONTH PAK   Used for:  Tobacco use disorder        Take 0.5 mg tab daily for 3 days, then 0.5 mg tab twice daily for 4 days, then 1 mg twice daily.   Quantity:  53 tablet   Refills:  0       VITAMIN D3 PO        Dose:  400 Units   Take 400 Units by mouth daily   Refills:  0       * Notice:  This list has 2 medication(s) that are the same as other medications prescribed for you. Read the directions carefully, and ask your doctor or other care provider to review them with you.         Where to get your medicines      These medications were sent to [address] [address]    Hours:  TRANSPLANT PHONE NUMBER [phone] Phone:  [phone]     erythromycin ophthalmic ointment         Some of these will need a paper prescription and others can be bought over the counter. Ask your nurse if you have questions.     Bring a paper prescription for each of these medications     oxyCODONE IR 5 MG tablet                Protect others around you: Learn how to safely use, store and throw away your medicines at www.disposemymeds.org.        Information about OPIOIDS     PRESCRIPTION  OPIOIDS: WHAT YOU NEED TO KNOW   We gave you an opioid (narcotic) pain medicine. It is important to manage your pain, but opioids are not always the best choice. You should first try all the other options your care team gave you. Take this medicine for as short a time (and as few doses) as possible.    Some activities can increase your pain, such as bandage changes or therapy sessions. It may help to take your pain medicine 30 to 60 minutes before these activities. Reduce your stress by getting enough sleep, working on hobbies you enjoy and practicing relaxation or meditation. Talk to your care team about ways to manage your pain beyond prescription opioids.    These medicines have risks:    DO NOT drive when on new or higher doses of pain medicine. These medicines can affect your alertness and reaction times, and you could be arrested for driving under the influence (DUI). If you need to use opioids long-term, talk to your care team about driving.    DO NOT operate heavy machinery    DO NOT do any other dangerous activities while taking these medicines.    DO NOT drink any alcohol while taking these medicines.     If the opioid prescribed includes acetaminophen, DO NOT take with any other medicines that contain acetaminophen. Read all labels carefully. Look for the word  acetaminophen  or  Tylenol.  Ask your pharmacist if you have questions or are unsure.    You can get addicted to pain medicines, especially if you have a history of addiction (chemical, alcohol or substance dependence). Talk to your care team about ways to reduce this risk.    All opioids tend to cause constipation. Drink plenty of water and eat foods that have a lot of fiber, such as fruits, vegetables, prune juice, apple juice and high-fiber cereal. Take a laxative (Miralax, milk of magnesia, Colace, Senna) if you don t move your bowels at least every other day. Other side effects include upset stomach, sleepiness, dizziness, throwing up,  tolerance (needing more of the medicine to have the same effect), physical dependence and slowed breathing.    Store your pills in a secure place, locked if possible. We will not replace any lost or stolen medicine. If you don t finish your medicine, please throw away (dispose) as directed by your pharmacist. The [place] Pollution [employer] has more information about safe disposal: https://www.pca.[facility].us/living-green/managing-unwanted-medications             Medication List: This is a list of all your medications and when to take them. Check marks below indicate your daily home schedule. Keep this list as a reference.      Medications           Morning Afternoon Evening Bedtime As Needed    albuterol 108 (90 Base) MCG/ACT inhaler   Commonly known as:  PROAIR HFA/PROVENTIL HFA/VENTOLIN HFA   Inhale 2 puffs into the lungs every 4 hours as needed for shortness of breath / dyspnea or wheezing                                BENADRYL PO   Take 50 mg by mouth nightly as needed                                buPROPion 150 MG 24 hr tablet   Commonly known as:  WELLBUTRIN XL   Take 1 tablet (150 mg) by mouth every morning                                erythromycin ophthalmic ointment   Commonly known as:  ROMYCIN   Apply small amount to incision sites three times daily, then apply to inner lower lid of operative eye(s) at bedtime   Last time this was given:  2 inches on 10/11/2018  2:33 PM                                estradiol 1 MG tablet   Commonly known as:  ESTRACE   Take 1 tablet (1 mg) by mouth daily                                estradiol 10 MCG Tabs vaginal tablet   Commonly known as:  VAGIFEM   Place 1 tablet (10 mcg) vaginally twice a week                                gabapentin 100 MG capsule   Commonly known as:  NEURONTIN   Take 1 capsule (100 mg) by mouth At Bedtime   Last time this was given:  300 mg on 10/11/2018 11:39 AM                                LORazepam 0.5 MG tablet   Commonly known  as:  ATIVAN   Take 1 tablet by mouth twice daily as needed                                multivitamin, therapeutic Tabs tablet   Take 1 tablet by mouth daily                                omeprazole 20 MG tablet   Take 1 tablet (20 mg) by mouth daily Take 30-60 minutes before a meal.                                oxyCODONE IR 5 MG tablet   Commonly known as:  ROXICODONE   Take 1 tablet (5 mg) by mouth every 6 hours as needed for pain                                * varenicline 1 MG tablet   Commonly known as:  CHANTIX   Take 1 tablet (1 mg) by mouth 2 times daily                                * varenicline 0.5 MG X 11 & 1 MG X 42 tablet   Commonly known as:  CHANTIX STARTING MONTH JOHNNY   Take 0.5 mg tab daily for 3 days, then 0.5 mg tab twice daily for 4 days, then 1 mg twice daily.                                VITAMIN D3 PO   Take 400 Units by mouth daily                                * Notice:  This list has 2 medication(s) that are the same as other medications prescribed for you. Read the directions carefully, and ask your doctor or other care provider to review them with you.

## 2018-10-11 NOTE — ANESTHESIA PREPROCEDURE EVALUATION
Anesthesia Evaluation     .             ROS/MED HX    ENT/Pulmonary:  - neg pulmonary ROS   (+)tobacco use, Current use , . .    Neurologic:  - neg neurologic ROS   (+)migraines,     Cardiovascular: Comment: Raynauds - neg cardiovascular ROS       METS/Exercise Tolerance:     Hematologic:  - neg hematologic  ROS       Musculoskeletal:  - neg musculoskeletal ROS       GI/Hepatic:  - neg GI/hepatic ROS   (+) GERD       Renal/Genitourinary:  - ROS Renal section negative       Endo:  - neg endo ROS       Psychiatric:  - neg psychiatric ROS       Infectious Disease:  - neg infectious disease ROS       Malignancy:      - no malignancy   Other:    - neg other ROS                 Physical Exam  Normal systems: cardiovascular, pulmonary and dental    Airway   Mallampati: II  TM distance: >3 FB  Neck ROM: full    Dental     Cardiovascular       Pulmonary                     Anesthesia Plan      History & Physical Review  History and physical reviewed and following examination; no interval change.    ASA Status:  2 .    NPO Status:  > 8 hours    Plan for MAC with Intravenous induction. Maintenance will be TIVA.  Reason for MAC:  Procedure to face, neck, head or breast  PONV prophylaxis:  Ondansetron (or other 5HT-3)       Postoperative Care  Postoperative pain management:  IV analgesics, Oral pain medications and Multi-modal analgesia.      Consents  Anesthetic plan, risks, benefits and alternatives discussed with:  Patient..                          .

## 2018-10-11 NOTE — ANESTHESIA CARE TRANSFER NOTE
Patient: Fatou Simental    Procedure(s):  Bilateral Upper Eyelid Ptosis Repair - Wound Class: I-Clean    Diagnosis: Ptosis  Diagnosis Additional Information: No value filed.    Anesthesia Type:   MAC     Note:  Airway :Room Air  Patient transferred to:Phase II  Comments: Report to RN    96.9, 16, 72, 98/63Handoff Report: Identifed the Patient, Identified the Reponsible Provider, Reviewed the pertinent medical history, Discussed the surgical course, Reviewed Intra-OP anesthesia mangement and issues during anesthesia, Set expectations for post-procedure period and Allowed opportunity for questions and acknowledgement of understanding      Vitals: (Last set prior to Anesthesia Care Transfer)    CRNA VITALS  10/11/2018 1416 - 10/11/2018 1454      10/11/2018             Pulse: 62    Ht Rate: 62    SpO2: 98 %    Resp Rate (set): 10                Electronically Signed By: ANGEL Teague CRNA  October 11, 2018  2:54 PM

## 2018-10-11 NOTE — BRIEF OP NOTE
[facility]    Brief Operative Note    Pre-operative diagnosis: Ptosis  Post-operative diagnosis * No post-op diagnosis entered *  Procedure: Procedure(s):  Bilateral Upper Eyelid Ptosis Repair - Wound Class: I-Clean  Surgeon: Surgeon(s) and Role:     * Wilton Viramontes MD - Primary  Anesthesia: Combined MAC with Local   Estimated blood loss: Minimal  Drains: None  Specimens: * No specimens in log *  Findings:   None.  Complications: None.  Implants: None.

## 2018-10-11 NOTE — DISCHARGE INSTRUCTIONS
Post-operative Instructions  Ophthalmic Plastic and Reconstructive Surgery    Wilton Viramontes M.D.     All instructions apply to the operated eye(s) or eyelid(s).    Wound care and personal care  ? If a patch or bandage has been placed, please leave this in place until seen by your physician. Ensure that the bandage does not get wet when you take a shower.  ? Apply ice compresses 15 minutes on 15 minutes off while awake for 2 days, then switch to warm water compresses 4 times a day until seen by your physician. For warm packs you can place a cup of dry uncooked rice in a clean cotton sock. Then place sock in microwave 30 seconds to one minute. Next place the warm sock into a plastic bag and wrap the bag with clean warm wet washcloth and place over operated eye.    ? You may shower or wash your hair the day after surgery. Do not bathe or go swimming for 1 week to prevent contamination of your wounds.  ? Do not apply make-up to the eyes or eyelids for 2 weeks after surgery.  ? Expect some swelling, bruising, black eye (even into the lower eyelids and cheeks). Also expect serum caking, crusting and discharge from the eye and/or incisions. A small amount of surface bleeding is normal for the first 48 hours.  ? Your eye(s) and eyelid(s) may be painful and tender. This is normal after surgery.      Contact information and follow-up  ? Return to the Eye Clinic for a follow-up appointment with your physician as  scheduled. If no appointment has been scheduled:   - [facility] eye clinic: [phone] for an appointment with Dr. Viramontes within 1 to 2 weeks from your date of surgery.   -  [facility] eye clinic: [phone] for an appointment with Dr. Viramontes within 1 to 2 weeks from your date of surgery.     ? For severe pain, bleeding, or loss of vision, call the [facility] at [phone] or [facility] at [phone].     After hours or on weekends  and holidays, call [phone] and ask to speak with the ophthalmologist on call.    An on call person can be reached after hours for concerns. The on call doctor should not call in medication refill requests after hours or on weekends, so please plan accordingly. An effort has been made to provide adequate pain medications following every surgery, and refills will not be provided in most instances. Narcotic pain medications cannot be called in.     Activity restrictions and driving  ? Avoid heavy lifting, bending, exercise or strenuous activity for 1 week after surgery.  You may resume other activities and return to work as tolerated.  ? You may not resume driving until have you stopped using narcotic pain medications (such as Norco, Percocet, Tylenol #3).    Medications  ? Restart all your regular home medications and eye drops. If you take Plavix or  Aspirin on a regular basis, wait for 72 hours after your surgery before restarting these in order to decrease the risk of bleeding complications.  ? Avoid aspirin and aspirin-like medications (Motrin, Aleve, Ibuprofen, Mary-  Waka etc) for 72 hours to reduce the risk of bleeding. You may take Tylenol  (acetaminophen) for pain.  ? In addition to your home medications, take the following post-operative medications as prescribed by your physician.    ? Apply antibiotic ointment to all sutures three times a day, and into the operated eye(s) at night.    ? WARNING:  You must not take more than 4,000 mg of acetaminophen per  24-hour period.     [facility]  Home Care Following Anesthesia  For 24 hours after surgery:  1. Get plenty of rest.  A responsible adult must stay with you for at least 24 hours after you leave the surgery center.  2. Do not drive or use heavy equipment.  If you have weakness or tingling, don't drive or use heavy equipment until this feeling goes away.   3. Do not drink alcohol.   4. Avoid strenuous or risky  "activities.  Ask for help when climbing stairs.  5. You may feel lightheaded.  IF so, sit for a few minutes before standing.  Have someone help you get up.   6. If you have nausea (feel sick to your stomach): Drink only clear liquids such as apple juice, ginger ale, broth or 7-Up.  Rest may also help.  Be sure to drink enough fluids.  Move to a regular diet as you feel able.   7. You may have a slight fever.  Call the doctor if your fever is over 100 F (37.7 C) (taken under the tongue) or lasts longer than 24 hours.  8. You may have a dry mouth, a sore throat, muscle aches or trouble sleeping. These should go away after 24 hours.  9. Do not make important or legal decisions.        Today you received a Marcaine or bupivacaine block to numb the nerves near your surgery site.  This is a block using local anesthetic or \"numbing\" medication injected around the nerves to anesthetize or \"numb\" the area supplied by those nerves.  This block is injected into the muscle layer near your surgical site.  The medication may numb the location where you had surgery for 6-18 hours, but may last up to 24 hours.  If your surgical site is an arm or leg you should be careful with your affected limb, since it is possible to injure your limb without being aware of it due to the numbing.  Until full feeling returns, you should guard against bumping or hitting your limb, and avoid extreme hot or cold temperatures on the skin.  As the block wears off, the feeling will return as a tingling or prickly sensation near your surgical site.  You will experience more discomfort from your incision as the feeling returns.  You may want to take a pain pill (a narcotic or Tylenol if this was prescribed by your surgeon) when you start to experience mild pain before the pain beccomes more severe.  If your pain medications do not control your pain you should notifiy your surgeon.    Tips for taking pain medications  To get the best pain relief possible, " remember these points:    Take pain medications as directed, before pain becomes severe.    Pain medication can upset your stomach: taking it with food may help.    Constipation is a common side effect of pain medication. Drink plenty of  fluids.    Eat foods high in fiber. Take a stool softener if recommended by your doctor or pharmacist.    Do not drink alcohol, drive or operate machinery while taking pain medications.    Ask about other ways to control pain, such as with heat, ice or relaxation.    Tylenol/Acetaminophen Consumption  To help encourage the safe use of acetaminophen, the makers of TYLENOL  have lowered the maximum daily dose for single-ingredient Extra Strength TYLENOL  (acetaminophen) products sold in the [place] from 8 pills per day (4,000 mg) to 6 pills per day (3,000 mg). The dosing interval has also changed from 2 pills every 4-6 hours to 2 pills every 6 hours.    If you feel your pain relief is insufficient, you may take Tylenol/Acetaminophen in addition to your narcotic pain medication.     Be careful not to exceed 3,000 mg of Tylenol/Acetaminophen in a 24 hour period from all sources.    If you are taking extra strength Tylenol/acetaminophen (500 mg), the maximum dose is 6 tablets in 24 hours.    If you are taking regular strength acetaminophen (325 mg), the maximum dose is 9 tablets in 24 hours.    Call a doctor for any of the followin. Signs of infection (fever, growing tenderness at the surgery site, a large amount of drainage or bleeding, severe pain, foul-smelling drainage, redness, swelling).  2. It has been over 8 to 10 hours since surgery and you are still not able to urinate (pass water).  3. Headache for over 24 hours.  4. Numbness, tingling or weakness the day after surgery (if you had spinal anesthesia).  Your doctor is:       Dr. Wilton Viramontes, Ophthalmology: [phone]               Or dial [phone] and ask for the resident on call for:  Ophthalmology  For emergency  care, call the:  [place] [facility]:  [phone] (TTY for hearing impaired: [phone])

## 2018-10-12 NOTE — PROGRESS NOTES
Telephone call to Fatou Simental    Doing well with no pain, good vision, and no bleeding. All questions were answered, she is doing well, and postoperative care was reviewed.  A postop appointment has been scheduled.    Wilton Viramontes

## 2018-10-12 NOTE — OP NOTE
PREOPERATIVE DIAGNOSIS: Ptosis, bilateral upper lid.   POSTOPERATIVE DIAGNOSIS:  Ptosis,bilateral upper lid.   PROCEDURE:Both  upper eyelid  ptosis repair by external levator resection.   SURGEON: Wilton Viramontes MD  ASSISTANT: None  ANESTHESIA: Monitored with local infiltration of a 50/50 mixture of 2% lidocaine with epinephrine and 0.5% Marcaine.   COMPLICATIONS: None.   ESTIMATED BLOOD LOSS: Less than 5 mL.   HISTORY: Fatou Simental  presented with ptosis of bilateral upper lid interfering with the superior visual field and activities of daily living. After the risks, benefits and alternatives to the proposed procedure were explained, informed consent was obtained.   DESCRIPTION OF PROCEDURE: Fatou Simental  was brought to the operating room and placed supine on the operating table. Intravenous sedation was given. The bilateral upper lid crease and an ellipse of skin was marked with a marking pen and infiltrated with local anesthetic. The area was prepped and draped in the typical sterile ophthalmic fashion. Attention was directed to the right  side. The previously marked ellipse was incised with a 15 blade and the skin flap was excised with high temperature cautery. The orbital septum was opened horizontally. The levator aponeurosis was identified and dissected from the superior tarsal border and the underlying Blake's muscle and advanced with a double armed 5-0 Mersilene suture to bring the lid into a normal height and contour. The suture was passed to partial thickness through the superior tarsal plate and then each end brought underneath the levator aponeurosis. The patient was asked to open the eye and the suture adjusted for height and contour. Attention was directed to the other side where the same procedure was performed. The skin was closed with with running 6-0 plain gut sutures.   The patient tolerated the procedure well and left the operating room in stable condition.     Wilton Viramontes  MD

## 2018-10-15 ENCOUNTER — TELEPHONE (OUTPATIENT)
Dept: OPHTHALMOLOGY | Facility: CLINIC | Age: 53
End: 2018-10-15

## 2018-10-15 NOTE — TELEPHONE ENCOUNTER
Spoke with patient. Sees a small gap where sutures came out. Instructed patient to keep opening and sutures moist with erythromycin ophth ointment TID and to come in and see Dr. Viramontes tomorrow @ 2:45. He can re-suture opening at that time if needed. Patient is happy with this plan and appointment has been made.  Fatou Brown RN 11:22 AM 10/15/18

## 2018-10-15 NOTE — TELEPHONE ENCOUNTER
[facility] Call Center    Phone Message    May a detailed message be left on voicemail: yes    Reason for Call: PT had their surgery on 10/11/18 and 2 of their stitches have come out on their upper eyelid which created a gap that pt said will probably leave a scar, and it's very itchy. PT wants to know what to do about this.     Action Taken: Message routed to:  [facility] ([facility] E[facility]

## 2018-10-16 ENCOUNTER — OFFICE VISIT (OUTPATIENT)
Dept: OPHTHALMOLOGY | Facility: CLINIC | Age: 53
End: 2018-10-16
Payer: COMMERCIAL

## 2018-10-16 DIAGNOSIS — H02.403 INVOLUTIONAL PTOSIS, ACQUIRED, BILATERAL: Primary | ICD-10-CM

## 2018-10-16 ASSESSMENT — VISUAL ACUITY
OS_SC+: +3
OD_SC: 20/50
OS_SC: 20/40
OD_SC+: -3
METHOD: SNELLEN - LINEAR

## 2018-10-16 ASSESSMENT — TONOMETRY
IOP_METHOD: ICARE
OS_IOP_MMHG: 11
OD_IOP_MMHG: 13

## 2018-10-16 NOTE — PROGRESS NOTES
Fatou Simental is status post bilateral ptosis repair levator.  Incision(s) healing well.  The lid(s)  are  in excellent position.    She is concerned about the incision, but really looks good.      I have recommended:  * Continue antibiotic ointment or bland lubricating ointment (eg vaseline or aquaphor) to the incision site BID.  * Massage along the incision 2-3 x daily.   * Warm soaks 3-4x daily until all edema and ecchymoses resolve  * Return to clinic in 2 months     Attending Physician Attestation:  Complete documentation of historical and exam elements from today's encounter can be found in the full encounter summary report (not reduplicated in this progress note).  I personally obtained the chief complaint(s) and history of present illness.  I confirmed and edited as necessary the review of systems, past medical/surgical history, family history, social history, and examination findings as documented by others; and I examined the patient myself.  I personally reviewed the relevant tests, images, and reports as documented above.  I formulated and edited as necessary the assessment and plan and discussed the findings and management plan with the patient and family. - Wilton Viramontes MD

## 2018-10-16 NOTE — NURSING NOTE
Chief Complaints and History of Present Illnesses   Patient presents with     Post Op (Ophthalmology) Both Eyes      Bilateral Upper Eyelid Ptosis Repair 10/11/18      HPI    Affected eye(s):  Both   Location:  Upper   Symptoms:     Blurred vision   Puffy eyes   Redness   Foreign body sensation   Itching   Eye discharge         Do you have eye pain now?:  No      Comments:  5 day Post op of Bilateral Upper Eyelid Ptosis Repair 10/11/18.  Patient says that there are open cuts on both upper eyelids, concerned that stitches came out.  Swelling and bruising noticeable each eye.  Using warm compresses both eyes 4 times daily. Out of erythromycin ointment.  Eye meds: none  BILL Baltazar 10/16/2018 3:06 PM

## 2018-10-16 NOTE — MR AVS SNAPSHOT
After Visit Summary   10/16/2018    Fatou Simental    MRN: [id number]           Patient Information     Date Of Birth          1965        Visit Information        Provider Department      10/16/2018 2:45 PM Wilton Viramontes MD [facility]        Today's Diagnoses     Involutional ptosis, acquired, bilateral    -  1       Follow-ups after your visit        Follow-up notes from your care team     Return in about 2 months (around 12/16/2018) for Post Op.      Who to contact     Please call your clinic at [phone] to:    Ask questions about your health    Make or cancel appointments    Discuss your medicines    Learn about your test results    Speak to your doctor            Additional Information About Your Visit        [employer] Information     [employer] gives you secure access to your electronic health record. If you see a primary care provider, you can also send messages to your care team and make appointments. If you have questions, please call your primary care clinic.  If you do not have a primary care provider, please call [phone] and they will assist you.      [employer] is an electronic gateway that provides easy, online access to your medical records. With [employer], you can request a clinic appointment, read your test results, renew a prescription or communicate with your care team.     To access your existing account, please contact your [facility] or call [phone] for assistance.        Care EveryWhere ID     This is your Care EveryWhere ID. This could be used by other organizations to access your [place] medical records  [id number]         Blood Pressure from Last 3 Encounters:   10/11/18 101/63   10/02/18 112/62   08/24/18 92/56    Weight from Last 3 Encounters:   10/11/18 47.2 kg (104 lb)   10/02/18 48.5 kg (107 lb)   08/24/18 48.1 kg (106 lb)              Today, you had the following     No orders found for display       Primary  Care Provider Office Phone # Fax #    R Chucho Kelly [phone] [address] [facility] [id number]        Equal Access to Services     LYLEELYSIA KOLE : Catalino katelyn sosa hiro Ocampo, wakristida lukiersten, qaaliciata kalisa garrido, tiffanie richardalmaz dinora. So [facility] [phone].    ATENCIÓN: Si habla español, tiene a saldaña disposición servicios gratuitos de asistencia lingüística. Llame al [phone].    We comply with applicable federal civil rights laws and [place] laws. We do not discriminate on the basis of race, color, national origin, age, disability, sex, sexual orientation, or gender identity.            Thank you!     Thank you for choosing [facility] OPHTH[facility]MOLOGY  for your care. Our goal is always to provide you with excellent care. Hearing back from our patients is one way we can continue to improve our services. Please take a few minutes to complete the written survey that you may receive in the mail after your visit with us. Thank you!             Your Updated Medication List - Protect others around you: Learn how to safely use, store and throw away your medicines at www.disposemymeds.org.          This list is accurate as of 10/16/18  3:41 PM.  Always use your most recent med list.                   Brand Name Dispense Instructions for use Diagnosis    albuterol 108 (90 Base) MCG/ACT inhaler    PROAIR HFA/PROVENTIL HFA/VENTOLIN HFA    1 Inhaler    Inhale 2 puffs into the lungs every 4 hours as needed for shortness of breath / dyspnea or wheezing    Acute bronchitis with symptoms greater than 10 days       BENADRYL PO      Take 50 mg by mouth nightly as needed        buPROPion 150 MG 24 hr tablet    WELLBUTRIN XL    30 tablet    Take 1 tablet (150 mg) by mouth every morning    Nicotine dependence, uncomplicated, unspecified nicotine product type       erythromycin ophthalmic ointment    ROMYCIN    3.5 g    Apply small amount to incision sites three times daily,  then apply to inner lower lid of operative eye(s) at bedtime    Postoperative eye state       estradiol 1 MG tablet    ESTRACE    90 tablet    Take 1 tablet (1 mg) by mouth daily    Need for prophylactic hormone replacement therapy (postmenopausal)       estradiol 10 MCG Tabs vaginal tablet    VAGIFEM    8 tablet    Place 1 tablet (10 mcg) vaginally twice a week    Need for prophylactic hormone replacement therapy (postmenopausal)       gabapentin 100 MG capsule    NEURONTIN    90 capsule    Take 1 capsule (100 mg) by mouth At Bedtime    Vulvodynia       LORazepam 0.5 MG tablet    ATIVAN    60 tablet    Take 1 tablet by mouth twice daily as needed    Generalized anxiety disorder       multivitamin, therapeutic Tabs tablet      Take 1 tablet by mouth daily        omeprazole 20 MG tablet     90 tablet    Take 1 tablet (20 mg) by mouth daily Take 30-60 minutes before a meal.    Gastroesophageal reflux disease without esophagitis       oxyCODONE IR 5 MG tablet    ROXICODONE    10 tablet    Take 1 tablet (5 mg) by mouth every 6 hours as needed for pain    Postoperative eye state       * varenicline 1 MG tablet    CHANTIX    56 tablet    Take 1 tablet (1 mg) by mouth 2 times daily    Tobacco use disorder       * varenicline 0.5 MG X 11 & 1 MG X 42 tablet    CHANTIX STARTING MONTH JOHNNY    53 tablet    Take 0.5 mg tab daily for 3 days, then 0.5 mg tab twice daily for 4 days, then 1 mg twice daily.    Tobacco use disorder       VITAMIN D3 PO      Take 400 Units by mouth daily        * Notice:  This list has 2 medication(s) that are the same as other medications prescribed for you. Read the directions carefully, and ask your doctor or other care provider to review them with you.

## 2018-12-03 DIAGNOSIS — Z79.890 NEED FOR PROPHYLACTIC HORMONE REPLACEMENT THERAPY (POSTMENOPAUSAL): ICD-10-CM

## 2018-12-03 NOTE — TELEPHONE ENCOUNTER
"Requested Prescriptions   Pending Prescriptions Disp Refills     estradiol (ESTRACE) 1 MG tablet 90 tablet 3    Last Written Prescription Date:  10/2/18  Last Fill Quantity: 90,  # refills: 3   Last office visit: 10/2/2018 with prescribing provider:  Post   Future Office Visit:     Sig: Take 1 tablet (1 mg) by mouth daily    Hormone Replacement Therapy Failed    12/3/2018  2:44 PM       Failed - Patient has mammogram in past 2 years on file if age 50-75       Passed - Blood pressure under 140/90 in past 12 months    BP Readings from Last 3 Encounters:   10/11/18 101/63   10/02/18 112/62   08/24/18 92/56                Passed - Recent (12 mo) or future (30 days) visit within the authorizing provider's specialty    Patient had office visit in the last 12 months or has a visit in the next 30 days with authorizing provider or within the authorizing provider's specialty.  See \"Patient Info\" tab in inbasket, or \"Choose Columns\" in Meds & Orders section of the refill encounter.             Passed - Patient is 18 years of age or older       Passed - No active pregnancy on record       Passed - No positive pregnancy test on record in past 12 months          "

## 2018-12-04 DIAGNOSIS — F41.1 GENERALIZED ANXIETY DISORDER: ICD-10-CM

## 2018-12-04 DIAGNOSIS — N94.819 VULVODYNIA: ICD-10-CM

## 2018-12-04 RX ORDER — ESTRADIOL 1 MG/1
1 TABLET ORAL DAILY
Qty: 90 TABLET | Refills: 2 | Status: SHIPPED | OUTPATIENT
Start: 2018-12-04 | End: 2019-10-11

## 2018-12-04 RX ORDER — LORAZEPAM 0.5 MG/1
TABLET ORAL
Qty: 60 TABLET | Refills: 5 | Status: SHIPPED | OUTPATIENT
Start: 2018-12-04 | End: 2019-07-31

## 2018-12-04 NOTE — TELEPHONE ENCOUNTER
Requested Prescriptions   Pending Prescriptions Disp Refills     LORazepam (ATIVAN) 0.5 MG tablet 60 tablet 5     Sig: Take 1 tablet by mouth twice daily as needed    Last Written Prescription Date:  5/8/2018  Last Fill Quantity: 60,   # refills: 5  Last Office Visit: 10/2/2018 with Post  Future Office visit:       Routing refill request to provider for review/approval because:  Drug not on the [facility], [facility] or [facility] refill protocol or controlled substance

## 2018-12-10 DIAGNOSIS — G43.009 MIGRAINE WITHOUT AURA AND WITHOUT STATUS MIGRAINOSUS, NOT INTRACTABLE: Primary | ICD-10-CM

## 2018-12-10 RX ORDER — SUMATRIPTAN 50 MG/1
50 TABLET, FILM COATED ORAL
Qty: 10 TABLET | Refills: 5 | Status: SHIPPED | OUTPATIENT
Start: 2018-12-10 | End: 2020-04-23 | Stop reason: ALTCHOICE

## 2018-12-10 NOTE — TELEPHONE ENCOUNTER
Reason for Call:  Medication or medication refill:    Do you use a [facility]?  Name of the pharmacy and phone number for the current request: [facility]    Name of the medication requested: Imitrex - Pt called for refill - Unable to queue up past med, not on current med list.      Last Written Prescription Date:  10/10/17  Last Fill Quantity: 10,  # refills: 5   Last office visit: 10/2/2018 with prescribing provider:     Future Office Visit:      Other request:     Can we leave a detailed message on this number? YES    Phone number patient can be reached at: Cell number on file:    Telephone Information:   Mobile [phone]       Best Time: any    Call taken on 12/10/2018 at 1:41 PM by Jenna Young

## 2018-12-11 ENCOUNTER — OFFICE VISIT (OUTPATIENT)
Dept: OPHTHALMOLOGY | Facility: CLINIC | Age: 53
End: 2018-12-11
Payer: COMMERCIAL

## 2018-12-11 DIAGNOSIS — Z98.890 POSTOPERATIVE STATE: ICD-10-CM

## 2018-12-11 DIAGNOSIS — H02.403 INVOLUTIONAL PTOSIS, ACQUIRED, BILATERAL: Primary | ICD-10-CM

## 2018-12-11 ASSESSMENT — REFRACTION_MANIFEST
OS_ADD: +1.75
OD_CYLINDER: +1.00
OD_SPHERE: -1.25
OD_AXIS: 165
OS_SPHERE: -0.50
OS_CYLINDER: +0.50
OD_ADD: +1.75
OS_AXIS: 034

## 2018-12-11 ASSESSMENT — VISUAL ACUITY
METHOD: SNELLEN - LINEAR
OS_SC+: -2
OD_SC: 20/50
OS_SC: 20/30

## 2018-12-11 ASSESSMENT — TONOMETRY
IOP_METHOD: ICARE
OS_IOP_MMHG: 11
OD_IOP_MMHG: 13

## 2018-12-11 NOTE — PROGRESS NOTES
Fatou Simental is status post bilateral ptosis repair levator.  Incision(s) healing well.  The lid(s)  are  in excellent position.    I have recommended:  * Continue artificial tears prn  * Return to clinic in 2 months       Giuseppe Lr MD  PGY4   Agree with above  Discussed BLLB vs HA filler for tear trough.  BLLB would be GA 45 min TCF, repo nasal, SP, closed canthopexy.   Filler would be Restylane with Lidocaine  I think surgery would give ideal outcome as she does have a good amount of prolapsed oribtal fat.     Will talk with Agustina and decide    Attending Physician Attestation:  Complete documentation of historical and exam elements from today's encounter can be found in the full encounter summary report (not reduplicated in this progress note).  I personally obtained the chief complaint(s) and history of present illness.  I confirmed and edited as necessary the review of systems, past medical/surgical history, family history, social history, and examination findings as documented by others; and I examined the patient myself.  I personally reviewed the relevant tests, images, and reports as documented above.  I formulated and edited as necessary the assessment and plan and discussed the findings and management plan with the patient and family. - Wilton Viramontes MD

## 2019-02-13 ENCOUNTER — APPOINTMENT (OUTPATIENT)
Dept: GENERAL RADIOLOGY | Facility: CLINIC | Age: 54
End: 2019-02-13
Attending: FAMILY MEDICINE

## 2019-02-13 ENCOUNTER — HOSPITAL ENCOUNTER (EMERGENCY)
Facility: CLINIC | Age: 54
Discharge: HOME | End: 2019-02-13
Attending: FAMILY MEDICINE | Admitting: FAMILY MEDICINE

## 2019-02-13 VITALS
HEART RATE: 64 BPM | RESPIRATION RATE: 14 BRPM | OXYGEN SATURATION: 98 % | WEIGHT: 107 LBS | SYSTOLIC BLOOD PRESSURE: 107 MMHG | DIASTOLIC BLOOD PRESSURE: 80 MMHG | TEMPERATURE: 98.1 F | BODY MASS INDEX: 19.57 KG/M2

## 2019-02-13 DIAGNOSIS — K59.00 CONSTIPATION, UNSPECIFIED CONSTIPATION TYPE: ICD-10-CM

## 2019-02-13 LAB
ALBUMIN SERPL-MCNC: 3.8 G/DL (ref 3.4–5)
ALBUMIN UR-MCNC: NEGATIVE MG/DL
ALP SERPL-CCNC: 54 U/L (ref 40–150)
ALT SERPL W P-5'-P-CCNC: 26 U/L (ref 0–50)
ANION GAP SERPL CALCULATED.3IONS-SCNC: 5 MMOL/L (ref 3–14)
APPEARANCE UR: CLEAR
AST SERPL W P-5'-P-CCNC: 19 U/L (ref 0–45)
BASOPHILS # BLD AUTO: 0.1 10E9/L (ref 0–0.2)
BASOPHILS NFR BLD AUTO: 1.1 %
BILIRUB SERPL-MCNC: 0.1 MG/DL (ref 0.2–1.3)
BILIRUB UR QL STRIP: NEGATIVE
BUN SERPL-MCNC: 14 MG/DL (ref 7–30)
CALCIUM SERPL-MCNC: 8.6 MG/DL (ref 8.5–10.1)
CHLORIDE SERPL-SCNC: 107 MMOL/L (ref 94–109)
CO2 SERPL-SCNC: 29 MMOL/L (ref 20–32)
COLOR UR AUTO: NORMAL
CREAT SERPL-MCNC: 0.58 MG/DL (ref 0.52–1.04)
CRP SERPL-MCNC: <2.9 MG/L (ref 0–8)
DIFFERENTIAL METHOD BLD: NORMAL
EOSINOPHIL # BLD AUTO: 0.1 10E9/L (ref 0–0.7)
EOSINOPHIL NFR BLD AUTO: 2 %
ERYTHROCYTE [DISTWIDTH] IN BLOOD BY AUTOMATED COUNT: 12.6 % (ref 10–15)
GFR SERPL CREATININE-BSD FRML MDRD: >90 ML/MIN/{1.73_M2}
GLUCOSE SERPL-MCNC: 97 MG/DL (ref 70–99)
GLUCOSE UR STRIP-MCNC: NEGATIVE MG/DL
HCT VFR BLD AUTO: 37.9 % (ref 35–47)
HGB BLD-MCNC: 12.9 G/DL (ref 11.7–15.7)
HGB UR QL STRIP: NEGATIVE
IMM GRANULOCYTES # BLD: 0 10E9/L (ref 0–0.4)
IMM GRANULOCYTES NFR BLD: 0.2 %
KETONES UR STRIP-MCNC: NEGATIVE MG/DL
LACTATE BLD-SCNC: 1.1 MMOL/L (ref 0.7–2)
LEUKOCYTE ESTERASE UR QL STRIP: NEGATIVE
LIPASE SERPL-CCNC: 205 U/L (ref 73–393)
LYMPHOCYTES # BLD AUTO: 1.6 10E9/L (ref 0.8–5.3)
LYMPHOCYTES NFR BLD AUTO: 34.8 %
MCH RBC QN AUTO: 31.7 PG (ref 26.5–33)
MCHC RBC AUTO-ENTMCNC: 34 G/DL (ref 31.5–36.5)
MCV RBC AUTO: 93 FL (ref 78–100)
MONOCYTES # BLD AUTO: 0.4 10E9/L (ref 0–1.3)
MONOCYTES NFR BLD AUTO: 8.5 %
NEUTROPHILS # BLD AUTO: 2.5 10E9/L (ref 1.6–8.3)
NEUTROPHILS NFR BLD AUTO: 53.4 %
NITRATE UR QL: NEGATIVE
NRBC # BLD AUTO: 0 10*3/UL
NRBC BLD AUTO-RTO: 0 /100
PH UR STRIP: 6 PH (ref 5–7)
PLATELET # BLD AUTO: 265 10E9/L (ref 150–450)
POTASSIUM SERPL-SCNC: 3.9 MMOL/L (ref 3.4–5.3)
PROT SERPL-MCNC: 6.5 G/DL (ref 6.8–8.8)
RBC # BLD AUTO: 4.07 10E12/L (ref 3.8–5.2)
SODIUM SERPL-SCNC: 141 MMOL/L (ref 133–144)
SOURCE: NORMAL
SP GR UR STRIP: 1.01 (ref 1–1.03)
UROBILINOGEN UR STRIP-MCNC: 0 MG/DL (ref 0–2)
WBC # BLD AUTO: 4.6 10E9/L (ref 4–11)

## 2019-02-13 PROCEDURE — 25000128 H RX IP 250 OP 636: Performed by: FAMILY MEDICINE

## 2019-02-13 PROCEDURE — 25800030 ZZH RX IP 258 OP 636: Performed by: FAMILY MEDICINE

## 2019-02-13 PROCEDURE — 85025 COMPLETE CBC W/AUTO DIFF WBC: CPT | Performed by: FAMILY MEDICINE

## 2019-02-13 PROCEDURE — 80053 COMPREHEN METABOLIC PANEL: CPT | Performed by: FAMILY MEDICINE

## 2019-02-13 PROCEDURE — 83605 ASSAY OF LACTIC ACID: CPT | Performed by: FAMILY MEDICINE

## 2019-02-13 PROCEDURE — 83690 ASSAY OF LIPASE: CPT | Performed by: FAMILY MEDICINE

## 2019-02-13 PROCEDURE — 99284 EMERGENCY DEPT VISIT MOD MDM: CPT | Mod: 25 | Performed by: FAMILY MEDICINE

## 2019-02-13 PROCEDURE — 86140 C-REACTIVE PROTEIN: CPT | Performed by: FAMILY MEDICINE

## 2019-02-13 PROCEDURE — 99284 EMERGENCY DEPT VISIT MOD MDM: CPT | Mod: Z6 | Performed by: FAMILY MEDICINE

## 2019-02-13 PROCEDURE — 96374 THER/PROPH/DIAG INJ IV PUSH: CPT | Performed by: FAMILY MEDICINE

## 2019-02-13 PROCEDURE — 81003 URINALYSIS AUTO W/O SCOPE: CPT | Performed by: FAMILY MEDICINE

## 2019-02-13 PROCEDURE — 74019 RADEX ABDOMEN 2 VIEWS: CPT

## 2019-02-13 PROCEDURE — 96375 TX/PRO/DX INJ NEW DRUG ADDON: CPT | Performed by: FAMILY MEDICINE

## 2019-02-13 RX ORDER — SODIUM CHLORIDE, SODIUM LACTATE, POTASSIUM CHLORIDE, CALCIUM CHLORIDE 600; 310; 30; 20 MG/100ML; MG/100ML; MG/100ML; MG/100ML
1000 INJECTION, SOLUTION INTRAVENOUS CONTINUOUS
Status: DISCONTINUED | OUTPATIENT
Start: 2019-02-13 | End: 2019-02-13 | Stop reason: HOSPADM

## 2019-02-13 RX ORDER — ONDANSETRON 2 MG/ML
4 INJECTION INTRAMUSCULAR; INTRAVENOUS
Status: COMPLETED | OUTPATIENT
Start: 2019-02-13 | End: 2019-02-13

## 2019-02-13 RX ORDER — LACTULOSE 10 G/15ML
20 SOLUTION ORAL 2 TIMES DAILY
Qty: 600 ML | Refills: 0 | Status: SHIPPED | OUTPATIENT
Start: 2019-02-13 | End: 2019-02-23

## 2019-02-13 RX ORDER — KETOROLAC TROMETHAMINE 15 MG/ML
10 INJECTION, SOLUTION INTRAMUSCULAR; INTRAVENOUS ONCE
Status: COMPLETED | OUTPATIENT
Start: 2019-02-13 | End: 2019-02-13

## 2019-02-13 RX ADMIN — SODIUM CHLORIDE, POTASSIUM CHLORIDE, SODIUM LACTATE AND CALCIUM CHLORIDE 1000 ML: 600; 310; 30; 20 INJECTION, SOLUTION INTRAVENOUS at 15:30

## 2019-02-13 RX ADMIN — ONDANSETRON 4 MG: 2 INJECTION INTRAMUSCULAR; INTRAVENOUS at 15:32

## 2019-02-13 RX ADMIN — KETOROLAC TROMETHAMINE 10 MG: 15 INJECTION, SOLUTION INTRAMUSCULAR; INTRAVENOUS at 15:31

## 2019-02-13 NOTE — ED NOTES
Pt prefers to have her enema at home - discussed with Dr Rivera - plan established - she will call [place] [facility] in the morning to review visit here and establish follow up.

## 2019-02-13 NOTE — ED AVS SNAPSHOT
[facility] Emergency Department  [address] [address]  Phone:  [phone]  Fax:  [phone]                                    Fatou Simental   MRN: [id number]    Department:  [facility] Emergency Department   Date of Visit:  2/13/2019           After Visit Summary Signature Page    I have received my discharge instructions, and my questions have been answered. I have discussed any challenges I see with this plan with the nurse or doctor.    ..........................................................................................................................................  Patient/Patient Representative Signature      ..........................................................................................................................................  Patient Representative Print Name and Relationship to Patient    ..................................................               ................................................  Date                                   Time    ..........................................................................................................................................  Reviewed by Signature/Title    ...................................................              ..............................................  Date                                               Time          22EPIC Rev 08/18

## 2019-02-13 NOTE — ED PROVIDER NOTES
History     Chief Complaint   Patient presents with     Abdominal Pain     abd pain for one month no emesis HX of constipation in the past last BM this am after miralax     HPI  Fatou Simental is a 53 year old female, medical history is significant for migraine headaches, diverticulosis, IBS, ovarian cystic disease, GERD, generalized anxiety disorder, Raynaud's syndrome, tobacco use disorder, depression, presents to the emergency department with concerns of abdominal pain.  History is obtained from the patient who states that she has had burning migratory all over abdominal pain for approximately 1 month, occasional nausea, anorexia although she is forced herself to drink fluids and solids.  No vomiting.  Bowel habits are erratic sometimes she has diarrhea sometimes she has formed rabbit turd-like stools.  She states that she has not had a normal bowel movement in over a month.  She denies fever although admits to chills and sweats.  No urinary tract symptoms such as frequency urgency or dysuria.  She is status post cholecystectomy, hysterectomy, and resection of bowel from complication of diverticulitis in the past.  The pain feels somewhat like diverticular disease but not exactly.  She has tried a variety of laxatives.  She has not tried any pain medication of any kind for the pain.    Allergies:  Allergies   Allergen Reactions     Cephalexin Hives     Patient states she gets hives - bad reaction not listed on her H & P.     Ciprofloxacin Hives     Got rash when taken with metronidazole + Vicodin     Lactose Diarrhea     Metronidazole Hives     Rash when taken with cipro + Vicodin     No Clinical Screening - See Comments      Pt developed hives when taking flagyl, cipro, and vicodin, doesn't know which she is allergic to      Vicodin [Hydrocodone-Acetaminophen] Hives     Rash when taken with cipro and metronidazole       Problem List:    Patient Active Problem List    Diagnosis Date Noted     Migraine with  aura and without status migrainosus, not intractable 10/16/2017     Priority: Medium     Diverticulosis 02/05/2015     Priority: Medium     Screening for malignant neoplasm of cervix 07/17/2013     Priority: Medium     S/p hysterectomy for endometriosis, still has cervix  Distant history of cervical dysplasia 2000  Pap 2010:  NIL with negative HPV  Pap 2012:  NIL  Should not need repeat Pap before 2015    Problem list name updated by automated process. Provider to review       CARDIOVASCULAR SCREENING; LDL GOAL LESS THAN 160 10/31/2010     Priority: Medium     IBS (irritable bowel syndrome) 05/28/2008     Priority: Medium     Ovarian cyst 08/23/2007     Priority: Medium     Oophorectomy bilateral - premalignant tissue ? 1998  Problem list name updated by automated process. Provider to review       Breast screening 01/22/2007     Priority: Medium      implants with 4mm superficial subq lesion at 2:00 left breast - unchanged over last year. Cyst identified on US 1/07  Problem list name updated by automated process. Provider to review       Ganglion of joint 01/22/2007     Priority: Medium     Esophageal reflux 11/13/2006     Priority: Medium     Generalized anxiety disorder 10/30/2006     Priority: Medium     Buspar prescribed 8/23/07       Raynaud's syndrome 01/04/2006     Priority: Medium     Tobacco use disorder 01/04/2006     Priority: Medium     Mild major depression (H) 01/04/2006     Priority: Medium     Need for prophylactic hormone replacement therapy (postmenopausal) 01/04/2006     Priority: Medium        Past Medical History:    Past Medical History:   Diagnosis Date     Cocaine abuse, unspecified 2003     Dysplasia of cervix, unspecified 2000     Esophageal reflux 11/13/2006     Generalized anxiety disorder 10/30/2006     IBS (irritable bowel syndrome) 5/28/2008       Past Surgical History:    Past Surgical History:   Procedure Laterality Date     ABLATE VEIN VARICOSE RADIO FREQUENCY WITHOUT PHLEBECTOMY  MULTIPLE STAB  2014    Procedure: ABLATE VEIN VARICOSE RADIO FREQUENCY WITHOUT PHLEBECTOMY MULTIPLE STAB;  Surgeon: Khanh Bunch MD;  Location: [facility] OR     C  DELIVERY ONLY  ,,    , Low Cervical     ENHANCE LASER REFRACTIVE BILATERAL EXISTING PT IN PARAMETERS Bilateral     LASIK     HYSTERECTOMY SUPRACERVICAL, BILATERAL SALPINGO-OOPHORECTOMY, COMBINED       LAPAROSCOPIC ASSISTED COLECTOMY N/A 2015    Procedure: LAPAROSCOPIC ASSISTED COLECTOMY;  Surgeon: Khanh Bunch MD;  Location: [facility] OR     LAPAROSCOPIC CHOLECYSTECTOMY N/A 2017    Procedure: LAPAROSCOPIC CHOLECYSTECTOMY;  Surgeon: Levi Louis MD;  Location: [facility] OR     NERVE BLOCK PERIPHERAL Bilateral 2015    Procedure: NERVE BLOCK PERIPHERAL;  Surgeon: Generic Anesthesia Provider;  Location: [facility] OR     REPAIR PTOSIS BILATERAL Bilateral 10/11/2018    Procedure: REPAIR PTOSIS BILATERAL;  Bilateral Upper Eyelid Ptosis Repair;  Surgeon: Wilton Viramontes MD;  Location: [facility]     SURGICAL HISTORY OF -       Breast implants     SURGICAL HISTORY OF -       Carpal  tunnel r hand       Family History:    Family History   Problem Relation Age of Onset     Cancer Father         liver     Cancer Brother         lung, asbestos     Cancer Mother         uterine     Cancer Sister         uterine     Breast Cancer Other      Breast Cancer Other      Glaucoma No family hx of      Macular Degeneration No family hx of        Social History:  Marital Status:   [4]  Social History     Tobacco Use     Smoking status: Current Every Day Smoker     Packs/day: 0.25     Years: 20.00     Pack years: 5.00     Smokeless tobacco: Never Used   Substance Use Topics     Alcohol use: No     Alcohol/week: 0.0 oz     Comment: Social. 1-2 cans of beer or 2 glasses of wine once per month     Drug use: No        Medications:      Cholecalciferol (VITAMIN D3 PO)   estradiol (ESTRACE) 1 MG tablet   estradiol (VAGIFEM)  10 MCG TABS vaginal tablet   gabapentin (NEURONTIN) 100 MG capsule   lactulose (CHRONULAC) 10 GM/15ML solution   multivitamin, therapeutic (THERA-VIT) TABS   omeprazole 20 MG tablet   SUMAtriptan (IMITREX) 50 MG tablet   albuterol (PROAIR HFA/PROVENTIL HFA/VENTOLIN HFA) 108 (90 Base) MCG/ACT Inhaler   DiphenhydrAMINE HCl (BENADRYL PO)   erythromycin (ROMYCIN) ophthalmic ointment   LORazepam (ATIVAN) 0.5 MG tablet   varenicline (CHANTIX STARTING MONTH JOHNNY) 0.5 MG X 11 & 1 MG X 42 tablet   varenicline (CHANTIX) 1 MG tablet         Review of Systems    Physical Exam   BP: 102/62  Pulse: 64  Heart Rate: 88  Temp: 98.1  F (36.7  C)  Resp: 14  Weight: 48.5 kg (107 lb)  SpO2: 98 %      Physical Exam   Constitutional: She is oriented to person, place, and time. She appears well-developed and well-nourished.   HENT:   Head: Normocephalic and atraumatic.   Right Ear: External ear normal.   Left Ear: External ear normal.   Nose: Nose normal.   Mouth/Throat: Oropharynx is clear and moist.   Eyes: Conjunctivae and EOM are normal. Pupils are equal, round, and reactive to light.   Neck: Normal range of motion. Neck supple.   Cardiovascular: Normal rate, regular rhythm, normal heart sounds and intact distal pulses.   Pulmonary/Chest: Effort normal and breath sounds normal.   Abdominal: Soft. Bowel sounds are normal. There is tenderness.   Diffusely tender, distractible, no rebound or guarding.     Musculoskeletal: Normal range of motion.   Neurological: She is alert and oriented to person, place, and time.   Skin: Skin is warm and dry. Capillary refill takes less than 2 seconds.   Psychiatric: She has a normal mood and affect. Her behavior is normal.   Nursing note and vitals reviewed.      ED Course        Procedures               Critical Care time:  none               Results for orders placed or performed during the hospital encounter of 02/13/19 (from the past 24 hour(s))   UA reflex to Microscopic   Result Value Ref Range     Color Urine Straw     Appearance Urine Clear     Glucose Urine Negative NEG^Negative mg/dL    Bilirubin Urine Negative NEG^Negative    Ketones Urine Negative NEG^Negative mg/dL    Specific Gravity Urine 1.010 1.003 - 1.035    Blood Urine Negative NEG^Negative    pH Urine 6.0 5.0 - 7.0 pH    Protein Albumin Urine Negative NEG^Negative mg/dL    Urobilinogen mg/dL 0.0 0.0 - 2.0 mg/dL    Nitrite Urine Negative NEG^Negative    Leukocyte Esterase Urine Negative NEG^Negative    Source Midstream Urine    XR Abdomen 2 Views    Narrative    XR ABDOMEN 2 VW 2/13/2019 3:26 PM     HISTORY: Abdominal pain    COMPARISON: 11/14/2016      Impression    IMPRESSION: There is a nonspecific, nonobstructive bowel gas pattern.  Moderate gas and stool throughout the colon. No evidence of free  intraperitoneal air. Surgical clips in the right upper quadrant.  Visualized portions of the lung bases are clear.    RITCHIE DUNNE MD   CBC with platelets differential   Result Value Ref Range    WBC 4.6 4.0 - 11.0 10e9/L    RBC Count 4.07 3.8 - 5.2 10e12/L    Hemoglobin 12.9 11.7 - 15.7 g/dL    Hematocrit 37.9 35.0 - 47.0 %    MCV 93 78 - 100 fl    MCH 31.7 26.5 - 33.0 pg    MCHC 34.0 31.5 - 36.5 g/dL    RDW 12.6 10.0 - 15.0 %    Platelet Count 265 150 - 450 10e9/L    Diff Method Automated Method     % Neutrophils 53.4 %    % Lymphocytes 34.8 %    % Monocytes 8.5 %    % Eosinophils 2.0 %    % Basophils 1.1 %    % Immature Granulocytes 0.2 %    Nucleated RBCs 0 0 /100    Absolute Neutrophil 2.5 1.6 - 8.3 10e9/L    Absolute Lymphocytes 1.6 0.8 - 5.3 10e9/L    Absolute Monocytes 0.4 0.0 - 1.3 10e9/L    Absolute Eosinophils 0.1 0.0 - 0.7 10e9/L    Absolute Basophils 0.1 0.0 - 0.2 10e9/L    Abs Immature Granulocytes 0.0 0 - 0.4 10e9/L    Absolute Nucleated RBC 0.0    CRP inflammation   Result Value Ref Range    CRP Inflammation <2.9 0.0 - 8.0 mg/L   Comprehensive metabolic panel   Result Value Ref Range    Sodium 141 133 - 144 mmol/L    Potassium 3.9  3.4 - 5.3 mmol/L    Chloride 107 94 - 109 mmol/L    Carbon Dioxide 29 20 - 32 mmol/L    Anion Gap 5 3 - 14 mmol/L    Glucose 97 70 - 99 mg/dL    Urea Nitrogen 14 7 - 30 mg/dL    Creatinine 0.58 0.52 - 1.04 mg/dL    GFR Estimate >90 >60 mL/min/[1.73_m2]    GFR Estimate If Black >90 >60 mL/min/[1.73_m2]    Calcium 8.6 8.5 - 10.1 mg/dL    Bilirubin Total 0.1 (L) 0.2 - 1.3 mg/dL    Albumin 3.8 3.4 - 5.0 g/dL    Protein Total 6.5 (L) 6.8 - 8.8 g/dL    Alkaline Phosphatase 54 40 - 150 U/L    ALT 26 0 - 50 U/L    AST 19 0 - 45 U/L   Lipase   Result Value Ref Range    Lipase 205 73 - 393 U/L   Lactic acid whole blood   Result Value Ref Range    Lactic Acid 1.1 0.7 - 2.0 mmol/L       Medications   lactated ringers BOLUS 1,000 mL (0 mLs Intravenous Stopped 2/13/19 1639)   ketorolac (TORADOL) injection 10 mg (10 mg Intravenous Given 2/13/19 1531)   ondansetron (ZOFRAN) injection 4 mg (4 mg Intravenous Given 2/13/19 1532)       Assessments & Plan (with Medical Decision Making)   53-year-old female past medical history reviewed as above who presents the emergency department for approximately 1 month of abdominal pain as discussed in the HPI.  Physical exam finds her alert no acute distress non-ill and nontoxic in appearance.  Stable adult range normal vital signs without fever.  Nonfocal benign abdominal exam.  Lab diagnostics reviewed above and are completely within normal limits.  Flat and upright abdominal x-rays are obtained revealing a moderate amount of fecal material, no evidence of obstruction or perforation.  All results were reviewed with the patient and answered her questions.  She initially decided she would like to do an enema here in the emergency department and then changed her mind.  She would like to do this at home as well as oral medication.  We discussed treatment.  Disposition is to home.    Disclaimer: This note consists of symbols derived from keyboarding, dictation and/or voice recognition software. As a  result, there may be errors in the script that have gone undetected. Please consider this when interpreting information found in this chart.      I have reviewed the nursing notes.    I have reviewed the findings, diagnosis, plan and need for follow up with the patient.             Medication List      Started    lactulose 10 GM/15ML solution  Commonly known as:  CHRONULAC  20 g, Oral, 2 TIMES DAILY            Final diagnoses:   Constipation, unspecified constipation type       2/13/2019   [facility] EMERGENCY DEPARTMENT     Shine Rivera MD  02/13/19 1640       Shine Rivera MD  02/14/19 0958

## 2019-02-13 NOTE — ED NOTES
Bowel resection 4 years ago, has been doing ok since then, but this past month has not been having normal stools. Has been taking miralax and stool softner pills over the counter, only going small amount, straining to go. Upper bilateral abdominal pain radiated to right side flank. Did have small bowel movement this am. Feels stomach is constantly rumbling. Is passing some gas.

## 2019-02-13 NOTE — DISCHARGE INSTRUCTIONS
Push fluids, increase dietary fiber.  Lactulose 30 cc twice daily times 10 days.  I would also encourage you to  a Fleet enema and self administer at home.  If you are not improving or worse please return to the emergency department.  Otherwise follow-up in clinic with your primary care provider.

## 2019-07-31 DIAGNOSIS — F41.1 GENERALIZED ANXIETY DISORDER: ICD-10-CM

## 2019-07-31 RX ORDER — LORAZEPAM 0.5 MG/1
TABLET ORAL
Qty: 60 TABLET | Refills: 5 | Status: SHIPPED | OUTPATIENT
Start: 2019-07-31 | End: 2020-05-15

## 2019-07-31 NOTE — TELEPHONE ENCOUNTER
Patient called requesting a refill on ativan, has future appt the end of august, requesting enough meds to last to OV.     Next 5 appointments (look out 90 days)    Aug 30, 2019 10:40 AM CDT  SHORT with Dariel Maxwell MD  [facility] [place] ([facility] [place]) [address] [address]  [phone]             Rochelle ALBERTO  Station

## 2019-07-31 NOTE — TELEPHONE ENCOUNTER
Patient called, states she is out and pharmacy did not let her know her refills were out. Routing with high priority to covering providers for PCP, appointment is scheduled for 8-30-19 with Dr. Maxwell.     PA Yanez

## 2019-07-31 NOTE — TELEPHONE ENCOUNTER
Refilled x 1.  This is not a good long term medication.  I would work with PCP on getting off of this and onto something else for anxiety.      Sowmya Sylvester M.D.

## 2019-07-31 NOTE — TELEPHONE ENCOUNTER
Requested Prescriptions   Pending Prescriptions Disp Refills     LORazepam (ATIVAN) 0.5 MG tablet  Last Written Prescription Date:  12/04/18  Last Fill Quantity: 60,  # refills: 5   Last office visit: 10/2/2018 with prescribing provider:  Ronni Kelly   Future Office Visit:     60 tablet 5     Sig: Take 1 tablet by mouth twice daily as needed       There is no refill protocol information for this order

## 2019-09-06 DIAGNOSIS — K21.9 GASTROESOPHAGEAL REFLUX DISEASE WITHOUT ESOPHAGITIS: ICD-10-CM

## 2019-09-06 NOTE — TELEPHONE ENCOUNTER
"Requested Prescriptions   Pending Prescriptions Disp Refills     omeprazole 20 MG tablet 90 tablet 1     Sig: Take 1 tablet (20 mg) by mouth daily Take 30-60 minutes before a meal.       PPI Protocol Failed - 9/6/2019 10:56 AM        Failed - Recent (12 mo) or future (30 days) visit within the authorizing provider's specialty     Patient had office visit in the last 12 months or has a visit in the next 30 days with authorizing provider or within the authorizing provider's specialty.  See \"Patient Info\" tab in inbasket, or \"Choose Columns\" in Meds & Orders section of the refill encounter.              Passed - Not on Clopidogrel (unless Pantoprazole ordered)        Passed - No diagnosis of osteoporosis on record        Passed - Medication is active on med list        Passed - Patient is age 18 or older        Passed - No active pregnacy on record        Passed - No positive pregnancy test in past 12 months        Last Written Prescription Date:  8/3/18  Last Fill Quantity: 90,  # refills: 1   Last office visit: 10/2/2018 with prescribing provider:  [facility]   Future Office Visit:      "

## 2019-09-06 NOTE — LETTER
[facility]  [address]  [facility] [address]  Phone: [phone]    September 11, 2019    Fatou Simental                                                                                                             [address] [place] [address]            Dear Ms. Simental,    We are concerned about your health care. Medications require routine follow-up with your a Health care Provider. Please call to schedule an appointment to establish care with a new Primary care Provider as Dr. Kelly retired last winter and it will be one year since you were last seen in October 2018.    At this time we ask that: You schedule a routine annual examination/establish care office visit with a Primary care Provider.    Your prescription: Has been refilled for 1 month so you may have time for the above noted follow-up. Please be seen prior to needing your next refill of medication.     Thank you,      [facility] RN

## 2019-09-09 NOTE — TELEPHONE ENCOUNTER
Unable to reach pt or LM.  Pt last seen by  on 10/2/18.  Pt needs to schedule appt and address refill.  Need to recall pt...........  Kwaku

## 2019-09-11 DIAGNOSIS — K21.9 GASTROESOPHAGEAL REFLUX DISEASE WITHOUT ESOPHAGITIS: ICD-10-CM

## 2019-09-11 RX ORDER — NICOTINE POLACRILEX 4 MG/1
20 GUM, CHEWING ORAL DAILY
Qty: 30 TABLET | Refills: 0 | Status: SHIPPED | OUTPATIENT
Start: 2019-09-11 | End: 2020-10-30

## 2019-09-11 NOTE — TELEPHONE ENCOUNTER
"Requested Prescriptions   Pending Prescriptions Disp Refills     omeprazole 20 MG tablet 30 tablet 0     Sig: Take 1 tablet (20 mg) by mouth daily Take 30-60 minutes before a meal.   Last Written Prescription Date:  9/11/19  Last Fill Quantity: 30 tab,  # refills: 0   Last office visit: 10/2/2018 with prescribing provider:  BETHANY Kelly   Future Office Visit:        PPI Protocol Failed - 9/11/2019 11:18 AM        Failed - Recent (12 mo) or future (30 days) visit within the authorizing provider's specialty     Patient had office visit in the last 12 months or has a visit in the next 30 days with authorizing provider or within the authorizing provider's specialty.  See \"Patient Info\" tab in inbasket, or \"Choose Columns\" in Meds & Orders section of the refill encounter.              Passed - Not on Clopidogrel (unless Pantoprazole ordered)        Passed - No diagnosis of osteoporosis on record        Passed - Medication is active on med list        Passed - Patient is age 18 or older        Passed - No active pregnacy on record        Passed - No positive pregnancy test in past 12 months          "

## 2019-09-11 NOTE — TELEPHONE ENCOUNTER
Tried to reach Patient but No answer. No answering machine. It does not appear that she has used My chart either. Will send 1 month refill and will send letter to try and reach.  Kaila Luu RN

## 2019-09-13 RX ORDER — NICOTINE POLACRILEX 4 MG/1
20 GUM, CHEWING ORAL DAILY
Qty: 30 TABLET | Refills: 0 | OUTPATIENT
Start: 2019-09-13

## 2019-10-03 ENCOUNTER — HEALTH MAINTENANCE LETTER (OUTPATIENT)
Age: 54
End: 2019-10-03

## 2019-10-07 DIAGNOSIS — Z79.890 NEED FOR PROPHYLACTIC HORMONE REPLACEMENT THERAPY (POSTMENOPAUSAL): ICD-10-CM

## 2019-10-07 NOTE — TELEPHONE ENCOUNTER
"Requested Prescriptions   Pending Prescriptions Disp Refills     estradiol (ESTRACE) 1 MG tablet 90 tablet 2     Sig: Take 1 tablet (1 mg) by mouth daily       Hormone Replacement Therapy Failed - 10/7/2019  2:00 PM        Failed - Recent (12 mo) or future (30 days) visit within the authorizing provider's specialty     Patient has had an office visit with the authorizing provider or a provider within the authorizing providers department within the previous 12 mos or has a future within next 30 days. See \"Patient Info\" tab in inbasket, or \"Choose Columns\" in Meds & Orders section of the refill encounter.              Failed - Patient has mammogram in past 2 years on file if age 50-75        Passed - Blood pressure under 140/90 in past 12 months     BP Readings from Last 3 Encounters:   02/13/19 107/80   10/11/18 101/63   10/02/18 112/62                 Passed - Medication is active on med list        Passed - Patient is 18 years of age or older        Passed - No active pregnancy on record        Passed - No positive pregnancy test on record in past 12 months        Last Written Prescription Date:  12/4/2018  Last Fill Quantity: 90,  # refills: 2   Last office visit: 10/2/2018 with prescribing provider:  Post  Future Office Visit:      "

## 2019-10-09 NOTE — TELEPHONE ENCOUNTER
S:  Refill request for estradiol 1mg tab daily    B:  LOV 10/2/18 with Post  estradiol 1mg tab daily last written 12/4/18 for 9 month supply    A:  Patient now has [facility] as primary PCP  Fails [facility] refill protocol overdue for OV, otherwise passes    R:  Routed to Dr Maxwell:  Can patient have refill of medication as pended?    Cm Jorgensen RN

## 2019-10-11 DIAGNOSIS — N94.819 VULVODYNIA: ICD-10-CM

## 2019-10-11 RX ORDER — ESTRADIOL 1 MG/1
1 TABLET ORAL DAILY
Qty: 90 TABLET | Refills: 2 | Status: SHIPPED | OUTPATIENT
Start: 2019-10-11 | End: 2020-08-21

## 2019-10-11 NOTE — TELEPHONE ENCOUNTER
Routing refill request to provider for review/approval because:  Drug not on the [facility] refill protocol     Ivis Boateng RN

## 2019-10-11 NOTE — TELEPHONE ENCOUNTER
Requested Prescriptions   Pending Prescriptions Disp Refills     gabapentin (NEURONTIN) 100 MG capsule 90 capsule 3     Sig: Take 1 capsule (100 mg) by mouth At Bedtime       There is no refill protocol information for this order        Last Written Prescription Date:  10/5/18  Last Fill Quantity: 90,  # refills: 3   Last office visit: 10/2/2018 with prescribing provider:  [facility]   Future Office Visit:

## 2019-10-14 RX ORDER — GABAPENTIN 100 MG/1
100 CAPSULE ORAL AT BEDTIME
Qty: 90 CAPSULE | Refills: 3 | Status: SHIPPED | OUTPATIENT
Start: 2019-10-14 | End: 2020-04-23 | Stop reason: DRUGHIGH

## 2020-02-05 ENCOUNTER — OFFICE VISIT (OUTPATIENT)
Dept: FAMILY MEDICINE | Facility: CLINIC | Age: 55
End: 2020-02-05
Payer: COMMERCIAL

## 2020-02-05 VITALS
RESPIRATION RATE: 16 BRPM | WEIGHT: 107 LBS | HEIGHT: 62 IN | DIASTOLIC BLOOD PRESSURE: 60 MMHG | TEMPERATURE: 98.9 F | OXYGEN SATURATION: 100 % | BODY MASS INDEX: 19.69 KG/M2 | SYSTOLIC BLOOD PRESSURE: 90 MMHG | HEART RATE: 79 BPM

## 2020-02-05 DIAGNOSIS — Z71.6 ENCOUNTER FOR SMOKING CESSATION COUNSELING: ICD-10-CM

## 2020-02-05 DIAGNOSIS — J06.9 VIRAL UPPER RESPIRATORY TRACT INFECTION: Primary | ICD-10-CM

## 2020-02-05 PROCEDURE — 99213 OFFICE O/P EST LOW 20 MIN: CPT | Performed by: NURSE PRACTITIONER

## 2020-02-05 RX ORDER — VARENICLINE TARTRATE 1 MG/1
1 TABLET, FILM COATED ORAL 2 TIMES DAILY
Qty: 60 TABLET | Refills: 1 | Status: SHIPPED | OUTPATIENT
Start: 2020-02-05 | End: 2020-04-05

## 2020-02-05 ASSESSMENT — MIFFLIN-ST. JEOR: SCORE: 1038.6

## 2020-02-05 NOTE — PROGRESS NOTES
Fatou Simental is a 54 year old female who presents to clinic today for the following health issues:    HPI   Chief Complaint   Patient presents with     Sinus Problem     Smoking Cessation     would like ot know if she could get a prescription for chantix agian.    She has quit for 4 years with chantix in the past and would like to do this again.    ENT Symptoms             Symptoms: cc Present Absent Comment   Fever/Chills   x    Fatigue  x     Muscle Aches  x     Eye Irritation   x    Sneezing  x     Nasal Rafael/Drg  x  Stuffy and pressure and now breaking up   Sinus Pressure/Pain  x     Loss of smell   x    Dental pain   x    Sore Throat  x  Dry    Swollen Glands   x    Ear Pain/Fullness  x  Fullness in both ears    Cough  x  dry   Wheeze   x    Chest Pain   x    Shortness of breath   x    Rash   x    Other   x      Symptom duration:  4 days    Symptom severity:  moderate    Treatments tried:  mucinex -not helping and cough drops - helped   Contacts:  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  Patient Active Problem List   Diagnosis     Raynaud's syndrome     Tobacco use disorder     Mild major depression (H)     Need for prophylactic hormone replacement therapy (postmenopausal)     Generalized anxiety disorder     Esophageal reflux     Breast screening     Ganglion of joint     Ovarian cyst     IBS (irritable bowel syndrome)     CARDIOVASCULAR SCREENING; LDL GOAL LESS THAN 160     Screening for malignant neoplasm of cervix     Diverticulosis     Migraine with aura and without status migrainosus, not intractable     Past Surgical History:   Procedure Laterality Date     ABLATE VEIN VARICOSE RADIO FREQUENCY WITHOUT PHLEBECTOMY MULTIPLE STAB  2014    Procedure: ABLATE VEIN VARICOSE RADIO FREQUENCY WITHOUT PHLEBECTOMY MULTIPLE STAB;  Surgeon: Khanh Bunch MD;  Location: [facility]     C  DELIVERY ONLY  ,,    ,  Low Cervical     ENHANCE LASER REFRACTIVE BILATERAL EXISTING PT IN PARAMETERS Bilateral     LASIK     HYSTERECTOMY SUPRACERVICAL, BILATERAL SALPINGO-OOPHORECTOMY, COMBINED  2000     LAPAROSCOPIC ASSISTED COLECTOMY N/A 2/5/2015    Procedure: LAPAROSCOPIC ASSISTED COLECTOMY;  Surgeon: Khanh Bunch MD;  Location: [facility]     LAPAROSCOPIC CHOLECYSTECTOMY N/A 4/11/2017    Procedure: LAPAROSCOPIC CHOLECYSTECTOMY;  Surgeon: Levi Louis MD;  Location: [facility]     NERVE BLOCK PERIPHERAL Bilateral 2/6/2015    Procedure: NERVE BLOCK PERIPHERAL;  Surgeon: Generic Anesthesia Provider;  Location: [facility]     REPAIR PTOSIS BILATERAL Bilateral 10/11/2018    Procedure: REPAIR PTOSIS BILATERAL;  Bilateral Upper Eyelid Ptosis Repair;  Surgeon: Wilton Viramontes MD;  Location:  OR     SURGICAL HISTORY OF -   1998    Breast implants     SURGICAL HISTORY OF -   1994    Carpal  tunnel r hand       Social History     Tobacco Use     Smoking status: Current Every Day Smoker     Packs/day: 0.25     Years: 20.00     Pack years: 5.00     Smokeless tobacco: Never Used   Substance Use Topics     Alcohol use: No     Alcohol/week: 0.0 standard drinks     Comment: Social. 1-2 cans of beer or 2 glasses of wine once per month     Family History   Problem Relation Age of Onset     Cancer Father         liver     Cancer Brother         lung, asbestos     Cancer Mother         uterine     Cancer Sister         uterine     Breast Cancer Other      Breast Cancer Other      Glaucoma No family hx of      Macular Degeneration No family hx of          Current Outpatient Medications   Medication Sig Dispense Refill     albuterol (PROAIR HFA/PROVENTIL HFA/VENTOLIN HFA) 108 (90 Base) MCG/ACT Inhaler Inhale 2 puffs into the lungs every 4 hours as needed for shortness of breath / dyspnea or wheezing 1 Inhaler 3     Cholecalciferol (VITAMIN D3 PO) Take 400 Units by mouth daily        DiphenhydrAMINE HCl (BENADRYL PO) Take 50 mg by mouth nightly as needed         erythromycin (ROMYCIN) ophthalmic ointment Apply small amount to incision sites three times daily, then apply to inner lower lid of operative eye(s) at bedtime 3.5 g 0     estradiol (ESTRACE) 1 MG tablet Take 1 tablet (1 mg) by mouth daily 90 tablet 2     estradiol (VAGIFEM) 10 MCG TABS vaginal tablet Place 1 tablet (10 mcg) vaginally twice a week 8 tablet 1     gabapentin (NEURONTIN) 100 MG capsule Take 1 capsule (100 mg) by mouth At Bedtime 90 capsule 3     LORazepam (ATIVAN) 0.5 MG tablet Take 1 tablet by mouth twice daily as needed 60 tablet 5     multivitamin, therapeutic (THERA-VIT) TABS Take 1 tablet by mouth daily       omeprazole 20 MG tablet Take 1 tablet (20 mg) by mouth daily Take 30-60 minutes before a meal. 30 tablet 0     SUMAtriptan (IMITREX) 50 MG tablet Take 1 tablet (50 mg) by mouth at onset of headache for migraine 10 tablet 5     varenicline (CHANTIX JOHNNY) 0.5 MG X 11 & 1 MG X 42 tablet Take 0.5 mg tab daily for 3 days, THEN 0.5 mg tab twice daily for 4 days, THEN 1 mg twice daily. 53 tablet 0     varenicline (CHANTIX) 1 MG tablet Take 1 tablet (1 mg) by mouth 2 times daily 60 tablet 1     Allergies   Allergen Reactions     Cephalexin Hives     Patient states she gets hives - bad reaction not listed on her H & P.     Ciprofloxacin Hives     Got rash when taken with metronidazole + Vicodin     Lactose Diarrhea     Metronidazole Hives     Rash when taken with cipro + Vicodin     No Clinical Screening - See Comments      Pt developed hives when taking flagyl, cipro, and vicodin, doesn't know which she is allergic to      Vicodin [Hydrocodone-Acetaminophen] Hives     Rash when taken with cipro and metronidazole       Reviewed and updated as needed this visit by Provider  Tobacco  Allergies  Meds  Problems  Med Hx  Surg Hx  Fam Hx         Review of Systems   ROS COMP: CONSTITUTIONAL: Positive fatigue and myalgias  ENT/MOUTH: POSITIVE for hoarseness, nasal congestion, postnasal drainage,  "sinus pressure, fullness in both ears and sneezing  RESP:POSITIVE for cough-non productive  CV: NEGATIVE for chest pain, palpitations or peripheral edema  GI: POSITIVE for poor appetite  PSYCHIATRIC: NEGATIVE for changes in mood or affect  ROS otherwise negative      Objective    BP 90/60   Pulse 79   Temp 98.9  F (37.2  C) (Tympanic)   Resp 16   Ht 1.575 m (5' 2\")   Wt 48.5 kg (107 lb)   SpO2 100%   BMI 19.57 kg/m    Body mass index is 19.57 kg/m .  Physical Exam   GENERAL: healthy, alert and no distress  EYES: Eyes grossly normal to inspection, PERRL and conjunctivae and sclerae normal  HENT: ear canals and TM's normal, nose and mouth without ulcers or lesions  NECK: no adenopathy and no asymmetry, masses, or scars  RESP: lungs clear to auscultation - no rales, rhonchi or wheezes  CV: regular rate and rhythm, normal S1 S2, no S3 or S4, no murmur, click or rub, no peripheral edema and peripheral pulses strong  PSYCH: mentation appears normal, affect normal/bright    Diagnostic Test Results:  none         Assessment & Plan     1. Viral upper respiratory tract infection  Handout given and reviewed symptom management with patient.  Follow-up in clinic if any persistent or worsening symptoms over the next week.    2. Encounter for smoking cessation counseling  Starting Chantix.  Recommend follow-up with PCP in 1 month to check in and see how this is going.  - varenicline (CHANTIX JOHNNY) 0.5 MG X 11 & 1 MG X 42 tablet; Take 0.5 mg tab daily for 3 days, THEN 0.5 mg tab twice daily for 4 days, THEN 1 mg twice daily.  Dispense: 53 tablet; Refill: 0  - varenicline (CHANTIX) 1 MG tablet; Take 1 tablet (1 mg) by mouth 2 times daily  Dispense: 60 tablet; Refill: 1     Tobacco Cessation:   reports that she has been smoking. She has a 5.00 pack-year smoking history. She has never used smokeless tobacco.  Tobacco Cessation Action Plan: Pharmacotherapies : Chantix        See Patient Instructions    Return in about 1 week " (around 2/12/2020), or if symptoms worsen or fail to improve.    Karin Piña NP  [facility]

## 2020-02-05 NOTE — PATIENT INSTRUCTIONS
1.  Information provided on symptom management.  2.  Follow-up in 1 week if any worsening or persistent symptoms.  3.  Start chantix and increase as directed.  Follow-up in 1 month with primary care provider for recheck on how things are going.      Patient Education     Viral Upper Respiratory Illness (Adult)    You have a viral upper respiratory illness (URI), which is another term for the common cold. This illness is contagious during the first few days. It is spread through the air by coughing and sneezing. It may also be spread by direct contact (touching the sick person and then touching your own eyes, nose, or mouth). Frequent handwashing will decrease risk of spread. Most viral illnesses go away within 7 to 10 days with rest and simple home remedies. Sometimes the illness may last for several weeks. Antibiotics will not kill a virus, and they are generally not prescribed for this condition.  Home care    If symptoms are severe, rest at home for the first 2 to 3 days. When you resume activity, don't let yourself get too tired.    Don't smoke. If you need help stopping, talk with your healthcare provider.    Avoid being exposed to cigarette smoke (yours or others ).    You may use acetaminophen or ibuprofen to control pain and fever, unless another medicine was prescribed. If you have chronic liver or kidney disease, have ever had a stomach ulcer or gastrointestinal bleeding, or are taking blood-thinning medicines, talk with your healthcare provider before using these medicines. Aspirin should never be given to anyone under 18 years of age who is ill with a viral infection or fever. It may cause severe liver or brain damage.    Your appetite may be poor, so a light diet is fine. Stay well hydrated by drinking 6 to 8 glasses of fluids per day (water, soft drinks, juices, tea, or soup). Extra fluids will help loosen secretions in the nose and lungs.    Over-the-counter cold medicines will not shorten the length  of time you re sick, but they may be helpful for the following symptoms: cough, sore throat, and nasal and sinus congestion. If you take prescription medicines, ask your healthcare provider or pharmacist which over-the-counter medicines are safe to use. (Note: Don't use decongestants if you have high blood pressure.)  Follow-up care  Follow up with your healthcare provider, or as advised.  When to seek medical advice  Call your healthcare provider right away if any of these occur:    Cough with lots of colored sputum (mucus)    Severe headache; face, neck, or ear pain    Difficulty swallowing due to throat pain    Fever of 100.4 F (38 C) or higher, or as directed by your healthcare provider  Call 911  Call 911 if any of these occur:    Chest pain, shortness of breath, wheezing, or difficulty breathing    Coughing up blood    Very severe pain with swallowing, especially if it goes along with a muffled voice   Date Last Reviewed: 6/1/2018 2000-2019 [employer]. [address] [place] [address], [place] [address]. All rights reserved. This information is not intended as a substitute for professional medical care. Always follow your healthcare professional's instructions.

## 2020-02-28 ENCOUNTER — OFFICE VISIT (OUTPATIENT)
Dept: FAMILY MEDICINE | Facility: CLINIC | Age: 55
End: 2020-02-28
Payer: COMMERCIAL

## 2020-02-28 VITALS
TEMPERATURE: 97.4 F | HEART RATE: 64 BPM | WEIGHT: 108.8 LBS | SYSTOLIC BLOOD PRESSURE: 102 MMHG | BODY MASS INDEX: 19.9 KG/M2 | RESPIRATION RATE: 16 BRPM | DIASTOLIC BLOOD PRESSURE: 48 MMHG

## 2020-02-28 DIAGNOSIS — B02.29 POST HERPETIC NEURALGIA: Primary | ICD-10-CM

## 2020-02-28 PROCEDURE — 99213 OFFICE O/P EST LOW 20 MIN: CPT | Performed by: FAMILY MEDICINE

## 2020-02-28 RX ORDER — GABAPENTIN 100 MG/1
200 CAPSULE ORAL AT BEDTIME
Qty: 60 CAPSULE | Refills: 1 | Status: SHIPPED | OUTPATIENT
Start: 2020-02-28 | End: 2020-05-15

## 2020-02-28 NOTE — NURSING NOTE
"Chief Complaint   Patient presents with     Back Pain     Noticed a couple months ago on left mid back.  Now started wrapping around to left front.  Felt like popped rib.  Hard to lay on that side        Initial /48 (BP Location: Right arm, Patient Position: Chair, Cuff Size: Adult Regular)   Pulse 64   Temp 97.4  F (36.3  C) (Tympanic)   Resp 16   Wt 49.4 kg (108 lb 12.8 oz)   BMI 19.90 kg/m   Estimated body mass index is 19.9 kg/m  as calculated from the following:    Height as of 2/5/20: 1.575 m (5' 2\").    Weight as of this encounter: 49.4 kg (108 lb 12.8 oz).    Patient presents to the clinic using No DME    Health Maintenance that is potentially due pending provider review:  NONE    n/a    Is there anyone who you would like to be able to receive your results? No  If yes have patient fill out LE[facility]    Gideon Rosales M.A.      "

## 2020-02-28 NOTE — PROGRESS NOTES
SUBJECTIVE:  Patient presents with sharp pain lt posterior and lower rib area 2 months ago with no known injury. Over the two months the pain has changed following dermatome and more lateral and anterior now.  The quality of pain has also changed and is dull ache and feels deep.  Does not notice during the day when active.  Most prominent when trying to get to sleep.  No cough, fever or SOB.   She takes gabapentin 100 mg q hs for chronic vaginal discomfort.    Past Medical History:   Diagnosis Date     Cocaine abuse, unspecified 2003     Dysplasia of cervix, unspecified 2000     Esophageal reflux 11/13/2006     Generalized anxiety disorder 10/30/2006    Buspar prescribed 8/23/07     IBS (irritable bowel syndrome) 5/28/2008     Current Outpatient Medications   Medication Sig Dispense Refill     albuterol (PROAIR HFA/PROVENTIL HFA/VENTOLIN HFA) 108 (90 Base) MCG/ACT Inhaler Inhale 2 puffs into the lungs every 4 hours as needed for shortness of breath / dyspnea or wheezing 1 Inhaler 3     Cholecalciferol (VITAMIN D3 PO) Take 400 Units by mouth daily        DiphenhydrAMINE HCl (BENADRYL PO) Take 50 mg by mouth nightly as needed        estradiol (ESTRACE) 1 MG tablet Take 1 tablet (1 mg) by mouth daily 90 tablet 2     estradiol (VAGIFEM) 10 MCG TABS vaginal tablet Place 1 tablet (10 mcg) vaginally twice a week 8 tablet 1     gabapentin (NEURONTIN) 100 MG capsule Take 2 capsules (200 mg) by mouth At Bedtime 60 capsule 1     gabapentin (NEURONTIN) 100 MG capsule Take 1 capsule (100 mg) by mouth At Bedtime 90 capsule 3     multivitamin, therapeutic (THERA-VIT) TABS Take 1 tablet by mouth daily       omeprazole 20 MG tablet Take 1 tablet (20 mg) by mouth daily Take 30-60 minutes before a meal. 30 tablet 0     varenicline (CHANTIX JOHNNY) 0.5 MG X 11 & 1 MG X 42 tablet Take 0.5 mg tab daily for 3 days, THEN 0.5 mg tab twice daily for 4 days, THEN 1 mg twice daily. 53 tablet 0     varenicline (CHANTIX) 1 MG tablet Take 1 tablet  (1 mg) by mouth 2 times daily 60 tablet 1     erythromycin (ROMYCIN) ophthalmic ointment Apply small amount to incision sites three times daily, then apply to inner lower lid of operative eye(s) at bedtime (Patient not taking: Reported on 2/28/2020) 3.5 g 0     LORazepam (ATIVAN) 0.5 MG tablet Take 1 tablet by mouth twice daily as needed (Patient not taking: Reported on 2/28/2020) 60 tablet 5     SUMAtriptan (IMITREX) 50 MG tablet Take 1 tablet (50 mg) by mouth at onset of headache for migraine (Patient not taking: Reported on 2/28/2020) 10 tablet 5     History   Smoking Status     Current Every Day Smoker     Packs/day: 0.25     Years: 20.00   Smokeless Tobacco     Never Used         OBJECITVE;  /48 (BP Location: Right arm, Patient Position: Chair, Cuff Size: Adult Regular)   Pulse 64   Temp 97.4  F (36.3  C) (Tympanic)   Resp 16   Wt 49.4 kg (108 lb 12.8 oz)   BMI 19.90 kg/m    A.    CHEST:  Clear.  No tenderness to palpation or compression of ribs.  ABD: negative except for mild tenderness on palpation LUQ    ASSESSMENT:  Post herpetic neuralgia    PLAN:  Increase Gabapentin to 200 mg q hs. May also use ibuprofen.  Given information on post herpetic neuralgia.    Stressed follow up with PCP as this may last for some time and may need further eval if presentation worsens.

## 2020-04-03 ENCOUNTER — NURSE TRIAGE (OUTPATIENT)
Dept: FAMILY MEDICINE | Facility: CLINIC | Age: 55
End: 2020-04-03

## 2020-04-03 NOTE — TELEPHONE ENCOUNTER
Reason for Call:  Cluster Headache    Detailed comments: patient is calling and stating that for the last 7 days, she has had a left sided headache that wraps around to the front. She has a history of Cluster Headaches. She also has an upset stomach. Wants to be seen. Please advise.    Phone Number Patient can be reached at: Home number on file [phone] (home)    Best Time: any    Can we leave a detailed message on this number? YES   Debbie Mehta  [facility]        Call taken on 4/3/2020 at 2:31 PM by Debbie Simon
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
## 2020-04-07 ENCOUNTER — APPOINTMENT (OUTPATIENT)
Dept: CT IMAGING | Facility: CLINIC | Age: 55
End: 2020-04-07
Attending: EMERGENCY MEDICINE
Payer: COMMERCIAL

## 2020-04-07 ENCOUNTER — HOSPITAL ENCOUNTER (EMERGENCY)
Facility: CLINIC | Age: 55
Discharge: HOME | End: 2020-04-07
Attending: NURSE PRACTITIONER | Admitting: EMERGENCY MEDICINE
Payer: COMMERCIAL

## 2020-04-07 VITALS
BODY MASS INDEX: 19.57 KG/M2 | HEART RATE: 57 BPM | DIASTOLIC BLOOD PRESSURE: 70 MMHG | SYSTOLIC BLOOD PRESSURE: 106 MMHG | TEMPERATURE: 98 F | WEIGHT: 107 LBS | OXYGEN SATURATION: 99 % | RESPIRATION RATE: 16 BRPM

## 2020-04-07 DIAGNOSIS — G43.809 OTHER MIGRAINE WITHOUT STATUS MIGRAINOSUS, NOT INTRACTABLE: ICD-10-CM

## 2020-04-07 LAB
ANION GAP SERPL CALCULATED.3IONS-SCNC: <1 MMOL/L (ref 3–14)
BASOPHILS # BLD AUTO: 0.1 10E9/L (ref 0–0.2)
BASOPHILS NFR BLD AUTO: 1.3 %
BUN SERPL-MCNC: 18 MG/DL (ref 7–30)
CALCIUM SERPL-MCNC: 9.6 MG/DL (ref 8.5–10.1)
CHLORIDE SERPL-SCNC: 108 MMOL/L (ref 94–109)
CO2 SERPL-SCNC: 31 MMOL/L (ref 20–32)
CREAT SERPL-MCNC: 0.53 MG/DL (ref 0.52–1.04)
CRP SERPL-MCNC: <2.9 MG/L (ref 0–8)
DIFFERENTIAL METHOD BLD: NORMAL
EOSINOPHIL # BLD AUTO: 0.2 10E9/L (ref 0–0.7)
EOSINOPHIL NFR BLD AUTO: 3 %
ERYTHROCYTE [DISTWIDTH] IN BLOOD BY AUTOMATED COUNT: 13.2 % (ref 10–15)
GFR SERPL CREATININE-BSD FRML MDRD: >90 ML/MIN/{1.73_M2}
GLUCOSE SERPL-MCNC: 93 MG/DL (ref 70–99)
HCT VFR BLD AUTO: 43.2 % (ref 35–47)
HGB BLD-MCNC: 14.6 G/DL (ref 11.7–15.7)
IMM GRANULOCYTES # BLD: 0 10E9/L (ref 0–0.4)
IMM GRANULOCYTES NFR BLD: 0.2 %
LYMPHOCYTES # BLD AUTO: 2 10E9/L (ref 0.8–5.3)
LYMPHOCYTES NFR BLD AUTO: 30.9 %
MCH RBC QN AUTO: 30.9 PG (ref 26.5–33)
MCHC RBC AUTO-ENTMCNC: 33.8 G/DL (ref 31.5–36.5)
MCV RBC AUTO: 92 FL (ref 78–100)
MONOCYTES # BLD AUTO: 0.6 10E9/L (ref 0–1.3)
MONOCYTES NFR BLD AUTO: 8.7 %
NEUTROPHILS # BLD AUTO: 3.6 10E9/L (ref 1.6–8.3)
NEUTROPHILS NFR BLD AUTO: 55.9 %
NRBC # BLD AUTO: 0 10*3/UL
NRBC BLD AUTO-RTO: 0 /100
PLATELET # BLD AUTO: 322 10E9/L (ref 150–450)
POTASSIUM SERPL-SCNC: 3.8 MMOL/L (ref 3.4–5.3)
RBC # BLD AUTO: 4.72 10E12/L (ref 3.8–5.2)
SODIUM SERPL-SCNC: 139 MMOL/L (ref 133–144)
WBC # BLD AUTO: 6.3 10E9/L (ref 4–11)

## 2020-04-07 PROCEDURE — 25000128 H RX IP 250 OP 636: Performed by: NURSE PRACTITIONER

## 2020-04-07 PROCEDURE — 80048 BASIC METABOLIC PNL TOTAL CA: CPT | Performed by: NURSE PRACTITIONER

## 2020-04-07 PROCEDURE — 96361 HYDRATE IV INFUSION ADD-ON: CPT | Performed by: EMERGENCY MEDICINE

## 2020-04-07 PROCEDURE — 86140 C-REACTIVE PROTEIN: CPT | Performed by: NURSE PRACTITIONER

## 2020-04-07 PROCEDURE — 99285 EMERGENCY DEPT VISIT HI MDM: CPT | Mod: 25 | Performed by: EMERGENCY MEDICINE

## 2020-04-07 PROCEDURE — 96375 TX/PRO/DX INJ NEW DRUG ADDON: CPT | Performed by: EMERGENCY MEDICINE

## 2020-04-07 PROCEDURE — 25800030 ZZH RX IP 258 OP 636: Performed by: NURSE PRACTITIONER

## 2020-04-07 PROCEDURE — 99284 EMERGENCY DEPT VISIT MOD MDM: CPT | Mod: Z6 | Performed by: EMERGENCY MEDICINE

## 2020-04-07 PROCEDURE — 85025 COMPLETE CBC W/AUTO DIFF WBC: CPT | Performed by: NURSE PRACTITIONER

## 2020-04-07 PROCEDURE — 86140 C-REACTIVE PROTEIN: CPT | Performed by: EMERGENCY MEDICINE

## 2020-04-07 PROCEDURE — 25000128 H RX IP 250 OP 636: Performed by: EMERGENCY MEDICINE

## 2020-04-07 PROCEDURE — 96374 THER/PROPH/DIAG INJ IV PUSH: CPT | Performed by: EMERGENCY MEDICINE

## 2020-04-07 PROCEDURE — 70450 CT HEAD/BRAIN W/O DYE: CPT

## 2020-04-07 RX ORDER — DEXAMETHASONE SODIUM PHOSPHATE 4 MG/ML
10 INJECTION, SOLUTION INTRA-ARTICULAR; INTRALESIONAL; INTRAMUSCULAR; INTRAVENOUS; SOFT TISSUE ONCE
Status: COMPLETED | OUTPATIENT
Start: 2020-04-07 | End: 2020-04-07

## 2020-04-07 RX ORDER — KETOROLAC TROMETHAMINE 15 MG/ML
15 INJECTION, SOLUTION INTRAMUSCULAR; INTRAVENOUS ONCE
Status: COMPLETED | OUTPATIENT
Start: 2020-04-07 | End: 2020-04-07

## 2020-04-07 RX ORDER — ONDANSETRON 2 MG/ML
4 INJECTION INTRAMUSCULAR; INTRAVENOUS
Status: DISCONTINUED | OUTPATIENT
Start: 2020-04-07 | End: 2020-04-07

## 2020-04-07 RX ORDER — METOCLOPRAMIDE HYDROCHLORIDE 5 MG/ML
10 INJECTION INTRAMUSCULAR; INTRAVENOUS ONCE
Status: COMPLETED | OUTPATIENT
Start: 2020-04-07 | End: 2020-04-07

## 2020-04-07 RX ORDER — DIPHENHYDRAMINE HYDROCHLORIDE 50 MG/ML
25 INJECTION INTRAMUSCULAR; INTRAVENOUS ONCE
Status: COMPLETED | OUTPATIENT
Start: 2020-04-07 | End: 2020-04-07

## 2020-04-07 RX ORDER — SODIUM CHLORIDE, SODIUM LACTATE, POTASSIUM CHLORIDE, CALCIUM CHLORIDE 600; 310; 30; 20 MG/100ML; MG/100ML; MG/100ML; MG/100ML
1000 INJECTION, SOLUTION INTRAVENOUS CONTINUOUS
Status: DISCONTINUED | OUTPATIENT
Start: 2020-04-07 | End: 2020-04-07 | Stop reason: HOSPADM

## 2020-04-07 RX ORDER — SUMATRIPTAN 50 MG/1
50 TABLET, FILM COATED ORAL
Qty: 5 TABLET | Refills: 0 | Status: SHIPPED | OUTPATIENT
Start: 2020-04-07 | End: 2020-09-21

## 2020-04-07 RX ADMIN — SODIUM CHLORIDE, POTASSIUM CHLORIDE, SODIUM LACTATE AND CALCIUM CHLORIDE 1000 ML: 600; 310; 30; 20 INJECTION, SOLUTION INTRAVENOUS at 16:36

## 2020-04-07 RX ADMIN — DIPHENHYDRAMINE HYDROCHLORIDE 25 MG: 50 INJECTION, SOLUTION INTRAMUSCULAR; INTRAVENOUS at 16:35

## 2020-04-07 RX ADMIN — DEXAMETHASONE SODIUM PHOSPHATE 10 MG: 4 INJECTION, SOLUTION INTRAMUSCULAR; INTRAVENOUS at 18:44

## 2020-04-07 RX ADMIN — SODIUM CHLORIDE, POTASSIUM CHLORIDE, SODIUM LACTATE AND CALCIUM CHLORIDE 1000 ML: 600; 310; 30; 20 INJECTION, SOLUTION INTRAVENOUS at 17:59

## 2020-04-07 RX ADMIN — METOCLOPRAMIDE HYDROCHLORIDE 10 MG: 5 INJECTION INTRAMUSCULAR; INTRAVENOUS at 16:39

## 2020-04-07 RX ADMIN — KETOROLAC TROMETHAMINE 15 MG: 15 INJECTION, SOLUTION INTRAMUSCULAR; INTRAVENOUS at 16:37

## 2020-04-07 NOTE — ED AVS SNAPSHOT
[facility]  [address] [address]  Phone:  [phone]  Fax:  [phone]                                    Fatou Simental   MRN: [id number]    Department:  [facility]   Date of Visit:  4/7/2020           After Visit Summary Signature Page    I have received my discharge instructions, and my questions have been answered. I have discussed any challenges I see with this plan with the nurse or doctor.    ..........................................................................................................................................  Patient/Patient Representative Signature      ..........................................................................................................................................  Patient Representative Print Name and Relationship to Patient    ..................................................               ................................................  Date                                   Time    ..........................................................................................................................................  Reviewed by Signature/Title    ...................................................              ..............................................  Date                                               Time          22EPIC Rev 08/18

## 2020-04-07 NOTE — ED NOTES
H/a started 7 on and off, pain can be so sharp it takes her breath away and now it's a dull pain. Has been using Excedrin at 0800 today, lack of sleep due to pain and dizziness. Usually she can control her h/a with Excedrin and will only get a h/a once every two months or so and usually the h/a last a day two at the most. Pt just feels tired out from this on. Some nausea with no vomiting. Has had diarrhea but this is her normal.pt states she can call someone to come and pick her up and she can leave her car here overnight. Is eating and drinking normal.

## 2020-04-07 NOTE — DISCHARGE INSTRUCTIONS
Tylenol/ibuprofen, Imitrex as prescribed    Return if worsening headache, stiff neck, focal neurologic change, vomiting or other concern

## 2020-04-07 NOTE — ED TRIAGE NOTES
Pt states that she has had a headache for about a week off and on. Pt has tried Excedrin headache and it helps some but does not take it away. Pt denies any visual disturbances other than maybe a little blurry with reading.

## 2020-04-10 NOTE — ED PROVIDER NOTES
History     Chief Complaint   Patient presents with     Headache     HPI  Fatou Simental is a 55 year old female who resents with a one-week right temporal headache intermittently sharp and stabbing, mostly throbbing and aching.  Similar symptoms in the past with migraine.  She is out of her Imitrex.  She describes some nausea without vomiting.  No fever, no diplopia, no numbness weakness imbalance.  Denies head injury.  No chronic anticoagulation or antiplatelet therapy.    Allergies:  Allergies   Allergen Reactions     Cephalexin Hives     Patient states she gets hives - bad reaction not listed on her H & P.     Ciprofloxacin Hives     Got rash when taken with metronidazole + Vicodin     Lactose Diarrhea     Metronidazole Hives     Rash when taken with cipro + Vicodin     No Clinical Screening - See Comments      Pt developed hives when taking flagyl, cipro, and vicodin, doesn't know which she is allergic to      Vicodin [Hydrocodone-Acetaminophen] Hives     Rash when taken with cipro and metronidazole       Problem List:    Patient Active Problem List    Diagnosis Date Noted     Migraine with aura and without status migrainosus, not intractable 10/16/2017     Priority: Medium     Diverticulosis 02/05/2015     Priority: Medium     Screening for malignant neoplasm of cervix 07/17/2013     Priority: Medium     S/p hysterectomy for endometriosis, still has cervix  Distant history of cervical dysplasia 2000  Pap 2010:  NIL with negative HPV  Pap 2012:  NIL  Should not need repeat Pap before 2015    Problem list name updated by automated process. Provider to review       CARDIOVASCULAR SCREENING; LDL GOAL LESS THAN 160 10/31/2010     Priority: Medium     IBS (irritable bowel syndrome) 05/28/2008     Priority: Medium     Ovarian cyst 08/23/2007     Priority: Medium     Oophorectomy bilateral - premalignant tissue ? 1998  Problem list name updated by automated process. Provider to review       Breast screening  2007     Priority: Medium      implants with 4mm superficial subq lesion at 2:00 left breast - unchanged over last year. Cyst identified on US   Problem list name updated by automated process. Provider to review       Ganglion of joint 2007     Priority: Medium     Esophageal reflux 2006     Priority: Medium     Generalized anxiety disorder 10/30/2006     Priority: Medium     Buspar prescribed 07       Raynaud's syndrome 2006     Priority: Medium     Tobacco use disorder 2006     Priority: Medium     Mild major depression (H) 2006     Priority: Medium     Need for prophylactic hormone replacement therapy (postmenopausal) 2006     Priority: Medium        Past Medical History:    Past Medical History:   Diagnosis Date     Cocaine abuse, unspecified      Dysplasia of cervix, unspecified      Esophageal reflux 2006     Generalized anxiety disorder 10/30/2006     IBS (irritable bowel syndrome) 2008       Past Surgical History:    Past Surgical History:   Procedure Laterality Date     ABLATE VEIN VARICOSE RADIO FREQUENCY WITHOUT PHLEBECTOMY MULTIPLE STAB  2014    Procedure: ABLATE VEIN VARICOSE RADIO FREQUENCY WITHOUT PHLEBECTOMY MULTIPLE STAB;  Surgeon: Khanh Bunch MD;  Location: [facility]       DELIVERY ONLY  ,,    , Low Cervical     ENHANCE LASER REFRACTIVE BILATERAL EXISTING PT IN PARAMETERS Bilateral     LASIK     HYSTERECTOMY SUPRACERVICAL, BILATERAL SALPINGO-OOPHORECTOMY, COMBINED       LAPAROSCOPIC ASSISTED COLECTOMY N/A 2015    Procedure: LAPAROSCOPIC ASSISTED COLECTOMY;  Surgeon: Khanh Bunch MD;  Location: [facility]     LAPAROSCOPIC CHOLECYSTECTOMY N/A 2017    Procedure: LAPAROSCOPIC CHOLECYSTECTOMY;  Surgeon: Levi Louis MD;  Location: [facility]     NERVE BLOCK PERIPHERAL Bilateral 2015    Procedure: NERVE BLOCK PERIPHERAL;  Surgeon: Generic Anesthesia Provider;  Location: [facility]  OR     REPAIR PTOSIS BILATERAL Bilateral 10/11/2018    Procedure: REPAIR PTOSIS BILATERAL;  Bilateral Upper Eyelid Ptosis Repair;  Surgeon: Wilton Viramontes MD;  Location:  OR     SURGICAL HISTORY OF -   1998    Breast implants     SURGICAL HISTORY OF -   1994    Carpal  tunnel r hand       Family History:    Family History   Problem Relation Age of Onset     Cancer Father         liver     Cancer Brother         lung, asbestos     Cancer Mother         uterine     Cancer Sister         uterine     Breast Cancer Other      Breast Cancer Other      Glaucoma No family hx of      Macular Degeneration No family hx of        Social History:  Marital Status:   [4]  Social History     Tobacco Use     Smoking status: Current Every Day Smoker     Packs/day: 0.25     Years: 20.00     Pack years: 5.00     Smokeless tobacco: Never Used   Substance Use Topics     Alcohol use: No     Alcohol/week: 0.0 standard drinks     Comment: Social. 1-2 cans of beer or 2 glasses of wine once per month     Drug use: No        Medications:    SUMAtriptan (IMITREX) 50 MG tablet  albuterol (PROAIR HFA/PROVENTIL HFA/VENTOLIN HFA) 108 (90 Base) MCG/ACT Inhaler  Cholecalciferol (VITAMIN D3 PO)  DiphenhydrAMINE HCl (BENADRYL PO)  erythromycin (ROMYCIN) ophthalmic ointment  estradiol (ESTRACE) 1 MG tablet  estradiol (VAGIFEM) 10 MCG TABS vaginal tablet  gabapentin (NEURONTIN) 100 MG capsule  gabapentin (NEURONTIN) 100 MG capsule  LORazepam (ATIVAN) 0.5 MG tablet  multivitamin, therapeutic (THERA-VIT) TABS  omeprazole 20 MG tablet  SUMAtriptan (IMITREX) 50 MG tablet  varenicline (CHANTIX JOHNNY) 0.5 MG X 11 & 1 MG X 42 tablet          Review of Systems  All other systems reviewed and are negative.    Physical Exam   BP: 118/61  Pulse: 73  Heart Rate: 74  Temp: 98  F (36.7  C)  Resp: 16  Weight: 48.5 kg (107 lb)  SpO2: 99 %      Physical Exam  Nontoxic-appearing no respiratory distress alert and oriented x3. GCS 15 on arrival, throughout stay  and at discharge.    Head atraumatic normocephalic    Cranial nerves; vision baseline fields intact, PERRL, EOMI, facial sensation intact to light touch, facial muscle tone intact and symmetrical, hearing grossly intact,swallowing without difficulty, voice baseline and normal, SCM  strength intact, tongue protrudes midline.  Palatal elevation symmetric    TM's unremarkable, EACs clear, oropharynx moist without lesions or erythema.    Neck supple full active painless range of motion no posterior midline tenderness.    No cervical adenopathy    Lungs clear to auscultation no rales rhonchi or wheezes    Heart regular no murmur S3 or rub    Abdomen soft nontender bowel sounds positive no masses or HSM    Strength and sensation intact throughout the extremities, skin clear from rash or lesion.      ED Course        Procedures               Critical Care time:  None                   No results found for this or any previous visit (from the past 24 hour(s)).    Medications   lactated ringers BOLUS 1,000 mL (0 mLs Intravenous Stopped 4/7/20 1757)   diphenhydrAMINE (BENADRYL) injection 25 mg (25 mg Intravenous Given 4/7/20 1635)   ketorolac (TORADOL) injection 15 mg (15 mg Intravenous Given 4/7/20 1637)   metoclopramide (REGLAN) injection 10 mg (10 mg Intravenous Given 4/7/20 1639)   dexamethasone (DECADRON) injection 10 mg (10 mg Intravenous Given 4/7/20 1844)       Assessments & Plan (with Medical Decision Making)  Migraine headache, responded well to above treatment.  Did discuss possibility meningitis, although no white count CRP normal, no significant meningismus and afebrile.  At this point in time defer LP.  Discharged home rest, refilled prescription for sumatriptan, return criteria reviewed     I have reviewed the nursing notes.    I have reviewed the findings, diagnosis, plan and need for follow up with the patient.       Discharge Medication List as of 4/7/2020  6:50 PM      START taking these medications     Details   !! SUMAtriptan (IMITREX) 50 MG tablet Take 1 tablet (50 mg) by mouth at onset of headache for migraine May repeat in 2 hours. Max 4 tablets/24 hours., Disp-5 tablet,R-0, E-Prescribe       !! - Potential duplicate medications found. Please discuss with provider.          Final diagnoses:   Other migraine without status migrainosus, not intractable       4/7/2020   [facility] EMERGENCY DEPARTMENT     Edy Peñaloza MD  04/09/20 1922

## 2020-04-16 ENCOUNTER — TELEPHONE (OUTPATIENT)
Dept: FAMILY MEDICINE | Facility: CLINIC | Age: 55
End: 2020-04-16

## 2020-04-16 RX ORDER — SUMATRIPTAN 50 MG/1
50 TABLET, FILM COATED ORAL
Qty: 5 TABLET | Refills: 0 | Status: CANCELLED | OUTPATIENT
Start: 2020-04-16

## 2020-04-16 NOTE — TELEPHONE ENCOUNTER
Scheduled virtual appt tomorrow concerning refills for Migraines and neck pain.  Pt was seeing  and has seen a couple providers since.  KPaHeather

## 2020-04-16 NOTE — TELEPHONE ENCOUNTER
Last Written Prescription Date:  Imitrex 4/7/2020 in ER  Last Fill Quantity: 5,  # refills: 0   Last office visit: 4/7/2020 with prescribing provider:  LOIS PITTMAN   Future Office Visit:      Last Written Prescription Date:  Flexeril  5/21/2015  Last Fill Quantity: 40,  # refills: 5   Last office visit: ? with prescribing provider:  Post  Future Office Visit:      Patient is calling and stating that she was in the ER recently for a migraine and for her painful neck. Hoping she can get Imitrex for her headache, and Dr. Kelly used to give her Flexeril for her neck.  Please advise.  Debbie Mehta  [facility]      Patient uses our Pharmacy at [facility].

## 2020-04-17 ENCOUNTER — VIRTUAL VISIT (OUTPATIENT)
Dept: FAMILY MEDICINE | Facility: CLINIC | Age: 55
End: 2020-04-17
Payer: COMMERCIAL

## 2020-04-17 DIAGNOSIS — G43.009 MIGRAINE WITHOUT AURA AND WITHOUT STATUS MIGRAINOSUS, NOT INTRACTABLE: Primary | ICD-10-CM

## 2020-04-17 PROCEDURE — 99441 ZZC PHYSICIAN TELEPHONE EVALUATION 5-10 MIN: CPT | Performed by: NURSE PRACTITIONER

## 2020-04-17 RX ORDER — RIZATRIPTAN BENZOATE 10 MG/1
10 TABLET ORAL
Qty: 18 TABLET | Refills: 4 | Status: SHIPPED | OUTPATIENT
Start: 2020-04-17 | End: 2020-04-23 | Stop reason: SINTOL

## 2020-04-17 RX ORDER — CYCLOBENZAPRINE HCL 10 MG
5-10 TABLET ORAL 3 TIMES DAILY PRN
Qty: 60 TABLET | Refills: 1 | Status: SHIPPED | OUTPATIENT
Start: 2020-04-17 | End: 2020-10-30

## 2020-04-17 NOTE — PROGRESS NOTES
"Fatou Simental is a 55 year old female who is being evaluated via a billable telephone visit.      The patient has been notified of following:     \"This telephone visit will be conducted via a call between you and your physician/provider. We have found that certain health care needs can be provided without the need for a physical exam.  This service lets us provide the care you need with a short phone conversation.  If a prescription is necessary we can send it directly to your pharmacy.  If lab work is needed we can place an order for that and you can then stop by our lab to have the test done at a later time.    Telephone visits are billed at different rates depending on your insurance coverage. During this emergency period, for some insurers they may be billed the same as an in-person visit.  Please reach out to your insurance provider with any questions.    If during the course of the call the physician/provider feels a telephone visit is not appropriate, you will not be charged for this service.\"    Patient has given verbal consent for Telephone visit?  Yes    How would you like to obtain your AVS? Mail a copy     This patient was recommended a virtual visit as opposed to an office visit as part of the social distancing recommendations to prevent the spread of COVID19 per CDC recommendations.       Subjective     Fatou Simental is a 55 year old female who presents to clinic today for the following health issues:    Headache  Onset: 12 days     Description:   Location: starts in neck, goes up the back of her head and into forehead    Character: throbbing pain, stabbing at times   Frequency:  daily  Duration:  All day     Intensity: severe    Progression of Symptoms:  Improving with medications     Accompanying Signs & Symptoms:  Stiff neck: YES  Neck or upper back pain: YES  Fever: no   Sinus pressure: YES  Nausea or vomiting: no   Dizziness: YES- when she stands up quick   Numbness: YES  Weakness: no "   Visual changes: no    History: Had an ER visit for this 2020, labs and head CT unremarkable  Head trauma: no   Family history of migraines: no   Previous tests for headaches: YES  Neurologist evaluations: YES  Able to do daily activities: no  Wake with a headaches: YES- on occasion   Do headaches wake you up: YES  Daily pain medication use: YES- ibuprofen, tylenol, excederin tension headache   Work/school stressors/changes: YES    Precipitating factors:   Does light make it worse: YES  Does sound make it worse: YES    Alleviating factors:  Does sleep help: YES    Therapies Tried and outcome: Ibuprofen (Advil, Motrin), Tylenol, Excedrin and Imitrex-mild to moderate relief.        Patient Active Problem List   Diagnosis     Raynaud's syndrome     Tobacco use disorder     Mild major depression (H)     Need for prophylactic hormone replacement therapy (postmenopausal)     Generalized anxiety disorder     Esophageal reflux     Breast screening     Ganglion of joint     Ovarian cyst     IBS (irritable bowel syndrome)     CARDIOVASCULAR SCREENING; LDL GOAL LESS THAN 160     Screening for malignant neoplasm of cervix     Diverticulosis     Migraine with aura and without status migrainosus, not intractable     Past Surgical History:   Procedure Laterality Date     ABLATE VEIN VARICOSE RADIO FREQUENCY WITHOUT PHLEBECTOMY MULTIPLE STAB  2014    Procedure: ABLATE VEIN VARICOSE RADIO FREQUENCY WITHOUT PHLEBECTOMY MULTIPLE STAB;  Surgeon: Khanh Bunch MD;  Location: [facility]       DELIVERY ONLY  ,,    , Low Cervical     ENHANCE LASER REFRACTIVE BILATERAL EXISTING PT IN PARAMETERS Bilateral     LASIK     HYSTERECTOMY SUPRACERVICAL, BILATERAL SALPINGO-OOPHORECTOMY, COMBINED       LAPAROSCOPIC ASSISTED COLECTOMY N/A 2015    Procedure: LAPAROSCOPIC ASSISTED COLECTOMY;  Surgeon: Khanh Bunch MD;  Location: [facility]     LAPAROSCOPIC CHOLECYSTECTOMY N/A 2017     Procedure: LAPAROSCOPIC CHOLECYSTECTOMY;  Surgeon: Levi Louis MD;  Location: [facility]     NERVE BLOCK PERIPHERAL Bilateral 2/6/2015    Procedure: NERVE BLOCK PERIPHERAL;  Surgeon: Generic Anesthesia Provider;  Location: [facility]     REPAIR PTOSIS BILATERAL Bilateral 10/11/2018    Procedure: REPAIR PTOSIS BILATERAL;  Bilateral Upper Eyelid Ptosis Repair;  Surgeon: Wilton Viramontes MD;  Location:  OR     SURGICAL HISTORY OF -   1998    Breast implants     SURGICAL HISTORY OF -   1994    Carpal  tunnel r hand       Social History     Tobacco Use     Smoking status: Current Every Day Smoker     Packs/day: 0.25     Years: 20.00     Pack years: 5.00     Smokeless tobacco: Never Used   Substance Use Topics     Alcohol use: Yes     Alcohol/week: 0.0 standard drinks     Comment: Social. 1-2 cans of beer or 2 glasses of wine once per month     Family History   Problem Relation Age of Onset     Cancer Father         liver     Cancer Brother         lung, asbestos     Cancer Mother         uterine     Cancer Sister         uterine     Breast Cancer Other      Breast Cancer Other      Glaucoma No family hx of      Macular Degeneration No family hx of            Reviewed and updated as needed this visit by Provider         Review of Systems   ROS COMP: Constitutional, HEENT, cardiovascular, pulmonary, gi and gu systems are negative, except as otherwise noted.       Objective   Reported vitals:  There were no vitals taken for this visit.   alert and no distress  PSYCH: Alert and oriented times 3; coherent speech, normal   rate and volume, able to articulate logical thoughts, able   to abstract reason, no tangential thoughts, no hallucinations   or delusions  Her affect is normal and pleasant  RESP: No cough, no audible wheezing, able to talk in full sentences  Remainder of exam unable to be completed due to telephone visits    Diagnostic Test Results:  Labs reviewed in Epic        Assessment/Plan:  1. Migraine without aura and  without status migrainosus, not intractable    - rizatriptan (MAXALT) 10 MG tablet; Take 1 tablet (10 mg) by mouth at onset of headache for migraine May repeat in 2 hours. Max 3 tablets/24 hours.  Dispense: 18 tablet; Refill: 4  - cyclobenzaprine (FLEXERIL) 10 MG tablet; Take 0.5-1 tablets (5-10 mg) by mouth 3 times daily as needed for muscle spasms  Dispense: 60 tablet; Refill: 1    No follow-ups on file.      Phone call duration:  8 minutes    ANGEL Singh CNP  \

## 2020-04-22 ENCOUNTER — TELEPHONE (OUTPATIENT)
Dept: FAMILY MEDICINE | Facility: CLINIC | Age: 55
End: 2020-04-22

## 2020-04-22 DIAGNOSIS — G43.009 MIGRAINE WITHOUT AURA AND WITHOUT STATUS MIGRAINOSUS, NOT INTRACTABLE: Primary | ICD-10-CM

## 2020-04-22 NOTE — TELEPHONE ENCOUNTER
"Patient took one tablet of the rizatriptan, about 20 min later she got a bad taste in her mouth, constricted pupils and dizzy.  Still feels \"funny\" today.  Didn't really help her migraine.    Has tried sumitriptan (imitrex) in the past and did not experience any side effects.  This works ok but doesn't take the headache away completely.      Uses tylenol, ibuprofen and Excedrin as needed.     She is asking for a different prescription for her migraines.    Adele Bucio RN    "

## 2020-04-22 NOTE — TELEPHONE ENCOUNTER
Reason for Call:  Other prescription    Detailed comments: Pt is stating she had a bad reaction to the medication of Rizatriptan, took this medication yesterday 4/21/, and felt dizzy and nauseated pupils dilated.  She would like to try something else for migraines  Phar is [facility]    Phone Number Patient can be reached at: Home number on file [phone] (home)    Best Time: any    Can we leave a detailed message on this number? YES    Call taken on 4/22/2020 at 1:02 PM by Mira Camara

## 2020-04-23 RX ORDER — BUTALBITAL, ASPIRIN, AND CAFFEINE 325; 50; 40 MG/1; MG/1; MG/1
1 CAPSULE ORAL EVERY 6 HOURS PRN
Qty: 30 CAPSULE | Refills: 5 | Status: SHIPPED | OUTPATIENT
Start: 2020-04-23 | End: 2021-05-21

## 2020-04-23 NOTE — TELEPHONE ENCOUNTER
I sent a new Rx to [facility]. This is a different class of medication from the triptans so hopefully it will work better. ANGEL Avitia CNP

## 2020-04-23 NOTE — TELEPHONE ENCOUNTER
Pt notified of new med order, Fiorinal.  Pt would like Rizatriptan placed on her allergy list, doesn't want to take that med again.  KPavelrn

## 2020-05-15 ENCOUNTER — VIRTUAL VISIT (OUTPATIENT)
Dept: FAMILY MEDICINE | Facility: CLINIC | Age: 55
End: 2020-05-15
Payer: COMMERCIAL

## 2020-05-15 DIAGNOSIS — F41.1 GENERALIZED ANXIETY DISORDER: ICD-10-CM

## 2020-05-15 DIAGNOSIS — B02.29 POST HERPETIC NEURALGIA: ICD-10-CM

## 2020-05-15 PROCEDURE — 99214 OFFICE O/P EST MOD 30 MIN: CPT | Mod: TEL | Performed by: NURSE PRACTITIONER

## 2020-05-15 RX ORDER — GABAPENTIN 100 MG/1
200 CAPSULE ORAL AT BEDTIME
Qty: 60 CAPSULE | Refills: 1 | Status: SHIPPED | OUTPATIENT
Start: 2020-05-15 | End: 2020-07-14

## 2020-05-15 RX ORDER — LORAZEPAM 0.5 MG/1
TABLET ORAL
Qty: 60 TABLET | Refills: 1 | Status: SHIPPED | OUTPATIENT
Start: 2020-05-15 | End: 2020-10-30

## 2020-05-15 ASSESSMENT — ANXIETY QUESTIONNAIRES
3. WORRYING TOO MUCH ABOUT DIFFERENT THINGS: NEARLY EVERY DAY
5. BEING SO RESTLESS THAT IT IS HARD TO SIT STILL: MORE THAN HALF THE DAYS
7. FEELING AFRAID AS IF SOMETHING AWFUL MIGHT HAPPEN: MORE THAN HALF THE DAYS
1. FEELING NERVOUS, ANXIOUS, OR ON EDGE: NEARLY EVERY DAY
2. NOT BEING ABLE TO STOP OR CONTROL WORRYING: NEARLY EVERY DAY
IF YOU CHECKED OFF ANY PROBLEMS ON THIS QUESTIONNAIRE, HOW DIFFICULT HAVE THESE PROBLEMS MADE IT FOR YOU TO DO YOUR WORK, TAKE CARE OF THINGS AT HOME, OR GET ALONG WITH OTHER PEOPLE: EXTREMELY DIFFICULT
6. BECOMING EASILY ANNOYED OR IRRITABLE: NEARLY EVERY DAY
GAD7 TOTAL SCORE: 18

## 2020-05-15 ASSESSMENT — PATIENT HEALTH QUESTIONNAIRE - PHQ9
SUM OF ALL RESPONSES TO PHQ QUESTIONS 1-9: 15
5. POOR APPETITE OR OVEREATING: MORE THAN HALF THE DAYS

## 2020-05-15 NOTE — PROGRESS NOTES
"Fatou Simental is a 55 year old female who is being evaluated via a billable telephone visit.      The patient has been notified of following:     \"This telephone visit will be conducted via a call between you and your physician/provider. We have found that certain health care needs can be provided without the need for a physical exam.  This service lets us provide the care you need with a short phone conversation.  If a prescription is necessary we can send it directly to your pharmacy.  If lab work is needed we can place an order for that and you can then stop by our lab to have the test done at a later time.    Telephone visits are billed at different rates depending on your insurance coverage. During this emergency period, for some insurers they may be billed the same as an in-person visit.  Please reach out to your insurance provider with any questions.    If during the course of the call the physician/provider feels a telephone visit is not appropriate, you will not be charged for this service.\"    Patient has given verbal consent for Telephone visit?  Yes    What phone number would you like to be contacted at? [phone]    How would you like to obtain your AVS? [place]or[place]    Subjective     Fatou Simental is a 55 year old female who presents to clinic today for the following health issues:    HPI  Depression and Anxiety Follow-Up    How are you doing with your depression since your last visit? Worsened     How are you doing with your anxiety since your last visit?  Worsened     Are you having other symptoms that might be associated with depression or anxiety? Yes:  shingles    Have you had a significant life event? OTHER: [employer]  and no work     Works cleaning houses      Do you have any concerns with your use of alcohol or other drugs? No     Was on lorazepam as needed for 12 years   Has not needed this for for 6 months     Was providing care to her mother in her mothers apartment daily, she fell and " broke her neck, she is in the hospital now but will be moving in with her       Social History     Tobacco Use     Smoking status: Current Every Day Smoker     Packs/day: 0.25     Years: 20.00     Pack years: 5.00     Smokeless tobacco: Never Used   Substance Use Topics     Alcohol use: Yes     Alcohol/week: 0.0 standard drinks     Comment: Social. 1-2 cans of beer or 2 glasses of wine once per month     Drug use: No     PHQ 2/19/2018 8/3/2018 5/15/2020   PHQ-9 Total Score 0 6 15   Q9: Thoughts of better off dead/self-harm past 2 weeks Not at all Not at all Not at all     SOY-7 SCORE 9/15/2014 3/10/2015 5/15/2020   Total Score 21 12 -   Total Score - - 18     Last PHQ-9 5/15/2020   1.  Little interest or pleasure in doing things 1   2.  Feeling down, depressed, or hopeless 2   3.  Trouble falling or staying asleep, or sleeping too much 2   4.  Feeling tired or having little energy 2   5.  Poor appetite or overeating 2   6.  Feeling bad about yourself 2   7.  Trouble concentrating 2   8.  Moving slowly or restless 2   Q9: Thoughts of better off dead/self-harm past 2 weeks 0   PHQ-9 Total Score 15   Difficulty at work, home, or with people Very difficult     SOY-7  5/15/2020   1. Feeling nervous, anxious, or on edge 3   2. Not being able to stop or control worrying 3   3. Worrying too much about different things 3   4. Trouble relaxing 2   5. Being so restless that it is hard to sit still 2   6. Becoming easily annoyed or irritable 3   7. Feeling afraid, as if something awful might happen 2   SOY-7 Total Score 18   If you checked any problems, how difficult have they made it for you to do your work, take care of things at home, or get along with other people? Extremely difficult         Shingles follow up - still having symptoms and is out of medication would like gabapentin refilled         Patient Active Problem List   Diagnosis     Raynaud's syndrome     Tobacco use disorder     Mild major depression (H)     Need  for prophylactic hormone replacement therapy (postmenopausal)     Generalized anxiety disorder     Esophageal reflux     Breast screening     Ganglion of joint     Ovarian cyst     IBS (irritable bowel syndrome)     CARDIOVASCULAR SCREENING; LDL GOAL LESS THAN 160     Screening for malignant neoplasm of cervix     Diverticulosis     Migraine with aura and without status migrainosus, not intractable     Past Surgical History:   Procedure Laterality Date     ABLATE VEIN VARICOSE RADIO FREQUENCY WITHOUT PHLEBECTOMY MULTIPLE STAB  2014    Procedure: ABLATE VEIN VARICOSE RADIO FREQUENCY WITHOUT PHLEBECTOMY MULTIPLE STAB;  Surgeon: Khanh Bunch MD;  Location: [facility]     C  DELIVERY ONLY  ,,    , Low Cervical     ENHANCE LASER REFRACTIVE BILATERAL EXISTING PT IN PARAMETERS Bilateral     LASIK     HYSTERECTOMY SUPRACERVICAL, BILATERAL SALPINGO-OOPHORECTOMY, COMBINED       LAPAROSCOPIC ASSISTED COLECTOMY N/A 2015    Procedure: LAPAROSCOPIC ASSISTED COLECTOMY;  Surgeon: Khanh Bunch MD;  Location: [facility]     LAPAROSCOPIC CHOLECYSTECTOMY N/A 2017    Procedure: LAPAROSCOPIC CHOLECYSTECTOMY;  Surgeon: Levi Louis MD;  Location: [facility]     NERVE BLOCK PERIPHERAL Bilateral 2015    Procedure: NERVE BLOCK PERIPHERAL;  Surgeon: Generic Anesthesia Provider;  Location: [facility]     REPAIR PTOSIS BILATERAL Bilateral 10/11/2018    Procedure: REPAIR PTOSIS BILATERAL;  Bilateral Upper Eyelid Ptosis Repair;  Surgeon: Wilton Viramontes MD;  Location:  OR     SURGICAL HISTORY OF -       Breast implants     SURGICAL HISTORY OF -       Carpal  tunnel r hand       Social History     Tobacco Use     Smoking status: Current Every Day Smoker     Packs/day: 0.25     Years: 20.00     Pack years: 5.00     Smokeless tobacco: Never Used   Substance Use Topics     Alcohol use: Yes     Alcohol/week: 0.0 standard drinks     Comment: Social. 1-2 cans of beer or 2 glasses of  wine once per month     Family History   Problem Relation Age of Onset     Cancer Father         liver     Cancer Brother         lung, asbestos     Cancer Mother         uterine     Cancer Sister         uterine     Breast Cancer Other      Breast Cancer Other      Glaucoma No family hx of      Macular Degeneration No family hx of            Reviewed and updated as needed this visit by Provider         Review of Systems   Constitutional, HEENT, cardiovascular, pulmonary, gi and gu systems are negative, except as otherwise noted.       Objective   Reported vitals:  There were no vitals taken for this visit.   healthy, alert and no distress  PSYCH: Alert and oriented times 3; coherent speech, normal   rate and volume, able to articulate logical thoughts, able   to abstract reason, no tangential thoughts, no hallucinations   or delusions  Her affect is tearful  RESP: No cough, no audible wheezing, able to talk in full sentences  Remainder of exam unable to be completed due to telephone visits    Diagnostic Test Results:  Labs reviewed in Epic        Assessment/Plan:  1. Post herpetic neuralgia  Refilled gabapentin   - gabapentin (NEURONTIN) 100 MG capsule; Take 2 capsules (200 mg) by mouth At Bedtime  Dispense: 60 capsule; Refill: 1    2. Generalized anxiety disorder  Was doing well but now with situational stress related to finances, caring for mother and COVID 19   Refilled lorazpem as previous used   Discussed not best option for long term use or managing daily anxiety   She will contact clinic when due for refills.  - LORazepam (ATIVAN) 0.5 MG tablet; Take 1 tablet by mouth twice daily as needed  Dispense: 60 tablet; Refill: 1    No follow-ups on file.      Phone call duration:  10 minutes    Maday Sylvester NP

## 2020-05-15 NOTE — PATIENT INSTRUCTIONS
Refilled the lorazepam and gabapentin       Would recommend follow up when needing refill on lorazepam   Would only recommend using this for short term and would suggest looking at starting antidepressant if ongoing anxiety.

## 2020-05-16 ASSESSMENT — ANXIETY QUESTIONNAIRES: GAD7 TOTAL SCORE: 18

## 2020-07-14 DIAGNOSIS — B02.29 POST HERPETIC NEURALGIA: ICD-10-CM

## 2020-07-14 RX ORDER — GABAPENTIN 100 MG/1
CAPSULE ORAL
Qty: 60 CAPSULE | Refills: 1 | Status: SHIPPED | OUTPATIENT
Start: 2020-07-14 | End: 2022-01-20

## 2020-07-14 NOTE — TELEPHONE ENCOUNTER
Routing refill request to provider for review/approval because:  Drug not on the [facility] refill protocol     Last Written Prescription Date:  5/15/2020  Last Fill Quantity: 60,  # refills: 1   Last office visit: 5/15/2020 VV with Higinio ORTIZ  Future Office Visit:      Maritza HEREDIA RN, BSN

## 2020-07-22 ENCOUNTER — VIRTUAL VISIT (OUTPATIENT)
Dept: FAMILY MEDICINE | Facility: CLINIC | Age: 55
End: 2020-07-22
Payer: COMMERCIAL

## 2020-07-22 DIAGNOSIS — R49.0 HOARSENESS: ICD-10-CM

## 2020-07-22 DIAGNOSIS — R06.00 DYSPNEA, UNSPECIFIED TYPE: Primary | ICD-10-CM

## 2020-07-22 PROCEDURE — 99213 OFFICE O/P EST LOW 20 MIN: CPT | Mod: TEL | Performed by: FAMILY MEDICINE

## 2020-07-22 RX ORDER — ALBUTEROL SULFATE 90 UG/1
2 AEROSOL, METERED RESPIRATORY (INHALATION) EVERY 4 HOURS PRN
Qty: 1 INHALER | Refills: 3 | Status: SHIPPED | OUTPATIENT
Start: 2020-07-22 | End: 2021-06-08

## 2020-07-22 NOTE — PROGRESS NOTES
"Fatou Simental is a 55 year old female who is being evaluated via a billable telephone visit.        The patient has been notified of following:     \"This telephone visit will be conducted via a call between you and your physician/provider. We have found that certain health care needs can be provided without the need for a physical exam.  This service lets us provide the care you need with a short phone conversation.  If a prescription is necessary we can send it directly to your pharmacy.  If lab work is needed we can place an order for that and you can then stop by our lab to have the test done at a later time.    Telephone visits are billed at different rates depending on your insurance coverage. During this emergency period, for some insurers they may be billed the same as an in-person visit.  Please reach out to your insurance provider with any questions.    If during the course of the call the physician/provider feels a telephone visit is not appropriate, you will not be charged for this service.\"    Patient has given verbal consent for Telephone visit?  Yes    What phone number would you like to be contacted at? [phone]    How would you like to obtain your AVS? Mail a copy    Subjective     Fatou Simental is a 55 year old female who presents via phone visit today for the following health issues:    HPI  Here for hoarseness - ongoing for a few weeks.   Reports a dry cough  No wheezing  She smokes  No difficulty with swallowing  Has had some shortness of breath and chest heaviness, seems worse at night.    Chief Complaint   Patient presents with     Hoarse     horseness in voice, breathing issues only when laying down at night x 2 weeks           Duration: 2 weeks    Description (location/character/radiation): dry throat horse voice, not sure if related to medications     Intensity:  moderate, severe    Accompanying signs and symptoms: none    History (similar episodes/previous evaluation): " None    Precipitating or alleviating factors: not sure if from medications    Therapies tried and outcome: cough drops, hard candy            Patient Active Problem List   Diagnosis     Raynaud's syndrome     Tobacco use disorder     Mild major depression (H)     Need for prophylactic hormone replacement therapy (postmenopausal)     Generalized anxiety disorder     Esophageal reflux     Breast screening     Ganglion of joint     Ovarian cyst     IBS (irritable bowel syndrome)     CARDIOVASCULAR SCREENING; LDL GOAL LESS THAN 160     Screening for malignant neoplasm of cervix     Diverticulosis     Migraine with aura and without status migrainosus, not intractable     Past Surgical History:   Procedure Laterality Date     ABLATE VEIN VARICOSE RADIO FREQUENCY WITHOUT PHLEBECTOMY MULTIPLE STAB  2014    Procedure: ABLATE VEIN VARICOSE RADIO FREQUENCY WITHOUT PHLEBECTOMY MULTIPLE STAB;  Surgeon: Khanh Bunch MD;  Location: [facility]     C  DELIVERY ONLY  ,,    , Low Cervical     ENHANCE LASER REFRACTIVE BILATERAL EXISTING PT IN PARAMETERS Bilateral     LASIK     HYSTERECTOMY SUPRACERVICAL, BILATERAL SALPINGO-OOPHORECTOMY, COMBINED       LAPAROSCOPIC ASSISTED COLECTOMY N/A 2015    Procedure: LAPAROSCOPIC ASSISTED COLECTOMY;  Surgeon: Khanh Bunch MD;  Location: [facility]     LAPAROSCOPIC CHOLECYSTECTOMY N/A 2017    Procedure: LAPAROSCOPIC CHOLECYSTECTOMY;  Surgeon: Levi Louis MD;  Location: [facility]     NERVE BLOCK PERIPHERAL Bilateral 2015    Procedure: NERVE BLOCK PERIPHERAL;  Surgeon: Generic Anesthesia Provider;  Location: [facility]     REPAIR PTOSIS BILATERAL Bilateral 10/11/2018    Procedure: REPAIR PTOSIS BILATERAL;  Bilateral Upper Eyelid Ptosis Repair;  Surgeon: Wilton Viramontes MD;  [facility]     SURGICAL HISTORY OF -       Breast implants     SURGICAL HISTORY OF -       Carpal  tunnel r hand       Social History     Tobacco Use      Smoking status: Current Every Day Smoker     Packs/day: 0.25     Years: 20.00     Pack years: 5.00     Smokeless tobacco: Never Used   Substance Use Topics     Alcohol use: Yes     Alcohol/week: 0.0 standard drinks     Comment: Social. 1-2 cans of beer or 2 glasses of wine once per month     Family History   Problem Relation Age of Onset     Cancer Father         liver     Cancer Brother         lung, asbestos     Cancer Mother         uterine     Cancer Sister         uterine     Breast Cancer Other      Breast Cancer Other      Glaucoma No family hx of      Macular Degeneration No family hx of          Current Outpatient Medications   Medication Sig Dispense Refill     albuterol (PROAIR HFA/PROVENTIL HFA/VENTOLIN HFA) 108 (90 Base) MCG/ACT inhaler Inhale 2 puffs into the lungs every 4 hours as needed for shortness of breath / dyspnea 1 Inhaler 3     butalbital-aspirin-caffeine (FIORINAL) -40 MG capsule Take 1 capsule by mouth every 6 hours as needed for headaches 30 capsule 5     Cholecalciferol (VITAMIN D3 PO) Take 400 Units by mouth daily        cyclobenzaprine (FLEXERIL) 10 MG tablet Take 0.5-1 tablets (5-10 mg) by mouth 3 times daily as needed for muscle spasms 60 tablet 1     estradiol (ESTRACE) 1 MG tablet Take 1 tablet (1 mg) by mouth daily 90 tablet 2     estradiol (VAGIFEM) 10 MCG TABS vaginal tablet Place 1 tablet (10 mcg) vaginally twice a week 8 tablet 1     gabapentin (NEURONTIN) 100 MG capsule TAKE TWO CAPSULES BY MOUTH AT BEDTIME 60 capsule 1     LORazepam (ATIVAN) 0.5 MG tablet Take 1 tablet by mouth twice daily as needed 60 tablet 1     multivitamin, therapeutic (THERA-VIT) TABS Take 1 tablet by mouth daily       omeprazole 20 MG tablet Take 1 tablet (20 mg) by mouth daily Take 30-60 minutes before a meal. 30 tablet 0     varenicline (CHANTIX JOHNNY) 0.5 MG X 11 & 1 MG X 42 tablet Take 0.5 mg tab daily for 3 days, THEN 0.5 mg tab twice daily for 4 days, THEN 1 mg twice daily. 53 tablet 0      DiphenhydrAMINE HCl (BENADRYL PO) Take 50 mg by mouth nightly as needed        SUMAtriptan (IMITREX) 50 MG tablet Take 1 tablet (50 mg) by mouth at onset of headache for migraine May repeat in 2 hours. Max 4 tablets/24 hours. (Patient not taking: Reported on 7/22/2020) 5 tablet 0     Allergies   Allergen Reactions     Cephalexin Hives     Patient states she gets hives - bad reaction not listed on her H & P.     Rizatriptan Dizziness     Ciprofloxacin Hives     Got rash when taken with metronidazole + Vicodin     Lactose Diarrhea     Metronidazole Hives     Rash when taken with cipro + Vicodin     No Clinical Screening - See Comments      Pt developed hives when taking flagyl, cipro, and vicodin, doesn't know which she is allergic to      Vicodin [Hydrocodone-Acetaminophen] Hives     Rash when taken with cipro and metronidazole     BP Readings from Last 3 Encounters:   04/07/20 106/70   02/28/20 102/48   02/05/20 90/60    Wt Readings from Last 3 Encounters:   04/07/20 48.5 kg (107 lb)   02/28/20 49.4 kg (108 lb 12.8 oz)   02/05/20 48.5 kg (107 lb)                    Reviewed and updated as needed this visit by Provider  Tobacco  Allergies  Meds  Problems  Med Hx  Surg Hx  Fam Hx         Review of Systems   Constitutional, HEENT, cardiovascular, pulmonary, gi and gu systems are negative, except as otherwise noted.       Objective   Reported vitals:  There were no vitals taken for this visit.   healthy, alert and no distress  PSYCH: Alert and oriented times 3; coherent speech, normal   rate and volume, able to articulate logical thoughts, able   to abstract reason, no tangential thoughts, no hallucinations   or delusions  Her affect is normal  RESP: No cough, no audible wheezing, able to talk in full sentences  Remainder of exam unable to be completed due to telephone visits    Diagnostic Test Results:  Labs reviewed in Epic        Assessment/Plan:    1. Dyspnea, unspecified type  Likely a bronchitis recommend  an inhaler albuterol. If no improvement needs to be seen in clinic   - albuterol (PROAIR HFA/PROVENTIL HFA/VENTOLIN HFA) 108 (90 Base) MCG/ACT inhaler; Inhale 2 puffs into the lungs every 4 hours as needed for shortness of breath / dyspnea  Dispense: 1 Inhaler; Refill: 3    2. Hoarseness  Explained that most hoarseness is caused by viruses. If her symptoms do not improve, will recommend ENT evaluation.       Return in about 2 weeks (around 8/5/2020) for In-Clinic Visit.      Phone call duration:  5 minutes    Johanne Montaño MD

## 2020-08-20 DIAGNOSIS — Z79.890 NEED FOR PROPHYLACTIC HORMONE REPLACEMENT THERAPY (POSTMENOPAUSAL): ICD-10-CM

## 2020-08-20 NOTE — TELEPHONE ENCOUNTER
"Requested Prescriptions   Pending Prescriptions Disp Refills     estradiol (ESTRACE) 1 MG tablet [Pharmacy Med Name: ESTRADIOL 1MG TABS] 90 tablet 2     Sig: TAKE ONE TABLET BY MOUTH ONCE DAILY       Hormone Replacement Therapy Failed - 8/20/2020  9:56 AM        Failed - Patient has mammogram in past 2 years on file if age 50-75        Passed - Blood pressure under 140/90 in past 12 months     BP Readings from Last 3 Encounters:   04/07/20 106/70   02/28/20 102/48   02/05/20 90/60                 Passed - Recent (12 mo) or future (30 days) visit within the authorizing provider's specialty     Patient has had an office visit with the authorizing provider or a provider within the authorizing providers department within the previous 12 mos or has a future within next 30 days. See \"Patient Info\" tab in inbasket, or \"Choose Columns\" in Meds & Orders section of the refill encounter.              Passed - Medication is active on med list        Passed - Patient is 18 years of age or older        Passed - No active pregnancy on record        Passed - No positive pregnancy test on record in past 12 months             "

## 2020-08-21 RX ORDER — ESTRADIOL 1 MG/1
TABLET ORAL
Qty: 90 TABLET | Refills: 2 | OUTPATIENT
Start: 2020-08-21

## 2020-08-21 RX ORDER — ESTRADIOL 1 MG/1
1 TABLET ORAL DAILY
Qty: 30 TABLET | Refills: 0 | Status: SHIPPED | OUTPATIENT
Start: 2020-08-21 | End: 2020-09-21

## 2020-08-21 NOTE — TELEPHONE ENCOUNTER
Routing refill request to provider for review/approval because:  No mammogram on file in last 2 years.    Adele Bucio RN

## 2020-09-21 ENCOUNTER — TELEPHONE (OUTPATIENT)
Dept: FAMILY MEDICINE | Facility: CLINIC | Age: 55
End: 2020-09-21

## 2020-09-21 ENCOUNTER — OFFICE VISIT (OUTPATIENT)
Dept: FAMILY MEDICINE | Facility: CLINIC | Age: 55
End: 2020-09-21
Payer: COMMERCIAL

## 2020-09-21 VITALS
BODY MASS INDEX: 20.43 KG/M2 | HEART RATE: 67 BPM | RESPIRATION RATE: 16 BRPM | WEIGHT: 111 LBS | TEMPERATURE: 98.2 F | OXYGEN SATURATION: 100 % | DIASTOLIC BLOOD PRESSURE: 64 MMHG | SYSTOLIC BLOOD PRESSURE: 106 MMHG | HEIGHT: 62 IN

## 2020-09-21 DIAGNOSIS — Z79.890 NEED FOR PROPHYLACTIC HORMONE REPLACEMENT THERAPY (POSTMENOPAUSAL): ICD-10-CM

## 2020-09-21 DIAGNOSIS — N63.0 LUMP OR MASS IN BREAST: Primary | ICD-10-CM

## 2020-09-21 PROCEDURE — 99214 OFFICE O/P EST MOD 30 MIN: CPT | Performed by: FAMILY MEDICINE

## 2020-09-21 RX ORDER — ESTRADIOL 10 UG/1
10 INSERT VAGINAL
Qty: 24 TABLET | Refills: 3 | Status: SHIPPED | OUTPATIENT
Start: 2020-09-21 | End: 2020-10-30

## 2020-09-21 RX ORDER — ESTRADIOL 1 MG/1
1 TABLET ORAL DAILY
Qty: 90 TABLET | Refills: 0 | Status: SHIPPED | OUTPATIENT
Start: 2020-09-21 | End: 2020-10-30

## 2020-09-21 ASSESSMENT — MIFFLIN-ST. JEOR: SCORE: 1051.74

## 2020-09-21 ASSESSMENT — PAIN SCALES - GENERAL: PAINLEVEL: SEVERE PAIN (7)

## 2020-09-21 NOTE — PROGRESS NOTES
"Subjective     Fatou Simental is a 55 year old female who presents to clinic today for the following health issues:    HPI   Chief Complaint   Patient presents with     Breast Mass     Patient noticed a right breast mass X 1 week. Patient describes it as a size of a nickel. Patient rates pain at a 7/10 when laying down at night.      Health Maintenance     pap and mammogram     Flu Shot     declined     Refill Request     estrogen     Patient noticed pain behind her right nipple one week ago.  She has not had a mammogram for 2.5 years.  She is on both oral and vaginal estrogen.  We did discuss that a yearly mammogram is recommended while she is on oral estrogen. We also discussed the increased risk of clotting/strokes/PE/DVT on estrogen while smoking.  She verbalizes understanding.  She has tried to cut back on the estrogen to every other day and gets a lot of hot flashes.  We did discuss other options like SNRIs for mood/hot flash abatement.      Review of Systems   Constitutional, HEENT, cardiovascular, pulmonary, gi and gu systems are negative, except as otherwise noted.      Objective    /64   Pulse 67   Temp 98.2  F (36.8  C) (Tympanic)   Resp 16   Ht 1.575 m (5' 2\")   Wt 50.3 kg (111 lb)   SpO2 100%   BMI 20.30 kg/m    Body mass index is 20.3 kg/m .  Physical Exam   GENERAL: healthy, alert and no distress  NECK: no adenopathy, no asymmetry, masses, or scars and thyroid normal to palpation  RESP: lungs clear to auscultation - no rales, rhonchi or wheezes  BREAST: bilateral implants.  On the right under the areola at 3-6 oclock there is a mobile soft mass that is tender to palpation.  Left breast at 3 oclock there is a mobile pea sized mass that feels like it's in the dermis layer rather than in deep breast tissue.   CV: regular rate and rhythm, normal S1 S2, no S3 or S4, no murmur, click or rub, no peripheral edema and peripheral pulses strong  ABDOMEN: soft, nontender, no hepatosplenomegaly, " no masses and bowel sounds normal  MS: no gross musculoskeletal defects noted, no edema            Assessment & Plan     POSTMENOPAUSAL HORMONAL REPLACEMENT TX   risks discussed as above.   - estradiol (ESTRACE) 1 MG tablet; Take 1 tablet (1 mg) by mouth daily  - estradiol (VAGIFEM) 10 MCG TABS vaginal tablet; Place 1 tablet (10 mcg) vaginally twice a week    Lump or mass in breast  Diagnostic mammogram and US ordered  - US Breast Right Limited 1-3 Quadrants; Future  - MA Diagnostic with Implants Bilateral w/Juan Jose; Future           No follow-ups on file.    Sowmya Sylvester MD  [facility]

## 2020-09-21 NOTE — TELEPHONE ENCOUNTER
Reason for Call: Estridol ordered both Vaginally and oral     Detailed comments: [facility] is calling and wondering if Dr. Sylvester wants both Vaginal and Oral Estridol for this Patient. Please advise. Pharmacy.  [phone]    Call taken on 9/21/2020 at 10:55 AM by Debbie Lewis [facility] [place]

## 2020-09-21 NOTE — PATIENT INSTRUCTIONS
Health Maintenance reviewed and plan for update discussed.  Patient asked to schedule her pap smear  Patient asked to schedule her mammogram     [phone] to schedule diagnostic mammogram        Our Clinic hours are:  Mondays    7:20 am - 7 pm  Tues -  Fri  7:20 am - 5 pm    Clinic Phone: [phone]    The clinic lab opens at 7:30 am Mon - Fri and appointments are required.    [facility]  Ph. [phone]  Monday  8 am - 7pm  Tues - Fri 8 am - 5:30 pm

## 2020-09-21 NOTE — TELEPHONE ENCOUNTER
This is what she has been taking, I believe.  You can check with patient to make sure she still wants the vaginal estrogen.    Sowmya Sylvester M.D.

## 2020-09-21 NOTE — TELEPHONE ENCOUNTER
Please verify- would like both oral and cream for the Estradiol?    Thank you    Karin LYMAN RN

## 2020-09-22 ENCOUNTER — HOSPITAL ENCOUNTER (OUTPATIENT)
Dept: ULTRASOUND IMAGING | Facility: CLINIC | Age: 55
End: 2020-09-22
Attending: FAMILY MEDICINE
Payer: COMMERCIAL

## 2020-09-22 ENCOUNTER — HOSPITAL ENCOUNTER (OUTPATIENT)
Dept: MAMMOGRAPHY | Facility: CLINIC | Age: 55
End: 2020-09-22
Attending: FAMILY MEDICINE
Payer: COMMERCIAL

## 2020-09-22 DIAGNOSIS — N63.0 LUMP OR MASS IN BREAST: ICD-10-CM

## 2020-09-22 DIAGNOSIS — N63.15 BREAST LUMP ON RIGHT SIDE AT 6 O'CLOCK POSITION: ICD-10-CM

## 2020-09-22 PROCEDURE — 76642 ULTRASOUND BREAST LIMITED: CPT | Mod: RT

## 2020-09-22 PROCEDURE — G0279 TOMOSYNTHESIS, MAMMO: HCPCS

## 2020-09-22 NOTE — LETTER
Fatou Simental  [address] [place] [address]      September 22, 2020  Date of Exam: 9/22/20    Dear Fatou Simental:    Thank you for your recent visit.  Breast Imaging Result: We are pleased to inform you that the results of your recent breast imaging show no evidence of malignancy (cancer).    If you are experiencing any breast problems such as a lump or localized pain we request that you discuss this with your health care provider if you haven t already done so, as additional testing may be necessary.    As you know, early detection of cancer is very important. Although mammography is the most accurate method for early detection, not all cancers are found through mammography. A thorough examination includes a combination of mammography, physical examination and breast self-examination. Currently the American College of Radiology and the Society of Breast Imaging recommend an annual mammogram for all women beginning at the age of 40.    A report of your breast imaging results was sent to: Sowmya Sylvester    Your breast imaging will become part of your medical file here at [facility] for at least 10 years. You are responsible for informing any new health care provider or breast imaging facility of the date and location of this examination.    We appreciate the opportunity to participate in your health care.    Sincerely,    Barney Mendoza MD  Interpreting Radiologist  [facility]

## 2020-09-22 NOTE — RESULT ENCOUNTER NOTE
Good news that mammogram/US are normal.  If you continue to have pain, new symptoms or would like further evaluation, we can send you to one of the breast surgeons for evaluation.      Sowmya Sylvester M.D.

## 2020-10-06 ENCOUNTER — HOSPITAL ENCOUNTER (EMERGENCY)
Facility: CLINIC | Age: 55
Discharge: HOME | End: 2020-10-06
Attending: EMERGENCY MEDICINE | Admitting: EMERGENCY MEDICINE
Payer: COMMERCIAL

## 2020-10-06 ENCOUNTER — APPOINTMENT (OUTPATIENT)
Dept: CT IMAGING | Facility: CLINIC | Age: 55
End: 2020-10-06
Attending: EMERGENCY MEDICINE
Payer: COMMERCIAL

## 2020-10-06 ENCOUNTER — OFFICE VISIT (OUTPATIENT)
Dept: FAMILY MEDICINE | Facility: CLINIC | Age: 55
End: 2020-10-06
Payer: COMMERCIAL

## 2020-10-06 VITALS
RESPIRATION RATE: 16 BRPM | HEIGHT: 62 IN | HEART RATE: 81 BPM | BODY MASS INDEX: 20.43 KG/M2 | WEIGHT: 111 LBS | DIASTOLIC BLOOD PRESSURE: 60 MMHG | SYSTOLIC BLOOD PRESSURE: 122 MMHG | OXYGEN SATURATION: 97 % | TEMPERATURE: 97.7 F

## 2020-10-06 VITALS
TEMPERATURE: 98.4 F | SYSTOLIC BLOOD PRESSURE: 112 MMHG | DIASTOLIC BLOOD PRESSURE: 74 MMHG | OXYGEN SATURATION: 99 % | HEART RATE: 72 BPM | RESPIRATION RATE: 20 BRPM

## 2020-10-06 DIAGNOSIS — Z98.890 HISTORY OF COLON SURGERY: ICD-10-CM

## 2020-10-06 DIAGNOSIS — R10.31 RIGHT LOWER QUADRANT PAIN: Primary | ICD-10-CM

## 2020-10-06 DIAGNOSIS — Z87.19 HISTORY OF DIVERTICULITIS: ICD-10-CM

## 2020-10-06 DIAGNOSIS — R10.31 ABDOMINAL PAIN, RIGHT LOWER QUADRANT: ICD-10-CM

## 2020-10-06 LAB
ALBUMIN SERPL-MCNC: 3.9 G/DL (ref 3.4–5)
ALBUMIN UR-MCNC: NEGATIVE MG/DL
ALP SERPL-CCNC: 71 U/L (ref 40–150)
ALT SERPL W P-5'-P-CCNC: 16 U/L (ref 0–50)
ANION GAP SERPL CALCULATED.3IONS-SCNC: 4 MMOL/L (ref 3–14)
APPEARANCE UR: CLEAR
AST SERPL W P-5'-P-CCNC: 15 U/L (ref 0–45)
BACTERIA #/AREA URNS HPF: ABNORMAL /HPF
BASOPHILS # BLD AUTO: 0.1 10E9/L (ref 0–0.2)
BASOPHILS NFR BLD AUTO: 1 %
BILIRUB SERPL-MCNC: 0.3 MG/DL (ref 0.2–1.3)
BILIRUB UR QL STRIP: NEGATIVE
BUN SERPL-MCNC: 12 MG/DL (ref 7–30)
CALCIUM SERPL-MCNC: 9.2 MG/DL (ref 8.5–10.1)
CHLORIDE SERPL-SCNC: 106 MMOL/L (ref 94–109)
CO2 SERPL-SCNC: 29 MMOL/L (ref 20–32)
COLOR UR AUTO: YELLOW
CREAT SERPL-MCNC: 0.62 MG/DL (ref 0.52–1.04)
DIFFERENTIAL METHOD BLD: NORMAL
EOSINOPHIL # BLD AUTO: 0.2 10E9/L (ref 0–0.7)
EOSINOPHIL NFR BLD AUTO: 2.8 %
ERYTHROCYTE [DISTWIDTH] IN BLOOD BY AUTOMATED COUNT: 13.2 % (ref 10–15)
GFR SERPL CREATININE-BSD FRML MDRD: >90 ML/MIN/{1.73_M2}
GLUCOSE SERPL-MCNC: 103 MG/DL (ref 70–99)
GLUCOSE UR STRIP-MCNC: NEGATIVE MG/DL
HCT VFR BLD AUTO: 41.4 % (ref 35–47)
HGB BLD-MCNC: 13.9 G/DL (ref 11.7–15.7)
HGB UR QL STRIP: ABNORMAL
IMM GRANULOCYTES # BLD: 0 10E9/L (ref 0–0.4)
IMM GRANULOCYTES NFR BLD: 0.2 %
KETONES UR STRIP-MCNC: 5 MG/DL
LEUKOCYTE ESTERASE UR QL STRIP: NEGATIVE
LYMPHOCYTES # BLD AUTO: 1.6 10E9/L (ref 0.8–5.3)
LYMPHOCYTES NFR BLD AUTO: 26.6 %
MCH RBC QN AUTO: 31.8 PG (ref 26.5–33)
MCHC RBC AUTO-ENTMCNC: 33.6 G/DL (ref 31.5–36.5)
MCV RBC AUTO: 95 FL (ref 78–100)
MONOCYTES # BLD AUTO: 0.5 10E9/L (ref 0–1.3)
MONOCYTES NFR BLD AUTO: 7.8 %
MUCOUS THREADS #/AREA URNS LPF: PRESENT /LPF
NEUTROPHILS # BLD AUTO: 3.7 10E9/L (ref 1.6–8.3)
NEUTROPHILS NFR BLD AUTO: 61.6 %
NITRATE UR QL: NEGATIVE
NRBC # BLD AUTO: 0 10*3/UL
NRBC BLD AUTO-RTO: 0 /100
PH UR STRIP: 5 PH (ref 5–7)
PLATELET # BLD AUTO: 296 10E9/L (ref 150–450)
POTASSIUM SERPL-SCNC: 3.9 MMOL/L (ref 3.4–5.3)
PROT SERPL-MCNC: 7.3 G/DL (ref 6.8–8.8)
RBC # BLD AUTO: 4.37 10E12/L (ref 3.8–5.2)
RBC #/AREA URNS AUTO: 1 /HPF (ref 0–2)
SODIUM SERPL-SCNC: 139 MMOL/L (ref 133–144)
SOURCE: ABNORMAL
SP GR UR STRIP: 1.01 (ref 1–1.03)
SQUAMOUS #/AREA URNS AUTO: 1 /HPF (ref 0–1)
UROBILINOGEN UR STRIP-MCNC: 0 MG/DL (ref 0–2)
WBC # BLD AUTO: 6.1 10E9/L (ref 4–11)
WBC #/AREA URNS AUTO: <1 /HPF (ref 0–5)

## 2020-10-06 PROCEDURE — 96374 THER/PROPH/DIAG INJ IV PUSH: CPT | Mod: 59 | Performed by: EMERGENCY MEDICINE

## 2020-10-06 PROCEDURE — 96376 TX/PRO/DX INJ SAME DRUG ADON: CPT | Performed by: EMERGENCY MEDICINE

## 2020-10-06 PROCEDURE — 250N000011 HC RX IP 250 OP 636: Performed by: EMERGENCY MEDICINE

## 2020-10-06 PROCEDURE — 250N000009 HC RX 250: Performed by: EMERGENCY MEDICINE

## 2020-10-06 PROCEDURE — 80053 COMPREHEN METABOLIC PANEL: CPT | Performed by: FAMILY MEDICINE

## 2020-10-06 PROCEDURE — 81001 URINALYSIS AUTO W/SCOPE: CPT | Performed by: FAMILY MEDICINE

## 2020-10-06 PROCEDURE — 74177 CT ABD & PELVIS W/CONTRAST: CPT

## 2020-10-06 PROCEDURE — 99285 EMERGENCY DEPT VISIT HI MDM: CPT | Performed by: EMERGENCY MEDICINE

## 2020-10-06 PROCEDURE — 85025 COMPLETE CBC W/AUTO DIFF WBC: CPT | Performed by: FAMILY MEDICINE

## 2020-10-06 PROCEDURE — 99285 EMERGENCY DEPT VISIT HI MDM: CPT | Mod: 25 | Performed by: EMERGENCY MEDICINE

## 2020-10-06 PROCEDURE — 99215 OFFICE O/P EST HI 40 MIN: CPT | Performed by: FAMILY MEDICINE

## 2020-10-06 PROCEDURE — 96375 TX/PRO/DX INJ NEW DRUG ADDON: CPT | Performed by: EMERGENCY MEDICINE

## 2020-10-06 RX ORDER — OXYCODONE HYDROCHLORIDE 5 MG/1
5 TABLET ORAL EVERY 6 HOURS PRN
Qty: 12 TABLET | Refills: 0 | Status: SHIPPED | OUTPATIENT
Start: 2020-10-06 | End: 2020-10-30

## 2020-10-06 RX ORDER — ONDANSETRON 2 MG/ML
4 INJECTION INTRAMUSCULAR; INTRAVENOUS EVERY 30 MIN PRN
Status: DISCONTINUED | OUTPATIENT
Start: 2020-10-06 | End: 2020-10-06 | Stop reason: HOSPADM

## 2020-10-06 RX ORDER — HYDROMORPHONE HYDROCHLORIDE 1 MG/ML
0.5 INJECTION, SOLUTION INTRAMUSCULAR; INTRAVENOUS; SUBCUTANEOUS
Status: DISCONTINUED | OUTPATIENT
Start: 2020-10-06 | End: 2020-10-06 | Stop reason: HOSPADM

## 2020-10-06 RX ORDER — IOPAMIDOL 755 MG/ML
54 INJECTION, SOLUTION INTRAVASCULAR ONCE
Status: COMPLETED | OUTPATIENT
Start: 2020-10-06 | End: 2020-10-06

## 2020-10-06 RX ADMIN — HYDROMORPHONE HYDROCHLORIDE 0.5 MG: 1 INJECTION, SOLUTION INTRAMUSCULAR; INTRAVENOUS; SUBCUTANEOUS at 18:56

## 2020-10-06 RX ADMIN — IOPAMIDOL 54 ML: 755 INJECTION, SOLUTION INTRAVENOUS at 18:03

## 2020-10-06 RX ADMIN — SODIUM CHLORIDE 54 ML: 9 INJECTION, SOLUTION INTRAVENOUS at 18:03

## 2020-10-06 RX ADMIN — HYDROMORPHONE HYDROCHLORIDE 0.5 MG: 1 INJECTION, SOLUTION INTRAMUSCULAR; INTRAVENOUS; SUBCUTANEOUS at 16:34

## 2020-10-06 RX ADMIN — ONDANSETRON 4 MG: 2 INJECTION INTRAMUSCULAR; INTRAVENOUS at 16:32

## 2020-10-06 ASSESSMENT — ENCOUNTER SYMPTOMS
LIGHT-HEADEDNESS: 0
CHILLS: 0
COUGH: 0
NAUSEA: 0
ABDOMINAL PAIN: 1
SHORTNESS OF BREATH: 0
HEADACHES: 0
VOMITING: 0
DYSURIA: 0
FEVER: 0
DIARRHEA: 0
BACK PAIN: 0
SORE THROAT: 0

## 2020-10-06 ASSESSMENT — PAIN SCALES - GENERAL: PAINLEVEL: EXTREME PAIN (9)

## 2020-10-06 ASSESSMENT — MIFFLIN-ST. JEOR: SCORE: 1051.74

## 2020-10-06 NOTE — ED NOTES
"Pt c/o ruq pain and not \"feeling well\" for past several weeks.  No fevers.  No urinary symptoms.  Pt states she has had \"watery stools\" in am but normal bowel movements later in the day but lately no bowel movements after the morning bowel movement.  Poor appetite and gets full quickly.  "

## 2020-10-06 NOTE — ED PROVIDER NOTES
"  History     Chief Complaint   Patient presents with     Abdominal Pain     sent from clinic for RLQ abd pain. History of diverticulitis      HPI  Fatou Simental is a 55 year old female with history of diverticulosis (s/p colectomy 2015), migraine headaches, irritable bowel syndrome, anxiety, depression, who was sent from clinic for evaluation of abdominal pain.  Has not been feeling quite right for more than a week but over the past 3 days she is been having progressively worsening pain in her right lower quadrant.  Reports that it \"feels like on fire\".  Pain is most significant in her right lower quadrant with some radiation towards her right flank as well to him midline and toward her left lower quadrant.  Feels similar to prior diverticulitis except for location.  Has not been taking anything for pain at home.  Frequently has loose stools in the morning and then by the afternoon they have some form to them.  Recently they have been far more liquidy.  No blood or dark color.  No mucus in stools.  She has any fevers, chills, nausea, vomiting, or urinary symptoms.    CT on 11/11/2016 demonstrates sigmoid diverticulosis without evidence of diverticulitis.    The patient's PMHx, Surgical Hx, Allergies, and Medications were all reviewed with the patient.    Allergies:  Allergies   Allergen Reactions     Cephalexin Hives     Patient states she gets hives - bad reaction not listed on her H & P.     Rizatriptan Dizziness     Ciprofloxacin Hives     Got rash when taken with metronidazole + Vicodin     Lactose Diarrhea     Metronidazole Hives     Rash when taken with cipro + Vicodin     No Clinical Screening - See Comments      Pt developed hives when taking flagyl, cipro, and vicodin, doesn't know which she is allergic to      Vicodin [Hydrocodone-Acetaminophen] Hives     Rash when taken with cipro and metronidazole       Problem List:    Patient Active Problem List    Diagnosis Date Noted     Migraine with aura and " without status migrainosus, not intractable 10/16/2017     Priority: Medium     Diverticulosis 02/05/2015     Priority: Medium     Screening for malignant neoplasm of cervix 07/17/2013     Priority: Medium     S/p hysterectomy for endometriosis, still has cervix  Distant history of cervical dysplasia 2000  Pap 2010:  NIL with negative HPV  Pap 2012:  NIL  Should not need repeat Pap before 2015    Problem list name updated by automated process. Provider to review       CARDIOVASCULAR SCREENING; LDL GOAL LESS THAN 160 10/31/2010     Priority: Medium     IBS (irritable bowel syndrome) 05/28/2008     Priority: Medium     Ovarian cyst 08/23/2007     Priority: Medium     Oophorectomy bilateral - premalignant tissue ? 1998  Problem list name updated by automated process. Provider to review       Breast screening 01/22/2007     Priority: Medium      implants with 4mm superficial subq lesion at 2:00 left breast - unchanged over last year. Cyst identified on US 1/07  Problem list name updated by automated process. Provider to review       Ganglion of joint 01/22/2007     Priority: Medium     Esophageal reflux 11/13/2006     Priority: Medium     Generalized anxiety disorder 10/30/2006     Priority: Medium     Buspar prescribed 8/23/07       Raynaud's syndrome 01/04/2006     Priority: Medium     Tobacco use disorder 01/04/2006     Priority: Medium     Mild major depression (H) 01/04/2006     Priority: Medium     Need for prophylactic hormone replacement therapy (postmenopausal) 01/04/2006     Priority: Medium        Past Medical History:    Past Medical History:   Diagnosis Date     Cocaine abuse, unspecified 2003     Dysplasia of cervix, unspecified 2000     Esophageal reflux 11/13/2006     Generalized anxiety disorder 10/30/2006     IBS (irritable bowel syndrome) 5/28/2008       Past Surgical History:    Past Surgical History:   Procedure Laterality Date     ABLATE VEIN VARICOSE RADIO FREQUENCY WITHOUT PHLEBECTOMY MULTIPLE  STAB  2014    Procedure: ABLATE VEIN VARICOSE RADIO FREQUENCY WITHOUT PHLEBECTOMY MULTIPLE STAB;  Surgeon: Khanh Bunch MD;  Location: [facility]     C  DELIVERY ONLY  ,,    , Low Cervical     ENHANCE LASER REFRACTIVE BILATERAL EXISTING PT IN PARAMETERS Bilateral     LASIK     HYSTERECTOMY SUPRACERVICAL, BILATERAL SALPINGO-OOPHORECTOMY, COMBINED       LAPAROSCOPIC ASSISTED COLECTOMY N/A 2015    Procedure: LAPAROSCOPIC ASSISTED COLECTOMY;  Surgeon: Khanh Bunch MD;  Location: [facility]     LAPAROSCOPIC CHOLECYSTECTOMY N/A 2017    Procedure: LAPAROSCOPIC CHOLECYSTECTOMY;  Surgeon: Levi Louis MD;  Location: [facility]     NERVE BLOCK PERIPHERAL Bilateral 2015    Procedure: NERVE BLOCK PERIPHERAL;  Surgeon: Generic Anesthesia Provider;  Location: [facility]     REPAIR PTOSIS BILATERAL Bilateral 10/11/2018    Procedure: REPAIR PTOSIS BILATERAL;  Bilateral Upper Eyelid Ptosis Repair;  Surgeon: Wilton Viramontes MD;  Location: [facility]     SURGICAL HISTORY OF -       Breast implants     SURGICAL HISTORY OF -       Carpal  tunnel r hand       Family History:    Family History   Problem Relation Age of Onset     Cancer Father         liver     Cancer Brother         lung, asbestos     Cancer Mother         uterine     Cancer Sister         uterine     Breast Cancer Other      Breast Cancer Other      Glaucoma No family hx of      Macular Degeneration No family hx of        Social History:  Marital Status:   [4]  Social History     Tobacco Use     Smoking status: Current Every Day Smoker     Packs/day: 0.25     Years: 20.00     Pack years: 5.00     Smokeless tobacco: Never Used   Substance Use Topics     Alcohol use: Yes     Alcohol/week: 0.0 standard drinks     Comment: Social. 1-2 cans of beer or 2 glasses of wine once per month     Drug use: No        Medications:         oxyCODONE (ROXICODONE) 5 MG tablet       albuterol (PROAIR HFA/PROVENTIL  HFA/VENTOLIN HFA) 108 (90 Base) MCG/ACT inhaler       butalbital-aspirin-caffeine (FIORINAL) -40 MG capsule       Cholecalciferol (VITAMIN D3 PO)       cyclobenzaprine (FLEXERIL) 10 MG tablet       DiphenhydrAMINE HCl (BENADRYL PO)       estradiol (ESTRACE) 1 MG tablet       estradiol (VAGIFEM) 10 MCG TABS vaginal tablet       gabapentin (NEURONTIN) 100 MG capsule       LORazepam (ATIVAN) 0.5 MG tablet       multivitamin, therapeutic (THERA-VIT) TABS       omeprazole 20 MG tablet          Review of Systems   Constitutional: Negative for chills and fever.   HENT: Negative for sore throat.    Eyes: Negative for visual disturbance.   Respiratory: Negative for cough and shortness of breath.    Cardiovascular: Negative for chest pain.   Gastrointestinal: Positive for abdominal pain. Negative for diarrhea, nausea and vomiting.   Genitourinary: Negative for dysuria.   Musculoskeletal: Negative for back pain.   Skin: Negative for rash.   Neurological: Negative for light-headedness and headaches.       Physical Exam   BP: 122/80  Pulse: 73  Temp: 98.4  F (36.9  C)  Resp: 20  SpO2: 98 %    Physical Exam  GEN: Awake, alert, and cooperative. Appears mildly distressed but on toxic  HENT: MMM. External ears and nose normal bilaterally.  EYES: EOM intact. Conjunctiva clear. No discharge.   NECK: Supple, symmetric.  CV : Regular rate and rhythm. Extremities warm and well perfused.   PULM: Normal effort.   ABD: Soft, lower abdominal tenderness most significant in RLQ.    NEURO: Normal speech. Following commands. CN II-XII grossly intact. Answering questions and interacting appropriately.   EXT: No gross deformity. No lower extremity edema.   INT: Warm. No diaphoresis. Normal color.        ED Course        Procedures           Critical Care time:  none               Results for orders placed or performed during the hospital encounter of 10/06/20 (from the past 24 hour(s))   UA reflex to Microscopic   Result Value Ref Range     Color Urine Yellow     Appearance Urine Clear     Glucose Urine Negative NEG^Negative mg/dL    Bilirubin Urine Negative NEG^Negative    Ketones Urine 5 (A) NEG^Negative mg/dL    Specific Gravity Urine 1.014 1.003 - 1.035    Blood Urine Small (A) NEG^Negative    pH Urine 5.0 5.0 - 7.0 pH    Protein Albumin Urine Negative NEG^Negative mg/dL    Urobilinogen mg/dL 0.0 0.0 - 2.0 mg/dL    Nitrite Urine Negative NEG^Negative    Leukocyte Esterase Urine Negative NEG^Negative    Source Midstream Urine     RBC Urine 1 0 - 2 /HPF    WBC Urine <1 0 - 5 /HPF    Bacteria Urine Few (A) NEG^Negative /HPF    Squamous Epithelial /HPF Urine 1 0 - 1 /HPF    Mucous Urine Present (A) NEG^Negative /LPF   CBC with platelets differential   Result Value Ref Range    WBC 6.1 4.0 - 11.0 10e9/L    RBC Count 4.37 3.8 - 5.2 10e12/L    Hemoglobin 13.9 11.7 - 15.7 g/dL    Hematocrit 41.4 35.0 - 47.0 %    MCV 95 78 - 100 fl    MCH 31.8 26.5 - 33.0 pg    MCHC 33.6 31.5 - 36.5 g/dL    RDW 13.2 10.0 - 15.0 %    Platelet Count 296 150 - 450 10e9/L    Diff Method Automated Method     % Neutrophils 61.6 %    % Lymphocytes 26.6 %    % Monocytes 7.8 %    % Eosinophils 2.8 %    % Basophils 1.0 %    % Immature Granulocytes 0.2 %    Nucleated RBCs 0 0 /100    Absolute Neutrophil 3.7 1.6 - 8.3 10e9/L    Absolute Lymphocytes 1.6 0.8 - 5.3 10e9/L    Absolute Monocytes 0.5 0.0 - 1.3 10e9/L    Absolute Eosinophils 0.2 0.0 - 0.7 10e9/L    Absolute Basophils 0.1 0.0 - 0.2 10e9/L    Abs Immature Granulocytes 0.0 0 - 0.4 10e9/L    Absolute Nucleated RBC 0.0    Comprehensive metabolic panel   Result Value Ref Range    Sodium 139 133 - 144 mmol/L    Potassium 3.9 3.4 - 5.3 mmol/L    Chloride 106 94 - 109 mmol/L    Carbon Dioxide 29 20 - 32 mmol/L    Anion Gap 4 3 - 14 mmol/L    Glucose 103 (H) 70 - 99 mg/dL    Urea Nitrogen 12 7 - 30 mg/dL    Creatinine 0.62 0.52 - 1.04 mg/dL    GFR Estimate >90 >60 mL/min/[1.73_m2]    GFR Estimate If Black >90 >60 mL/min/[1.73_m2]     Calcium 9.2 8.5 - 10.1 mg/dL    Bilirubin Total 0.3 0.2 - 1.3 mg/dL    Albumin 3.9 3.4 - 5.0 g/dL    Protein Total 7.3 6.8 - 8.8 g/dL    Alkaline Phosphatase 71 40 - 150 U/L    ALT 16 0 - 50 U/L    AST 15 0 - 45 U/L   CT Abdomen Pelvis w Contrast    Narrative    EXAM: CT ABDOMEN PELVIS W CONTRAST  LOCATION: [facility]  DATE/TIME: 10/6/2020 6:02 PM    INDICATION: Right lower quadrant pain for 3 days.  COMPARISON: 11/11/2016 and additional exams dating back to 05/22/2008.  TECHNIQUE: CT scan of the abdomen and pelvis was performed following injection of IV contrast. Multiplanar reformats were obtained. Dose reduction techniques were used.  CONTRAST: 54 mL Isovue-370.    FINDINGS:   LOWER CHEST: Emphysema. Mild fibroatelectasis.    HEPATOBILIARY: Small hepatic lesions too small to characterize though unchanged from prior and therefore benign. Cholecystectomy.    PANCREAS: Normal.    SPLEEN: Normal.    ADRENAL GLANDS: Normal.    KIDNEYS/BLADDER: No significant mass, stone, or hydronephrosis.    BOWEL: Diverticulosis without diverticulitis. Postoperative changes sigmoid colon. No obstruction. Normal appendix.    LYMPH NODES: Normal.    VASCULATURE: Atherosclerotic plaque. No aortic aneurysm.    PELVIC ORGANS: Presumed subtotal hysterectomy.    MUSCULOSKELETAL: Degenerative disease.      Impression    IMPRESSION:   1.  Diverticulosis without diverticulitis.  2.  Remainder stable.       Medications   ondansetron (ZOFRAN) injection 4 mg (4 mg Intravenous Given 10/6/20 1632)   HYDROmorphone (PF) (DILAUDID) injection 0.5 mg (0.5 mg Intravenous Given 10/6/20 3866)   iopamidol (ISOVUE-370) solution 54 mL (54 mLs Intravenous Given 10/6/20 1803)   sodium chloride 0.9 % bag 500mL for CT scan flush use (54 mLs Intravenous Given 10/6/20 1803)       Assessments & Plan (with Medical Decision Making)   55 year old female with past medical history of diverticulitis status post colectomy in 2015, with 3 days of grossly  worsening right lower quadrant tenderness not associated fevers chills nausea or vomiting.  She was seen in clinic earlier today and sent to emergency department for further evaluation after tenderness on exam.  On arrival to Emergency Department, vital signs were blood pressure 122/80, temperature 90.4, pulse 73, respiratory rate 20, SPO2 98% on room air.    Labs were obtained prior to my evaluation and CBC was normal, CMP grossly normal exception of glucose of 103.  Urinalysis without evidence of acute infection, ketones present.  On exam she did have tenderness in her lower abdomen most noticeable in her right lower quadrant.  No peritoneal signs.  Given her tenderness on exam, history of diverticulitis, surgical history of colectomy, will obtain CT scan of abdomen pelvis with IV contrast.  Patient was given 0.5 mg of IV hydromorphone for pain control and 4 mg IV ondansetron empirically for nausea.    CBC normal.  CMP grossly normal with exception of glucose of 103.  Urinalysis with small amount of blood but only 1 RBC, no evidence of acute infections.  CT with diverticulosis but no signs of diverticulitis.  No pathology identified on CT to explain her symptoms.  Images reviewed personally as well as report from radiology which is noted above.  Pain could be related to her bowel syndrome.  Her pain was improved after treatment with 1 0.5 mg dose of hydromorphone.  No acute intradermal process requiring hospitalization emergent specialty consultation.  Plan for her to follow-up in primary care clinic in next 1 to 2 days if symptoms continue.  Consideration of referral to GI.  She has previously seen [facility] for colonoscopies.  She requested something for pain to help her get through the next few days and asleep at night.  A prescription for 12 Roxicodone was sent to pharmacy.  ED return precautions discussed with patient.  She expresses good understanding of plan and discharged in improved  condition.    I have reviewed the nursing notes.         New Prescriptions    OXYCODONE (ROXICODONE) 5 MG TABLET    Take 1 tablet (5 mg) by mouth every 6 hours as needed for pain       Final diagnoses:   Abdominal pain, right lower quadrant     Bonifacio Colon MD    10/6/2020   [facility] EMERGENCY DEPT    Disclaimer: This note consists of words and symbols derived from keyboarding and dictation using voice recognition software.  As a result, there may be errors that have gone undetected.  Please consider this when interpreting information found in this note.             Bonifacio Colon MD  10/06/20 1916

## 2020-10-06 NOTE — ED AVS SNAPSHOT
[facility]  [address] [address]  Phone: [phone]  Fax: [phone]                                    Fatou Simental   MRN: [id number]    Department: [facility]   Date of Visit: 10/6/2020           After Visit Summary Signature Page    I have received my discharge instructions, and my questions have been answered. I have discussed any challenges I see with this plan with the nurse or doctor.    ..........................................................................................................................................  Patient/Patient Representative Signature      ..........................................................................................................................................  Patient Representative Print Name and Relationship to Patient    ..................................................               ................................................  Date                                   Time    ..........................................................................................................................................  Reviewed by Signature/Title    ...................................................              ..............................................  Date                                               Time          22EPIC Rev 08/18

## 2020-10-06 NOTE — PATIENT INSTRUCTIONS
Due to severity of your pain, more urgent evaluation at the ER is needed. You most likely will need a CT scan of the abdomen, a few blood tests and a urine test as well.    Further treatment will be given there.    You agreed to be picked up by your fiance and brought to the ER now.      Our Clinic hours are:  Mondays    7:20 am - 7 pm  Tues -  Fri  7:20 am - 5 pm    Clinic Phone: [phone]    The clinic lab opens at 7:30 am Mon - Fri and appointments are required.    [facility]  Ph. [phone]  Monday  8 am - 7pm  Tues - Fri 8 am - 5:30 pm

## 2020-10-06 NOTE — PROGRESS NOTES
Subjective     Fatou Simental is a 55 year old female who presents to clinic today for the following health issues:    HPI         Abdominal/Flank Pain  Onset/Duration: x 3 days   Description:   Character: Sharp and Fullness  Location: right lower quadrant left lower quadrant  Radiation: None  Intensity: severe  Progression of Symptoms:  worsening  Accompanying Signs & Symptoms:  Fever/Chills: no  Gas/Bloating: YES  Nausea: YES  Vomitting: no  Diarrhea: YES  Constipation: YES  Dysuria or Hematuria: no  History:   Trauma: no  Previous similar pain: YES  Previous tests done: Colonoscopy  Precipitating factors:   Does the pain change with:     Food: YES    Bowel Movement: no    Urination: no   Other factors:  no  Therapies tried and outcome: None  No LMP recorded. Patient has had a hysterectomy.    Verified above history with patient.    Patient Active Problem List   Diagnosis     Raynaud's syndrome     Tobacco use disorder     Mild major depression (H)     Need for prophylactic hormone replacement therapy (postmenopausal)     Generalized anxiety disorder     Esophageal reflux     Breast screening     Ganglion of joint     Ovarian cyst     IBS (irritable bowel syndrome)     CARDIOVASCULAR SCREENING; LDL GOAL LESS THAN 160     Screening for malignant neoplasm of cervix     Diverticulosis     Migraine with aura and without status migrainosus, not intractable     Past Surgical History:   Procedure Laterality Date     ABLATE VEIN VARICOSE RADIO FREQUENCY WITHOUT PHLEBECTOMY MULTIPLE STAB  2014    Procedure: ABLATE VEIN VARICOSE RADIO FREQUENCY WITHOUT PHLEBECTOMY MULTIPLE STAB;  Surgeon: Khanh Bunch MD;  Location: [facility]     C  DELIVERY ONLY  ,,    , Low Cervical     ENHANCE LASER REFRACTIVE BILATERAL EXISTING PT IN PARAMETERS Bilateral     LASIK     HYSTERECTOMY SUPRACERVICAL, BILATERAL SALPINGO-OOPHORECTOMY, COMBINED       LAPAROSCOPIC ASSISTED COLECTOMY N/A 2015     Procedure: LAPAROSCOPIC ASSISTED COLECTOMY;  Surgeon: Khanh Bunch MD;  Location: [facility]     LAPAROSCOPIC CHOLECYSTECTOMY N/A 4/11/2017    Procedure: LAPAROSCOPIC CHOLECYSTECTOMY;  Surgeon: Levi Louis MD;  Location: [facility]     NERVE BLOCK PERIPHERAL Bilateral 2/6/2015    Procedure: NERVE BLOCK PERIPHERAL;  Surgeon: Generic Anesthesia Provider;  Location: [facility]     REPAIR PTOSIS BILATERAL Bilateral 10/11/2018    Procedure: REPAIR PTOSIS BILATERAL;  Bilateral Upper Eyelid Ptosis Repair;  Surgeon: Wilton Viramontes MD;  Location:  OR     SURGICAL HISTORY OF -   1998    Breast implants     SURGICAL HISTORY OF -   1994    Carpal  tunnel r hand       Social History     Tobacco Use     Smoking status: Current Every Day Smoker     Packs/day: 0.25     Years: 20.00     Pack years: 5.00     Smokeless tobacco: Never Used   Substance Use Topics     Alcohol use: Yes     Alcohol/week: 0.0 standard drinks     Comment: Social. 1-2 cans of beer or 2 glasses of wine once per month     Family History   Problem Relation Age of Onset     Cancer Father         liver     Cancer Brother         lung, asbestos     Cancer Mother         uterine     Cancer Sister         uterine     Breast Cancer Other      Breast Cancer Other      Glaucoma No family hx of      Macular Degeneration No family hx of          Current Outpatient Medications   Medication Sig Dispense Refill     albuterol (PROAIR HFA/PROVENTIL HFA/VENTOLIN HFA) 108 (90 Base) MCG/ACT inhaler Inhale 2 puffs into the lungs every 4 hours as needed for shortness of breath / dyspnea 1 Inhaler 3     butalbital-aspirin-caffeine (FIORINAL) -40 MG capsule Take 1 capsule by mouth every 6 hours as needed for headaches 30 capsule 5     Cholecalciferol (VITAMIN D3 PO) Take 400 Units by mouth daily        cyclobenzaprine (FLEXERIL) 10 MG tablet Take 0.5-1 tablets (5-10 mg) by mouth 3 times daily as needed for muscle spasms 60 tablet 1     DiphenhydrAMINE HCl (BENADRYL PO)  "Take 50 mg by mouth nightly as needed        estradiol (ESTRACE) 1 MG tablet Take 1 tablet (1 mg) by mouth daily 90 tablet 0     estradiol (VAGIFEM) 10 MCG TABS vaginal tablet Place 1 tablet (10 mcg) vaginally twice a week 24 tablet 3     gabapentin (NEURONTIN) 100 MG capsule TAKE TWO CAPSULES BY MOUTH AT BEDTIME 60 capsule 1     LORazepam (ATIVAN) 0.5 MG tablet Take 1 tablet by mouth twice daily as needed 60 tablet 1     multivitamin, therapeutic (THERA-VIT) TABS Take 1 tablet by mouth daily       omeprazole 20 MG tablet Take 1 tablet (20 mg) by mouth daily Take 30-60 minutes before a meal. 30 tablet 0     Allergies   Allergen Reactions     Cephalexin Hives     Patient states she gets hives - bad reaction not listed on her H & P.     Rizatriptan Dizziness     Ciprofloxacin Hives     Got rash when taken with metronidazole + Vicodin     Lactose Diarrhea     Metronidazole Hives     Rash when taken with cipro + Vicodin     No Clinical Screening - See Comments      Pt developed hives when taking flagyl, cipro, and vicodin, doesn't know which she is allergic to      Vicodin [Hydrocodone-Acetaminophen] Hives     Rash when taken with cipro and metronidazole         Review of Systems   C: NEGATIVE for fever, chills, or change in weight  I: NEGATIVE for worrisome rashes, moles or lesions  E: NEGATIVE for vision changes or irritation  ENT: NEGATIVE for ear, mouth and throat problems  R: NEGATIVE for significant cough or SOB  CV: NEGATIVE for chest pain, palpitations or peripheral edema  GI: see above  :negative for dysuria, hematuria, decreased urinary stream, or discharge  M: NEGATIVE for significant arthralgias or myalgia  N: NEGATIVE for weakness, dizziness or paresthesias  E: NEGATIVE for temperature intolerance, skin/hair changes  H: NEGATIVE for bleeding problems  P: anxious      Objective    /60   Pulse 81   Temp 97.7  F (36.5  C)   Resp 16   Ht 1.575 m (5' 2\")   Wt 50.3 kg (111 lb)   SpO2 97%   BMI " 20.30 kg/m    Body mass index is 20.3 kg/m .  Physical Exam   GENERAL: underweight, alert and no distress  EYES: no icterus  ABD: flat abdomen, no skin changes, severe direct RLQ TTP and moderate LLQ TTP, no palpable mass, mild guarding on RLQ but no rebound tenderness at this time, no Pearson's sign, no hepatosplenomegaly  MS: extremities- no gross deformities noted, no edema  SKIN: no jaundice or rash    No results found for this or any previous visit (from the past 24 hour(s)).        Assessment & Plan     Fatou was seen today for abdominal pain.    Diagnoses and all orders for this visit:    Right lower quadrant pain    History of diverticulitis    History of colon surgery          Patient has significant RLQ pain tenderness and lesser left lower abdominal tenderness.  Afebrile but appears moderately uncomfortable on exam.   DDx: appendicitis, diverticulitis, ileus, colitis, adhesions, peritonitis  Due to variou sacute conditions that need to be ruled out, ER evaluation is advised. Most likely, patient will need CBC, cMP, urinalysis and CT abdomen/pelvis to star the  evaluation.  Patient concurred.  Report given to ER charge nurse who concurred to have patient brought to the ER.  Patient will call fiance to pick her up from clinic and drive her to the ER.    Patient Instructions   Due to severity of your pain, more urgent evaluation at the ER is needed. You most likely will need a CT scan of the abdomen, a few blood tests and a urine test as well.    Further treatment will be given there.    You agreed to be picked up by your fiance and brought to the ER now.      Our Clinic hours are:  Mondays    7:20 am - 7 pm  Tues -  Fri  7:20 am - 5 pm    Clinic Phone: [phone]    The clinic lab opens at 7:30 am Mon - Fri and appointments are required.    [facility]. [phone]  Monday  8 am - 7pm  Tues - Fri 8 am - 5:30 pm             Return for depending on ER evaluation.    Forest Wong  MD Elaine  [facility]

## 2020-10-07 ENCOUNTER — TELEPHONE (OUTPATIENT)
Dept: FAMILY MEDICINE | Facility: CLINIC | Age: 55
End: 2020-10-07

## 2020-10-07 DIAGNOSIS — R10.31 RIGHT LOWER QUADRANT PAIN: Primary | ICD-10-CM

## 2020-10-07 DIAGNOSIS — Z87.19 HISTORY OF DIVERTICULITIS: ICD-10-CM

## 2020-10-07 NOTE — TELEPHONE ENCOUNTER
Reason for call:  Patient reporting a symptom    Symptom or request: Pt was seen by Dr. Henry and was seen in ED yesterday.  The abd pain is worse and pt is vomiting now.  No fever.  Please call patient and advise.      Duration (how long have symptoms been present): ongoing    Have you been treated for this before? Yes    Additional comments:     Phone Number patient can be reached at:  Home number on file [phone] (home)    Best Time:  any    Can we leave a detailed message on this number:  YES    Call taken on 10/7/2020 at 9:21 AM by Jenna Young

## 2020-10-07 NOTE — DISCHARGE INSTRUCTIONS
Contact primary care provider you saw this morning to follow-up on your ED visit and abdominal pain.  You may take ibuprofen and Tylenol as directed for pain.  You may take Roxicodone for breakthrough pain.    If you develop worsening pain, change in your pain, blood in your stools, black stools, fever, persistent nausea vomiting, or other new concerning signs or symptoms, he should return to the emergency department immediately for further evaluation and treatment.

## 2020-10-07 NOTE — TELEPHONE ENCOUNTER
"Patient was seen yesterday for Right abdominal pain, sent to ER for full work-up.  ER exam unremarkable.  Patient states her abdominal pain yesterday was mainly on the right side with tenderness.  States last night and today it is on the left side and reflects more of diverticulitis that she has had in the past.  \"the ER doctor said that I may be in the early stages of diverticulitis.\"  She had 2 episodes of vomiting last night, none today - following a bland diet and drinking liquids.  Denies fever, B/B concerns or other issues at this time.  She would like to know if you recommend the abx discussed at OV yesterday?    Pharm ready.    Routing to provider.  Jane GERBER RN      "

## 2020-10-07 NOTE — TELEPHONE ENCOUNTER
Reviwed ER visit.  It is reasonable for trial of Augmentin for 10 days as treatment for suspected acute early diverticulitis.  Rx sent to pharmacy.  Patient has hx of adverse reaction to ciprofloxacin and metronidazole so Augmentin was chosen as treatment.

## 2020-10-27 DIAGNOSIS — F41.1 GENERALIZED ANXIETY DISORDER: ICD-10-CM

## 2020-10-27 DIAGNOSIS — B02.29 POST HERPETIC NEURALGIA: ICD-10-CM

## 2020-10-28 RX ORDER — GABAPENTIN 100 MG/1
CAPSULE ORAL
Qty: 60 CAPSULE | Refills: 1 | OUTPATIENT
Start: 2020-10-28

## 2020-10-28 RX ORDER — LORAZEPAM 0.5 MG/1
TABLET ORAL
Qty: 60 TABLET | Refills: 1 | OUTPATIENT
Start: 2020-10-28

## 2020-10-30 ENCOUNTER — OFFICE VISIT (OUTPATIENT)
Dept: FAMILY MEDICINE | Facility: CLINIC | Age: 55
End: 2020-10-30
Payer: COMMERCIAL

## 2020-10-30 VITALS
SYSTOLIC BLOOD PRESSURE: 102 MMHG | WEIGHT: 111 LBS | OXYGEN SATURATION: 98 % | DIASTOLIC BLOOD PRESSURE: 62 MMHG | HEART RATE: 76 BPM | TEMPERATURE: 98.2 F | RESPIRATION RATE: 16 BRPM | HEIGHT: 62 IN | BODY MASS INDEX: 20.43 KG/M2

## 2020-10-30 DIAGNOSIS — Z79.890 NEED FOR PROPHYLACTIC HORMONE REPLACEMENT THERAPY (POSTMENOPAUSAL): ICD-10-CM

## 2020-10-30 DIAGNOSIS — K21.9 GASTROESOPHAGEAL REFLUX DISEASE WITHOUT ESOPHAGITIS: ICD-10-CM

## 2020-10-30 DIAGNOSIS — K58.9 IRRITABLE BOWEL SYNDROME, UNSPECIFIED TYPE: ICD-10-CM

## 2020-10-30 DIAGNOSIS — B02.29 POST HERPETIC NEURALGIA: ICD-10-CM

## 2020-10-30 DIAGNOSIS — Z13.6 CARDIOVASCULAR SCREENING; LDL GOAL LESS THAN 160: ICD-10-CM

## 2020-10-30 DIAGNOSIS — F41.1 GENERALIZED ANXIETY DISORDER: Primary | ICD-10-CM

## 2020-10-30 DIAGNOSIS — F32.0 MILD MAJOR DEPRESSION (H): ICD-10-CM

## 2020-10-30 PROCEDURE — 99214 OFFICE O/P EST MOD 30 MIN: CPT | Performed by: NURSE PRACTITIONER

## 2020-10-30 RX ORDER — GABAPENTIN 100 MG/1
CAPSULE ORAL
Qty: 60 CAPSULE | Refills: 1 | Status: CANCELLED | OUTPATIENT
Start: 2020-10-30

## 2020-10-30 RX ORDER — ESTRADIOL 10 UG/1
10 INSERT VAGINAL
Qty: 24 TABLET | Refills: 3 | Status: SHIPPED | OUTPATIENT
Start: 2020-11-02 | End: 2022-06-21

## 2020-10-30 RX ORDER — ESTRADIOL 1 MG/1
1 TABLET ORAL DAILY
Qty: 90 TABLET | Refills: 1 | Status: SHIPPED | OUTPATIENT
Start: 2020-10-30 | End: 2021-02-23

## 2020-10-30 RX ORDER — LORAZEPAM 0.5 MG/1
TABLET ORAL
Qty: 30 TABLET | Refills: 0 | Status: SHIPPED | OUTPATIENT
Start: 2020-10-30 | End: 2020-12-11

## 2020-10-30 RX ORDER — GABAPENTIN 300 MG/1
300 CAPSULE ORAL AT BEDTIME
Qty: 90 CAPSULE | Refills: 1 | Status: SHIPPED | OUTPATIENT
Start: 2020-10-30 | End: 2021-07-28

## 2020-10-30 RX ORDER — PAROXETINE 10 MG/1
20 TABLET, FILM COATED ORAL EVERY MORNING
Qty: 30 TABLET | Refills: 3 | Status: SHIPPED | OUTPATIENT
Start: 2020-10-30 | End: 2020-12-18

## 2020-10-30 RX ORDER — NICOTINE POLACRILEX 4 MG/1
20 GUM, CHEWING ORAL DAILY
Qty: 90 TABLET | Refills: 3 | Status: SHIPPED | OUTPATIENT
Start: 2020-10-30 | End: 2023-04-28

## 2020-10-30 SDOH — HEALTH STABILITY: MENTAL HEALTH: HOW MANY STANDARD DRINKS CONTAINING ALCOHOL DO YOU HAVE ON A TYPICAL DAY?: NOT ASKED

## 2020-10-30 SDOH — HEALTH STABILITY: MENTAL HEALTH: HOW OFTEN DO YOU HAVE A DRINK CONTAINING ALCOHOL?: NEVER

## 2020-10-30 SDOH — HEALTH STABILITY: MENTAL HEALTH: HOW OFTEN DO YOU HAVE 6 OR MORE DRINKS ON ONE OCCASION?: NEVER

## 2020-10-30 ASSESSMENT — ANXIETY QUESTIONNAIRES
7. FEELING AFRAID AS IF SOMETHING AWFUL MIGHT HAPPEN: NEARLY EVERY DAY
5. BEING SO RESTLESS THAT IT IS HARD TO SIT STILL: NEARLY EVERY DAY
6. BECOMING EASILY ANNOYED OR IRRITABLE: NEARLY EVERY DAY
IF YOU CHECKED OFF ANY PROBLEMS ON THIS QUESTIONNAIRE, HOW DIFFICULT HAVE THESE PROBLEMS MADE IT FOR YOU TO DO YOUR WORK, TAKE CARE OF THINGS AT HOME, OR GET ALONG WITH OTHER PEOPLE: EXTREMELY DIFFICULT
3. WORRYING TOO MUCH ABOUT DIFFERENT THINGS: NEARLY EVERY DAY
2. NOT BEING ABLE TO STOP OR CONTROL WORRYING: NEARLY EVERY DAY
1. FEELING NERVOUS, ANXIOUS, OR ON EDGE: NEARLY EVERY DAY
GAD7 TOTAL SCORE: 21

## 2020-10-30 ASSESSMENT — PATIENT HEALTH QUESTIONNAIRE - PHQ9
SUM OF ALL RESPONSES TO PHQ QUESTIONS 1-9: 12
5. POOR APPETITE OR OVEREATING: NEARLY EVERY DAY

## 2020-10-30 ASSESSMENT — PAIN SCALES - GENERAL: PAINLEVEL: NO PAIN (0)

## 2020-10-30 ASSESSMENT — MIFFLIN-ST. JEOR: SCORE: 1051.74

## 2020-10-30 NOTE — PROGRESS NOTES
"Subjective     Fatou Simental is a 55 year old female who presents to clinic today for the following health issues:    HPI   Chief Complaint   Patient presents with     Medication Request     Patient is here to discuss her medication. Patient will need her estradiol, gabapentin and ativan refilled. Patient has no concerns regarding medication.      Flu Shot     declined     Health Maintenance     discussed pap     Medication Reconciliation     No longer taking flexeril     Anxiety             Anxiety Follow-Up    How are you doing with your anxiety since your last visit? Worsened     Are you having other symptoms that might be associated with anxiety? Yes:  anxious    Have you had a significant life event? Job Concerns, Grief or Loss and OTHER: covid     Are you feeling depressed? No    Do you have any concerns with your use of alcohol or other drugs? No     Using Lorazepam mostly at night to help with sleep - cutting it in half.   Given #60 with 1 refill 05/2020.  Now using during the day - with daytime anxiety and \"feeling like my skin is crawling\"  Is currently not on anything daily for anxiety or symptoms.  NOT seeing counseling currently.  Lots of family and outside stressors.    Tried Prozac and Sertraline but does not remember why she stopped.  Buspar used for anxiety - stopped unsure why.  Social History     Tobacco Use     Smoking status: Current Every Day Smoker     Packs/day: 0.25     Years: 20.00     Pack years: 5.00     Smokeless tobacco: Never Used   Substance Use Topics     Alcohol use: Yes     Alcohol/week: 0.0 standard drinks     Comment: Social. 1-2 cans of beer or 2 glasses of wine once per month     Drug use: No     SOY-7 SCORE 3/10/2015 5/15/2020 10/30/2020   Total Score 12 - -   Total Score - 18 21     PHQ 8/3/2018 5/15/2020 10/30/2020   PHQ-9 Total Score 6 15 12   Q9: Thoughts of better off dead/self-harm past 2 weeks Not at all Not at all Not at all     Last PHQ-9 10/30/2020   1.  Little " interest or pleasure in doing things 2   2.  Feeling down, depressed, or hopeless 2   3.  Trouble falling or staying asleep, or sleeping too much 3   4.  Feeling tired or having little energy 3   5.  Poor appetite or overeating 0   6.  Feeling bad about yourself 1   7.  Trouble concentrating 1   8.  Moving slowly or restless 0   Q9: Thoughts of better off dead/self-harm past 2 weeks 0   PHQ-9 Total Score 12   Difficulty at work, home, or with people -     SOY-7  10/30/2020   1. Feeling nervous, anxious, or on edge 3   2. Not being able to stop or control worrying 3   3. Worrying too much about different things 3   4. Trouble relaxing 3   5. Being so restless that it is hard to sit still 3   6. Becoming easily annoyed or irritable 3   7. Feeling afraid, as if something awful might happen 3   SOY-7 Total Score 21   If you checked any problems, how difficult have they made it for you to do your work, take care of things at home, or get along with other people? Extremely difficult     Medication Followup of gabapentin    Taking Medication as prescribed: yes    Side Effects:  None    Medication Helping Symptoms:  Yes    Using post herpetic neuralgia.  Taking 100 mg taking 2 capsules at bedtime.  Reports helps with pain but still having difficulty sleeping.       Medication Followup of estradiol     Taking Medication as prescribed: yes    Side Effects:  None    Medication Helping Symptoms:  Yes    History of supracervical hysterectomy with oophorectomy.  Is currently taking Estradiol 1 mg along with vagifem 10 mcg twice weekly.  Reports has vaginal itching          How many servings of fruits and vegetables do you eat daily?  2-3    On average, how many sweetened beverages do you drink each day (Examples: soda, juice, sweet tea, etc.  Do NOT count diet or artificially sweetened beverages)?   0    How many days per week do you exercise enough to make your heart beat faster? 3 or less    How many minutes a day do you  "exercise enough to make your heart beat faster? 9 or less    How many days per week do you miss taking your medication? 0    Reports irritable bowel syndrome - mixed constipation and diarrhea.  Had colectomy 2015  - started following with [facility]   Due for follow up with them to recheck symptoms.  Having intermittent cramping but unsure if related to stress.    History of cholecystectomy in 2017    New Patient/Transfer of Care    Review of Systems   Constitutional, HEENT, cardiovascular, pulmonary, GI, , musculoskeletal, neuro, skin, endocrine and psych systems are negative, except as otherwise noted.      Objective    /62   Pulse 76   Temp 98.2  F (36.8  C) (Tympanic)   Resp 16   Ht 1.575 m (5' 2\")   Wt 50.3 kg (111 lb)   SpO2 98%   Breastfeeding No   BMI 20.30 kg/m    Body mass index is 20.3 kg/m .  Physical Exam   GENERAL: healthy, alert and no distress  RESP: lungs clear to auscultation - no rales, rhonchi or wheezes  BREAST: normal without masses, tenderness or nipple discharge and no palpable axillary masses or adenopathy  CV: regular rate and rhythm, normal S1 S2, no S3 or S4, no murmur, click or rub, no peripheral edema and peripheral pulses strong  ABDOMEN: soft, nontender, no hepatosplenomegaly, no masses and bowel sounds normal  MS: no gross musculoskeletal defects noted, no edema  PSYCH: mentation appears normal, affect normal/bright, tearful and anxious    No results found for this or any previous visit (from the past 24 hour(s)).        Assessment & Plan     Fatou was seen today for medication request, flu shot, health maintenance, medication reconciliation and anxiety.    Diagnoses and all orders for this visit:    Generalized anxiety disorder  -     LORazepam (ATIVAN) 0.5 MG tablet; Take 1 tablet by mouth twice daily as needed  -     PARoxetine (PAXIL) 10 MG tablet; Take 2 tablets (20 mg) by mouth every morning  -     gabapentin (NEURONTIN) 300 MG capsule; Take 1 capsule (300 mg) by " mouth At Bedtime    Mild major depression (H)  -     PARoxetine (PAXIL) 10 MG tablet; Take 2 tablets (20 mg) by mouth every morning  -     gabapentin (NEURONTIN) 300 MG capsule; Take 1 capsule (300 mg) by mouth At Bedtime    POSTMENOPAUSAL HORMONAL REPLACEMENT TX  -     estradiol (ESTRACE) 1 MG tablet; Take 1 tablet (1 mg) by mouth daily  -     estradiol (VAGIFEM) 10 MCG TABS vaginal tablet; Place 1 tablet (10 mcg) vaginally twice a week    Post herpetic neuralgia  -     gabapentin (NEURONTIN) 300 MG capsule; Take 1 capsule (300 mg) by mouth At Bedtime    Gastroesophageal reflux disease without esophagitis  -     omeprazole 20 MG tablet; Take 1 tablet (20 mg) by mouth daily Take 30-60 minutes before a meal.    Irritable bowel syndrome, unspecified type    CARDIOVASCULAR SCREENING; LDL GOAL LESS THAN 160  -     Lipid panel reflex to direct LDL Non-fasting; Future           Discussed estradiol and risks with tobacco use - will re-discuss cessation and resources at next visit/physical.   See AVS for other recommendations/plan.  Follow-up in 3 weeks for physical, labs ordered today - fasting, and recheck medication - make dosage adjustment.    Discussed at length use of benzodiazepines and my intention to wean back and to just prn use once Paxil is working more effectively for her current panic and anxiety disorder.  Given Pauline QUESADA [facility] information today to schedule.    Patient Instructions   Health Maintenance reviewed and plan for update discussed.  Patient asked to schedule her pap smear   FOLLOW UP with me in 3 weeks to recheck anxiety, medication and make dose adjustments if needed and do physical with pap smear.    Start Paroxetine (Paxil) at 10 mg once daily in the am.  Discussed using Lorazepam for severe pain episodes and to try not to use daily.  We will discuss this at the next visit.  Given #30 today - which should last > 1 month.    Increased Gabapentin to 300 mg at bedtime to see if this helps with sleep,  moods and trigeminal neuralgia (nerve pain)    Continue all other medications.  Discussed quitting smoking and risk of smoking with use of hormone replacement.    Lorazepam (Ativan) as needed for anxiety - this medication is not intended for long term use.  It should be used as little as possible to keep symptoms controlled and it may be used occasionally at night to help with sleep.  There is a possiblity of dependency while taking this medication.  It will often be a good bridge to helping with symptoms while waiting for other long term medications to become effective.  We will hopefully need less of these as antidepressants or other long term medications become more effective.  Counseling can also be a good alternative, as they can teach relaxation techniques instead of the use of these medications.       DOREEN Valverde            Our Clinic hours are:  Mondays    7:20 am - 7 pm  Tues - Fri  7:20 am - 5 pm    Clinic Phone: [phone]    The clinic lab opens at 7:30 am Mon - Fri and appointments are required.    [facility]. [phone]  Monday  8 am - 7pm  Tues - Fri 8 am - 5:30 pm           Patient Education     Treating Anxiety Disorders with Medicine  An anxiety disorder can make you feel nervous or apprehensive, even without a clear reason. In people age 65 and older, generalized anxiety disorder is one of the most commonly diagnosed anxiety disorders. Many times it occurs with depression. Certain anxiety disorders can cause intense feelings of fear or panic. You may even have physical symptoms such as a racing heartbeat, sweating, or dizziness. If you have these feelings, you don t have to suffer anymore. Treatment to help you overcome your fears will likely include therapy (also called counseling). Medicine may also be prescribed to help control your symptoms.     Medicines  Certain medicines may be prescribed to help control your symptoms. So you may feel less anxious. You  may also feel able to move forward with therapy. At first, medicines and dosages may need to be adjusted to find what works best for you. Try to be patient. Tell your healthcare provider how a medicine makes you feel. This way, you can work together to find the treatment that s best for you. Keep in mind that medicines can have side effects. Talk with your provider about any side effects that are bothering you. Changing the dose or type of medicine may help. Don t stop taking medicine on your own. That can cause symptoms to come back or cause dangerous withdrawal symptoms.     Anti-anxiety medicine. This medicine eases symptoms and helps you relax. Your healthcare provider will explain when and how to use it. It may be prescribed for use before situations that make you anxious. You may also be told to take medicine on a regular schedule. Anti-anxiety medicine may make you feel a little sleepy or  out of it.  Don t drive a car or operate machinery while on this medicine, until you know how it affects you.  Never use alcohol or other drugs with anti-anxiety medicines. This could result in loss of muscular control, sedation, coma, or death. Also, use only the amount of medicine prescribed for you. If you think you may have taken too much, get emergency care right away. Never share your medicines with others. Store these medicines in a safe place that can't be reached by children or visitors.   Keep taking medicines as prescribed  Never change your dosage, share or use another person's medicine, or stop taking your medicines without talking to your healthcare provider first. Keep the following in mind:     Some medicines must be taken on a schedule. Make this part of your daily routine. For instance, always take your pill before brushing your teeth. A pillbox can help you remember if you ve taken your medicine each day.    Medicines are often taken for 6 to 12 months. Your healthcare provider will then evaluate whether  you need to stay on them. Many people who have also had therapy may no longer need medicine to manage anxiety.    You may need to stop taking medicine slowly to give your body time to adjust. When it s time to stop, your healthcare provider will tell you more. Remember: Never stop taking your medicine without talking to your provider first.    If symptoms return, you may need to start taking medicines again.  This isn t your fault. It s just the nature of your anxiety disorder.  What to think about    Side effects. Medicines may cause side effects. Ask your healthcare provider or pharmacist what you can expect. They may have ideas for avoiding some side effects.    Sexual problems. Some antidepressants can affect your desire for sex or your ability to have an orgasm. A change in dosage or medicine often solves the problem. If you have a sexual side effect that concerns you, tell your healthcare provider.    Addiction. If you ve never had a problem with drugs or alcohol, you may not have a problem with medicines used to treat anxiety disorders. But always discuss the medicines with your healthcare provider before taking them. If you have a history of addiction, you may not be able to use certain medicines used to treat anxiety disorders.    Medicine interactions. Always check with your pharmacist before using any over-the-counter medicines (OTCs), including herbal supplements. Some OTCs may interact with your anti-anxiety medicines and increase or decrease their effectiveness.    [employer] last reviewed this educational content on 12/1/2019 2000-2020 [employer]. [address] [place] [address]. All rights reserved. This information is not intended as a substitute for professional medical care. Always follow your healthcare professional's instructions.               Return in about 3 weeks (around 11/20/2020) for Medication Follow up, Physical Exam.    Karlene Shay NP  [facility]  [facility]

## 2020-10-30 NOTE — PATIENT INSTRUCTIONS
Health Maintenance reviewed and plan for update discussed.  Patient asked to schedule her pap smear   FOLLOW UP with me in 3 weeks to recheck anxiety, medication and make dose adjustments if needed and do physical with pap smear.    Start Paroxetine (Paxil) at 10 mg once daily in the am.  Discussed using Lorazepam for severe pain episodes and to try not to use daily.  We will discuss this at the next visit.  Given #30 today - which should last > 1 month.    Increased Gabapentin to 300 mg at bedtime to see if this helps with sleep, moods and trigeminal neuralgia (nerve pain)    Continue all other medications.  Discussed quitting smoking and risk of smoking with use of hormone replacement.    Lorazepam (Ativan) as needed for anxiety - this medication is not intended for long term use.  It should be used as little as possible to keep symptoms controlled and it may be used occasionally at night to help with sleep.  There is a possiblity of dependency while taking this medication.  It will often be a good bridge to helping with symptoms while waiting for other long term medications to become effective.  We will hopefully need less of these as antidepressants or other long term medications become more effective.  Counseling can also be a good alternative, as they can teach relaxation techniques instead of the use of these medications.       DOREEN Valverde            Our Clinic hours are:  Mondays    7:20 am - 7 pm  Tues -  Fri  7:20 am - 5 pm    Clinic Phone: [phone]    The clinic lab opens at 7:30 am Mon - Fri and appointments are required.    [facility]  Ph. [phone]  Monday  8 am - 7pm  Tues - Fri 8 am - 5:30 pm           Patient Education     Treating Anxiety Disorders with Medicine  An anxiety disorder can make you feel nervous or apprehensive, even without a clear reason. In people age 65 and older, generalized anxiety disorder is one of the most commonly diagnosed anxiety  disorders. Many times it occurs with depression. Certain anxiety disorders can cause intense feelings of fear or panic. You may even have physical symptoms such as a racing heartbeat, sweating, or dizziness. If you have these feelings, you don t have to suffer anymore. Treatment to help you overcome your fears will likely include therapy (also called counseling). Medicine may also be prescribed to help control your symptoms.     Medicines  Certain medicines may be prescribed to help control your symptoms. So you may feel less anxious. You may also feel able to move forward with therapy. At first, medicines and dosages may need to be adjusted to find what works best for you. Try to be patient. Tell your healthcare provider how a medicine makes you feel. This way, you can work together to find the treatment that s best for you. Keep in mind that medicines can have side effects. Talk with your provider about any side effects that are bothering you. Changing the dose or type of medicine may help. Don t stop taking medicine on your own. That can cause symptoms to come back or cause dangerous withdrawal symptoms.     Anti-anxiety medicine. This medicine eases symptoms and helps you relax. Your healthcare provider will explain when and how to use it. It may be prescribed for use before situations that make you anxious. You may also be told to take medicine on a regular schedule. Anti-anxiety medicine may make you feel a little sleepy or  out of it.  Don t drive a car or operate machinery while on this medicine, until you know how it affects you.  Never use alcohol or other drugs with anti-anxiety medicines. This could result in loss of muscular control, sedation, coma, or death. Also, use only the amount of medicine prescribed for you. If you think you may have taken too much, get emergency care right away. Never share your medicines with others. Store these medicines in a safe place that can't be reached by children or  visitors.   Keep taking medicines as prescribed  Never change your dosage, share or use another person's medicine, or stop taking your medicines without talking to your healthcare provider first. Keep the following in mind:     Some medicines must be taken on a schedule. Make this part of your daily routine. For instance, always take your pill before brushing your teeth. A pillbox can help you remember if you ve taken your medicine each day.    Medicines are often taken for 6 to 12 months. Your healthcare provider will then evaluate whether you need to stay on them. Many people who have also had therapy may no longer need medicine to manage anxiety.    You may need to stop taking medicine slowly to give your body time to adjust. When it s time to stop, your healthcare provider will tell you more. Remember: Never stop taking your medicine without talking to your provider first.    If symptoms return, you may need to start taking medicines again.  This isn t your fault. It s just the nature of your anxiety disorder.  What to think about    Side effects. Medicines may cause side effects. Ask your healthcare provider or pharmacist what you can expect. They may have ideas for avoiding some side effects.    Sexual problems. Some antidepressants can affect your desire for sex or your ability to have an orgasm. A change in dosage or medicine often solves the problem. If you have a sexual side effect that concerns you, tell your healthcare provider.    Addiction. If you ve never had a problem with drugs or alcohol, you may not have a problem with medicines used to treat anxiety disorders. But always discuss the medicines with your healthcare provider before taking them. If you have a history of addiction, you may not be able to use certain medicines used to treat anxiety disorders.    Medicine interactions. Always check with your pharmacist before using any over-the-counter medicines (OTCs), including herbal supplements.  Some OTCs may interact with your anti-anxiety medicines and increase or decrease their effectiveness.    [employer] last reviewed this educational content on 12/1/2019 2000-2020 [employer]. [address] [place] [address], [place] [address]. All rights reserved. This information is not intended as a substitute for professional medical care. Always follow your healthcare professional's instructions.

## 2020-10-31 ASSESSMENT — ANXIETY QUESTIONNAIRES: GAD7 TOTAL SCORE: 21

## 2020-11-09 ENCOUNTER — TRANSFERRED RECORDS (OUTPATIENT)
Dept: HEALTH INFORMATION MANAGEMENT | Facility: CLINIC | Age: 55
End: 2020-11-09

## 2020-11-12 ENCOUNTER — VIRTUAL VISIT (OUTPATIENT)
Dept: FAMILY MEDICINE | Facility: CLINIC | Age: 55
End: 2020-11-12
Payer: COMMERCIAL

## 2020-11-12 DIAGNOSIS — M79.10 MYALGIA: ICD-10-CM

## 2020-11-12 DIAGNOSIS — B37.31 YEAST INFECTION OF THE VAGINA: ICD-10-CM

## 2020-11-12 DIAGNOSIS — J01.10 ACUTE NON-RECURRENT FRONTAL SINUSITIS: Primary | ICD-10-CM

## 2020-11-12 DIAGNOSIS — R09.81 CONGESTION OF PARANASAL SINUS: ICD-10-CM

## 2020-11-12 PROCEDURE — 99214 OFFICE O/P EST MOD 30 MIN: CPT | Mod: TEL | Performed by: NURSE PRACTITIONER

## 2020-11-12 RX ORDER — FLUCONAZOLE 150 MG/1
150 TABLET ORAL ONCE
Qty: 1 TABLET | Refills: 1 | Status: SHIPPED | OUTPATIENT
Start: 2020-11-12 | End: 2020-11-12

## 2020-11-12 NOTE — PATIENT INSTRUCTIONS
1.  Take antibiotic as directed for sinus infection if symptoms are not improving over the next couple days.  2.  Get tested for COVID 19, instructions below.  3.  Follow-up in clinic if any worsening or persistent symptoms.      Patient Education     Sinusitis (Antibiotic Treatment)    The sinuses are air-filled spaces within the bones of the face. They connect to the inside of the nose. Sinusitis is an inflammation of the tissue that lines the sinuses. Sinusitis can occur during a cold. It can also happen due to allergies to pollens and other particles in the air. Sinusitis can cause symptoms of sinus congestion and a feeling of fullness. A sinus infection causes fever, headache, and facial pain. There is often green or yellow fluid draining from the nose or into the back of the throat (post-nasal drip). You have been given antibiotics to treat this condition.   Home care    Take the full course of antibiotics as instructed. Don't stop taking them, even when you feel better.    Drink plenty of water, hot tea, and other liquids as directed by the healthcare provider. This may help thin nasal mucus. It also may help your sinuses drain fluids.    Heat may help soothe painful areas of your face. Use a towel soaked in hot water. Or,  the shower and direct the warm spray onto your face. Using a vaporizer along with a menthol rub at night may also help soothe symptoms.     An expectorant with guaifenesin may help thin nasal mucus and help your sinuses drain fluids. Talk with your provider or pharmacists before taking an over-the-counter (OTC) medicine if you have any questions about it or its side effects..    You can use an OTC decongestant, unless a similar medicine was prescribed to you. Nasal sprays work the fastest. Use one that contains phenylephrine or oxymetazoline. First blow your nose gently. Then use the spray. Don't use these medicines more often than directed on the label. If you do, your symptoms  may get worse. You may also take pills that contain pseudoephedrine. Don t use products that combine multiple medicines. This is because side effects may be increased. Read labels. You can also ask the pharmacist for help. (People with high blood pressure should not use decongestants. They can raise blood pressure.) Talk with your provider or pharmacist if you have any questions about the medicine..    OTC antihistamines may help if allergies contributed to your sinusitis. Talk with your provider or pharmacist if you have any questions about the medicine..    Don't use nasal rinses or irrigation during an acute sinus infection, unless your healthcare provider tells you to. Rinsing may spread the infection to other areas in your sinuses.    Use acetaminophen or ibuprofen to control pain, unless another pain medicine was prescribed to you. If you have chronic liver or kidney disease or ever had a stomach ulcer, talk with your healthcare provider before using these medicines. Never give aspirin to anyone under age 18 who is ill with a fever. It may cause severe liver damage.    Don't smoke. This can make symptoms worse.    Follow-up care  Follow up with your healthcare provider, or as advised.   When to seek medical advice  Call your healthcare provider if any of these occur:     Facial pain or headache that gets worse    Stiff neck    Unusual drowsiness or confusion    Swelling of your forehead or eyelids    Symptoms don't go away in 10 days    Vision problems, such as blurred or double vision    Fever of 100.4 F (38 C) or higher, or as directed by your healthcare provider  Call 911  Call 911 if any of these occur:     Seizure    Trouble breathing    Feeling dizzy or faint    Fingernails, skin or lips look blue, purple , or gray  Prevention  Here are steps you can take to help prevent an infection:     Keep good hand washing habits.    Don t have close contact with people who have sore throats, colds, or other upper  "respiratory infections.    Don t smoke, and stay away from secondhand smoke.    Stay up to date with of your vaccines.  [employer] last reviewed this educational content on 12/1/2019 2000-2020 [employer]. [address] [place] [address], [place] [address]. All rights reserved. This information is not intended as a substitute for professional medical care. Always follow your healthcare professional's instructions.           Patient Education   After Your COVID-19 (Coronavirus) Test  You have been tested for COVID-19 (coronavirus).   If you'll have surgery in the next few days, we'll let you know ahead of time if you have the virus. Please call your surgeon's office with any questions.  For all other patients: Results are usually available in [employer] within 2 to 3 days.   If you do not have a [employer] account, you'll get a letter in the mail in about 7 to 10 days.   [employer] is often the fastest way to get test results. Please sign up if you do not already have a [employer] account. See the handout \"Getting COVID-19 Test Results in [employer]\" for help.   What if my test result is positive?  If your test result is positive, you'll also get a phone call letting you know. (A positive test means that you have the virus.)     Follow the tips under \"How do I self-isolate?\" below for 10 days (20 days if you have a weak immune system).    You don't need to be retested for COVID-19 before going back to school or work. As long as you're fever-free and feeling better, you can go back to school, work and other activities after waiting the 10 or 20 days.  What if I have questions after I get my results?  If you have questions about your results, please visit our testing website at [email or web address].  After 7 to 10 days, if you have not gotten your results:     Call [phone] ([phone]) and ask to speak with our COVID-19 results team.    If you're being treated at an infusion center: Call your " "infusion center directly.  What are the symptoms of COVID-19?  Cough, fever and trouble breathing are the most common signs of COVID-19.  Other symptoms can include new headaches, new muscle or body aches, new and unexplained fatigue (feeling very tired), chills, sore throat, congestion (stuffy or runny nose), diarrhea (loose poop), loss of taste or smell, belly pain, and nausea or vomiting (feeling sick to your stomach or throwing up).  You may already have symptoms of COVID-19, or they may show up later.  What should I do if I have symptoms?  If you're having surgery: Call your surgeon's office.  For all other patients: Stay home and away from others (self-isolate) until ...    You've had no fever--and no medicine that reduces fever--for 1 full day (24 hours), AND    Other symptoms have gotten better. For example, your cough or breathing has improved, AND    At least 10 days have passed since your symptoms first started.  How do I self-isolate?    Stay in your own room, even for meals. Use your own bathroom if you can.    Stay away from others in your home. No hugging, kissing or shaking hands. No visitors.    Don't go to work, school or anywhere else.    Clean \"high touch\" surfaces often (doorknobs, counters, handles). Use household cleaning spray or wipes. You'll find a full list of  on the EPA website: www.epa.gov/pesticide-registration/list-n-disinfectants-use-against-sars-cov-2.    Cover your mouth and nose with a mask or other face covering to avoid spreading germs.    Wash your hands and face often. Use soap and water.    Caregivers in these groups are at risk for severe illness due to COVID-19:  ? People 65 years and older  ? People who live in a nursing home or long-term care facility  ? People with chronic disease (lung, heart, cancer, diabetes, kidney, liver, immunologic)  ? People who have a weakened immune system, including those who:    Are in cancer treatment    Take medicine that weakens " the immune system, such as corticosteroids    Had a bone marrow or organ transplant    Have an immune deficiency    Have poorly controlled HIV or AIDS    Are obese (body mass index of 40 or higher)    Smoke regularly    Caregivers should wear gloves while washing dishes, handling laundry and cleaning bedrooms and bathrooms.    Use caution when washing and drying laundry: Don't shake dirty laundry and use the warmest water setting that you can.    For more tips on managing your health at home, go to www.cdc.gov/coronavirus/2019-ncov/downloads/10Things.pdf.  How can I take care of myself at home?  1. Get lots of rest. Drink extra fluids (unless a doctor has told you not to).  2. Take Tylenol (acetaminophen) for fever or pain. If you have liver or kidney problems, ask your family doctor if it's OK to take Tylenol.   Adults can take either:  ? 650 mg (two 325 mg pills) every 4 to 6 hours, or   ? 1,000 mg (two 500 mg pills) every 8 hours as needed.  ? Note: Don't take more than 3,000 mg in one day. Acetaminophen is found in many medicines (both prescribed and over-the-counter medicines). Read all labels to be sure you don't take too much.   For children, check the Tylenol bottle for the right dose. The dose is based on the child's age or weight.  3. If you have other health problems (like cancer, heart failure, an organ transplant or severe kidney disease): Call your specialty clinic if you don't feel better in the next 2 days.  4. Know when to call 911. Emergency warning signs include:  ? Trouble breathing or shortness of breath  ? Chest pain or pressure that doesn't go away  ? Feeling confused like you haven't felt before, or not being able to wake up  ? Bluish-colored lips or face  5. If your doctor prescribed a blood thinner medicine: Follow their instructions.  Where can I get more information?     [facility] - About COVID-19: [email or web address]    CDC - If You're Sick:  cdc.gov/coronavirus/2019-ncov/about/steps-when-sick.html    CDC - Ending Home Isolation: www.cdc.gov/coronavirus/2019-ncov/hcp/disposition-in-home-patients.html    CDC - Caring for Someone: www.cdc.gov/coronavirus/2019-ncov/if-you-are-sick/care-for-someone.html    [facility] - Interim Guidance for Hospital Discharge to Home: www.health.[facility].[place]./diseases/coronavirus/hcp/hospdischarge.pdf    [facility] clinical trials (COVID-19 research studies): [email or web address]    Below are the COVID-19 hotlines at the [place] Department of Health ([facility]). Interpreters are available.  ? For health questions: Call 101-111-4007 or [phone] (7 a.m. to 7 p.m.)  ? For questions about schools and childcare: Call 426-339-8548 or 1-415.155.9488 (7 a.m. to 7 p.m.)    For informational purposes only. Not to replace the advice of your health care provider. Clinically reviewed by Infection Prevention and the [facility] COVID-19 Clinical Team. Copyright   2020 [employer]. All rights reserved. [employer] 138635 - Rev 10/2/20.

## 2020-11-12 NOTE — PROGRESS NOTES
"Fatou Simental is a 55 year old female who is being evaluated via a billable telephone visit.      The patient has been notified of following:     \"This telephone visit will be conducted via a call between you and your physician/provider. We have found that certain health care needs can be provided without the need for a physical exam.  This service lets us provide the care you need with a short phone conversation.  If a prescription is necessary we can send it directly to your pharmacy.  If lab work is needed we can place an order for that and you can then stop by our lab to have the test done at a later time.    Telephone visits are billed at different rates depending on your insurance coverage. During this emergency period, for some insurers they may be billed the same as an in-person visit.  Please reach out to your insurance provider with any questions.    If during the course of the call the physician/provider feels a telephone visit is not appropriate, you will not be charged for this service.\"    Patient has given verbal consent for Telephone visit?  Yes    What phone number would you like to be contacted at? [phone]     How would you like to obtain your AVS? Mail a copy    Fatou Simental is a 55 year old female who presents via phone visit today for the following health issues:    HPI       Concern for COVID-19  About how many days ago did these symptoms start? 8 days ago   Is this your first visit for this illness? Yes  In the 14 days before your symptoms started, have you had close contact with someone with COVID-19 (Coronavirus)? No  Do you have a fever or chills? Yes, I felt feverish or had chills, temperature this am is 98.1 and 98.7  Are you having new or worsening difficulty breathing? No  Do you have new or worsening cough? Yes, I am coughing up mucus. Lots of mucous that is yellow, worsened as time has gone on  Have you had any new or unexplained body aches? YES , since beginning of " symptoms.  Have you experienced any of the following NEW symptoms?    Headache: YES, top of the head and started since symptoms began    Sore throat: No    Loss of taste or smell: YES    Chest pain: No    Diarrhea: YES-Says that she has colon issues that she is also being see for, not different from issues    Rash: No  What treatments have you tried? Ibuprofen and Tylenol, Mary-seltzer sinus, helping a little  Who do you live with? Fiance who has a little cough and no energy  Are you, or a household member, a healthcare worker or a ? No  Do you live in a nursing home, group home, or shelter? No  Do you have a way to get food/medications if quarantined? Yes, I have a friend or family member who can help me.    Feels like she has a sinus infection and ears are clogged.  Uncertain if any contacts.  No influenza vaccination this year and usually does not get them.  Usually gets sinus infections this time of year.  Takes Claritin when needed.  Stopped a week or so ago.    Review of Systems   CONSTITUTIONAL:POSITIVE  for chills, myalgias and sweats  ENT/MOUTH: POSITIVE for nasal congestion, postnasal drainage, sinus pressure and ears feeling full and plugged  RESP:POSITIVE for cough-productive  CV: NEGATIVE for chest pain, palpitations or peripheral edema  PSYCHIATRIC: NEGATIVE for changes in mood or affect  ROS otherwise negative       Objective          Vitals:  No vitals were obtained today due to virtual visit.    healthy, alert and no distress  PSYCH: Alert and oriented times 3; coherent speech, normal   rate and volume, able to articulate logical thoughts, able   to abstract reason, no tangential thoughts, no hallucinations   or delusions  Her affect is normal  RESP: Wet cough noted on phone call, no audible wheezing, able to talk in full sentences  Remainder of exam unable to be completed due to telephone visits        Assessment/Plan:    Assessment & Plan     Acute non-recurrent frontal  sinusitis  Discussed that if symptoms are not improving in the next couple days, she can start antibiotic treatment for sinus infection.  Handout given on home management with symptoms.  Recommend follow-up in 1 week if any persistent symptoms.  - amoxicillin-clavulanate (AUGMENTIN) 875-125 MG tablet; Take 1 tablet by mouth 2 times daily for 7 days    Yeast infection of the vagina  Ordered Diflucan as needed if she develops yeast infection from antibiotic which is typical for her.  - fluconazole (DIFLUCAN) 150 MG tablet; Take 1 tablet (150 mg) by mouth once for 1 dose    Congestion of paranasal sinus  Ordered COVID 19 testing to rule this out due to symptoms.  - Symptomatic COVID-19 Virus (Coronavirus) by PCR; Future    Myalgia  - Symptomatic COVID-19 Virus (Coronavirus) by PCR; Future        See Patient Instructions    Return in about 1 week (around 11/19/2020), or if symptoms worsen or fail to improve.    Karin Piña NP  [facility]    Phone call duration:  12 minutes

## 2020-11-18 DIAGNOSIS — R09.81 CONGESTION OF PARANASAL SINUS: ICD-10-CM

## 2020-11-18 DIAGNOSIS — M79.10 MYALGIA: ICD-10-CM

## 2020-11-18 PROCEDURE — U0003 INFECTIOUS AGENT DETECTION BY NUCLEIC ACID (DNA OR RNA); SEVERE ACUTE RESPIRATORY SYNDROME CORONAVIRUS 2 (SARS-COV-2) (CORONAVIRUS DISEASE [COVID-19]), AMPLIFIED PROBE TECHNIQUE, MAKING USE OF HIGH THROUGHPUT TECHNOLOGIES AS DESCRIBED BY CMS-2020-01-R: HCPCS | Performed by: NURSE PRACTITIONER

## 2020-11-19 ENCOUNTER — MYC MEDICAL ADVICE (OUTPATIENT)
Dept: FAMILY MEDICINE | Facility: CLINIC | Age: 55
End: 2020-11-19

## 2020-11-19 LAB
SARS-COV-2 RNA SPEC QL NAA+PROBE: NOT DETECTED
SPECIMEN SOURCE: NORMAL

## 2020-12-03 ENCOUNTER — MYC MEDICAL ADVICE (OUTPATIENT)
Dept: FAMILY MEDICINE | Facility: CLINIC | Age: 55
End: 2020-12-03

## 2020-12-03 DIAGNOSIS — F41.1 GENERALIZED ANXIETY DISORDER: ICD-10-CM

## 2020-12-03 RX ORDER — LORAZEPAM 0.5 MG/1
TABLET ORAL
Qty: 30 TABLET | Refills: 0 | OUTPATIENT
Start: 2020-12-03

## 2020-12-03 NOTE — TELEPHONE ENCOUNTER
10/30/2020 OV note: 'Return in about 3 weeks (around 11/20/2020) for Medication Follow up, Physical Exam.'    [employer] message sent.    Maritza HEREDIA RN, BSN

## 2020-12-03 NOTE — TELEPHONE ENCOUNTER
Requested Prescriptions   Pending Prescriptions Disp Refills     LORazepam (ATIVAN) 0.5 MG tablet [Pharmacy Med Name: LORAZEPAM 0.5MG TABS] 30 tablet 0     Sig: TAKE ONE TABLET BY MOUTH TWICE A DAY AS NEEDED       There is no refill protocol information for this order

## 2020-12-11 NOTE — TELEPHONE ENCOUNTER
Patient called and would like a refill sent asap to [facility] pharmacy, she is out of medication and has appt scheduled for 12/18/2020 and needs at least enough to get her to appt. Debbie Jauregui on 12/11/2020 at 1:51 PM

## 2020-12-14 ENCOUNTER — MYC MEDICAL ADVICE (OUTPATIENT)
Dept: FAMILY MEDICINE | Facility: CLINIC | Age: 55
End: 2020-12-14

## 2020-12-14 RX ORDER — LORAZEPAM 0.5 MG/1
TABLET ORAL
Qty: 5 TABLET | Refills: 0 | Status: SHIPPED | OUTPATIENT
Start: 2020-12-14 | End: 2020-12-18

## 2020-12-18 ENCOUNTER — OFFICE VISIT (OUTPATIENT)
Dept: FAMILY MEDICINE | Facility: CLINIC | Age: 55
End: 2020-12-18
Payer: COMMERCIAL

## 2020-12-18 VITALS
RESPIRATION RATE: 20 BRPM | SYSTOLIC BLOOD PRESSURE: 100 MMHG | WEIGHT: 107.4 LBS | TEMPERATURE: 97.2 F | HEART RATE: 68 BPM | DIASTOLIC BLOOD PRESSURE: 60 MMHG | HEIGHT: 62 IN | BODY MASS INDEX: 19.77 KG/M2

## 2020-12-18 DIAGNOSIS — Z00.00 ROUTINE GENERAL MEDICAL EXAMINATION AT A HEALTH CARE FACILITY: Primary | ICD-10-CM

## 2020-12-18 DIAGNOSIS — F41.1 GENERALIZED ANXIETY DISORDER: ICD-10-CM

## 2020-12-18 DIAGNOSIS — Z13.6 CARDIOVASCULAR SCREENING; LDL GOAL LESS THAN 160: ICD-10-CM

## 2020-12-18 DIAGNOSIS — Z12.4 ENCOUNTER FOR SCREENING FOR CERVICAL CANCER: ICD-10-CM

## 2020-12-18 DIAGNOSIS — G44.209 TENSION-TYPE HEADACHE, NOT INTRACTABLE, UNSPECIFIED CHRONICITY PATTERN: ICD-10-CM

## 2020-12-18 DIAGNOSIS — K58.9 IRRITABLE BOWEL SYNDROME, UNSPECIFIED TYPE: ICD-10-CM

## 2020-12-18 DIAGNOSIS — N39.46 MIXED INCONTINENCE: ICD-10-CM

## 2020-12-18 DIAGNOSIS — F32.0 MILD MAJOR DEPRESSION (H): ICD-10-CM

## 2020-12-18 LAB
CHOLEST SERPL-MCNC: 165 MG/DL
GLUCOSE SERPL-MCNC: 84 MG/DL (ref 70–99)
HDLC SERPL-MCNC: 89 MG/DL
LDLC SERPL CALC-MCNC: 46 MG/DL
NONHDLC SERPL-MCNC: 76 MG/DL
TRIGL SERPL-MCNC: 149 MG/DL
TSH SERPL DL<=0.005 MIU/L-ACNC: 0.94 MU/L (ref 0.4–4)

## 2020-12-18 PROCEDURE — 36415 COLL VENOUS BLD VENIPUNCTURE: CPT | Performed by: NURSE PRACTITIONER

## 2020-12-18 PROCEDURE — G0145 SCR C/V CYTO,THINLAYER,RESCR: HCPCS | Performed by: NURSE PRACTITIONER

## 2020-12-18 PROCEDURE — 82947 ASSAY GLUCOSE BLOOD QUANT: CPT | Performed by: NURSE PRACTITIONER

## 2020-12-18 PROCEDURE — 84443 ASSAY THYROID STIM HORMONE: CPT | Performed by: NURSE PRACTITIONER

## 2020-12-18 PROCEDURE — 87624 HPV HI-RISK TYP POOLED RSLT: CPT | Performed by: NURSE PRACTITIONER

## 2020-12-18 PROCEDURE — 99213 OFFICE O/P EST LOW 20 MIN: CPT | Mod: 25 | Performed by: NURSE PRACTITIONER

## 2020-12-18 PROCEDURE — 99396 PREV VISIT EST AGE 40-64: CPT | Performed by: NURSE PRACTITIONER

## 2020-12-18 PROCEDURE — 80061 LIPID PANEL: CPT | Performed by: NURSE PRACTITIONER

## 2020-12-18 RX ORDER — METHOCARBAMOL 500 MG/1
500 TABLET, FILM COATED ORAL 3 TIMES DAILY
Qty: 60 TABLET | Refills: 1 | Status: SHIPPED | OUTPATIENT
Start: 2020-12-18 | End: 2021-12-01

## 2020-12-18 RX ORDER — LORAZEPAM 0.5 MG/1
TABLET ORAL
Qty: 5 TABLET | Refills: 0 | Status: SHIPPED | OUTPATIENT
Start: 2020-12-18 | End: 2022-01-20

## 2020-12-18 RX ORDER — OXYBUTYNIN CHLORIDE 5 MG/1
5 TABLET, EXTENDED RELEASE ORAL DAILY
Qty: 30 TABLET | Refills: 3 | Status: SHIPPED | OUTPATIENT
Start: 2020-12-18 | End: 2021-07-28

## 2020-12-18 RX ORDER — PAROXETINE 10 MG/1
5 TABLET, FILM COATED ORAL EVERY MORNING
Qty: 30 TABLET | Refills: 3 | COMMUNITY
Start: 2020-12-18 | End: 2022-01-20

## 2020-12-18 ASSESSMENT — ANXIETY QUESTIONNAIRES
2. NOT BEING ABLE TO STOP OR CONTROL WORRYING: NEARLY EVERY DAY
7. FEELING AFRAID AS IF SOMETHING AWFUL MIGHT HAPPEN: NEARLY EVERY DAY
1. FEELING NERVOUS, ANXIOUS, OR ON EDGE: NEARLY EVERY DAY
3. WORRYING TOO MUCH ABOUT DIFFERENT THINGS: NEARLY EVERY DAY
6. BECOMING EASILY ANNOYED OR IRRITABLE: NEARLY EVERY DAY
GAD7 TOTAL SCORE: 19
5. BEING SO RESTLESS THAT IT IS HARD TO SIT STILL: SEVERAL DAYS

## 2020-12-18 ASSESSMENT — PATIENT HEALTH QUESTIONNAIRE - PHQ9
5. POOR APPETITE OR OVEREATING: NEARLY EVERY DAY
SUM OF ALL RESPONSES TO PHQ QUESTIONS 1-9: 11

## 2020-12-18 ASSESSMENT — PAIN SCALES - GENERAL: PAINLEVEL: NO PAIN (0)

## 2020-12-18 ASSESSMENT — MIFFLIN-ST. JEOR: SCORE: 1027.47
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
## 2020-12-18 NOTE — PATIENT INSTRUCTIONS
Start Oxybutynin for bladder issues - discussed side effects  Follow-up in 3-4 weeks to recheck.    For tension headaches - recommend chiropractor, massage, and heat at home.  Robaxin up to three times daily but can cause drowsiness.  Follow-up in 3-4 weeks for recheck.    Restart Paxil but at lower dose 5 mg (1/2 tablet) for anxiety and recheck in 3-4 weeks.    Follow up sooner if not improving.    DOREEN Vavlerde      Preventive Health Recommendations  Female Ages 50 - 64    Yearly exam: See your health care provider every year in order to  o Review health changes.   o Discuss preventive care.    o Review your medicines if your doctor has prescribed any.      Get a Pap test every three years (unless you have an abnormal result and your provider advises testing more often).    If you get Pap tests with HPV test, you only need to test every 5 years, unless you have an abnormal result.     You do not need a Pap test if your uterus was removed (hysterectomy) and you have not had cancer.    You should be tested each year for STDs (sexually transmitted diseases) if you're at risk.     Have a mammogram every 1 to 2 years.    Have a colonoscopy at age 50, or have a yearly FIT test (stool test). These exams screen for colon cancer.      Have a cholesterol test every 5 years, or more often if advised.    Have a diabetes test (fasting glucose) every three years. If you are at risk for diabetes, you should have this test more often.     If you are at risk for osteoporosis (brittle bone disease), think about having a bone density scan (DEXA).    Shots: Get a flu shot each year. Get a tetanus shot every 10 years.    Nutrition:     Eat at least 5 servings of fruits and vegetables each day.    Eat whole-grain bread, whole-wheat pasta and brown rice instead of white grains and rice.    Get adequate Calcium and Vitamin D.     Lifestyle    Exercise at least 150 minutes a week (30 minutes a day, 5 days a week). This will help  you control your weight and prevent disease.    Limit alcohol to one drink per day.    No smoking.     Wear sunscreen to prevent skin cancer.     See your dentist every six months for an exam and cleaning.    See your eye doctor every 1 to 2 years.        Patient Education     Tension Headaches  Tension headaches cause a dull, steady pain on both sides of the head and in the neck and the back of the head. Your eyes may also feel tired. Tension headaches can be triggered by many things. These include muscle tension and clenching, lack of sleep, bad posture, eyestrain, stress, depression, anxiety, arthritis of the neck, and other factors.   To help prevent tension headaches  Take these steps:    Make sure your work area is set up to help you prevent neck strain and eyestrain.    Make sure that your eyeglass prescription is current and is correct for the work you do.    Learn methods for relaxing and reducing emotional stress. These include deep breathing, progressive relaxation, yoga, meditation, and biofeedback.    Keep up a regular exercise program under the guidance of a doctor. This can help keep your neck and back flexible, strong, and relaxed.  To relieve the pain  Take these steps:    Use moist heat to relax the muscles. Soak in a hot bath or wrap a warm, moist towel around your neck.    Brush your scalp lightly with a soft hairbrush.    Give yourself a massage. Knead the muscles that go from your shoulders up the back of your skull.    Use an ice pack. Apply this directly to the place where you feel pain.    Rest. Sleep often helps relieve headache pain.    Drink plenty of fluids. Dehydration is a trigger for headaches. Don't drink alcohol. This will make dehydration worse.    Use pain medicine when needed for moderate to severe pain.      Massaging your neck muscles can help relieve tension headache pain.     [employer] last reviewed this educational content on 12/1/2017 2000-2020 [employer]  [address] [place] [address]. All rights reserved. This information is not intended as a substitute for professional medical care. Always follow your healthcare professional's instructions.

## 2020-12-19 ASSESSMENT — ANXIETY QUESTIONNAIRES: GAD7 TOTAL SCORE: 19

## 2020-12-22 LAB
COPATH REPORT: NORMAL
PAP: NORMAL

## 2020-12-23 LAB
FINAL DIAGNOSIS: NORMAL
HPV HR 12 DNA CVX QL NAA+PROBE: NEGATIVE
HPV16 DNA SPEC QL NAA+PROBE: NEGATIVE
HPV18 DNA SPEC QL NAA+PROBE: NEGATIVE
SPECIMEN DESCRIPTION: NORMAL
SPECIMEN SOURCE CVX/VAG CYTO: NORMAL

## 2021-01-15 ENCOUNTER — HEALTH MAINTENANCE LETTER (OUTPATIENT)
Age: 56
End: 2021-01-15

## 2021-02-17 DIAGNOSIS — Z79.890 NEED FOR PROPHYLACTIC HORMONE REPLACEMENT THERAPY (POSTMENOPAUSAL): ICD-10-CM

## 2021-02-17 NOTE — TELEPHONE ENCOUNTER
"Requested Prescriptions   Pending Prescriptions Disp Refills     estradiol (ESTRACE) 1 MG tablet [Pharmacy Med Name: ESTRADIOL 1MG TABS] 90 tablet 0     Sig: TAKE 1 TABLET (1 MG) BY MOUTH DAILY       Hormone Replacement Therapy Failed - 2/17/2021  9:52 AM        Failed - Patient has mammogram in past 2 years on file if age 50-75        Passed - Blood pressure under 140/90 in past 12 months     BP Readings from Last 3 Encounters:   12/18/20 100/60   10/30/20 102/62   10/06/20 112/74                 Passed - Recent (12 mo) or future (30 days) visit within the authorizing provider's specialty     Patient has had an office visit with the authorizing provider or a provider within the authorizing providers department within the previous 12 mos or has a future within next 30 days. See \"Patient Info\" tab in inbasket, or \"Choose Columns\" in Meds & Orders section of the refill encounter.              Passed - Medication is active on med list        Passed - Patient is 18 years of age or older        Passed - No active pregnancy on record        Passed - No positive pregnancy test on record in past 12 months             "

## 2021-02-23 RX ORDER — ESTRADIOL 1 MG/1
1 TABLET ORAL DAILY
Qty: 90 TABLET | Refills: 1 | Status: SHIPPED | OUTPATIENT
Start: 2021-02-23 | End: 2021-10-18

## 2021-02-23 NOTE — TELEPHONE ENCOUNTER
Routing refill request to provider for review/approval because:  High drug interaction with butalbital-aspirin-caffeine    Za Neves RN

## 2021-05-11 DIAGNOSIS — G43.009 MIGRAINE WITHOUT AURA AND WITHOUT STATUS MIGRAINOSUS, NOT INTRACTABLE: ICD-10-CM

## 2021-05-11 RX ORDER — RIZATRIPTAN BENZOATE 10 MG/1
TABLET ORAL
Qty: 18 TABLET | Refills: 4 | OUTPATIENT
Start: 2021-05-11

## 2021-05-13 ENCOUNTER — TELEPHONE (OUTPATIENT)
Dept: FAMILY MEDICINE | Facility: CLINIC | Age: 56
End: 2021-05-13

## 2021-05-13 DIAGNOSIS — G43.009 MIGRAINE WITHOUT AURA AND WITHOUT STATUS MIGRAINOSUS, NOT INTRACTABLE: ICD-10-CM

## 2021-05-13 RX ORDER — RIZATRIPTAN BENZOATE 10 MG/1
TABLET ORAL
Qty: 18 TABLET | Refills: 4 | OUTPATIENT
Start: 2021-05-13

## 2021-05-21 DIAGNOSIS — G43.009 MIGRAINE WITHOUT AURA AND WITHOUT STATUS MIGRAINOSUS, NOT INTRACTABLE: ICD-10-CM

## 2021-05-21 RX ORDER — BUTALBITAL, ASPIRIN, AND CAFFEINE 325; 50; 40 MG/1; MG/1; MG/1
1 CAPSULE ORAL EVERY 6 HOURS PRN
Qty: 30 CAPSULE | Refills: 5 | Status: SHIPPED | OUTPATIENT
Start: 2021-05-21 | End: 2022-01-27

## 2021-05-21 RX ORDER — RIZATRIPTAN BENZOATE 10 MG/1
TABLET ORAL
Qty: 18 TABLET | Refills: 4 | OUTPATIENT
Start: 2021-05-21

## 2021-05-21 NOTE — TELEPHONE ENCOUNTER
"Called pt. She wants to have migraine med on hand. She had a \"small onset one yesterday\" and she had one tablet left, she took it and it helped.  She says with her migraines \"I'm in bed for days\" without her medication.  She gets migraines \"at least once a month.\" She gets them randomly.  Asked pt what she took yesterday, since she has Fiornal on med list and not rizatriptan. She said she wasn't home, does not have bottle with her and does not remember. She said: \"either one works.\"  Read her Telephone notes in Epic from 4/22/2020 - to remind her why the Fiornal was on med list and not the rizatriptan. \"Yeah I remember that now.\"      Routing refill request for FIORNAL to provider for review/approval because:  Drug not on the [facility] refill protocol   Last ordered by Gaby Shelotn APRN CNP Cindy F. RN, BSN        "

## 2021-06-08 ENCOUNTER — OFFICE VISIT (OUTPATIENT)
Dept: FAMILY MEDICINE | Facility: CLINIC | Age: 56
End: 2021-06-08
Payer: COMMERCIAL

## 2021-06-08 VITALS
SYSTOLIC BLOOD PRESSURE: 102 MMHG | DIASTOLIC BLOOD PRESSURE: 60 MMHG | HEART RATE: 70 BPM | OXYGEN SATURATION: 98 % | TEMPERATURE: 98.5 F | BODY MASS INDEX: 19.88 KG/M2 | HEIGHT: 62 IN | RESPIRATION RATE: 17 BRPM | WEIGHT: 108 LBS

## 2021-06-08 DIAGNOSIS — J01.90 ACUTE SINUSITIS WITH SYMPTOMS > 10 DAYS: Primary | ICD-10-CM

## 2021-06-08 DIAGNOSIS — I83.91 VARICOSE VEINS OF RIGHT LOWER EXTREMITY, UNSPECIFIED WHETHER COMPLICATED: ICD-10-CM

## 2021-06-08 DIAGNOSIS — F17.200 TOBACCO USE DISORDER: ICD-10-CM

## 2021-06-08 DIAGNOSIS — R06.00 DYSPNEA, UNSPECIFIED TYPE: ICD-10-CM

## 2021-06-08 DIAGNOSIS — J30.1 NON-SEASONAL ALLERGIC RHINITIS DUE TO POLLEN: ICD-10-CM

## 2021-06-08 PROCEDURE — 99213 OFFICE O/P EST LOW 20 MIN: CPT | Performed by: NURSE PRACTITIONER

## 2021-06-08 RX ORDER — MONTELUKAST SODIUM 10 MG/1
10 TABLET ORAL AT BEDTIME
Qty: 30 TABLET | Refills: 11 | Status: SHIPPED | OUTPATIENT
Start: 2021-06-08 | End: 2022-06-21

## 2021-06-08 RX ORDER — ALBUTEROL SULFATE 90 UG/1
2 AEROSOL, METERED RESPIRATORY (INHALATION) EVERY 4 HOURS PRN
Qty: 18 G | Refills: 1 | Status: SHIPPED | OUTPATIENT
Start: 2021-06-08 | End: 2023-05-11

## 2021-06-08 RX ORDER — CETIRIZINE HYDROCHLORIDE 10 MG/1
10 TABLET ORAL EVERY MORNING
Qty: 30 TABLET | Refills: 11 | Status: SHIPPED | OUTPATIENT
Start: 2021-06-08 | End: 2022-06-15

## 2021-06-08 ASSESSMENT — ANXIETY QUESTIONNAIRES
1. FEELING NERVOUS, ANXIOUS, OR ON EDGE: MORE THAN HALF THE DAYS
6. BECOMING EASILY ANNOYED OR IRRITABLE: MORE THAN HALF THE DAYS
3. WORRYING TOO MUCH ABOUT DIFFERENT THINGS: MORE THAN HALF THE DAYS
2. NOT BEING ABLE TO STOP OR CONTROL WORRYING: MORE THAN HALF THE DAYS
7. FEELING AFRAID AS IF SOMETHING AWFUL MIGHT HAPPEN: MORE THAN HALF THE DAYS
5. BEING SO RESTLESS THAT IT IS HARD TO SIT STILL: SEVERAL DAYS
IF YOU CHECKED OFF ANY PROBLEMS ON THIS QUESTIONNAIRE, HOW DIFFICULT HAVE THESE PROBLEMS MADE IT FOR YOU TO DO YOUR WORK, TAKE CARE OF THINGS AT HOME, OR GET ALONG WITH OTHER PEOPLE: SOMEWHAT DIFFICULT
GAD7 TOTAL SCORE: 13

## 2021-06-08 ASSESSMENT — PATIENT HEALTH QUESTIONNAIRE - PHQ9
SUM OF ALL RESPONSES TO PHQ QUESTIONS 1-9: 9
5. POOR APPETITE OR OVEREATING: MORE THAN HALF THE DAYS

## 2021-06-08 ASSESSMENT — MIFFLIN-ST. JEOR: SCORE: 1033.13

## 2021-06-08 NOTE — PROGRESS NOTES
Assessment & Plan     Acute sinusitis with symptoms > 10 days  The risks, benefits and treatment options of prescribed medications or other treatments have been discussed with the patient. The patient verbalized their understanding and should call or follow up if no improvement or if they develop further problems.    - montelukast (SINGULAIR) 10 MG tablet  Dispense: 30 tablet; Refill: 11  - amoxicillin-clavulanate (AUGMENTIN) 875-125 MG tablet  Dispense: 28 tablet; Refill: 0  - cetirizine (ZYRTEC) 10 MG tablet  Dispense: 30 tablet; Refill: 11    Dyspnea, unspecified type     - amoxicillin-clavulanate (AUGMENTIN) 875-125 MG tablet  Dispense: 28 tablet; Refill: 0  - albuterol (PROAIR HFA/PROVENTIL HFA/VENTOLIN HFA) 108 (90 Base) MCG/ACT inhaler  Dispense: 18 g; Refill: 1    Non-seasonal allergic rhinitis due to pollen     - montelukast (SINGULAIR) 10 MG tablet  Dispense: 30 tablet; Refill: 11  - amoxicillin-clavulanate (AUGMENTIN) 875-125 MG tablet  Dispense: 28 tablet; Refill: 0  - cetirizine (ZYRTEC) 10 MG tablet  Dispense: 30 tablet; Refill: 11    Tobacco use disorder  Discussed cessation.    - amoxicillin-clavulanate (AUGMENTIN) 875-125 MG tablet  Dispense: 28 tablet; Refill: 0    Varicose veins of right lower extremity, unspecified whether complicated   Patient requested referral.    - VASCULAR SURGERY REFERRAL        Patient Instructions     Patient Education     Sinusitis (Antibiotic Treatment)    The sinuses are air-filled spaces within the bones of the face. They connect to the inside of the nose. Sinusitis is an inflammation of the tissue that lines the sinuses. Sinusitis can occur during a cold. It can also happen due to allergies to pollens and other particles in the air. Sinusitis can cause symptoms of sinus congestion and a feeling of fullness. A sinus infection causes fever, headache, and facial pain. There is often green or yellow fluid draining from the nose or into the back of the throat  (post-nasal drip). You have been given antibiotics to treat this condition.   Home care    Take the full course of antibiotics as instructed. Don't stop taking them, even when you feel better.    Drink plenty of water, hot tea, and other liquids as directed by the healthcare provider. This may help thin nasal mucus. It also may help your sinuses drain fluids.    Heat may help soothe painful areas of your face. Use a towel soaked in hot water. Or,  the shower and direct the warm spray onto your face. Using a vaporizer along with a menthol rub at night may also help soothe symptoms.     An expectorant with guaifenesin may help thin nasal mucus and help your sinuses drain fluids. Talk with your provider or pharmacists before taking an over-the-counter (OTC) medicine if you have any questions about it or its side effects..    You can use an OTC decongestant, unless a similar medicine was prescribed to you. Nasal sprays work the fastest. Use one that contains phenylephrine or oxymetazoline. First blow your nose gently. Then use the spray. Don't use these medicines more often than directed on the label. If you do, your symptoms may get worse. You may also take pills that contain pseudoephedrine. Don t use products that combine multiple medicines. This is because side effects may be increased. Read labels. You can also ask the pharmacist for help. (People with high blood pressure should not use decongestants. They can raise blood pressure.) Talk with your provider or pharmacist if you have any questions about the medicine..    OTC antihistamines may help if allergies contributed to your sinusitis. Talk with your provider or pharmacist if you have any questions about the medicine..    Don't use nasal rinses or irrigation during an acute sinus infection, unless your healthcare provider tells you to. Rinsing may spread the infection to other areas in your sinuses.    Use acetaminophen or ibuprofen to control pain,  unless another pain medicine was prescribed to you. If you have chronic liver or kidney disease or ever had a stomach ulcer, talk with your healthcare provider before using these medicines. Never give aspirin to anyone under age 18 who is ill with a fever. It may cause severe liver damage.    Don't smoke. This can make symptoms worse.    Follow-up care  Follow up with your healthcare provider, or as advised.   When to seek medical advice  Call your healthcare provider if any of these occur:     Facial pain or headache that gets worse    Stiff neck    Unusual drowsiness or confusion    Swelling of your forehead or eyelids    Symptoms don't go away in 10 days    Vision problems, such as blurred or double vision    Fever of 100.4 F (38 C) or higher, or as directed by your healthcare provider  Call 911  Call 911 if any of these occur:     Seizure    Trouble breathing    Feeling dizzy or faint    Fingernails, skin or lips look blue, purple , or gray  Prevention  Here are steps you can take to help prevent an infection:     Keep good hand washing habits.    Don t have close contact with people who have sore throats, colds, or other upper respiratory infections.    Don t smoke, and stay away from secondhand smoke.    Stay up to date with of your vaccines.  [employer] last reviewed this educational content on 12/1/2019 2000-2021 [employer]. All rights reserved. This information is not intended as a substitute for professional medical care. Always follow your healthcare professional's instructions.               Return in about 1 week (around 6/15/2021) for Recheck symptoms.    Karlene Shay NP  [facility]    Chery Lainez is a 56 year old who presents for the following health issues     HPI     Acute Illness  Acute illness concerns: sinus pain/pressure  Onset/Duration: all summer long   Symptoms:  Fever: no  Chills/Sweats: no  Headache (location?): YES  Sinus Pressure:  "YES  Conjunctivitis:  Itchy eyes   Ear Pain: YES- bilateral itching ears   Rhinorrhea: YES  Congestion: YES  Sore Throat: not sore but scratchy   Cough: YES-productive of yellow sputum  Wheeze: YES  Decreased Appetite: no  Nausea: no  Vomiting: no  Diarrhea: no  Dysuria/Freq.: no  Dysuria or Hematuria: no  Fatigue/Achiness: no  Sick/Strep Exposure: no  Therapies tried and outcome: claritin, zyrtec. Sinus flush     Reports allergies year round.    Review of Systems   Constitutional, HEENT, cardiovascular, pulmonary, GI, , musculoskeletal, neuro, skin, endocrine and psych systems are negative, except as otherwise noted.      Objective    /60 (BP Location: Left arm, Patient Position: Sitting, Cuff Size: Adult Regular)   Pulse 70   Temp 98.5  F (36.9  C) (Tympanic)   Resp 17   Ht 1.575 m (5' 2\")   Wt 49 kg (108 lb)   SpO2 98%   BMI 19.75 kg/m    Body mass index is 19.75 kg/m .  Physical Exam   GENERAL: healthy, alert and no distress  HENT: normal cephalic/atraumatic, right ear: clear effusion, left ear: clear effusion, nose and mouth without ulcers or lesions, nasal mucosa edematous , oropharynx clear, oral mucous membranes moist and sinuses: maxillary tenderness on bilateral  NECK: no adenopathy, no asymmetry, masses, or scars and thyroid normal to palpation  RESP: lungs clear to auscultation - no rales, rhonchi or wheezes and occasional harsh productive cough, no dyspnea. RR 18  CV: regular rate and rhythm, normal S1 S2, no S3 or S4, no murmur, click or rub, no peripheral edema and peripheral pulses strong  ABDOMEN: soft, nontender, no hepatosplenomegaly, no masses and bowel sounds normal  MS: no gross musculoskeletal defects noted, no edema       "

## 2021-06-08 NOTE — PATIENT INSTRUCTIONS
Patient Education     Sinusitis (Antibiotic Treatment)    The sinuses are air-filled spaces within the bones of the face. They connect to the inside of the nose. Sinusitis is an inflammation of the tissue that lines the sinuses. Sinusitis can occur during a cold. It can also happen due to allergies to pollens and other particles in the air. Sinusitis can cause symptoms of sinus congestion and a feeling of fullness. A sinus infection causes fever, headache, and facial pain. There is often green or yellow fluid draining from the nose or into the back of the throat (post-nasal drip). You have been given antibiotics to treat this condition.   Home care    Take the full course of antibiotics as instructed. Don't stop taking them, even when you feel better.    Drink plenty of water, hot tea, and other liquids as directed by the healthcare provider. This may help thin nasal mucus. It also may help your sinuses drain fluids.    Heat may help soothe painful areas of your face. Use a towel soaked in hot water. Or,  the shower and direct the warm spray onto your face. Using a vaporizer along with a menthol rub at night may also help soothe symptoms.     An expectorant with guaifenesin may help thin nasal mucus and help your sinuses drain fluids. Talk with your provider or pharmacists before taking an over-the-counter (OTC) medicine if you have any questions about it or its side effects..    You can use an OTC decongestant, unless a similar medicine was prescribed to you. Nasal sprays work the fastest. Use one that contains phenylephrine or oxymetazoline. First blow your nose gently. Then use the spray. Don't use these medicines more often than directed on the label. If you do, your symptoms may get worse. You may also take pills that contain pseudoephedrine. Don t use products that combine multiple medicines. This is because side effects may be increased. Read labels. You can also ask the pharmacist for help. (People  with high blood pressure should not use decongestants. They can raise blood pressure.) Talk with your provider or pharmacist if you have any questions about the medicine..    OTC antihistamines may help if allergies contributed to your sinusitis. Talk with your provider or pharmacist if you have any questions about the medicine..    Don't use nasal rinses or irrigation during an acute sinus infection, unless your healthcare provider tells you to. Rinsing may spread the infection to other areas in your sinuses.    Use acetaminophen or ibuprofen to control pain, unless another pain medicine was prescribed to you. If you have chronic liver or kidney disease or ever had a stomach ulcer, talk with your healthcare provider before using these medicines. Never give aspirin to anyone under age 18 who is ill with a fever. It may cause severe liver damage.    Don't smoke. This can make symptoms worse.    Follow-up care  Follow up with your healthcare provider, or as advised.   When to seek medical advice  Call your healthcare provider if any of these occur:     Facial pain or headache that gets worse    Stiff neck    Unusual drowsiness or confusion    Swelling of your forehead or eyelids    Symptoms don't go away in 10 days    Vision problems, such as blurred or double vision    Fever of 100.4 F (38 C) or higher, or as directed by your healthcare provider  Call 911  Call 911 if any of these occur:     Seizure    Trouble breathing    Feeling dizzy or faint    Fingernails, skin or lips look blue, purple , or gray  Prevention  Here are steps you can take to help prevent an infection:     Keep good hand washing habits.    Don t have close contact with people who have sore throats, colds, or other upper respiratory infections.    Don t smoke, and stay away from secondhand smoke.    Stay up to date with of your vaccines.  [employer] last reviewed this educational content on 12/1/2019 2000-2021 [employer]. All rights  reserved. This information is not intended as a substitute for professional medical care. Always follow your healthcare professional's instructions.

## 2021-06-09 ASSESSMENT — ANXIETY QUESTIONNAIRES: GAD7 TOTAL SCORE: 13

## 2021-06-30 ENCOUNTER — OFFICE VISIT (OUTPATIENT)
Dept: VASCULAR SURGERY | Facility: CLINIC | Age: 56
End: 2021-06-30
Payer: COMMERCIAL

## 2021-06-30 DIAGNOSIS — I83.813 VARICOSE VEINS OF BILATERAL LOWER EXTREMITIES WITH PAIN: Primary | ICD-10-CM

## 2021-06-30 PROCEDURE — 99203 OFFICE O/P NEW LOW 30 MIN: CPT | Performed by: SPECIALIST

## 2021-06-30 NOTE — PROGRESS NOTES
Consult requested by Karlene Shay    Reason for consultation: Varicose veins and pain    HPI:  Patient is a 56-year-old white female with a longstanding history of bilateral lower extremity varicose veins.  She had some total form of treatment done 5 to 10 years ago but cannot recall what it was.  Recently she has had new veins appear mainly in her right leg but also some in her left.  She reports pain in her veins worse at the end of the day.  She also reports occasional edema.  She works standing all day as a .  She has not worn compression socks nor had an ultrasound recently.  The right leg is worse than the left.  Denies any leg trauma, DVT or superficial phlebitis.  She now presents to me for evaluation of her bilateral lower extremity varicose veins.    Past Medical History:   Diagnosis Date     Cocaine abuse, unspecified      Dysplasia of cervix, unspecified      Esophageal reflux 2006     Generalized anxiety disorder 10/30/2006    Buspar prescribed 07     IBS (irritable bowel syndrome) 2008     Past Surgical History:   Procedure Laterality Date     ABLATE VEIN VARICOSE RADIO FREQUENCY WITHOUT PHLEBECTOMY MULTIPLE STAB  2014    Procedure: ABLATE VEIN VARICOSE RADIO FREQUENCY WITHOUT PHLEBECTOMY MULTIPLE STAB;  Surgeon: Khanh Bunch MD;  Location: [facility]       DELIVERY ONLY  ,,    , Low Cervical     ENHANCE LASER REFRACTIVE BILATERAL EXISTING PT IN PARAMETERS Bilateral     LASIK     HYSTERECTOMY SUPRACERVICAL, BILATERAL SALPINGO-OOPHORECTOMY, COMBINED       LAPAROSCOPIC ASSISTED COLECTOMY N/A 2015    Procedure: LAPAROSCOPIC ASSISTED COLECTOMY;  Surgeon: Khanh Bunch MD;  Location: [facility]     LAPAROSCOPIC CHOLECYSTECTOMY N/A 2017    Procedure: LAPAROSCOPIC CHOLECYSTECTOMY;  Surgeon: Levi Louis MD;  Location: [facility]     NERVE BLOCK PERIPHERAL Bilateral 2015    Procedure: NERVE BLOCK PERIPHERAL;   Surgeon: Generic Anesthesia Provider;  Location: [facility]     REPAIR PTOSIS BILATERAL Bilateral 10/11/2018    Procedure: REPAIR PTOSIS BILATERAL;  Bilateral Upper Eyelid Ptosis Repair;  Surgeon: Wilton Viramontes MD;  Location:  OR     SURGICAL HISTORY OF -   1998    Breast implants     SURGICAL HISTORY OF -   1994    Carpal  tunnel r hand     Current Outpatient Medications   Medication     albuterol (PROAIR HFA/PROVENTIL HFA/VENTOLIN HFA) 108 (90 Base) MCG/ACT inhaler     butalbital-aspirin-caffeine (FIORINAL) -40 MG capsule     cetirizine (ZYRTEC) 10 MG tablet     Cholecalciferol (VITAMIN D3 PO)     DiphenhydrAMINE HCl (BENADRYL PO)     estradiol (ESTRACE) 1 MG tablet     estradiol (VAGIFEM) 10 MCG TABS vaginal tablet     gabapentin (NEURONTIN) 100 MG capsule     gabapentin (NEURONTIN) 300 MG capsule     LORazepam (ATIVAN) 0.5 MG tablet     methocarbamol (ROBAXIN) 500 MG tablet     montelukast (SINGULAIR) 10 MG tablet     multivitamin, therapeutic (THERA-VIT) TABS     omeprazole 20 MG tablet     oxybutynin ER (DITROPAN-XL) 5 MG 24 hr tablet     PARoxetine (PAXIL) 10 MG tablet     No current facility-administered medications for this visit.         Allergies   Allergen Reactions     Cephalexin Hives     Patient states she gets hives - bad reaction not listed on her H & P.     Rizatriptan Dizziness     Ciprofloxacin Hives     Got rash when taken with metronidazole + Vicodin     Lactose Diarrhea     Metronidazole Hives     Rash when taken with cipro + Vicodin     Unknown [No Clinical Screening - See Comments]      Pt developed hives when taking flagyl, cipro, and vicodin, doesn't know which she is allergic to      Vicodin [Hydrocodone-Acetaminophen] Hives     Rash when taken with cipro and metronidazole     Social History     Socioeconomic History     Marital status:      Spouse name: Not on file     Number of children: 3     Years of education: 14     Highest education level: Not on file   Occupational  History     Occupation: Customer Service     Employer: [employer]     Comment: 1 year   Social Needs     Financial resource strain: Not on file     Food insecurity     Worry: Not on file     Inability: Not on file     Transportation needs     Medical: Not on file     Non-medical: Not on file   Tobacco Use     Smoking status: Current Every Day Smoker     Packs/day: 0.25     Years: 20.00     Pack years: 5.00     Smokeless tobacco: Never Used     Tobacco comment: trying to quit   Substance and Sexual Activity     Alcohol use: Never     Frequency: Never     Binge frequency: Never     Drug use: No     Sexual activity: Yes     Partners: Male     Birth control/protection: Surgical     Comment: hysterectomy   Lifestyle     Physical activity     Days per week: Not on file     Minutes per session: Not on file     Stress: Not on file   Relationships     Social connections     Talks on phone: Not on file     Gets together: Not on file     Attends Sikh service: Not on file     Active member of club or organization: Not on file     Attends meetings of clubs or organizations: Not on file     Relationship status: Not on file     Intimate partner violence     Fear of current or ex partner: Not on file     Emotionally abused: Not on file     Physically abused: Not on file     Forced sexual activity: Not on file   Other Topics Concern     Parent/sibling w/ CABG, MI or angioplasty before 65F 55M? No   Social History Narrative     Not on file     Family History   Problem Relation Age of Onset     Cancer Father         liver     Cancer Brother         lung, asbestos     Cancer Mother         uterine     Cancer Sister         uterine     Breast Cancer Other      Breast Cancer Other      Glaucoma No family hx of      Macular Degeneration No family hx of       ROS: 10 point ROS neg other than the symptoms noted above in the HPI.    PE:  B/P: Data Unavailable, T: Data Unavailable, P: Data Unavailable, R: Data  Unavailable  General: well developed, well nourished thin WF who appears their stated age  HEENT: NC/AT, EOMI, (-)icterus, (-)injection  Neck: Supple, No JVD  Chest: CTA  Heart: RRR  Abd: Soft, non tender, non distended, non tender, no masses  Ext; Warm, no edema, bilateral varicose veins with right being greater than left.  Mainly in the calves.  No stasis changes.  Psych: AAOx3  Neuro: No focal deficits      Impression/plan:  There is a 56-year-old lady with bilateral symptomatic varicose veins.  The right leg is worse than the left.  He is a CEAP 2 bilaterally.  I discussed the etiology of varicose veins with the patient she expressed understanding.  I also discussed the role of compression therapy and ultrasound.  After discussion the patient the plan at this time is to restart her in her compression socks and obtain an ultrasound of both lower extremities.  Any further treatment determined by the response to compression therapy and the ultrasound findings.  She will follow with me for video visit after ultrasound.      Bonifacio Bruno MD, FACS

## 2021-06-30 NOTE — LETTER
2021         RE: Fatou Simental  [address]  [place] [address]        Dear Colleague,    Thank you for referring your patient, Fatou Simental, to the [facility] [place]. Please see a copy of my visit note below.    Consult requested by Karlene Shay    Reason for consultation: Varicose veins and pain    HPI:  Patient is a 56-year-old white female with a longstanding history of bilateral lower extremity varicose veins.  She had some total form of treatment done 5 to 10 years ago but cannot recall what it was.  Recently she has had new veins appear mainly in her right leg but also some in her left.  She reports pain in her veins worse at the end of the day.  She also reports occasional edema.  She works standing all day as a .  She has not worn compression socks nor had an ultrasound recently.  The right leg is worse than the left.  Denies any leg trauma, DVT or superficial phlebitis.  She now presents to me for evaluation of her bilateral lower extremity varicose veins.    Past Medical History:   Diagnosis Date     Cocaine abuse, unspecified      Dysplasia of cervix, unspecified      Esophageal reflux 2006     Generalized anxiety disorder 10/30/2006    Buspar prescribed 07     IBS (irritable bowel syndrome) 2008     Past Surgical History:   Procedure Laterality Date     ABLATE VEIN VARICOSE RADIO FREQUENCY WITHOUT PHLEBECTOMY MULTIPLE STAB  2014    Procedure: ABLATE VEIN VARICOSE RADIO FREQUENCY WITHOUT PHLEBECTOMY MULTIPLE STAB;  Surgeon: Khanh Bunch MD;  Location: [facility]     C  DELIVERY ONLY  ,,    , Low Cervical     ENHANCE LASER REFRACTIVE BILATERAL EXISTING PT IN PARAMETERS Bilateral     LASIK     HYSTERECTOMY SUPRACERVICAL, BILATERAL SALPINGO-OOPHORECTOMY, COMBINED       LAPAROSCOPIC ASSISTED COLECTOMY N/A 2015    Procedure: LAPAROSCOPIC ASSISTED COLECTOMY;  Surgeon: Alivia  Khanh Watkins MD;  Location: [facility]     LAPAROSCOPIC CHOLECYSTECTOMY N/A 4/11/2017    Procedure: LAPAROSCOPIC CHOLECYSTECTOMY;  Surgeon: Levi Louis MD;  Location: [facility]     NERVE BLOCK PERIPHERAL Bilateral 2/6/2015    Procedure: NERVE BLOCK PERIPHERAL;  Surgeon: Generic Anesthesia Provider;  Location: [facility]     REPAIR PTOSIS BILATERAL Bilateral 10/11/2018    Procedure: REPAIR PTOSIS BILATERAL;  Bilateral Upper Eyelid Ptosis Repair;  Surgeon: Wilton Viramontes MD;  Location:  OR     SURGICAL HISTORY OF -   1998    Breast implants     SURGICAL HISTORY OF -   1994    Carpal  tunnel r hand     Current Outpatient Medications   Medication     albuterol (PROAIR HFA/PROVENTIL HFA/VENTOLIN HFA) 108 (90 Base) MCG/ACT inhaler     butalbital-aspirin-caffeine (FIORINAL) -40 MG capsule     cetirizine (ZYRTEC) 10 MG tablet     Cholecalciferol (VITAMIN D3 PO)     DiphenhydrAMINE HCl (BENADRYL PO)     estradiol (ESTRACE) 1 MG tablet     estradiol (VAGIFEM) 10 MCG TABS vaginal tablet     gabapentin (NEURONTIN) 100 MG capsule     gabapentin (NEURONTIN) 300 MG capsule     LORazepam (ATIVAN) 0.5 MG tablet     methocarbamol (ROBAXIN) 500 MG tablet     montelukast (SINGULAIR) 10 MG tablet     multivitamin, therapeutic (THERA-VIT) TABS     omeprazole 20 MG tablet     oxybutynin ER (DITROPAN-XL) 5 MG 24 hr tablet     PARoxetine (PAXIL) 10 MG tablet     No current facility-administered medications for this visit.         Allergies   Allergen Reactions     Cephalexin Hives     Patient states she gets hives - bad reaction not listed on her H & P.     Rizatriptan Dizziness     Ciprofloxacin Hives     Got rash when taken with metronidazole + Vicodin     Lactose Diarrhea     Metronidazole Hives     Rash when taken with cipro + Vicodin     Unknown [No Clinical Screening - See Comments]      Pt developed hives when taking flagyl, cipro, and vicodin, doesn't know which she is allergic to      Vicodin [Hydrocodone-Acetaminophen] Hives      Rash when taken with cipro and metronidazole     Social History     Socioeconomic History     Marital status:      Spouse name: Not on file     Number of children: 3     Years of education: 14     Highest education level: Not on file   Occupational History     Occupation: Customer Service     Employer: [employer]     Comment: 1 year   Social Needs     Financial resource strain: Not on file     Food insecurity     Worry: Not on file     Inability: Not on file     Transportation needs     Medical: Not on file     Non-medical: Not on file   Tobacco Use     Smoking status: Current Every Day Smoker     Packs/day: 0.25     Years: 20.00     Pack years: 5.00     Smokeless tobacco: Never Used     Tobacco comment: trying to quit   Substance and Sexual Activity     Alcohol use: Never     Frequency: Never     Binge frequency: Never     Drug use: No     Sexual activity: Yes     Partners: Male     Birth control/protection: Surgical     Comment: hysterectomy   Lifestyle     Physical activity     Days per week: Not on file     Minutes per session: Not on file     Stress: Not on file   Relationships     Social connections     Talks on phone: Not on file     Gets together: Not on file     Attends Anglican service: Not on file     Active member of club or organization: Not on file     Attends meetings of clubs or organizations: Not on file     Relationship status: Not on file     Intimate partner violence     Fear of current or ex partner: Not on file     Emotionally abused: Not on file     Physically abused: Not on file     Forced sexual activity: Not on file   Other Topics Concern     Parent/sibling w/ CABG, MI or angioplasty before 65F 55M? No   Social History Narrative     Not on file     Family History   Problem Relation Age of Onset     Cancer Father         liver     Cancer Brother         lung, asbestos     Cancer Mother         uterine     Cancer Sister         uterine     Breast Cancer Other      Breast  Cancer Other      Glaucoma No family hx of      Macular Degeneration No family hx of       ROS: 10 point ROS neg other than the symptoms noted above in the HPI.    PE:  B/P: Data Unavailable, T: Data Unavailable, P: Data Unavailable, R: Data Unavailable  General: well developed, well nourished thin WF who appears their stated age  HEENT: NC/AT, EOMI, (-)icterus, (-)injection  Neck: Supple, No JVD  Chest: CTA  Heart: RRR  Abd: Soft, non tender, non distended, non tender, no masses  Ext; Warm, no edema, bilateral varicose veins with right being greater than left.  Mainly in the calves.  No stasis changes.  Psych: AAOx3  Neuro: No focal deficits      Impression/plan:  There is a 56-year-old lady with bilateral symptomatic varicose veins.  The right leg is worse than the left.  He is a CEAP 2 bilaterally.  I discussed the etiology of varicose veins with the patient she expressed understanding.  I also discussed the role of compression therapy and ultrasound.  After discussion the patient the plan at this time is to restart her in her compression socks and obtain an ultrasound of both lower extremities.  Any further treatment determined by the response to compression therapy and the ultrasound findings.  She will follow with me for video visit after ultrasound.      Bonifacio Bruno MD, FACS        Again, thank you for allowing me to participate in the care of your patient.        Sincerely,        Bonifacio Bruno MD

## 2021-07-26 DIAGNOSIS — B02.29 POST HERPETIC NEURALGIA: ICD-10-CM

## 2021-07-26 DIAGNOSIS — F32.0 MILD MAJOR DEPRESSION (H): ICD-10-CM

## 2021-07-26 DIAGNOSIS — F41.1 GENERALIZED ANXIETY DISORDER: ICD-10-CM

## 2021-07-26 DIAGNOSIS — N39.46 MIXED INCONTINENCE: ICD-10-CM

## 2021-07-28 RX ORDER — OXYBUTYNIN CHLORIDE 5 MG/1
TABLET, EXTENDED RELEASE ORAL
Qty: 30 TABLET | Refills: 3 | Status: SHIPPED | OUTPATIENT
Start: 2021-07-28 | End: 2022-03-29

## 2021-07-28 RX ORDER — GABAPENTIN 300 MG/1
CAPSULE ORAL
Qty: 90 CAPSULE | Refills: 1 | Status: SHIPPED | OUTPATIENT
Start: 2021-07-28 | End: 2022-01-20

## 2021-07-28 NOTE — TELEPHONE ENCOUNTER
Routing refill request to provider for review/approval because:  Drug not on the [facility] refill protocol       Adele Bucio RN

## 2021-08-12 ENCOUNTER — OFFICE VISIT (OUTPATIENT)
Dept: FAMILY MEDICINE | Facility: CLINIC | Age: 56
End: 2021-08-12
Payer: COMMERCIAL

## 2021-08-12 ENCOUNTER — ANCILLARY PROCEDURE (OUTPATIENT)
Dept: GENERAL RADIOLOGY | Facility: CLINIC | Age: 56
End: 2021-08-12
Attending: NURSE PRACTITIONER
Payer: COMMERCIAL

## 2021-08-12 VITALS
HEART RATE: 81 BPM | OXYGEN SATURATION: 97 % | WEIGHT: 106.2 LBS | BODY MASS INDEX: 19.54 KG/M2 | TEMPERATURE: 98.5 F | RESPIRATION RATE: 16 BRPM | SYSTOLIC BLOOD PRESSURE: 92 MMHG | HEIGHT: 62 IN | DIASTOLIC BLOOD PRESSURE: 56 MMHG

## 2021-08-12 DIAGNOSIS — R07.81 RIB PAIN ON LEFT SIDE: ICD-10-CM

## 2021-08-12 DIAGNOSIS — R07.81 RIB PAIN ON LEFT SIDE: Primary | ICD-10-CM

## 2021-08-12 DIAGNOSIS — R10.9 LEFT FLANK PAIN: ICD-10-CM

## 2021-08-12 LAB
ALBUMIN UR-MCNC: NEGATIVE MG/DL
APPEARANCE UR: CLEAR
BILIRUB UR QL STRIP: NEGATIVE
COLOR UR AUTO: YELLOW
GLUCOSE UR STRIP-MCNC: NEGATIVE MG/DL
HGB UR QL STRIP: ABNORMAL
KETONES UR STRIP-MCNC: NEGATIVE MG/DL
LEUKOCYTE ESTERASE UR QL STRIP: NEGATIVE
NITRATE UR QL: NEGATIVE
PH UR STRIP: 6 [PH] (ref 5–7)
RBC #/AREA URNS AUTO: NORMAL /HPF
SP GR UR STRIP: 1.02 (ref 1–1.03)
UROBILINOGEN UR STRIP-ACNC: 0.2 E.U./DL
WBC #/AREA URNS AUTO: NORMAL /HPF

## 2021-08-12 PROCEDURE — 99213 OFFICE O/P EST LOW 20 MIN: CPT | Performed by: NURSE PRACTITIONER

## 2021-08-12 PROCEDURE — 71101 X-RAY EXAM UNILAT RIBS/CHEST: CPT | Mod: LT | Performed by: RADIOLOGY

## 2021-08-12 PROCEDURE — 81001 URINALYSIS AUTO W/SCOPE: CPT | Performed by: NURSE PRACTITIONER

## 2021-08-12 RX ORDER — NAPROXEN 500 MG/1
500 TABLET ORAL 2 TIMES DAILY WITH MEALS
Qty: 60 TABLET | Refills: 1 | Status: SHIPPED | OUTPATIENT
Start: 2021-08-12 | End: 2023-05-19

## 2021-08-12 ASSESSMENT — MIFFLIN-ST. JEOR: SCORE: 1024.97

## 2021-08-12 NOTE — RESULT ENCOUNTER NOTE
Giuliana Lainez    Your xray report came back within normal limits. Please let us know if you have any questions.     Take care,    ANGEL Avitia CNP

## 2021-08-12 NOTE — PROGRESS NOTES
Assessment & Plan     Rib pain on left side  Consistent with slipping rib, trial NSAIDs and physical therapy  - XR Ribs & Chest Left G/E 3 Views; Future  - naproxen (NAPROSYN) 500 MG tablet; Take 1 tablet (500 mg) by mouth 2 times daily (with meals)  - Physical Therapy Referral; Future    Left flank pain    - UA macro with reflex to Microscopic and Culture - Clinc Collect  - Urine Microscopic             CONSULTATION/REFERRAL to physical therapy    FUTURE APPOINTMENTS:       - Follow-up visit in 1-2 weeks, sooner as needed for new or worsening symptoms.     See Patient Instructions    No follow-ups on file.    ANGEL Singh CNP  [facility]    Chery Lainez is a 56 year old who presents for the following health issues     HPI     Back Pain  Onset/Duration: over a month   Description:   Location of pain: middle of back left  Character of pain: sharp  Pain radiation: into left side of abdomen  New numbness or weakness in legs, not attributed to pain: no   Intensity: moderate  Progression of Symptoms: same, only happens at night when she lays down   History:   Specific cause: none  Pain interferes with job: no  History of back problems: no prior back problems  Any previous MRI or X-rays: None  Sees a specialist for back pain: No  Alleviating factors:   Improved by: none    Precipitating factors:  Worsened by: lying down, getting up out of bed, leaning back when sitting on the couch   Therapies tried and outcome: ibuprofen-might take the edge off a bit     Accompanying Signs & Symptoms:  Risk of Fracture: None  Risk of Cauda Equina: None  Risk of Infection: None  Risk of Cancer: None  Risk of Ankylosing Spondylitis: Onset at age <35, male, AND morning back stiffness  no       Denies dyspnea, pain does not worsen with deep breaths. No other joint aches. Denies rash. Is a smoker and has a chronic dry cough.       Review of Systems   Constitutional, HEENT, cardiovascular,  "pulmonary, gi and gu systems are negative, except as otherwise noted.      Objective    BP 92/56   Pulse 81   Temp 98.5  F (36.9  C) (Tympanic)   Resp 16   Ht 1.575 m (5' 2\")   Wt 48.2 kg (106 lb 3.2 oz)   SpO2 97%   BMI 19.42 kg/m    Body mass index is 19.42 kg/m .  Physical Exam   GENERAL: healthy, alert and no distress  RESP: lungs clear to auscultation - no rales, rhonchi or wheezes  CV: regular rates and rhythm, normal S1 S2, no S3 or S4, no murmur, click or rub and no peripheral edema  ABDOMEN: tenderness epigastric and no organomegaly or masses  MS: pain is reproduced by putting pressure on the 10th left rib, no CVA tenderness  SKIN: no suspicious lesions or rashes  NEURO: Normal strength and tone, mentation intact and speech normal    Xray - Reviewed and interpreted by me.  No acute findings, pending radiology review    Results for orders placed or performed in visit on 08/12/21 (from the past 24 hour(s))   UA macro with reflex to Microscopic and Culture - Clinc Collect    Specimen: Urine, Midstream   Result Value Ref Range    Color Urine Yellow Colorless, Straw, Light Yellow, Yellow    Appearance Urine Clear Clear    Glucose Urine Negative Negative mg/dL    Bilirubin Urine Negative Negative    Ketones Urine Negative Negative mg/dL    Specific Gravity Urine 1.020 1.003 - 1.035    Blood Urine Trace (A) Negative    pH Urine 6.0 5.0 - 7.0    Protein Albumin Urine Negative Negative mg/dL    Urobilinogen Urine 0.2 0.2, 1.0 E.U./dL    Nitrite Urine Negative Negative    Leukocyte Esterase Urine Negative Negative   Urine Microscopic   Result Value Ref Range    RBC Urine None Seen 0-2 /HPF /HPF    WBC Urine None Seen 0-5 /HPF /HPF    Narrative    Urine Culture not indicated               "

## 2021-08-12 NOTE — PATIENT INSTRUCTIONS
Patient Education     Chest Wall Pain: Costochondritis    The chest pain that you have had today is caused by costochondritis. This condition is caused by an inflammation of the cartilage joining your ribs to your breastbone. It's not caused by heart or lung problems. Your healthcare team has made sure that the chest pain you feel is not from a life threatening cause of chest pain such as heart attack, collapsed lung, blood clot in the lung, tear in the aorta, or esophageal rupture. The inflammation may have been brought on by a blow to the chest, lifting heavy objects, intense exercise, or an illness that made you cough and sneeze a lot. It often occurs during times of emotional stress. It can be painful, but it's not dangerous. It usually goes away in 1 to 2 weeks. But it may happen again. Rarely, a more serious condition may cause symptoms similar to costochondritis. That s why it s important to watch for the warning signs listed below.   Home care  Follow these guidelines when caring for yourself at home:    If you feel that emotional stress is a cause of your condition, try to figure out the sources of that stress. It may not be obvious. Learn ways to deal with the stress in your life. This can include regular exercise, muscle relaxation, meditation, or simply taking time out for yourself.    You may use acetaminophen, ibuprofen, or naproxen to control pain, unless another pain medicine was prescribed. If you have liver or kidney disease or ever had a stomach ulcer, talk with your healthcare provider before using these medicines.    You can also help ease pain by using a hot, wet compress or heating pad. Use this with or without a medicated skin cream that helps relieves pain.    Do stretching exercise as advised by your provider. Typically rest is beneficial for the first few days. Avoid strenuous activity that worsens the pain.    Take any prescribed medicines as directed.  Follow-up care  Follow up with  your healthcare provider, or as advised.   When to seek medical advice  Call your healthcare provider right away if any of these occur:    A change in the type of pain. Call if it feels different, becomes more serious, lasts longer, or spreads into your shoulder, arm, neck, jaw, or back.    Shortness of breath or pain gets worse when you breathe    Weakness, dizziness, or fainting    Cough with dark-colored sputum (phlegm) or blood    Abdominal pain    Dark red or black stools    Fever of 100.4 F (38 C) or higher, or as directed by your healthcare provider  Rickey last reviewed this educational content on 6/1/2019 2000-2021 [employer]. All rights reserved. This information is not intended as a substitute for professional medical care. Always follow your healthcare professional's instructions.

## 2021-09-05 ENCOUNTER — HEALTH MAINTENANCE LETTER (OUTPATIENT)
Age: 56
End: 2021-09-05

## 2021-10-15 ENCOUNTER — TELEPHONE (OUTPATIENT)
Dept: FAMILY MEDICINE | Facility: CLINIC | Age: 56
End: 2021-10-15

## 2021-10-15 DIAGNOSIS — Z79.890 NEED FOR PROPHYLACTIC HORMONE REPLACEMENT THERAPY (POSTMENOPAUSAL): ICD-10-CM

## 2021-10-18 RX ORDER — ESTRADIOL 1 MG/1
1 TABLET ORAL DAILY
Qty: 90 TABLET | Refills: 0 | Status: SHIPPED | OUTPATIENT
Start: 2021-10-18 | End: 2022-02-04

## 2021-10-18 NOTE — TELEPHONE ENCOUNTER
"Requested Prescriptions   Pending Prescriptions Disp Refills     estradiol (ESTRACE) 1 MG tablet [Pharmacy Med Name: ESTRADIOL 1MG TABS] 90 tablet 1     Sig: TAKE 1 TABLET (1 MG) BY MOUTH DAILY       Hormone Replacement Therapy Failed - 10/15/2021 10:23 AM        Failed - Patient has mammogram in past 2 years on file if age 50-75        Passed - Blood pressure under 140/90 in past 12 months     BP Readings from Last 3 Encounters:   08/12/21 92/56   06/08/21 102/60   12/18/20 100/60                 Passed - Recent (12 mo) or future (30 days) visit within the authorizing provider's specialty     Patient has had an office visit with the authorizing provider or a provider within the authorizing providers department within the previous 12 mos or has a future within next 30 days. See \"Patient Info\" tab in inbasket, or \"Choose Columns\" in Meds & Orders section of the refill encounter.              Passed - Medication is active on med list        Passed - Patient is 18 years of age or older        Passed - No active pregnancy on record        Passed - No positive pregnancy test on record in past 12 months             "

## 2021-10-19 NOTE — TELEPHONE ENCOUNTER
Message given to patient and she was asking why she did not get any refill on the fill from 10/18/2021. But she said she will just see PCP in December.    Alondra Cabrera CSS on 10/19/2021 at 1:50 PM

## 2021-10-19 NOTE — TELEPHONE ENCOUNTER
If she is on estrogen she needs yearly screening given the increased risk - hence why I said yearly mammogram.    I updated her HM.    She did get refills of estrogen 10/18/2021     She is due for annual visit with me in Dec.    What refills is she looking for?    Karlene Shay, DOREEN

## 2021-11-22 DIAGNOSIS — Z11.59 ENCOUNTER FOR SCREENING FOR OTHER VIRAL DISEASES: ICD-10-CM

## 2021-11-29 DIAGNOSIS — G44.209 TENSION-TYPE HEADACHE, NOT INTRACTABLE, UNSPECIFIED CHRONICITY PATTERN: ICD-10-CM

## 2021-12-01 RX ORDER — METHOCARBAMOL 500 MG/1
TABLET, FILM COATED ORAL
Qty: 60 TABLET | Refills: 1 | Status: SHIPPED | OUTPATIENT
Start: 2021-12-01 | End: 2023-05-19

## 2021-12-01 NOTE — TELEPHONE ENCOUNTER
Pending Prescriptions:                       Disp   Refills    methocarbamol (ROBAXIN) 500 MG tablet [Ph*60 tab*1            Sig: TAKE ONE TABLET BY MOUTH THREE TIMES A DAY    Routing refill request to provider for review/approval because:  Drug not on the [facility] refill protocol       Brent Weinberg RN

## 2021-12-07 ENCOUNTER — LAB (OUTPATIENT)
Dept: LAB | Facility: CLINIC | Age: 56
End: 2021-12-07
Payer: COMMERCIAL

## 2021-12-07 DIAGNOSIS — Z11.59 ENCOUNTER FOR SCREENING FOR OTHER VIRAL DISEASES: ICD-10-CM

## 2021-12-07 PROCEDURE — U0005 INFEC AGEN DETEC AMPLI PROBE: HCPCS

## 2021-12-07 PROCEDURE — U0003 INFECTIOUS AGENT DETECTION BY NUCLEIC ACID (DNA OR RNA); SEVERE ACUTE RESPIRATORY SYNDROME CORONAVIRUS 2 (SARS-COV-2) (CORONAVIRUS DISEASE [COVID-19]), AMPLIFIED PROBE TECHNIQUE, MAKING USE OF HIGH THROUGHPUT TECHNOLOGIES AS DESCRIBED BY CMS-2020-01-R: HCPCS

## 2021-12-08 LAB — SARS-COV-2 RNA RESP QL NAA+PROBE: NEGATIVE

## 2021-12-10 ENCOUNTER — HOSPITAL ENCOUNTER (OUTPATIENT)
Dept: MRI IMAGING | Facility: CLINIC | Age: 56
End: 2021-12-10
Attending: PHYSICIAN ASSISTANT
Payer: COMMERCIAL

## 2021-12-10 ENCOUNTER — HOSPITAL ENCOUNTER (OUTPATIENT)
Dept: GENERAL RADIOLOGY | Facility: CLINIC | Age: 56
End: 2021-12-10
Attending: PHYSICIAN ASSISTANT
Payer: COMMERCIAL

## 2021-12-10 DIAGNOSIS — M25.511 RIGHT SHOULDER PAIN: ICD-10-CM

## 2021-12-10 PROCEDURE — 250N000011 HC RX IP 250 OP 636: Performed by: RADIOLOGY

## 2021-12-10 PROCEDURE — 250N000009 HC RX 250: Performed by: RADIOLOGY

## 2021-12-10 PROCEDURE — A9585 GADOBUTROL INJECTION: HCPCS | Performed by: RADIOLOGY

## 2021-12-10 PROCEDURE — 23350 INJECTION FOR SHOULDER X-RAY: CPT | Mod: RT

## 2021-12-10 PROCEDURE — 255N000002 HC RX 255 OP 636: Performed by: RADIOLOGY

## 2021-12-10 PROCEDURE — 73222 MRI JOINT UPR EXTREM W/DYE: CPT | Mod: RT

## 2021-12-10 RX ORDER — EPINEPHRINE 1 MG/ML
0.1 INJECTION, SOLUTION, CONCENTRATE INTRAVENOUS ONCE
Status: COMPLETED | OUTPATIENT
Start: 2021-12-10 | End: 2021-12-10

## 2021-12-10 RX ORDER — LIDOCAINE HYDROCHLORIDE 10 MG/ML
5 INJECTION, SOLUTION EPIDURAL; INFILTRATION; INTRACAUDAL; PERINEURAL ONCE
Status: DISCONTINUED | OUTPATIENT
Start: 2021-12-10 | End: 2021-12-10

## 2021-12-10 RX ORDER — GADOBUTROL 604.72 MG/ML
0.1 INJECTION INTRAVENOUS ONCE
Status: DISCONTINUED | OUTPATIENT
Start: 2021-12-10 | End: 2021-12-10 | Stop reason: CLARIF

## 2021-12-10 RX ORDER — LIDOCAINE HYDROCHLORIDE 10 MG/ML
5 INJECTION, SOLUTION EPIDURAL; INFILTRATION; INTRACAUDAL; PERINEURAL ONCE
Status: COMPLETED | OUTPATIENT
Start: 2021-12-10 | End: 2021-12-10

## 2021-12-10 RX ORDER — IOPAMIDOL 408 MG/ML
10 INJECTION, SOLUTION INTRATHECAL ONCE
Status: DISCONTINUED | OUTPATIENT
Start: 2021-12-10 | End: 2021-12-10 | Stop reason: CLARIF

## 2021-12-10 RX ORDER — GADOBUTROL 604.72 MG/ML
0.1 INJECTION INTRAVENOUS ONCE
Status: COMPLETED | OUTPATIENT
Start: 2021-12-10 | End: 2021-12-10

## 2021-12-10 RX ORDER — IOPAMIDOL 408 MG/ML
10 INJECTION, SOLUTION INTRATHECAL ONCE
Status: COMPLETED | OUTPATIENT
Start: 2021-12-10 | End: 2021-12-10

## 2021-12-10 RX ADMIN — LIDOCAINE HYDROCHLORIDE 5 ML: 10 INJECTION, SOLUTION EPIDURAL; INFILTRATION; INTRACAUDAL; PERINEURAL at 13:18

## 2021-12-10 RX ADMIN — EPINEPHRINE 0.1 MG: 1 INJECTION, SOLUTION, CONCENTRATE INTRAVENOUS at 13:18

## 2021-12-10 RX ADMIN — GADOBUTROL 0.5 ML: 604.72 INJECTION INTRAVENOUS at 13:18

## 2021-12-10 RX ADMIN — IOPAMIDOL 1 ML: 408 INJECTION, SOLUTION INTRATHECAL at 13:18

## 2021-12-10 NOTE — PROGRESS NOTES
RADIOLOGY PROCEDURE NOTE  Patient name: Fatou Simental  MRN: [id number]  : 1965    Pre-procedure diagnosis: Pain.  Post-procedure diagnosis: Same    Procedure Date/Time: December 10, 2021  1:06 PM  Procedure: Right shoulder intrarticular SOY injection for MRI to follow.  Estimated blood loss: None  Specimen(s) collected with description: None.  The patient tolerated the procedure well with no immediate complications.    See imaging dictation for procedural details.    Provider name: Shine Fowler MD  Assistant(s):None

## 2021-12-26 ENCOUNTER — HEALTH MAINTENANCE LETTER (OUTPATIENT)
Age: 56
End: 2021-12-26

## 2022-01-17 ENCOUNTER — HOSPITAL ENCOUNTER (EMERGENCY)
Facility: CLINIC | Age: 57
Discharge: HOME | End: 2022-01-17
Attending: PHYSICIAN ASSISTANT | Admitting: PHYSICIAN ASSISTANT
Payer: COMMERCIAL

## 2022-01-17 ENCOUNTER — APPOINTMENT (OUTPATIENT)
Dept: CT IMAGING | Facility: CLINIC | Age: 57
End: 2022-01-17
Attending: PHYSICIAN ASSISTANT
Payer: COMMERCIAL

## 2022-01-17 VITALS
OXYGEN SATURATION: 99 % | DIASTOLIC BLOOD PRESSURE: 63 MMHG | BODY MASS INDEX: 19.02 KG/M2 | WEIGHT: 104 LBS | SYSTOLIC BLOOD PRESSURE: 116 MMHG | HEART RATE: 60 BPM | RESPIRATION RATE: 12 BRPM | TEMPERATURE: 98 F

## 2022-01-17 DIAGNOSIS — R10.9 LEFT SIDED ABDOMINAL PAIN: ICD-10-CM

## 2022-01-17 PROBLEM — K59.00 CONSTIPATION: Status: ACTIVE | Noted: 2022-01-17

## 2022-01-17 LAB
ALBUMIN SERPL-MCNC: 3.4 G/DL (ref 3.4–5)
ALBUMIN UR-MCNC: NEGATIVE MG/DL
ALP SERPL-CCNC: 68 U/L (ref 40–150)
ALT SERPL W P-5'-P-CCNC: 19 U/L (ref 0–50)
AMORPH CRY #/AREA URNS HPF: ABNORMAL /HPF
ANION GAP SERPL CALCULATED.3IONS-SCNC: 6 MMOL/L (ref 3–14)
APPEARANCE UR: ABNORMAL
AST SERPL W P-5'-P-CCNC: 15 U/L (ref 0–45)
BASOPHILS # BLD AUTO: 0.1 10E3/UL (ref 0–0.2)
BASOPHILS NFR BLD AUTO: 2 %
BILIRUB SERPL-MCNC: 0.2 MG/DL (ref 0.2–1.3)
BILIRUB UR QL STRIP: NEGATIVE
BUN SERPL-MCNC: 22 MG/DL (ref 7–30)
CALCIUM SERPL-MCNC: 9.3 MG/DL (ref 8.5–10.1)
CHLORIDE BLD-SCNC: 107 MMOL/L (ref 94–109)
CO2 SERPL-SCNC: 27 MMOL/L (ref 20–32)
COLOR UR AUTO: YELLOW
CREAT SERPL-MCNC: 0.56 MG/DL (ref 0.52–1.04)
EOSINOPHIL # BLD AUTO: 0.1 10E3/UL (ref 0–0.7)
EOSINOPHIL NFR BLD AUTO: 3 %
ERYTHROCYTE [DISTWIDTH] IN BLOOD BY AUTOMATED COUNT: 13 % (ref 10–15)
GFR SERPL CREATININE-BSD FRML MDRD: >90 ML/MIN/1.73M2
GLUCOSE BLD-MCNC: 87 MG/DL (ref 70–99)
GLUCOSE UR STRIP-MCNC: NEGATIVE MG/DL
HCT VFR BLD AUTO: 41 % (ref 35–47)
HGB BLD-MCNC: 14.1 G/DL (ref 11.7–15.7)
HGB UR QL STRIP: NEGATIVE
HOLD SPECIMEN: NORMAL
IMM GRANULOCYTES # BLD: 0 10E3/UL
IMM GRANULOCYTES NFR BLD: 0 %
KETONES UR STRIP-MCNC: NEGATIVE MG/DL
LEUKOCYTE ESTERASE UR QL STRIP: NEGATIVE
LIPASE SERPL-CCNC: 146 U/L (ref 73–393)
LYMPHOCYTES # BLD AUTO: 1.3 10E3/UL (ref 0.8–5.3)
LYMPHOCYTES NFR BLD AUTO: 28 %
MCH RBC QN AUTO: 31.8 PG (ref 26.5–33)
MCHC RBC AUTO-ENTMCNC: 34.4 G/DL (ref 31.5–36.5)
MCV RBC AUTO: 93 FL (ref 78–100)
MONOCYTES # BLD AUTO: 0.4 10E3/UL (ref 0–1.3)
MONOCYTES NFR BLD AUTO: 10 %
NEUTROPHILS # BLD AUTO: 2.5 10E3/UL (ref 1.6–8.3)
NEUTROPHILS NFR BLD AUTO: 57 %
NITRATE UR QL: NEGATIVE
NRBC # BLD AUTO: 0 10E3/UL
NRBC BLD AUTO-RTO: 0 /100
PH UR STRIP: 7 [PH] (ref 5–7)
PLATELET # BLD AUTO: 323 10E3/UL (ref 150–450)
POTASSIUM BLD-SCNC: 4 MMOL/L (ref 3.4–5.3)
PROT SERPL-MCNC: 6.8 G/DL (ref 6.8–8.8)
RBC # BLD AUTO: 4.43 10E6/UL (ref 3.8–5.2)
RBC URINE: 1 /HPF
SODIUM SERPL-SCNC: 140 MMOL/L (ref 133–144)
SP GR UR STRIP: 1.01 (ref 1–1.03)
SQUAMOUS EPITHELIAL: <1 /HPF
UROBILINOGEN UR STRIP-MCNC: NORMAL MG/DL
WBC # BLD AUTO: 4.4 10E3/UL (ref 4–11)
WBC URINE: 0 /HPF

## 2022-01-17 PROCEDURE — 96375 TX/PRO/DX INJ NEW DRUG ADDON: CPT | Performed by: PHYSICIAN ASSISTANT

## 2022-01-17 PROCEDURE — 99284 EMERGENCY DEPT VISIT MOD MDM: CPT | Performed by: PHYSICIAN ASSISTANT

## 2022-01-17 PROCEDURE — 82040 ASSAY OF SERUM ALBUMIN: CPT | Performed by: PHYSICIAN ASSISTANT

## 2022-01-17 PROCEDURE — 258N000003 HC RX IP 258 OP 636: Performed by: PHYSICIAN ASSISTANT

## 2022-01-17 PROCEDURE — 36415 COLL VENOUS BLD VENIPUNCTURE: CPT | Performed by: PHYSICIAN ASSISTANT

## 2022-01-17 PROCEDURE — 96374 THER/PROPH/DIAG INJ IV PUSH: CPT | Mod: 59 | Performed by: PHYSICIAN ASSISTANT

## 2022-01-17 PROCEDURE — 250N000011 HC RX IP 250 OP 636: Performed by: PHYSICIAN ASSISTANT

## 2022-01-17 PROCEDURE — 96361 HYDRATE IV INFUSION ADD-ON: CPT | Performed by: PHYSICIAN ASSISTANT

## 2022-01-17 PROCEDURE — 99285 EMERGENCY DEPT VISIT HI MDM: CPT | Mod: 25 | Performed by: PHYSICIAN ASSISTANT

## 2022-01-17 PROCEDURE — 74177 CT ABD & PELVIS W/CONTRAST: CPT

## 2022-01-17 PROCEDURE — 85025 COMPLETE CBC W/AUTO DIFF WBC: CPT | Performed by: PHYSICIAN ASSISTANT

## 2022-01-17 PROCEDURE — 81001 URINALYSIS AUTO W/SCOPE: CPT | Performed by: PHYSICIAN ASSISTANT

## 2022-01-17 PROCEDURE — 83690 ASSAY OF LIPASE: CPT | Performed by: PHYSICIAN ASSISTANT

## 2022-01-17 PROCEDURE — 80053 COMPREHEN METABOLIC PANEL: CPT | Performed by: PHYSICIAN ASSISTANT

## 2022-01-17 PROCEDURE — 250N000009 HC RX 250: Performed by: PHYSICIAN ASSISTANT

## 2022-01-17 RX ORDER — OXYCODONE HYDROCHLORIDE 5 MG/1
5 TABLET ORAL EVERY 6 HOURS PRN
Qty: 8 TABLET | Refills: 0 | Status: SHIPPED | OUTPATIENT
Start: 2022-01-17 | End: 2022-02-08

## 2022-01-17 RX ORDER — ONDANSETRON 2 MG/ML
4 INJECTION INTRAMUSCULAR; INTRAVENOUS ONCE
Status: COMPLETED | OUTPATIENT
Start: 2022-01-17 | End: 2022-01-17

## 2022-01-17 RX ORDER — IOPAMIDOL 755 MG/ML
51 INJECTION, SOLUTION INTRAVASCULAR ONCE
Status: COMPLETED | OUTPATIENT
Start: 2022-01-17 | End: 2022-01-17

## 2022-01-17 RX ORDER — KETOROLAC TROMETHAMINE 15 MG/ML
15 INJECTION, SOLUTION INTRAMUSCULAR; INTRAVENOUS ONCE
Status: COMPLETED | OUTPATIENT
Start: 2022-01-17 | End: 2022-01-17

## 2022-01-17 RX ORDER — ONDANSETRON 4 MG/1
4 TABLET, ORALLY DISINTEGRATING ORAL EVERY 8 HOURS PRN
Qty: 10 TABLET | Refills: 0 | Status: SHIPPED | OUTPATIENT
Start: 2022-01-17 | End: 2022-01-20

## 2022-01-17 RX ADMIN — IOPAMIDOL 51 ML: 755 INJECTION, SOLUTION INTRAVENOUS at 12:08

## 2022-01-17 RX ADMIN — SODIUM CHLORIDE 53 ML: 9 INJECTION, SOLUTION INTRAVENOUS at 12:08

## 2022-01-17 RX ADMIN — ONDANSETRON 4 MG: 2 INJECTION INTRAMUSCULAR; INTRAVENOUS at 11:20

## 2022-01-17 RX ADMIN — SODIUM CHLORIDE 1000 ML: 9 INJECTION, SOLUTION INTRAVENOUS at 11:20

## 2022-01-17 RX ADMIN — KETOROLAC TROMETHAMINE 15 MG: 15 INJECTION, SOLUTION INTRAMUSCULAR; INTRAVENOUS at 11:21

## 2022-01-17 ASSESSMENT — ENCOUNTER SYMPTOMS
COUGH: 0
VOMITING: 0
FEVER: 0
RESPIRATORY NEGATIVE: 1
DIARRHEA: 0
MUSCULOSKELETAL NEGATIVE: 1
NAUSEA: 1
ABDOMINAL PAIN: 1
CONSTIPATION: 1
APPETITE CHANGE: 1

## 2022-01-17 NOTE — ED PROVIDER NOTES
"  History     Chief Complaint   Patient presents with     Abdominal Pain     abd pain for one week LUQ pain last BM few days ago, no emesis     HPI  Fatou Simental is a 56 year old female with history of GERD, generalized anxiety disorder, migraines, IBS, diverticulosis (s/p colectomy in 2015) who presents with complaints of left-sided abdominal pain that has been ongoing over the past week and a half.  Patient also complains of associated nausea but no vomiting.  She states she was passing gas but not having any bowel movements until passing a small one yesterday after she started taking stool softeners.  She states she feels \"full\" and bloated.  She is not able to eat much.  Patient states her symptoms feel similar to when she has had diverticulitis in the past.  Denies fevers, chills, cough, chest pain, shortness of breath, or urinary symptoms.      Allergies:  Allergies   Allergen Reactions     Cephalexin Hives     Patient states she gets hives - bad reaction not listed on her H & P.     Rizatriptan Dizziness     Ciprofloxacin Hives     Got rash when taken with metronidazole + Vicodin     Lactose Diarrhea     Metronidazole Hives     Rash when taken with cipro + Vicodin     Unknown [No Clinical Screening - See Comments]      Pt developed hives when taking flagyl, cipro, and vicodin, doesn't know which she is allergic to      Vicodin [Hydrocodone-Acetaminophen] Hives     Rash when taken with cipro and metronidazole       Problem List:    Patient Active Problem List    Diagnosis Date Noted     Constipation 01/17/2022     Priority: Medium     Migraine with aura and without status migrainosus, not intractable 10/16/2017     Priority: Medium     Diverticulosis 02/05/2015     Priority: Medium     Diverticular disease of colon 10/07/2013     Priority: Medium     Screening for malignant neoplasm of cervix 07/17/2013     Priority: Medium     S/p hysterectomy for endometriosis, still has cervix  Distant history of " cervical dysplasia 2000  Pap 2010:  NIL with negative HPV  Pap 2012:  NIL  2014 NIL Pap, Neg HPV  2017 NIL Pap, Neg HPV  2020 NIL Pap, Neg HPV. Plan cotest in 3 years. Cotesting every 3 years through 2028 recommended per 2019 ASCCP guidelines.              CARDIOVASCULAR SCREENING; LDL GOAL LESS THAN 160 10/31/2010     Priority: Medium     IBS (irritable bowel syndrome) 05/28/2008     Priority: Medium     Ovarian cyst 08/23/2007     Priority: Medium     Oophorectomy bilateral - premalignant tissue ? 1998  Problem list name updated by automated process. Provider to review       Breast screening 01/22/2007     Priority: Medium      implants with 4mm superficial subq lesion at 2:00 left breast - unchanged over last year. Cyst identified on US 1/07  Problem list name updated by automated process. Provider to review       Ganglion of joint 01/22/2007     Priority: Medium     Esophageal reflux 11/13/2006     Priority: Medium     Generalized anxiety disorder 10/30/2006     Priority: Medium     Buspar prescribed 8/23/07       Raynaud's syndrome 01/04/2006     Priority: Medium     Tobacco use disorder 01/04/2006     Priority: Medium     Mild major depression (H) 01/04/2006     Priority: Medium     Need for prophylactic hormone replacement therapy (postmenopausal) 01/04/2006     Priority: Medium        Past Medical History:    Past Medical History:   Diagnosis Date     Cocaine abuse, unspecified 2003     Dysplasia of cervix, unspecified 2000     Esophageal reflux 11/13/2006     Generalized anxiety disorder 10/30/2006     IBS (irritable bowel syndrome) 5/28/2008       Past Surgical History:    Past Surgical History:   Procedure Laterality Date     ABLATE VEIN VARICOSE RADIO FREQUENCY WITHOUT PHLEBECTOMY MULTIPLE STAB  7/24/2014    Procedure: ABLATE VEIN VARICOSE RADIO FREQUENCY WITHOUT PHLEBECTOMY MULTIPLE STAB;  Surgeon: Khanh Bunch MD;  Location: [facility]     ENHANCE LASER REFRACTIVE BILATERAL EXISTING PT IN  PARAMETERS Bilateral     LASIK     HYSTERECTOMY SUPRACERVICAL, BILATERAL SALPINGO-OOPHORECTOMY, COMBINED       LAPAROSCOPIC ASSISTED COLECTOMY N/A 2015    Procedure: LAPAROSCOPIC ASSISTED COLECTOMY;  Surgeon: Khanh Bunch MD;  Location: [facility]     LAPAROSCOPIC CHOLECYSTECTOMY N/A 2017    Procedure: LAPAROSCOPIC CHOLECYSTECTOMY;  Surgeon: Levi Louis MD;  Location: [facility]     NERVE BLOCK PERIPHERAL Bilateral 2015    Procedure: NERVE BLOCK PERIPHERAL;  Surgeon: Generic Anesthesia Provider;  Location: [facility]     REPAIR PTOSIS BILATERAL Bilateral 10/11/2018    Procedure: REPAIR PTOSIS BILATERAL;  Bilateral Upper Eyelid Ptosis Repair;  Surgeon: Wilton Viramontes MD;  Location: [facility]     SURGICAL HISTORY OF -       Breast implants     SURGICAL HISTORY OF -       Carpal  tunnel r hand     ZZC  DELIVERY ONLY  ,,    , Low Cervical       Family History:    Family History   Problem Relation Age of Onset     Cancer Father         liver     Cancer Brother         lung, asbestos     Cancer Mother         uterine     Cancer Sister         uterine     Breast Cancer Other      Breast Cancer Other      Glaucoma No family hx of      Macular Degeneration No family hx of        Social History:  Marital Status:   [4]  Social History     Tobacco Use     Smoking status: Current Every Day Smoker     Packs/day: 0.25     Years: 20.00     Pack years: 5.00     Smokeless tobacco: Never Used     Tobacco comment: trying to quit   Substance Use Topics     Alcohol use: Never     Drug use: No        Medications:    ondansetron (ZOFRAN ODT) 4 MG ODT tab  oxyCODONE (ROXICODONE) 5 MG tablet  albuterol (PROAIR HFA/PROVENTIL HFA/VENTOLIN HFA) 108 (90 Base) MCG/ACT inhaler  butalbital-aspirin-caffeine (FIORINAL) -40 MG capsule  cetirizine (ZYRTEC) 10 MG tablet  Cholecalciferol (VITAMIN D3 PO)  DiphenhydrAMINE HCl (BENADRYL PO)  estradiol (ESTRACE) 1 MG tablet  estradiol  (VAGIFEM) 10 MCG TABS vaginal tablet  gabapentin (NEURONTIN) 100 MG capsule  gabapentin (NEURONTIN) 300 MG capsule  LORazepam (ATIVAN) 0.5 MG tablet  methocarbamol (ROBAXIN) 500 MG tablet  montelukast (SINGULAIR) 10 MG tablet  multivitamin, therapeutic (THERA-VIT) TABS  naproxen (NAPROSYN) 500 MG tablet  omeprazole 20 MG tablet  oxybutynin ER (DITROPAN-XL) 5 MG 24 hr tablet  PARoxetine (PAXIL) 10 MG tablet          Review of Systems   Constitutional: Positive for appetite change (decreased). Negative for fever.   Respiratory: Negative.  Negative for cough.    Gastrointestinal: Positive for abdominal pain, constipation and nausea. Negative for diarrhea and vomiting.   Genitourinary: Negative.    Musculoskeletal: Negative.    Skin: Negative.    All other systems reviewed and are negative.      Physical Exam   BP: 113/60  Pulse: 70  Temp: 98  F (36.7  C)  Resp: 12  Weight: 47.2 kg (104 lb)  SpO2: 99 %      Physical Exam  Constitutional:       General: She is not in acute distress.     Appearance: Normal appearance. She is well-developed. She is not ill-appearing, toxic-appearing or diaphoretic.   HENT:      Head: Normocephalic and atraumatic.      Nose: Nose normal.      Mouth/Throat:      Lips: Pink.      Mouth: Mucous membranes are moist.   Eyes:      Extraocular Movements: Extraocular movements intact.      Conjunctiva/sclera: Conjunctivae normal.      Pupils: Pupils are equal, round, and reactive to light.   Cardiovascular:      Rate and Rhythm: Normal rate and regular rhythm.      Heart sounds: Normal heart sounds.   Pulmonary:      Effort: Pulmonary effort is normal.      Breath sounds: Normal breath sounds.   Abdominal:      General: There is no distension.      Palpations: Abdomen is soft.      Tenderness: There is abdominal tenderness in the suprapubic area, left upper quadrant and left lower quadrant. There is no right CVA tenderness, left CVA tenderness, guarding or rebound.   Musculoskeletal:          General: Normal range of motion.      Cervical back: Normal range of motion and neck supple.   Skin:     General: Skin is warm and dry.   Neurological:      General: No focal deficit present.      Mental Status: She is alert.   Psychiatric:         Mood and Affect: Mood normal.         Behavior: Behavior is cooperative.         ED Course                 Procedures    Results for orders placed or performed during the hospital encounter of 01/17/22 (from the past 24 hour(s))   [place] Draw    Narrative    The following orders were created for panel order [place] Draw.  Procedure                               Abnormality         Status                     ---------                               -----------         ------                     Extra Blue Top Tube[[id number]]                              Final result               Extra Red Top Tube[[id number]]                               Final result               Extra Green Top (Lithium...[[id number]]                      Final result               Extra Purple Top Tube[[id number]]                            Final result                 Please view results for these tests on the individual orders.   UA with Microscopic reflex to Culture    Specimen: Urine, Midstream   Result Value Ref Range    Color Urine Yellow Colorless, Straw, Light Yellow, Yellow    Appearance Urine Slightly Cloudy (A) Clear    Glucose Urine Negative Negative mg/dL    Bilirubin Urine Negative Negative    Ketones Urine Negative Negative mg/dL    Specific Gravity Urine 1.014 1.003 - 1.035    Blood Urine Negative Negative    pH Urine 7.0 5.0 - 7.0    Protein Albumin Urine Negative Negative mg/dL    Urobilinogen Urine Normal Normal, 2.0 mg/dL    Nitrite Urine Negative Negative    Leukocyte Esterase Urine Negative Negative    Amorphous Crystals Urine Few (A) None Seen /HPF    RBC Urine 1 <=2 /HPF    WBC Urine 0 <=5 /HPF    Squamous Epithelials Urine <1 <=1 /HPF    Narrative    Urine Culture not indicated    Extra Blue Top Tube   Result Value Ref Range    Hold Specimen JIC    Extra Red Top Tube   Result Value Ref Range    Hold Specimen JIC    Extra Green Top (Lithium Heparin) Tube   Result Value Ref Range    Hold Specimen JIC    Extra Purple Top Tube   Result Value Ref Range    Hold Specimen JIC    CBC with platelets differential    Narrative    The following orders were created for panel order CBC with platelets differential.  Procedure                               Abnormality         Status                     ---------                               -----------         ------                     CBC with platelets and d...[[id number]]                      Final result                 Please view results for these tests on the individual orders.   Comprehensive metabolic panel   Result Value Ref Range    Sodium 140 133 - 144 mmol/L    Potassium 4.0 3.4 - 5.3 mmol/L    Chloride 107 94 - 109 mmol/L    Carbon Dioxide (CO2) 27 20 - 32 mmol/L    Anion Gap 6 3 - 14 mmol/L    Urea Nitrogen 22 7 - 30 mg/dL    Creatinine 0.56 0.52 - 1.04 mg/dL    Calcium 9.3 8.5 - 10.1 mg/dL    Glucose 87 70 - 99 mg/dL    Alkaline Phosphatase 68 40 - 150 U/L    AST 15 0 - 45 U/L    ALT 19 0 - 50 U/L    Protein Total 6.8 6.8 - 8.8 g/dL    Albumin 3.4 3.4 - 5.0 g/dL    Bilirubin Total 0.2 0.2 - 1.3 mg/dL    GFR Estimate >90 >60 mL/min/1.73m2   Lipase   Result Value Ref Range    Lipase 146 73 - 393 U/L   CBC with platelets and differential   Result Value Ref Range    WBC Count 4.4 4.0 - 11.0 10e3/uL    RBC Count 4.43 3.80 - 5.20 10e6/uL    Hemoglobin 14.1 11.7 - 15.7 g/dL    Hematocrit 41.0 35.0 - 47.0 %    MCV 93 78 - 100 fL    MCH 31.8 26.5 - 33.0 pg    MCHC 34.4 31.5 - 36.5 g/dL    RDW 13.0 10.0 - 15.0 %    Platelet Count 323 150 - 450 10e3/uL    % Neutrophils 57 %    % Lymphocytes 28 %    % Monocytes 10 %    % Eosinophils 3 %    % Basophils 2 %    % Immature Granulocytes 0 %    NRBCs per 100 WBC 0 <1 /100    Absolute Neutrophils 2.5 1.6 -  8.3 10e3/uL    Absolute Lymphocytes 1.3 0.8 - 5.3 10e3/uL    Absolute Monocytes 0.4 0.0 - 1.3 10e3/uL    Absolute Eosinophils 0.1 0.0 - 0.7 10e3/uL    Absolute Basophils 0.1 0.0 - 0.2 10e3/uL    Absolute Immature Granulocytes 0.0 <=0.4 10e3/uL    Absolute NRBCs 0.0 10e3/uL   CT Abdomen Pelvis w Contrast    Narrative    CT ABDOMEN PELVIS WITH CONTRAST 1/17/2022 12:16 PM    CLINICAL HISTORY: Left-sided abdominal pain.    TECHNIQUE: CT scan of the abdomen and pelvis was performed following  injection of IV contrast. Multiplanar reformats were obtained. Dose  reduction techniques were used.  CONTRAST: 51mL Isovue-370  COMPARISON: CT of the abdomen and pelvis performed 10/6/2020.    FINDINGS:   LOWER CHEST: The visualized lung bases are clear.    HEPATOBILIARY: Prior cholecystectomy. No hepatic masses.    PANCREAS: Normal.    SPLEEN: Normal.    ADRENAL GLANDS: Normal.    KIDNEYS/BLADDER: Unremarkable. No hydronephrosis.    BOWEL: Postoperative changes are noted in the sigmoid colon. Scattered  colonic diverticulosis. No convincing evidence for colitis or  diverticulitis. Unremarkable appendix. No bowel obstruction.    PELVIC ORGANS: Postoperative changes of prior supracervical  hysterectomy.    LYMPH NODES: No enlarged lymph nodes are identified in the abdomen or  pelvis.    VASCULATURE: Mild atherosclerotic aortoiliac calcification.    ADDITIONAL FINDINGS: None.    MUSCULOSKELETAL: Degenerative changes are noted at the lumbosacral  interspace.      Impression    IMPRESSION:   1.  No acute abnormality in the abdomen or pelvis. No cause for  abdominal pain is identified.  2.  Scattered colonic diverticulosis, without convincing evidence for  diverticulitis.  3.  Prior hysterectomy.    LYLA KENNEDY MD         SYSTEM ID:  [id number]       Medications   0.9% sodium chloride BOLUS (0 mLs Intravenous Stopped 1/17/22 1313)   ondansetron (ZOFRAN) injection 4 mg (4 mg Intravenous Given 1/17/22 1120)   ketorolac (TORADOL)  "injection 15 mg (15 mg Intravenous Given 1/17/22 1121)   iopamidol (ISOVUE-370) solution 51 mL (51 mLs Intravenous Given 1/17/22 1208)   sodium chloride 0.9 % bag 500mL for CT scan flush use (53 mLs Intravenous Given 1/17/22 1208)       Assessments & Plan (with Medical Decision Making)     Pt is a 56 year old female with history of GERD, generalized anxiety disorder, migraines, IBS, diverticulosis (s/p colectomy in 2015) who presents with complaints of left-sided abdominal pain that has been ongoing over the past week and a half.  Patient also complains of associated nausea but no vomiting.  She states she was passing gas but not having any bowel movements until passing a small one yesterday after she started taking stool softeners.  She states she feels \"full\" and bloated.  She is not able to eat much.  Patient states her symptoms feel similar to when she has had diverticulitis in the past.     Pt is afebrile on arrival.  Exam as above.  Urinalysis was negative for infection or hematuria.  No leukocytosis or anemia on CBC.  Comprehensive metabolic panel was unremarkable.  Lipase is not elevated.  CT scan of the abdomen and pelvis was negative for acute abnormality.  No cause for abdominal pain identified.  Scattered colonic diverticulosis without evidence of diverticulitis noted.  No small bowel obstruction.  Discussed results with patient.  Encouraged symptomatic treatments at home.  Unclear etiology of patient's abdominal pain.  Could be related to her bowel syndrome.  Encouraged bowel regimen including stool softeners and laxatives as well as continued hydration.  We discussed that narcotic pain medication can further exacerbate constipation.  Return precautions were reviewed.  Hand-outs were provided.    Patient was sent with Oxycodone and Zofran and was instructed to follow-up with PCP in 3-5 days for continued care and management or sooner if new or worsening symptoms.  She is to return to the ED for " persistent and/or worsening symptoms.  Patient expressed understanding of the diagnosis and plan and was discharged home in good condition.    I have reviewed the nursing notes.    I have reviewed the findings, diagnosis, plan and need for follow up with the patient.    Discharge Medication List as of 1/17/2022  1:13 PM      START taking these medications    Details   ondansetron (ZOFRAN ODT) 4 MG ODT tab Take 1 tablet (4 mg) by mouth every 8 hours as needed for nausea, Disp-10 tablet, R-0, E-Prescribe      oxyCODONE (ROXICODONE) 5 MG tablet Take 1 tablet (5 mg) by mouth every 6 hours as needed for breakthrough pain, Disp-8 tablet, R-0, E-Prescribe             Final diagnoses:   Left sided abdominal pain       1/17/2022   [facility] EMERGENCY DEPT      Disclaimer:  This note consists of symbols derived from keyboarding, dictation and/or voice recognition software.  As a result, there may be errors in the script that have gone undetected.  Please consider this when interpreting information found in this chart.     Reena Gambino PA-C  01/17/22 [id number]

## 2022-01-17 NOTE — ED NOTES
Pt c/o LUQ pain x 1 week, has hx diverticulitis. States also has hx bowel blockage. Pt states had BM 3 days ago, very small one yesterday. States pain feels like diverticulitis.

## 2022-01-18 ENCOUNTER — PATIENT OUTREACH (OUTPATIENT)
Dept: FAMILY MEDICINE | Facility: CLINIC | Age: 57
End: 2022-01-18

## 2022-01-18 ENCOUNTER — E-VISIT (OUTPATIENT)
Dept: FAMILY MEDICINE | Facility: CLINIC | Age: 57
End: 2022-01-18
Payer: COMMERCIAL

## 2022-01-18 DIAGNOSIS — R14.0 ABDOMINAL BLOATING: ICD-10-CM

## 2022-01-18 DIAGNOSIS — K59.00 CONSTIPATION, UNSPECIFIED CONSTIPATION TYPE: Primary | ICD-10-CM

## 2022-01-18 PROCEDURE — 99422 OL DIG E/M SVC 11-20 MIN: CPT | Performed by: NURSE PRACTITIONER

## 2022-01-18 RX ORDER — HYOSCYAMINE SULFATE 0.125 MG
0.12 TABLET ORAL EVERY 4 HOURS PRN
Qty: 30 TABLET | Refills: 4 | Status: SHIPPED | OUTPATIENT
Start: 2022-01-18 | End: 2022-06-21

## 2022-01-18 NOTE — PATIENT INSTRUCTIONS
Thank you for choosing us for your care. I have placed an order for a prescription so that you can start treatment. View your full visit summary for details by clicking on the link below. Your pharmacist will able to address any questions you may have about the medication. I sent a medication for Anaspaz- a prescription for IBS with constipation to decrease spasm pain.     Would also recommend to take Magnesium Oxide 400 mg tabs- 1 or 2 tabs daily- helps with chronic constipation/cramping and is available over the counter.     Also recommend a colon cleanse to help ease amount of stool in colon:    Mix 14 capfuls (238 grams) of Miralax into 64 oz of PowerAde/Gatorade. Drink within 1 hour.    Within 30 minutes of finishing the Miralax solution 3 bisacodyl (Dulcolax) tablets.     I did put an order for a pelvic ultrasound in to make sure this is not something with the remaining ovary as this can be hard to see on CT scan.     Please call [facility] Imaging Department to schedule your imaging [phone].      If you're not feeling better within 5-7 days, please schedule an appointment, sooner for new or worsening symptoms.  You can schedule an appointment right here in [facility], or call [phone]  If the visit is for the same symptoms as your eVisit, we'll refund the cost of your eVisit if seen within seven days.  You also can follow up with GI as you have seen them in the past and you may need a colonoscopy and I know that since your surgeries this may be difficult to have done in [place] with the general surgeon. Another possible cause is adhesions from previous surgeries that can only be picked up with exploratory surgery via a thoracic surgeon.       Please let us know if you have any questions.

## 2022-01-19 ENCOUNTER — HOSPITAL ENCOUNTER (OUTPATIENT)
Dept: ULTRASOUND IMAGING | Facility: CLINIC | Age: 57
Discharge: HOME | End: 2022-01-19
Attending: NURSE PRACTITIONER | Admitting: NURSE PRACTITIONER
Payer: COMMERCIAL

## 2022-01-19 DIAGNOSIS — R14.0 ABDOMINAL BLOATING: ICD-10-CM

## 2022-01-19 PROCEDURE — 76830 TRANSVAGINAL US NON-OB: CPT

## 2022-01-20 ENCOUNTER — OFFICE VISIT (OUTPATIENT)
Dept: FAMILY MEDICINE | Facility: CLINIC | Age: 57
End: 2022-01-20
Payer: COMMERCIAL

## 2022-01-20 ENCOUNTER — ANCILLARY PROCEDURE (OUTPATIENT)
Dept: GENERAL RADIOLOGY | Facility: CLINIC | Age: 57
End: 2022-01-20
Attending: NURSE PRACTITIONER
Payer: COMMERCIAL

## 2022-01-20 VITALS
HEART RATE: 103 BPM | TEMPERATURE: 97.8 F | OXYGEN SATURATION: 98 % | BODY MASS INDEX: 19.45 KG/M2 | WEIGHT: 103 LBS | HEIGHT: 61 IN

## 2022-01-20 DIAGNOSIS — R10.32 LLQ ABDOMINAL PAIN: ICD-10-CM

## 2022-01-20 DIAGNOSIS — K59.00 CONSTIPATION, UNSPECIFIED CONSTIPATION TYPE: ICD-10-CM

## 2022-01-20 DIAGNOSIS — B02.29 POST HERPETIC NEURALGIA: ICD-10-CM

## 2022-01-20 DIAGNOSIS — R10.32 LLQ ABDOMINAL PAIN: Primary | ICD-10-CM

## 2022-01-20 DIAGNOSIS — G89.29 OTHER CHRONIC PAIN: ICD-10-CM

## 2022-01-20 DIAGNOSIS — F41.1 GENERALIZED ANXIETY DISORDER: ICD-10-CM

## 2022-01-20 DIAGNOSIS — K57.30 DIVERTICULAR DISEASE OF COLON: ICD-10-CM

## 2022-01-20 PROCEDURE — 99214 OFFICE O/P EST MOD 30 MIN: CPT | Performed by: NURSE PRACTITIONER

## 2022-01-20 PROCEDURE — 74019 RADEX ABDOMEN 2 VIEWS: CPT | Performed by: RADIOLOGY

## 2022-01-20 RX ORDER — GABAPENTIN 300 MG/1
600 CAPSULE ORAL AT BEDTIME
Qty: 60 CAPSULE | Refills: 1 | Status: SHIPPED | OUTPATIENT
Start: 2022-01-20 | End: 2022-03-23

## 2022-01-20 RX ORDER — SENNA AND DOCUSATE SODIUM 50; 8.6 MG/1; MG/1
1 TABLET, FILM COATED ORAL AT BEDTIME
Qty: 60 TABLET | Refills: 1 | Status: SHIPPED | OUTPATIENT
Start: 2022-01-20 | End: 2022-05-03

## 2022-01-20 RX ORDER — LORAZEPAM 0.5 MG/1
TABLET ORAL
Qty: 5 TABLET | Refills: 0 | Status: SHIPPED | OUTPATIENT
Start: 2022-01-20 | End: 2023-05-19

## 2022-01-20 ASSESSMENT — PAIN SCALES - GENERAL: PAINLEVEL: SEVERE PAIN (7)

## 2022-01-20 ASSESSMENT — ANXIETY QUESTIONNAIRES
GAD7 TOTAL SCORE: 14
3. WORRYING TOO MUCH ABOUT DIFFERENT THINGS: MORE THAN HALF THE DAYS
IF YOU CHECKED OFF ANY PROBLEMS ON THIS QUESTIONNAIRE, HOW DIFFICULT HAVE THESE PROBLEMS MADE IT FOR YOU TO DO YOUR WORK, TAKE CARE OF THINGS AT HOME, OR GET ALONG WITH OTHER PEOPLE: VERY DIFFICULT
6. BECOMING EASILY ANNOYED OR IRRITABLE: NEARLY EVERY DAY
5. BEING SO RESTLESS THAT IT IS HARD TO SIT STILL: NOT AT ALL
2. NOT BEING ABLE TO STOP OR CONTROL WORRYING: MORE THAN HALF THE DAYS
7. FEELING AFRAID AS IF SOMETHING AWFUL MIGHT HAPPEN: NEARLY EVERY DAY
1. FEELING NERVOUS, ANXIOUS, OR ON EDGE: MORE THAN HALF THE DAYS

## 2022-01-20 ASSESSMENT — PATIENT HEALTH QUESTIONNAIRE - PHQ9
SUM OF ALL RESPONSES TO PHQ QUESTIONS 1-9: 23
5. POOR APPETITE OR OVEREATING: MORE THAN HALF THE DAYS

## 2022-01-20 ASSESSMENT — MIFFLIN-ST. JEOR: SCORE: 997.45

## 2022-01-20 NOTE — RESULT ENCOUNTER NOTE
Giuliana Lainez    Your pelvic ultrasound is normal- no ovary mass or other abnormal findings. I suggest to follow up GI- let me know if you need a referral- to explore possibility of adhesions from prior surgeries or for possible colonoscopy, which I would anticipate is the next step. If you have any new or worsening symptoms please follow up in clinic right away for repeat labs and exam. Please let us know if you have any questions.     Take care,    ANGEL Avitia CNP

## 2022-01-20 NOTE — PROGRESS NOTES
Assessment & Plan     LLQ abdominal pain  Likely related to constipation.  Xray today - reviewed revealed large amount of stool seen - no free air - non obstructive.  Added fiber and senna/docusate.  Discussed bowel clean out.  Discussed red flags.    - methylcellulose (CITRUCEL) powder  Dispense: 454 g; Refill: 1  - SENNA-docusate sodium (SENNA S) 8.6-50 MG tablet  Dispense: 60 tablet; Refill: 1  - linaclotide (LINZESS) 72 MCG capsule  Dispense: 30 capsule; Refill: 1  - Adult Gastro Ref - Procedure Only  - XR Abdomen 2 Views  - magnesium citrate solution  Dispense: 296 mL; Refill: 0    Constipation, unspecified constipation type   Added back in Linzess - take after above clean out and improvement in symptoms.  Follow-up in 1 month for recheck or sooner.    - methylcellulose (CITRUCEL) powder  Dispense: 454 g; Refill: 1  - SENNA-docusate sodium (SENNA S) 8.6-50 MG tablet  Dispense: 60 tablet; Refill: 1  - linaclotide (LINZESS) 72 MCG capsule  Dispense: 30 capsule; Refill: 1  - Adult Gastro Ref - Procedure Only  - XR Abdomen 2 Views  - magnesium citrate solution  Dispense: 296 mL; Refill: 0    Post herpetic neuralgia     - gabapentin (NEURONTIN) 300 MG capsule  Dispense: 60 capsule; Refill: 1    Other chronic pain     - gabapentin (NEURONTIN) 300 MG capsule  Dispense: 60 capsule; Refill: 1    Diverticular disease of colon       Generalized anxiety disorder   The risks, benefits and treatment options of prescribed medications or other treatments have been discussed with the patient. The patient verbalized their understanding and should call or follow up if no improvement or if they develop further problems.    - LORazepam (ATIVAN) 0.5 MG tablet  Dispense: 5 tablet; Refill: 0          Tobacco Cessation:   reports that she has been smoking. She has a 5.00 pack-year smoking history. She has never used smokeless tobacco.      Patient Instructions     Patient Education     Constipation (Adult)  Constipation means that  you have bowel movements that are less frequent than usual. Stools often become very hard and difficult to pass.  Constipation is very common. At some point in life, it affects almost everyone. Since everyone's bowel habits are different, what is constipation to one person may not be to another. Your healthcare provider may do tests to diagnose constipation. It depends on what he or she finds when evaluating you.    Symptoms of constipation include:    Abdominal pain    Bloating    Vomiting    Painful bowel movements    Itching, swelling, bleeding, or pain around the anus  Causes  Constipation can have many causes. These include:    Diet low in fiber    Too much dairy    Not drinking enough liquids    Lack of exercise or physical activity (especially true for older adults)    Changes in lifestyle or daily routine, including pregnancy, aging, work, and travel    Frequent use or misuse of laxatives    Ignoring the urge to have a bowel movement or delaying it until later    Medicines, such as certain prescription pain medicines, iron supplements, antacids, certain antidepressants, and calcium supplements    Diseases like irritable bowel syndrome, bowel obstructions, stroke, diabetes, thyroid disease, Parkinson disease, hemorrhoids, and colon cancer  Complications  Potential complications of constipation can include:    Hemorrhoids    Rectal bleeding from hemorrhoids or anal fissures (skin tears)    Hernias    Dependency on laxatives    Chronic constipation    Fecal impaction, a severe form of constipation in which a large amount of hard stool is in your rectum that you can't pass    Bowel obstruction or perforation  Home care  All treatment should be done after talking with your healthcare provider. This is especially true if you have another medical problems, are taking prescription medicines, or are an older adult. Treatment most often involves lifestyle changes. You may also need medicines. Your healthcare provider  will tell you which will work best for you. Follow the advice below to help avoid this problem in the future.  Lifestyle changes  These lifestyle changes can help prevent constipation:    Diet. Eat a high-fiber diet, with fresh fruit and vegetables, and reduce dairy intake, meats, and processed foods    Fluids. It's important to get enough fluids each day. Drink plenty of water when you eat more fiber. If you are on diet that limits the amount of fluid you can have, talk about this with your healthcare provider.    Regular exercise. Check with your healthcare provider first.  Medicines  Take any medicines as directed. Some laxatives are safe to use only every now and then. Others can be taken on a regular basis. While laxatives don't cause bowel dependence, they are treating the symptoms. So your constipation may return if you don't make other changes. Talk with your healthcare provider or pharmacist if you have questions.  Prescription pain medicines can cause constipation. If you are taking this kind of medicine, ask your healthcare provider if you should also take a stool softener.  Medicines you may take to treat constipation include:    Fiber supplements    Stool softeners    Laxatives    Enemas    Rectal suppositories  Follow-up care  Follow up with your healthcare provider if symptoms don't get better in the next few days. You may need to have more tests or see a specialist.  Call 911  Call 911 if any of these occur:    Trouble breathing    Stiff, rigid abdomen that is severely painful to touch    Confusion    Fainting or loss of consciousness    Rapid heart rate    Chest pain  When to seek medical advice  Call your healthcare provider right away if any of these occur:    Fever of 100.4 F (38 C) or higher, or as directed by your healthcare provider    Failure to resume normal bowel movements    Pain in your abdomen or back gets worse    Nausea or vomiting    Swelling in your abdomen    Blood in the  stool    Black, tarry stool    Involuntary weight loss    Weakness  [employer] last reviewed this educational content on 6/1/2018 2000-2021 [employer]. All rights reserved. This information is not intended as a substitute for professional medical care. Always follow your healthcare professional's instructions.           Patient Education     Unknown Causes of Abdominal Pain (Female)    The exact cause of your belly (abdominal) pain is not clear. This does not mean that this is something to worry about. Everyone likes to know the exact cause of the problem. But sometimes with belly pain, there is no clear-cut cause, and this could be a good thing. The good news is that your symptoms can be treated, and you will feel better.   Your condition does not seem serious now. But sometimes the signs of a serious problem may take more time to appear. For this reason, it is important for you to watch for any new symptoms, problems, or worsening of your condition.  Over the next few days, the abdominal pain may come and go. Or it may be constant. Other common symptoms can include nausea and vomiting. Sometimes it can be difficult to tell if you feel nauseous. You may just feel bad and not connect that feeling to nausea. Constipation, diarrhea, and a fever may go along with the pain.  The pain may continue even if treated correctly over the following days. Depending on how things go, sometimes the cause can become clear and may need more or different treatment. Additional evaluations, medicines, or tests may also be needed.  Home care  Your healthcare provider may prescribe medicine for pain, symptoms, or an infection.  Follow the healthcare provider's instructions for taking these medicines.  General care    Rest as much as you can until your next exam. No strenuous activities.    Try to find positions that ease discomfort. A small pillow placed on the abdomen may help relieve pain.    Something warm on your  abdomen (such as a heating pad) may help, but be careful not to burn yourself.  Diet    Don t force yourself to eat, especially if having cramps, vomiting, or diarrhea.    Water is important so you don't get dehydrated. Soup may also be good. Sports drinks may also help, especially if they are not too acidic. Don't drink sugary drinks as this can make things worse. Take liquids in small amounts. Don t guzzle them.    Caffeine sometimes makes the pain and cramping worse.    Don t take dairy products if you have vomiting or diarrhea.    Don't eat large amounts at a time. Wait a few minutes between bites.    Eat a diet low in fiber (called a low-residue diet). Foods allowed include refined breads, white rice, fruit and vegetable juices without pulp, tender meats. These foods will pass more easily through the intestine.    Don t have whole-grain foods, whole fruits and vegetables, meats, seeds and nuts, fried or fatty foods, dairy, alcohol and spicy foods until your symptoms go away.  Follow-up care  Follow up with your healthcare provider, or as advised, if your pain does not begin to improve in the next 24 hours.  Call 911  Call 911 if any of these occur:    Trouble breathing    Confusion    Fainting or loss of consciousness    Rapid heart rate    Seizure  When to seek medical advice  Call your healthcare provider right away if any of these occur:    Pain gets worse or moves to the right lower abdomen    New or worsening vomiting or diarrhea    Swelling of the abdomen    Unable to pass stool for more than 3 days    Fever of 100.4 F (38 C) or higher, or as directed by your healthcare provider.    Blood in vomit or bowel movements (dark red or black color)    Yellow color of eyes and skin (jaundice)    Weakness, dizziness    Chest, arm, back, neck, or jaw pain    Unexpected vaginal bleeding or missed period    Can't keep down liquids or water and you are getting dehydrated  [employer] last reviewed this educational  content on 6/1/2018 2000-2021 [employer]. All rights reserved. This information is not intended as a substitute for professional medical care. Always follow your healthcare professional's instructions.               No follow-ups on file.    Karlene Shay NP  [facility]    Chery Lainez is a 56 year old who presents for the following health issues     HPI     Constipation  Onset/Duration: started over a week ago,cramoing in stomach x 1 month getting worse  Description:  Frequency of bowel movements: tiny bm x1 all week    Consistency of stool: hard,small   Progression of Symptoms: worsening, constant dull ache,bloated and intermittent sharp pains  Accompanying signs and symptoms:    Abdominal pain: YES   Rectal pain: no   Blood in stool: no   Nausea/Vomiting: YES   Weight loss or gain: no  History:   Similar problems in past: YES removed part of colon   History of abdominal surgery: no  Chronic laxative use: YES  New medications: YES  Precipitating or alleviating factors: medications for spasm is helping but still having some spasm,pain medication help   Therapies tried and outcome: prescription medication,miralax,stool softener     Had work up with CT and US which were normal in the past 1 month.  Was given Oxycodone on Monday for pain - she has been taking 1-2 tablets.  Reports worsening constipation since Monday.  Last BM was > 1 week ago.  History of irritable bowel and chronic constipation.  Was on Linzess - stopped this > 1 year ago.  Trying to increase fluids.  No vomiting - reports nausea.  No dark stools, no hematemesis.    Abnormal Mood Symptoms  Onset/Duration: started a week ago  Description: getting worse  Depression (if yes, do PHQ-9): no  Anxiety (if yes, do SOY-7): YES  Accompanying Signs & Symptoms:  Still participating in activities that you used to enjoy: no  Fatigue: no  Irritability: no  Difficulty concentrating: no  Changes in appetite:  "no  Problems with sleep: no  Heart racing/beating fast: no  Abnormally elevated, expansive, or irritable mood: YES  Persistently increased activity or energy: no  Thoughts of hurting yourself or others: no  History:  Recent stress or major life event: YES  Prior depression or anxiety: yes    history of depression or anxiety: YES  Alcohol/drug use: no  Difficulty sleeping: YES  Precipitating or alleviating factors: None  Therapies tried and outcome: none    Stopped Paxil > 6 months.  Taking Lorazepam prn - last filled > 1 year ago.    PHQ 12/18/2020 6/8/2021 1/20/2022   PHQ-9 Total Score 11 9 23   Q9: Thoughts of better off dead/self-harm past 2 weeks Not at all Not at all Not at all     SOY-7 SCORE 12/18/2020 6/8/2021 1/20/2022   Total Score - - -   Total Score 19 13 14       Review of Systems   Constitutional, HEENT, cardiovascular, pulmonary, GI, , musculoskeletal, neuro, skin, endocrine and psych systems are negative, except as otherwise noted.      Objective    Pulse 103   Temp 97.8  F (36.6  C) (Tympanic)   Ht 1.554 m (5' 1.18\")   Wt 46.7 kg (103 lb)   SpO2 98%   BMI 19.35 kg/m    Body mass index is 19.35 kg/m .  Physical Exam   GENERAL: alert and no distress  NECK: no adenopathy, no asymmetry, masses, or scars and thyroid normal to palpation  RESP: lungs clear to auscultation - no rales, rhonchi or wheezes  CV: regular rate and rhythm, normal S1 S2, no S3 or S4, no murmur, click or rub, no peripheral edema and peripheral pulses strong  ABDOMEN: tenderness generalized throughout, hypoactive dull, no rebound pain or guarding, no organomegaly or masses and bowel sounds normal  MS: no gross musculoskeletal defects noted, no edema  PSYCH: mentation appears normal, affect flat, anxious, fatigued and judgement and insight intact       CT ABDOMEN PELVIS WITH CONTRAST 1/17/2022 12:16 PM     CLINICAL HISTORY: Left-sided abdominal pain.     TECHNIQUE: CT scan of the abdomen and pelvis was performed " following  injection of IV contrast. Multiplanar reformats were obtained. Dose  reduction techniques were used.  CONTRAST: 51mL Isovue-370  COMPARISON: CT of the abdomen and pelvis performed 10/6/2020.     FINDINGS:   LOWER CHEST: The visualized lung bases are clear.     HEPATOBILIARY: Prior cholecystectomy. No hepatic masses.     PANCREAS: Normal.     SPLEEN: Normal.     ADRENAL GLANDS: Normal.     KIDNEYS/BLADDER: Unremarkable. No hydronephrosis.     BOWEL: Postoperative changes are noted in the sigmoid colon. Scattered  colonic diverticulosis. No convincing evidence for colitis or  diverticulitis. Unremarkable appendix. No bowel obstruction.     PELVIC ORGANS: Postoperative changes of prior supracervical  hysterectomy.     LYMPH NODES: No enlarged lymph nodes are identified in the abdomen or  pelvis.     VASCULATURE: Mild atherosclerotic aortoiliac calcification.     ADDITIONAL FINDINGS: None.     MUSCULOSKELETAL: Degenerative changes are noted at the lumbosacral  interspace.                                                                      IMPRESSION:   1.  No acute abnormality in the abdomen or pelvis. No cause for  abdominal pain is identified.  2.  Scattered colonic diverticulosis, without convincing evidence for  diverticulitis.  3.  Prior hysterectomy.     LYLA KENNEDY MD

## 2022-01-20 NOTE — PATIENT INSTRUCTIONS
Patient Education     Constipation (Adult)  Constipation means that you have bowel movements that are less frequent than usual. Stools often become very hard and difficult to pass.  Constipation is very common. At some point in life, it affects almost everyone. Since everyone's bowel habits are different, what is constipation to one person may not be to another. Your healthcare provider may do tests to diagnose constipation. It depends on what he or she finds when evaluating you.    Symptoms of constipation include:    Abdominal pain    Bloating    Vomiting    Painful bowel movements    Itching, swelling, bleeding, or pain around the anus  Causes  Constipation can have many causes. These include:    Diet low in fiber    Too much dairy    Not drinking enough liquids    Lack of exercise or physical activity (especially true for older adults)    Changes in lifestyle or daily routine, including pregnancy, aging, work, and travel    Frequent use or misuse of laxatives    Ignoring the urge to have a bowel movement or delaying it until later    Medicines, such as certain prescription pain medicines, iron supplements, antacids, certain antidepressants, and calcium supplements    Diseases like irritable bowel syndrome, bowel obstructions, stroke, diabetes, thyroid disease, Parkinson disease, hemorrhoids, and colon cancer  Complications  Potential complications of constipation can include:    Hemorrhoids    Rectal bleeding from hemorrhoids or anal fissures (skin tears)    Hernias    Dependency on laxatives    Chronic constipation    Fecal impaction, a severe form of constipation in which a large amount of hard stool is in your rectum that you can't pass    Bowel obstruction or perforation  Home care  All treatment should be done after talking with your healthcare provider. This is especially true if you have another medical problems, are taking prescription medicines, or are an older adult. Treatment most often involves  lifestyle changes. You may also need medicines. Your healthcare provider will tell you which will work best for you. Follow the advice below to help avoid this problem in the future.  Lifestyle changes  These lifestyle changes can help prevent constipation:    Diet. Eat a high-fiber diet, with fresh fruit and vegetables, and reduce dairy intake, meats, and processed foods    Fluids. It's important to get enough fluids each day. Drink plenty of water when you eat more fiber. If you are on diet that limits the amount of fluid you can have, talk about this with your healthcare provider.    Regular exercise. Check with your healthcare provider first.  Medicines  Take any medicines as directed. Some laxatives are safe to use only every now and then. Others can be taken on a regular basis. While laxatives don't cause bowel dependence, they are treating the symptoms. So your constipation may return if you don't make other changes. Talk with your healthcare provider or pharmacist if you have questions.  Prescription pain medicines can cause constipation. If you are taking this kind of medicine, ask your healthcare provider if you should also take a stool softener.  Medicines you may take to treat constipation include:    Fiber supplements    Stool softeners    Laxatives    Enemas    Rectal suppositories  Follow-up care  Follow up with your healthcare provider if symptoms don't get better in the next few days. You may need to have more tests or see a specialist.  Call 911  Call 911 if any of these occur:    Trouble breathing    Stiff, rigid abdomen that is severely painful to touch    Confusion    Fainting or loss of consciousness    Rapid heart rate    Chest pain  When to seek medical advice  Call your healthcare provider right away if any of these occur:    Fever of 100.4 F (38 C) or higher, or as directed by your healthcare provider    Failure to resume normal bowel movements    Pain in your abdomen or back gets  worse    Nausea or vomiting    Swelling in your abdomen    Blood in the stool    Black, tarry stool    Involuntary weight loss    Weakness  [employer] last reviewed this educational content on 6/1/2018 2000-2021 [employer]. All rights reserved. This information is not intended as a substitute for professional medical care. Always follow your healthcare professional's instructions.           Patient Education     Unknown Causes of Abdominal Pain (Female)    The exact cause of your belly (abdominal) pain is not clear. This does not mean that this is something to worry about. Everyone likes to know the exact cause of the problem. But sometimes with belly pain, there is no clear-cut cause, and this could be a good thing. The good news is that your symptoms can be treated, and you will feel better.   Your condition does not seem serious now. But sometimes the signs of a serious problem may take more time to appear. For this reason, it is important for you to watch for any new symptoms, problems, or worsening of your condition.  Over the next few days, the abdominal pain may come and go. Or it may be constant. Other common symptoms can include nausea and vomiting. Sometimes it can be difficult to tell if you feel nauseous. You may just feel bad and not connect that feeling to nausea. Constipation, diarrhea, and a fever may go along with the pain.  The pain may continue even if treated correctly over the following days. Depending on how things go, sometimes the cause can become clear and may need more or different treatment. Additional evaluations, medicines, or tests may also be needed.  Home care  Your healthcare provider may prescribe medicine for pain, symptoms, or an infection.  Follow the healthcare provider's instructions for taking these medicines.  General care    Rest as much as you can until your next exam. No strenuous activities.    Try to find positions that ease discomfort. A small pillow  placed on the abdomen may help relieve pain.    Something warm on your abdomen (such as a heating pad) may help, but be careful not to burn yourself.  Diet    Don t force yourself to eat, especially if having cramps, vomiting, or diarrhea.    Water is important so you don't get dehydrated. Soup may also be good. Sports drinks may also help, especially if they are not too acidic. Don't drink sugary drinks as this can make things worse. Take liquids in small amounts. Don t guzzle them.    Caffeine sometimes makes the pain and cramping worse.    Don t take dairy products if you have vomiting or diarrhea.    Don't eat large amounts at a time. Wait a few minutes between bites.    Eat a diet low in fiber (called a low-residue diet). Foods allowed include refined breads, white rice, fruit and vegetable juices without pulp, tender meats. These foods will pass more easily through the intestine.    Don t have whole-grain foods, whole fruits and vegetables, meats, seeds and nuts, fried or fatty foods, dairy, alcohol and spicy foods until your symptoms go away.  Follow-up care  Follow up with your healthcare provider, or as advised, if your pain does not begin to improve in the next 24 hours.  Call 911  Call 911 if any of these occur:    Trouble breathing    Confusion    Fainting or loss of consciousness    Rapid heart rate    Seizure  When to seek medical advice  Call your healthcare provider right away if any of these occur:    Pain gets worse or moves to the right lower abdomen    New or worsening vomiting or diarrhea    Swelling of the abdomen    Unable to pass stool for more than 3 days    Fever of 100.4 F (38 C) or higher, or as directed by your healthcare provider.    Blood in vomit or bowel movements (dark red or black color)    Yellow color of eyes and skin (jaundice)    Weakness, dizziness    Chest, arm, back, neck, or jaw pain    Unexpected vaginal bleeding or missed period    Can't keep down liquids or water and you  are getting dehydrated  [employer] last reviewed this educational content on 6/1/2018 2000-2021 [employer]. All rights reserved. This information is not intended as a substitute for professional medical care. Always follow your healthcare professional's instructions.

## 2022-01-21 ASSESSMENT — ANXIETY QUESTIONNAIRES: GAD7 TOTAL SCORE: 14

## 2022-01-24 ENCOUNTER — MYC MEDICAL ADVICE (OUTPATIENT)
Dept: FAMILY MEDICINE | Facility: CLINIC | Age: 57
End: 2022-01-24
Payer: COMMERCIAL

## 2022-01-26 ENCOUNTER — HOSPITAL ENCOUNTER (OUTPATIENT)
Dept: MAMMOGRAPHY | Facility: CLINIC | Age: 57
Discharge: HOME | End: 2022-01-26
Attending: NURSE PRACTITIONER
Payer: COMMERCIAL

## 2022-01-26 DIAGNOSIS — Z12.31 VISIT FOR SCREENING MAMMOGRAM: ICD-10-CM

## 2022-01-26 PROCEDURE — 77063 BREAST TOMOSYNTHESIS BI: CPT

## 2022-01-27 DIAGNOSIS — G43.009 MIGRAINE WITHOUT AURA AND WITHOUT STATUS MIGRAINOSUS, NOT INTRACTABLE: ICD-10-CM

## 2022-01-27 RX ORDER — BUTALBITAL, ASPIRIN, AND CAFFEINE 325; 50; 40 MG/1; MG/1; MG/1
1 CAPSULE ORAL EVERY 6 HOURS PRN
Qty: 30 CAPSULE | Refills: 5 | Status: SHIPPED | OUTPATIENT
Start: 2022-01-27 | End: 2023-06-21

## 2022-01-27 NOTE — TELEPHONE ENCOUNTER
Routing refill request to provider for review/approval because:  Drug not on the [facility] refill protocol     PA Yanez

## 2022-02-04 ENCOUNTER — TELEPHONE (OUTPATIENT)
Dept: FAMILY MEDICINE | Facility: CLINIC | Age: 57
End: 2022-02-04
Payer: COMMERCIAL

## 2022-02-08 ENCOUNTER — OFFICE VISIT (OUTPATIENT)
Dept: FAMILY MEDICINE | Facility: CLINIC | Age: 57
End: 2022-02-08
Payer: COMMERCIAL

## 2022-02-08 VITALS
DIASTOLIC BLOOD PRESSURE: 62 MMHG | HEIGHT: 62 IN | SYSTOLIC BLOOD PRESSURE: 98 MMHG | OXYGEN SATURATION: 98 % | WEIGHT: 103 LBS | TEMPERATURE: 98.1 F | BODY MASS INDEX: 18.95 KG/M2 | RESPIRATION RATE: 14 BRPM | HEART RATE: 71 BPM

## 2022-02-08 DIAGNOSIS — R10.32 LLQ ABDOMINAL PAIN: Primary | ICD-10-CM

## 2022-02-08 DIAGNOSIS — K57.30 DIVERTICULAR DISEASE OF COLON: ICD-10-CM

## 2022-02-08 DIAGNOSIS — K59.00 CONSTIPATION, UNSPECIFIED CONSTIPATION TYPE: ICD-10-CM

## 2022-02-08 DIAGNOSIS — K58.1 IRRITABLE BOWEL SYNDROME WITH CONSTIPATION: ICD-10-CM

## 2022-02-08 PROCEDURE — 99213 OFFICE O/P EST LOW 20 MIN: CPT | Performed by: NURSE PRACTITIONER

## 2022-02-08 ASSESSMENT — MIFFLIN-ST. JEOR: SCORE: 1002.51

## 2022-02-08 ASSESSMENT — PAIN SCALES - GENERAL: PAINLEVEL: MILD PAIN (2)

## 2022-02-08 NOTE — PROGRESS NOTES
Assessment & Plan     LLQ abdominal pain  Resolved after bowel clean out.    - linaclotide (LINZESS) 72 MCG capsule  Dispense: 30 capsule; Refill: 1    Constipation, unspecified constipation type  Ongoing - chronic.  Likely related to irritable bowel syndrome.    The risks, benefits and treatment options of prescribed medications or other treatments have been discussed with the patient. The patient verbalized their understanding and should call or follow up if no improvement or if they develop further problems.  Start Linzess.  See avs    - linaclotide (LINZESS) 72 MCG capsule  Dispense: 30 capsule; Refill: 1    Irritable bowel syndrome with constipation       Diverticular disease of colon  Increase fiber discussed.  Follow-up with GI - colonoscopy.            Tobacco Cessation:   reports that she has been smoking. She has a 5.00 pack-year smoking history. She has never used smokeless tobacco.  Tobacco Cessation Action Plan: Information offered: Patient not interested at this time    Patient Instructions   Continue Senna/Docusate twice daily.    Citracal 1 tsp twice daily (fiber).  Increase fluids throughout the day.    Continue Miralax 2 capfuls.    Add in Linzess 72 mcg capsule once daily in the morning before breakfast.      My chart me in 2 weeks with update on medication and constipation.    Follow-up with GI after some improvement constipation symptoms - for follow up and colonoscopy.    DOREEN Valverde        Return in about 4 weeks (around 3/8/2022) for Recheck symptoms, Medication Follow up.    Karlene Shay NP  [facility]    Chery Lainez is a 56 year old who presents for the following health issues     HPI     Medication Followup of  Obstructed bowel    Taking Medication as prescribed: yes    Side Effects:  None    Medication Helping Symptoms:  Some what      Seen 1/20 for LLQ pain and constipation.  Notes from that visit were:  Has not yet added back in the  "Linzess - was going to wait until seen.  Follow-up in 1 month for recheck or sooner    Xray done and revealed large amount of stool - no obstruction.    Added fiber and Senna/docusate.    On chronic opioids for pain.  Following with pain clinic as well.    Denies nausea/vomiting  Denies abdominal pain and cramping.  Denies fever/chills    Last BM was this am and was small.  Last large BM was last week after Magnesium citrate - that worked well for clean out.    Review of Systems   Constitutional, HEENT, cardiovascular, pulmonary, GI, , musculoskeletal, neuro, skin, endocrine and psych systems are negative, except as otherwise noted.      Objective    Pulse 71   Temp 98.1  F (36.7  C) (Tympanic)   Resp 14   Ht 1.562 m (5' 1.5\")   Wt 46.7 kg (103 lb)   SpO2 98%   BMI 19.15 kg/m    Body mass index is 19.15 kg/m .  Physical Exam   GENERAL: healthy, alert and no distress  NECK: no adenopathy, no asymmetry, masses, or scars and thyroid normal to palpation  RESP: lungs clear to auscultation - no rales, rhonchi or wheezes  CV: regular rate and rhythm, normal S1 S2, no S3 or S4, no murmur, click or rub, no peripheral edema and peripheral pulses strong  ABDOMEN: tenderness generalized lower quadrant, no guarding or rebound tenderness, no organomegaly or masses and bowel sounds normal  MS: no gross musculoskeletal defects noted, no edema       "

## 2022-02-08 NOTE — PATIENT INSTRUCTIONS
Continue Senna/Docusate twice daily.    Citracal 1 tsp twice daily (fiber).  Increase fluids throughout the day.    Continue Miralax 2 capfuls.    Add in Linzess 72 mcg capsule once daily in the morning before breakfast.      My chart me in 2 weeks with update on medication and constipation.    Follow-up with GI after some improvement constipation symptoms - for follow up and colonoscopy.    Karlene Shay, DOREEN

## 2022-02-11 ENCOUNTER — MYC MEDICAL ADVICE (OUTPATIENT)
Dept: FAMILY MEDICINE | Facility: CLINIC | Age: 57
End: 2022-02-11
Payer: COMMERCIAL

## 2022-02-20 ENCOUNTER — HEALTH MAINTENANCE LETTER (OUTPATIENT)
Age: 57
End: 2022-02-20

## 2022-03-23 DIAGNOSIS — B02.29 POST HERPETIC NEURALGIA: ICD-10-CM

## 2022-03-23 DIAGNOSIS — N39.46 MIXED INCONTINENCE: ICD-10-CM

## 2022-03-23 DIAGNOSIS — G89.29 OTHER CHRONIC PAIN: ICD-10-CM

## 2022-03-23 RX ORDER — GABAPENTIN 300 MG/1
CAPSULE ORAL
Qty: 60 CAPSULE | Refills: 1 | Status: SHIPPED | OUTPATIENT
Start: 2022-03-23 | End: 2022-05-27

## 2022-03-23 NOTE — TELEPHONE ENCOUNTER
Pending Prescriptions:                       Disp   Refills    gabapentin (NEURONTIN) 300 MG capsule [Ph*60 cap*1            Sig: TAKE 2 CAPSULES BY MOUTH AT BEDTIME    Routing refill request to provider for review/approval because:  Drug not on the [facility] refill protocol       Brent Weinberg RN

## 2022-03-26 NOTE — TELEPHONE ENCOUNTER
Pending Prescriptions:                       Disp   Refills    oxybutynin ER (DITROPAN-XL) 5 MG 24 hr tab*30 tab*3        Sig: TAKE ONE TABLET BY MOUTH ONCE DAILY    Routing refill request to provider for review/approval because:  Patient needs to be seen because it has been more than 1 year since last routine office visit.  Routed to PCP for consideration.  Previously filled by partner.

## 2022-03-29 RX ORDER — OXYBUTYNIN CHLORIDE 5 MG/1
TABLET, EXTENDED RELEASE ORAL
Qty: 30 TABLET | Refills: 3 | Status: SHIPPED | OUTPATIENT
Start: 2022-03-29 | End: 2022-06-21

## 2022-04-18 ENCOUNTER — OFFICE VISIT (OUTPATIENT)
Dept: FAMILY MEDICINE | Facility: CLINIC | Age: 57
End: 2022-04-18
Payer: COMMERCIAL

## 2022-04-18 VITALS
SYSTOLIC BLOOD PRESSURE: 114 MMHG | TEMPERATURE: 98.3 F | RESPIRATION RATE: 14 BRPM | HEART RATE: 67 BPM | DIASTOLIC BLOOD PRESSURE: 62 MMHG | WEIGHT: 105 LBS | BODY MASS INDEX: 19.52 KG/M2 | OXYGEN SATURATION: 99 %

## 2022-04-18 DIAGNOSIS — L01.00 IMPETIGO: Primary | ICD-10-CM

## 2022-04-18 PROCEDURE — 99213 OFFICE O/P EST LOW 20 MIN: CPT | Performed by: NURSE PRACTITIONER

## 2022-04-18 RX ORDER — MUPIROCIN 20 MG/G
OINTMENT TOPICAL 3 TIMES DAILY
Qty: 22 G | Refills: 0 | Status: SHIPPED | OUTPATIENT
Start: 2022-04-18 | End: 2022-04-25

## 2022-04-18 ASSESSMENT — PAIN SCALES - GENERAL: PAINLEVEL: EXTREME PAIN (8)

## 2022-04-18 NOTE — PROGRESS NOTES
Assessment & Plan     Impetigo  Rash on face consistent with likely impetigo.  If symptoms are not improving following 7 days of Bactroban patient to notify me via MyChart would consider treatment for yeast and try metronidazole cream.  Patient is in agreement with this plan.  - mupirocin (BACTROBAN) 2 % external ointment; Apply topically 3 times daily for 7 days                 Return in about 7 years (around 4/18/2029) for ongoing symptoms if not improving.    ANGEL Becerra CNP  [facility]    Chery Lainez is a 57 year old who presents for the following health issues:    History of Present Illness       Reason for visit:  Sore on my face  Symptom onset:  3-7 days ago    She eats 2-3 servings of fruits and vegetables daily.She consumes 1 sweetened beverage(s) daily.She exercises with enough effort to increase her heart rate 10 to 19 minutes per day.  She exercises with enough effort to increase her heart rate 3 or less days per week.   She is taking medications regularly.     Rash next to nose started over a week ago, she squeezed it and some clear fluid came out. She had similar rash on the outside of her right corner of her mouth. She wakes up with yellow crusting on top. Perioxide and neosporin utilized.  Rash has been intermittently itchy.        Review of Systems   Constitutional, HEENT, cardiovascular, pulmonary, gi and gu systems are negative, except as otherwise noted.      Objective    /62   Pulse 67   Temp 98.3  F (36.8  C) (Tympanic)   Resp 14   Wt 47.6 kg (105 lb)   SpO2 99%   BMI 19.52 kg/m    Body mass index is 19.52 kg/m .  Physical Exam   GENERAL: healthy, alert and no distress  SKIN: Left side of nare on cheek and right corner of mouth with salmon-colored patch that is dry no acute crusting present however slightly raised and papular presentation.

## 2022-05-02 DIAGNOSIS — R10.32 LLQ ABDOMINAL PAIN: ICD-10-CM

## 2022-05-02 DIAGNOSIS — K59.00 CONSTIPATION, UNSPECIFIED CONSTIPATION TYPE: ICD-10-CM

## 2022-05-03 RX ORDER — DOCUSATE SODIUM 50MG AND SENNOSIDES 8.6MG 8.6; 5 MG/1; MG/1
TABLET, FILM COATED ORAL
Qty: 60 TABLET | Refills: 1 | Status: SHIPPED | OUTPATIENT
Start: 2022-05-03 | End: 2023-04-28

## 2022-05-17 DIAGNOSIS — Z79.890 NEED FOR PROPHYLACTIC HORMONE REPLACEMENT THERAPY (POSTMENOPAUSAL): ICD-10-CM

## 2022-05-18 NOTE — TELEPHONE ENCOUNTER
Pending Prescriptions:                       Disp   Refills    estradiol (ESTRACE) 1 MG tablet [Pharmacy*90 tab*0            Sig: TAKE 1 TABLET BY MOUTH DAILY    Routing refill request to provider for review/approval because:  No mammogram in last 2 years      Brent Weinberg RN

## 2022-05-19 NOTE — TELEPHONE ENCOUNTER
Call patient needs mammogram done to continue on estragen therapy.    Estrogen increases risk of breast cancer.  Refill if scheduled.  DOREEN Valverde

## 2022-05-20 RX ORDER — ESTRADIOL 1 MG/1
TABLET ORAL
Qty: 30 TABLET | Refills: 0 | Status: SHIPPED | OUTPATIENT
Start: 2022-05-20 | End: 2022-06-21

## 2022-05-20 NOTE — TELEPHONE ENCOUNTER
Patient had a Mammogram on 1/26/2022.  Please reassess the refill request.    Patient is asking for another provider to review that Jaspal is not here till 5/24/2022, patient has been out for a few days already.    Patient having phone problems she said it is ok to leave a detailed message at [phone].      Alondra Cabrera PSC on 5/20/2022 at 9:06 AM

## 2022-05-23 DIAGNOSIS — G89.29 OTHER CHRONIC PAIN: ICD-10-CM

## 2022-05-23 DIAGNOSIS — B02.29 POST HERPETIC NEURALGIA: ICD-10-CM

## 2022-05-24 NOTE — TELEPHONE ENCOUNTER
Pending Prescriptions:                       Disp   Refills    gabapentin (NEURONTIN) 300 MG capsule [Pha*60 cap*1        Sig: TAKE 2 CAPSULES BY MOUTH AT BEDTIME    Routing refill request to provider for review/approval because:  Drug not on the [facility] refill protocol     Dina Gauthier RN  [facility]

## 2022-05-26 RX ORDER — GABAPENTIN 300 MG/1
CAPSULE ORAL
Qty: 60 CAPSULE | Refills: 1 | OUTPATIENT
Start: 2022-05-26

## 2022-05-26 NOTE — TELEPHONE ENCOUNTER
Voicemail box is not set up. Unable to leave message.  Alondra Cabrera PSC on 5/26/2022 at 3:39 PM

## 2022-06-15 ENCOUNTER — OFFICE VISIT (OUTPATIENT)
Dept: FAMILY MEDICINE | Facility: CLINIC | Age: 57
End: 2022-06-15
Payer: COMMERCIAL

## 2022-06-15 VITALS
BODY MASS INDEX: 18.95 KG/M2 | RESPIRATION RATE: 16 BRPM | TEMPERATURE: 98.5 F | OXYGEN SATURATION: 97 % | HEART RATE: 74 BPM | WEIGHT: 103 LBS | SYSTOLIC BLOOD PRESSURE: 96 MMHG | HEIGHT: 62 IN | DIASTOLIC BLOOD PRESSURE: 58 MMHG

## 2022-06-15 DIAGNOSIS — Z79.899 ENCOUNTER FOR LONG-TERM (CURRENT) USE OF MEDICATIONS: ICD-10-CM

## 2022-06-15 DIAGNOSIS — J30.1 NON-SEASONAL ALLERGIC RHINITIS DUE TO POLLEN: Primary | ICD-10-CM

## 2022-06-15 DIAGNOSIS — Z20.822 SUSPECTED COVID-19 VIRUS INFECTION: ICD-10-CM

## 2022-06-15 LAB
DEPRECATED S PYO AG THROAT QL EIA: NEGATIVE
GROUP A STREP BY PCR: NOT DETECTED
SARS-COV-2 RNA RESP QL NAA+PROBE: NEGATIVE

## 2022-06-15 PROCEDURE — U0003 INFECTIOUS AGENT DETECTION BY NUCLEIC ACID (DNA OR RNA); SEVERE ACUTE RESPIRATORY SYNDROME CORONAVIRUS 2 (SARS-COV-2) (CORONAVIRUS DISEASE [COVID-19]), AMPLIFIED PROBE TECHNIQUE, MAKING USE OF HIGH THROUGHPUT TECHNOLOGIES AS DESCRIBED BY CMS-2020-01-R: HCPCS | Performed by: FAMILY MEDICINE

## 2022-06-15 PROCEDURE — U0005 INFEC AGEN DETEC AMPLI PROBE: HCPCS | Performed by: FAMILY MEDICINE

## 2022-06-15 PROCEDURE — 87651 STREP A DNA AMP PROBE: CPT | Performed by: FAMILY MEDICINE

## 2022-06-15 PROCEDURE — 99213 OFFICE O/P EST LOW 20 MIN: CPT | Mod: CS | Performed by: FAMILY MEDICINE

## 2022-06-15 RX ORDER — PREDNISONE 20 MG/1
40 TABLET ORAL DAILY
Qty: 10 TABLET | Refills: 0 | Status: SHIPPED | OUTPATIENT
Start: 2022-06-15 | End: 2022-06-21

## 2022-06-15 RX ORDER — CETIRIZINE HYDROCHLORIDE 10 MG/1
10 TABLET ORAL 2 TIMES DAILY
Qty: 30 TABLET | Refills: 0 | Status: SHIPPED | OUTPATIENT
Start: 2022-06-15 | End: 2022-08-24

## 2022-06-15 ASSESSMENT — PATIENT HEALTH QUESTIONNAIRE - PHQ9
SUM OF ALL RESPONSES TO PHQ QUESTIONS 1-9: 11
10. IF YOU CHECKED OFF ANY PROBLEMS, HOW DIFFICULT HAVE THESE PROBLEMS MADE IT FOR YOU TO DO YOUR WORK, TAKE CARE OF THINGS AT HOME, OR GET ALONG WITH OTHER PEOPLE: SOMEWHAT DIFFICULT
SUM OF ALL RESPONSES TO PHQ QUESTIONS 1-9: 11
10. IF YOU CHECKED OFF ANY PROBLEMS, HOW DIFFICULT HAVE THESE PROBLEMS MADE IT FOR YOU TO DO YOUR WORK, TAKE CARE OF THINGS AT HOME, OR GET ALONG WITH OTHER PEOPLE: SOMEWHAT DIFFICULT
SUM OF ALL RESPONSES TO PHQ QUESTIONS 1-9: 11
SUM OF ALL RESPONSES TO PHQ QUESTIONS 1-9: 11

## 2022-06-15 ASSESSMENT — PAIN SCALES - GENERAL: PAINLEVEL: NO PAIN (0)

## 2022-06-15 NOTE — NURSING NOTE
"Initial BP 96/58   Pulse 74   Temp 98.5  F (36.9  C) (Tympanic)   Resp 16   Ht 1.562 m (5' 1.5\")   Wt 46.7 kg (103 lb)   SpO2 97%   BMI 19.15 kg/m   Estimated body mass index is 19.15 kg/m  as calculated from the following:    Height as of this encounter: 1.562 m (5' 1.5\").    Weight as of this encounter: 46.7 kg (103 lb). .      "

## 2022-06-15 NOTE — PROGRESS NOTES
"  Assessment & Plan     Non-seasonal allergic rhinitis due to pollen  - cetirizine (ZYRTEC) 10 MG tablet; Take 1 tablet (10 mg) by mouth 2 times daily  - Streptococcus A Rapid Screen w/Reflex to PCR - Clinic Collect  - Group A Streptococcus PCR Throat Swab    Suspected COVID-19 virus infection  - Symptomatic; Yes; 6/11/2022 COVID-19 Virus (Coronavirus) by PCR Nose                     Return in about 2 weeks (around 6/29/2022) for if not improved or sooner if worsening.    Arturo Melendez MD  [facility]    Chery Lainez is a 57 year old who presents for the following health issues     History of Present Illness       Reason for visit:  Soar throat    loss of voice  Symptom onset:  1-3 days ago    She eats 0-1 servings of fruits and vegetables daily.She consumes 1 sweetened beverage(s) daily.She exercises with enough effort to increase her heart rate 10 to 19 minutes per day.  She exercises with enough effort to increase her heart rate 4 days per week.   She is taking medications regularly.    Today's PHQ-9         PHQ-9 Total Score: 11    PHQ-9 Q9 Thoughts of better off dead/self-harm past 2 weeks :   Not at all    How difficult have these problems made it for you to do your work, take care of things at home, or get along with other people: Somewhat difficult             Review of Systems   Constitutional, neuro, ENT, endocrine, pulmonary, cardiac, gastrointestinal, genitourinary, musculoskeletal, integument and psychiatric systems are negative, except as otherwise noted.       Objective    BP 96/58   Pulse 74   Temp 98.5  F (36.9  C) (Tympanic)   Resp 16   Ht 1.562 m (5' 1.5\")   Wt 46.7 kg (103 lb)   SpO2 97%   BMI 19.15 kg/m    Body mass index is 19.15 kg/m .  Physical Exam   GENERAL: Pleasant, well appearing female.  HEENT: PERRL, EOMI.  NECK: supple, no thyromegaly or thyroid masses, no lymphadenopathy.  CV: RRR, no murmurs, rubs or gallops.  LUNGS: Clear to " auscultation bilaterally, normal effort.  ABDOMEN: Soft, non-distended, non-tender.  No hepatosplenomegaly or palpable masses.    SKIN: warm and dry without obvious rashes.   EXTREMITIES: No edema, normal pulses.

## 2022-06-21 ENCOUNTER — OFFICE VISIT (OUTPATIENT)
Dept: FAMILY MEDICINE | Facility: CLINIC | Age: 57
End: 2022-06-21
Payer: COMMERCIAL

## 2022-06-21 VITALS
HEART RATE: 67 BPM | DIASTOLIC BLOOD PRESSURE: 68 MMHG | OXYGEN SATURATION: 97 % | WEIGHT: 103.4 LBS | RESPIRATION RATE: 15 BRPM | HEIGHT: 62 IN | SYSTOLIC BLOOD PRESSURE: 122 MMHG | BODY MASS INDEX: 19.03 KG/M2

## 2022-06-21 DIAGNOSIS — N39.46 MIXED INCONTINENCE: ICD-10-CM

## 2022-06-21 DIAGNOSIS — K59.00 CONSTIPATION, UNSPECIFIED CONSTIPATION TYPE: ICD-10-CM

## 2022-06-21 DIAGNOSIS — Z79.890 NEED FOR PROPHYLACTIC HORMONE REPLACEMENT THERAPY (POSTMENOPAUSAL): ICD-10-CM

## 2022-06-21 DIAGNOSIS — G43.109 MIGRAINE WITH AURA AND WITHOUT STATUS MIGRAINOSUS, NOT INTRACTABLE: ICD-10-CM

## 2022-06-21 DIAGNOSIS — Z79.899 ENCOUNTER FOR LONG-TERM (CURRENT) USE OF MEDICATIONS: ICD-10-CM

## 2022-06-21 DIAGNOSIS — J30.1 NON-SEASONAL ALLERGIC RHINITIS DUE TO POLLEN: ICD-10-CM

## 2022-06-21 DIAGNOSIS — G89.4 CHRONIC PAIN SYNDROME: ICD-10-CM

## 2022-06-21 DIAGNOSIS — B02.29 POST HERPETIC NEURALGIA: ICD-10-CM

## 2022-06-21 DIAGNOSIS — F33.1 MODERATE EPISODE OF RECURRENT MAJOR DEPRESSIVE DISORDER (H): Primary | ICD-10-CM

## 2022-06-21 PROCEDURE — 99214 OFFICE O/P EST MOD 30 MIN: CPT | Performed by: NURSE PRACTITIONER

## 2022-06-21 RX ORDER — ESTRADIOL 1 MG/1
1 TABLET ORAL DAILY
Qty: 30 TABLET | Refills: 6 | Status: SHIPPED | OUTPATIENT
Start: 2022-06-21 | End: 2023-06-01

## 2022-06-21 RX ORDER — HYOSCYAMINE SULFATE 0.125 MG
0.12 TABLET ORAL EVERY 4 HOURS PRN
Qty: 30 TABLET | Refills: 4 | Status: SHIPPED | OUTPATIENT
Start: 2022-06-21

## 2022-06-21 RX ORDER — GABAPENTIN 300 MG/1
600 CAPSULE ORAL AT BEDTIME
Qty: 180 CAPSULE | Refills: 3 | Status: SHIPPED | OUTPATIENT
Start: 2022-06-21 | End: 2023-06-01

## 2022-06-21 RX ORDER — MONTELUKAST SODIUM 10 MG/1
10 TABLET ORAL AT BEDTIME
Qty: 30 TABLET | Refills: 11 | Status: SHIPPED | OUTPATIENT
Start: 2022-06-21 | End: 2023-06-01

## 2022-06-21 RX ORDER — OXYBUTYNIN CHLORIDE 5 MG/1
5 TABLET, EXTENDED RELEASE ORAL DAILY
Qty: 30 TABLET | Refills: 11 | Status: SHIPPED | OUTPATIENT
Start: 2022-06-21 | End: 2023-05-19

## 2022-06-21 RX ORDER — ESTRADIOL 10 UG/1
10 INSERT VAGINAL
Qty: 24 TABLET | Refills: 3 | Status: SHIPPED | OUTPATIENT
Start: 2022-06-23 | End: 2023-06-01

## 2022-06-21 ASSESSMENT — PAIN SCALES - GENERAL: PAINLEVEL: NO PAIN (0)

## 2022-06-21 NOTE — PROGRESS NOTES
Assessment & Plan     Moderate episode of recurrent major depressive disorder (H)  Stable - PHQ-9 reviewed today.    Migraine with aura and without status migrainosus, not intractable  Stable - uses Fiorinal prn - 1 to 2 x per month.    Chronic pain syndrome   Discussed pain management.    - gabapentin (NEURONTIN) 300 MG capsule  Dispense: 180 capsule; Refill: 3    Encounter for long-term (current) use of medications       Post herpetic neuralgia   Continue Gabapentin. Symptoms have improved/stable  - gabapentin (NEURONTIN) 300 MG capsule  Dispense: 180 capsule; Refill: 3    POSTMENOPAUSAL HORMONAL REPLACEMENT TX   Discussed Estradiol and weaning off HRT - risks and management.  Mammogram yearly.    - estradiol (ESTRACE) 1 MG tablet  Dispense: 30 tablet; Refill: 6  - estradiol (VAGIFEM) 10 MCG TABS vaginal tablet  Dispense: 24 tablet; Refill: 3    Constipation, unspecified constipation type   History of irritable bowel.    - hyoscyamine (LEVSIN) 0.125 MG tablet  Dispense: 30 tablet; Refill: 4    Non-seasonal allergic rhinitis due to pollen     - montelukast (SINGULAIR) 10 MG tablet  Dispense: 30 tablet; Refill: 11    Mixed incontinence     - oxybutynin ER (DITROPAN XL) 5 MG 24 hr tablet  Dispense: 30 tablet; Refill: 11       }     See Patient Instructions    Return in about 4 weeks (around 7/19/2022).    Karlene Shay NP  [facility]    Chery Lainez is a 57 year old accompanied by her self., presenting for the following health issues:  Refill Request     Reports has had allergies recently - used Prednisone quit taking does not feel like she needs it.  Using daily allergy medications.      Depression and Anxiety Follow-Up    How are you doing with your depression since your last visit? No change    How are you doing with your anxiety since your last visit?  No change    Are you having other symptoms that might be associated with depression or anxiety? No    Have you had a  significant life event? No     Do you have any concerns with your use of alcohol or other drugs? No    Social History     Tobacco Use     Smoking status: Current Every Day Smoker     Packs/day: 0.25     Years: 20.00     Pack years: 5.00     Types: Cigarettes     Smokeless tobacco: Never Used     Tobacco comment: trying to quit   Vaping Use     Vaping Use: Never used   Substance Use Topics     Alcohol use: Never     Drug use: No     PHQ 1/20/2022 6/15/2022 6/15/2022   PHQ-9 Total Score 23 11 11   Q9: Thoughts of better off dead/self-harm past 2 weeks Not at all Not at all Not at all     SOY-7 SCORE 12/18/2020 6/8/2021 1/20/2022   Total Score - - -   Total Score 19 13 14     Last PHQ-9 6/15/2022   1.  Little interest or pleasure in doing things 1   2.  Feeling down, depressed, or hopeless 1   3.  Trouble falling or staying asleep, or sleeping too much 2   4.  Feeling tired or having little energy 1   5.  Poor appetite or overeating 1   6.  Feeling bad about yourself 2   7.  Trouble concentrating 2   8.  Moving slowly or restless 1   Q9: Thoughts of better off dead/self-harm past 2 weeks 0   PHQ-9 Total Score 11   Difficulty at work, home, or with people -     SOY-7  1/20/2022   1. Feeling nervous, anxious, or on edge 2   2. Not being able to stop or control worrying 2   3. Worrying too much about different things 2   4. Trouble relaxing 2   5. Being so restless that it is hard to sit still 0   6. Becoming easily annoyed or irritable 3   7. Feeling afraid, as if something awful might happen 3   SOY-7 Total Score 14   If you checked any problems, how difficult have they made it for you to do your work, take care of things at home, or get along with other people? Very difficult       Suicide Assessment Five-step Evaluation and Treatment (SAFE-T)     Migraine     Since your last clinic visit, how have your headaches changed?  No change    How often are you getting headaches or migraines? 1-2 x per month     Are you able  "to do normal daily activities when you have a migraine? No    Are you taking rescue/relief medications? (Select all that apply) No    How helpful is your rescue/relief medication?  I get total relief    Are you taking any medications to prevent migraines? (Select all that apply)  No    In the past 4 weeks, how often have you gone to urgent care or the emergency room because of your headaches?  0       Review of Systems   Constitutional, HEENT, cardiovascular, pulmonary, GI, , musculoskeletal, neuro, skin, endocrine and psych systems are negative, except as otherwise noted.  POS for vaginal atrophy and increased urinary frequency.  Stopped vaginal estrogen tablets > 6 months ago.  History of irritable bowel syndrome - predominant constipation but can vary.  Last episode was 1-2 months ago. Varies by food and stressors.  Has Hyoscyamine as needed for episodes.  No blood in stool, no nausea/vomiting       Objective    /68 (BP Location: Right arm, Patient Position: Sitting, Cuff Size: Adult Regular)   Pulse 67   Resp 15   Ht 1.562 m (5' 1.5\")   Wt 46.9 kg (103 lb 6.4 oz)   SpO2 97%   BMI 19.22 kg/m    Body mass index is 19.22 kg/m .  Physical Exam   GENERAL: healthy, alert and no distress  NECK: no adenopathy, no asymmetry, masses, or scars and thyroid normal to palpation  RESP: lungs clear to auscultation - no rales, rhonchi or wheezes  CV: regular rate and rhythm, normal S1 S2, no S3 or S4, no murmur, click or rub, no peripheral edema and peripheral pulses strong  ABDOMEN: soft, nontender, no hepatosplenomegaly, no masses and bowel sounds normal  MS: no gross musculoskeletal defects noted, no edema                .  ..  "

## 2022-08-24 ENCOUNTER — MYC MEDICAL ADVICE (OUTPATIENT)
Dept: FAMILY MEDICINE | Facility: CLINIC | Age: 57
End: 2022-08-24

## 2022-08-24 DIAGNOSIS — J30.1 NON-SEASONAL ALLERGIC RHINITIS DUE TO POLLEN: ICD-10-CM

## 2022-08-24 RX ORDER — CETIRIZINE HYDROCHLORIDE 10 MG/1
10 TABLET ORAL 2 TIMES DAILY
Qty: 180 TABLET | Refills: 0 | Status: SHIPPED | OUTPATIENT
Start: 2022-08-24 | End: 2023-06-01

## 2022-08-24 NOTE — TELEPHONE ENCOUNTER
"Prescription approved per [facility] Refill Protocol.    Medication is being filled for 1 time refill only due to:  Patient needs to be seen because notes in office visit 6/21/22 state to follow up in a month.     My chart message sent.     Requested Prescriptions   Signed Prescriptions Disp Refills    cetirizine (ZYRTEC) 10 MG tablet 180 tablet 0     Sig: TAKE 1 TABLET (10 MG) BY MOUTH 2 TIMES DAILY       Antihistamines Protocol Passed - 8/24/2022  9:21 AM        Passed - Patient is 3-64 years of age     Apply weight-based dosing for peds patients age 3 - 12 years of age.    Forward request to provider for patients under the age of 3 or over the age of 64.          Passed - Recent (12 mo) or future (30 days) visit within the authorizing provider's specialty     Patient has had an office visit with the authorizing provider or a provider within the authorizing providers department within the previous 12 mos or has a future within next 30 days. See \"Patient Info\" tab in inbasket, or \"Choose Columns\" in Meds & Orders section of the refill encounter.              Passed - Medication is active on med list           Signed Prescriptions:                        Disp   Refills    cetirizine (ZYRTEC) 10 MG tablet           180 ta*0        Sig: TAKE 1 TABLET (10 MG) BY MOUTH 2 TIMES DAILY  Authorizing Provider: HARISH WING  Ordering User: ANA BRUMFIELD RN on 8/24/2022 at 11:30 AM    "

## 2022-08-25 ENCOUNTER — TELEPHONE (OUTPATIENT)
Dept: FAMILY MEDICINE | Facility: CLINIC | Age: 57
End: 2022-08-25

## 2022-08-25 NOTE — TELEPHONE ENCOUNTER
"I called pt.    Allergy symptoms have improved since she was seen Informed pt that refill was sent.    Depression symptoms are increased due to family stressors.  Pt states \"But it's nothing I can't handle.\"    I advised pt to make appt for follow up of depression.  Pt says that she is heading out the door now.  She will call back at next opportunity to arrange her appt     Christine Sethi RN    "

## 2022-08-25 NOTE — TELEPHONE ENCOUNTER
Messages below are confusing without clear questions for the provider.    1.  If she is asking about her allergy medication refills, it appears that Dr. Melendez sent in refills yesterday.    2. if she has concerns about her depression not being well controlled, then she needs a follow-up with Karlene      May be helpful for RN to call and talk to patient versus these back-and-forth MyChart messages that are not clear.  Marcia Curiel, CNP

## 2022-08-25 NOTE — TELEPHONE ENCOUNTER
See my chart message  Patient advised to follow up per chart notes 6/21/22 and patient questions that  PHQ-9 and SOY-7 sent    Please advise if you want patient to schedule.    Xochilt Peñaloza RN on 8/25/2022 at 10:29 AM

## 2022-08-25 NOTE — TELEPHONE ENCOUNTER
See my chart message  Office visit 6/21 indicated to follow up in 4 weeks.    Xochilt Peñaloza RN on 8/25/2022 at 8:20 AM

## 2022-10-23 ENCOUNTER — HEALTH MAINTENANCE LETTER (OUTPATIENT)
Age: 57
End: 2022-10-23

## 2022-10-24 ENCOUNTER — E-VISIT (OUTPATIENT)
Dept: URGENT CARE | Facility: CLINIC | Age: 57
End: 2022-10-24
Payer: COMMERCIAL

## 2022-10-24 DIAGNOSIS — J01.90 ACUTE BACTERIAL SINUSITIS: Primary | ICD-10-CM

## 2022-10-24 DIAGNOSIS — B96.89 ACUTE BACTERIAL SINUSITIS: Primary | ICD-10-CM

## 2022-10-24 PROCEDURE — 99421 OL DIG E/M SVC 5-10 MIN: CPT | Performed by: EMERGENCY MEDICINE

## 2022-10-24 RX ORDER — DOXYCYCLINE HYCLATE 100 MG
100 TABLET ORAL 2 TIMES DAILY
Qty: 14 TABLET | Refills: 0 | Status: SHIPPED | OUTPATIENT
Start: 2022-10-24 | End: 2022-10-31

## 2022-10-24 NOTE — PATIENT INSTRUCTIONS
Dear Fatou Simnetal    After reviewing your responses, I've been able to diagnose you with   a sinus infection caused by bacteria.?     Based on your responses and diagnosis, I have prescribed doxycycline to treat your symptoms. I have sent this to your pharmacy.?     It is also important to stay well hydrated, get lots of rest and take over-the-counter decongestants,?tylenol?or ibuprofen if you?are able to?take those medications per your primary care provider to help relieve discomfort.?     It is important that you take?all of?your prescribed medication even if your symptoms are improving after a few doses.? Taking?all of?your medicine helps prevent the symptoms from returning.?     If your symptoms worsen, you develop severe headache, vomiting, high fever (>102), or are not improving in 7 days, please contact your primary care provider for an appointment or visit any of our convenient Walk-in Care or Urgent Care Centers to be seen which can be found on our website?here.?     Thanks again for choosing?us?as your health care partner,?   ?  Ronni Dickson MD?

## 2022-11-28 ENCOUNTER — TELEPHONE (OUTPATIENT)
Dept: FAMILY MEDICINE | Facility: CLINIC | Age: 57
End: 2022-11-28

## 2022-11-28 NOTE — TELEPHONE ENCOUNTER
Patient Quality Outreach    Patient is due for the following:   Depression  -  PHQ-9 needed    Next Steps:   complete questionnaires    Type of outreach:    Sent [facility] message.  Will postpone x 1 week, if not viewed or completed will call to review questionnaire.        Questions for provider review:    None     Apoorva Delgado, [facility]

## 2022-12-05 ASSESSMENT — ANXIETY QUESTIONNAIRES
1. FEELING NERVOUS, ANXIOUS, OR ON EDGE: NEARLY EVERY DAY
IF YOU CHECKED OFF ANY PROBLEMS ON THIS QUESTIONNAIRE, HOW DIFFICULT HAVE THESE PROBLEMS MADE IT FOR YOU TO DO YOUR WORK, TAKE CARE OF THINGS AT HOME, OR GET ALONG WITH OTHER PEOPLE: SOMEWHAT DIFFICULT
5. BEING SO RESTLESS THAT IT IS HARD TO SIT STILL: NOT AT ALL
3. WORRYING TOO MUCH ABOUT DIFFERENT THINGS: NEARLY EVERY DAY
2. NOT BEING ABLE TO STOP OR CONTROL WORRYING: NEARLY EVERY DAY
GAD7 TOTAL SCORE: 16
6. BECOMING EASILY ANNOYED OR IRRITABLE: NEARLY EVERY DAY
GAD7 TOTAL SCORE: 16
7. FEELING AFRAID AS IF SOMETHING AWFUL MIGHT HAPPEN: SEVERAL DAYS

## 2022-12-05 ASSESSMENT — PATIENT HEALTH QUESTIONNAIRE - PHQ9
SUM OF ALL RESPONSES TO PHQ QUESTIONS 1-9: 13
5. POOR APPETITE OR OVEREATING: NEARLY EVERY DAY

## 2022-12-05 NOTE — TELEPHONE ENCOUNTER
Patient Quality Outreach    Patient is due for the following:   Depression  -  PHQ-9 needed  Anxiety Gad7 needed    Next Steps:   updated phq/gad7    Type of outreach:    Phone, spoke to patient/parent. phq/gad7 updated      Questions for provider review:    Phq/gad7 scores updated. Patient is not taking any ani depressant, anti anxiety meds. Please see scores.    .  PHQ-9 ([employer]) 12/5/2022   No Interest In Doing Things    Feeling Depressed    Trouble Sleeping    Tired / No Energy    No appetite or Over-Eating    Feeling Bad about Self    Trouble Concentrating    Moving Slow or Restless    Suicidal Thoughts    Total Score    1.  Little interest or pleasure in doing things 1   2.  Feeling down, depressed, or hopeless 2   3.  Trouble falling or staying asleep, or sleeping too much 2   4.  Feeling tired or having little energy 2   5.  Poor appetite or overeating 1   6.  Feeling bad about yourself 2   7.  Trouble concentrating 2   8.  Moving slowly or restless 1   9.  Suicidal or self-harm thoughts 0   PHQ-9 Total Score 13   Difficulty at work, home, or with people Somewhat difficult     SOY-7   [employer], 2002; Used with Permission) 12/5/2022   Over the last 2 weeks, how often have you been bothered by feeling nervous, anxious or on edge?    Over the last 2 weeks, how often have you been bothered by not being able to stop or control worrying?    Over the last 2 weeks, how often have you been bothered by worrying too much about different things?    Over the last 2 weeks, how often have you been bothered by trouble relaxing?    Over the last 2 weeks, how often have you been bothered by being so restless that it is hard to sit still?    Over the last 2 weeks, how often have you been bothered by becoming easily annoyed or irritable?    Over the last 2 weeks, how often have you been bothered by feeling afraid as if something awful might happen?    SOY-7 Total Score =     1. Feeling nervous, anxious, or on edge    2. Not  being able to stop or control worrying    3. Worrying too much about different things    4. Trouble relaxing    5. Being so restless that it is hard to sit still    6. Becoming easily annoyed or irritable    7. Feeling afraid, as if something awful might happen    SOY 7 TOTAL SCORE    1. Feeling nervous, anxious, or on edge 3   2. Not being able to stop or control worrying 3   3. Worrying too much about different things 3   4. Trouble relaxing 3   5. Being so restless that it is hard to sit still 0   6. Becoming easily annoyed or irritable 3   7. Feeling afraid, as if something awful might happen 1   SOY-7 Total Score 16   If you checked any problems, how difficult have they made it for you to do your work, take care of things at home, or get along with other people? Somewhat difficult       Fermín Abraham, [facility]  Chart routed to Provider.

## 2022-12-06 NOTE — CONFIDENTIAL NOTE
Would recommend follow up with myself or Psychiatry if wanting to restart medications based on PHQ-9 and SOY.  Recommend counseling otherwise.    DOREEN Valverde

## 2022-12-06 NOTE — TELEPHONE ENCOUNTER
Attempted to notify patient of PCP's recommendation, no answer. VM left to return call to clinic.    Dina Gauthier RN  [facility]

## 2022-12-07 NOTE — TELEPHONE ENCOUNTER
Pt was called & read providers message. Did not want to make an appt at this time. States she will think about it & call back.    Nereida Rolon RN

## 2023-01-24 NOTE — NURSING NOTE
"Chief Complaint   Patient presents with     Breast Mass     patient has a lump on right breast x 2 months; painful - has had mammo and ultrasound - unable to see on imaging        Initial /73  Pulse 67  Temp 97.4  F (36.3  C) (Tympanic)  Resp 22  Ht 5' 2\" (1.575 m)  Wt 109 lb (49.4 kg)  SpO2 100%  BMI 19.94 kg/m2 Estimated body mass index is 19.94 kg/(m^2) as calculated from the following:    Height as of this encounter: 5' 2\" (1.575 m).    Weight as of this encounter: 109 lb (49.4 kg).  Medication Reconciliation: complete  " HPI:  Mr. Estevez 76 year old cantaneous speaking males with PMHx of CHF unknown ef, hypertension, hyperlipidemia, CAD, CKD3, CVA 8 years ago presenting for evaluation of lightheadedness, shortness of breath and vomiting for past 2 days. Daughter reports patient was seeing a cardiologist oupatient about a month ago for worsening LE bilateral edema and was diagnosed with new onset chf. Over the past week, he began feeling nausea with associated intermittent vomiting and decrease PO intake. Outpatient he was diagnosed with COVID and Rx for paxlovid, first dose was yesterday. Today he had 3 episodes of nonbloody nonbilious vomiting.  No fevers, chills, chest pain, abdominal pain, seizures, mental status changes.     In the ED, vitals BP: 124/60 HR: 52 RR: 18 Spo2 98 % on RA, afebrile. Labs significant for Na 119 (no baseline labs), creat 2.0, BNP 15k. COVID PCR positive. CXR: Left basilar consolidation. Right-sided fibrotic changes. CT chest/abd/pelvis: Bilateral peribronchial thickening. Left basilar consolidation with trace pleural effusion.Predominantly right upper lobe fibrotic changes with architectural distortion.Bilateral calcified pleural plaques.Circumferential distal esophageal wall thickening. Lobulated liver contour suggesting early cirrhotic changes.Trace abdominopelvic ascites, nonspecific. CT head: Age indeterminate right basal ganglia infarct. Rounded hyperdensity noted as noted within the left subinsular region potentially reflecting calcification though component of acute hemorrhage cannot entirely be excluded. Moderate chronic microvascular type changes as well as chronic infarcts involving the right occipital lobe and right cerebellar hemisphere. Patient given 1 L NS and admitted to ICU For further management.     Patient evaluated by NCC: hyperdense regions appear to be less likely hemorrhage and more likely calcification. The patient does NOT have any neurological deficits at the present moment on physical exam.  Recommend getting a second CT head non con 6hr post original scan on presentation and we will follow up imaging to assess for further recs.     Patient admitted to ICU for 1 day, in which sodium improved to 125 and repeat CT head was stable < from: CT Head No Cont (01.19.23 @ 15:58) >    No significant change in comparison to recent same day head CT: Redemonstration of a rounded hyperdensity within the left subinsular region potentially reflecting calcification, acute hemorrhage or possibly   an aneurysm. Age indeterminate right basal ganglia infarct right occipital and right cerebellar hemisphere. Patient evaluated by neurology, neurocritical care and neurosurgery.    SDU course:   No CTA was done due to pt KOBE. Pt pending MRI Head.  Pt stable to downgrade to the floor.     Patient stable on the floor, ambulating and eating well  Brain MRI showed no acute pathologies  Patient ready for DC HPI:  Mr. Estevez 76 year old Cantonese speaking male with PMHx of CHF unknown ef, hypertension, hyperlipidemia, CAD, CKD3, CVA 8 years ago presenting for evaluation of lightheadedness, shortness of breath and vomiting for past 2 days. Daughter reports patient was seeing a cardiologist oupatient about a month ago for worsening LE bilateral edema and was diagnosed with new onset chf. Over the past week, he began feeling nausea with associated intermittent vomiting and decrease PO intake. Outpatient he was diagnosed with COVID and Rx for paxlovid, first dose was yesterday. Today he had 3 episodes of nonbloody nonbilious vomiting.  No fevers, chills, chest pain, abdominal pain, seizures, mental status changes.     In the ED, vitals BP: 124/60 HR: 52 RR: 18 Spo2 98 % on RA, afebrile. Labs significant for Na 119 (no baseline labs), creat 2.0, BNP 15k. COVID PCR positive. CXR: Left basilar consolidation. Right-sided fibrotic changes. CT chest/abd/pelvis: Bilateral peribronchial thickening. Left basilar consolidation with trace pleural effusion.Predominantly right upper lobe fibrotic changes with architectural distortion.Bilateral calcified pleural plaques.Circumferential distal esophageal wall thickening. Lobulated liver contour suggesting early cirrhotic changes.Trace abdominopelvic ascites, nonspecific. CT head: Age indeterminate right basal ganglia infarct. Rounded hyperdensity noted as noted within the left subinsular region potentially reflecting calcification though component of acute hemorrhage cannot entirely be excluded. Moderate chronic microvascular type changes as well as chronic infarcts involving the right occipital lobe and right cerebellar hemisphere. Patient given 1 L NS and admitted to ICU For further management.     Patient evaluated by NCC: hyperdense regions appear to be less likely hemorrhage and more likely calcification. The patient does NOT have any neurological deficits at the present moment on physical exam.  Recommend getting a second CT head non con 6hr post original scan on presentation and we will follow up imaging to assess for further recs.     Patient admitted to ICU for 1 day, in which sodium improved to 125 and repeat CT head was stable < from: CT Head No Cont (01.19.23 @ 15:58) >    No significant change in comparison to recent same day head CT: Redemonstration of a rounded hyperdensity within the left subinsular region potentially reflecting calcification, acute hemorrhage or possibly   an aneurysm. Age indeterminate right basal ganglia infarct right occipital and right cerebellar hemisphere. Patient evaluated by neurology, neurocritical care and neurosurgery.    SDU course:   No CTA was done due to pt KOBE. Pt pending MRI Head.  Pt stable to downgrade to the floor.     Patient stable on the floor, ambulating and eating well  Brain MRI showed no acute pathologies  Patient ready for DC HPI:  Mr. Estevez 76 year old Cantonese speaking male with PMHx of CHF unknown ef, hypertension, hyperlipidemia, CAD, CKD3, CVA 8 years ago presenting for evaluation of lightheadedness, shortness of breath and vomiting for past 2 days. Daughter reports patient was seeing a cardiologist oupatient about a month ago for worsening LE bilateral edema and was diagnosed with new onset chf. Over the past week, he began feeling nausea with associated intermittent vomiting and decrease PO intake. Outpatient he was diagnosed with COVID and Rx for paxlovid, first dose was yesterday. Today he had 3 episodes of nonbloody nonbilious vomiting.  No fevers, chills, chest pain, abdominal pain, seizures, mental status changes.     In the ED, vitals BP: 124/60 HR: 52 RR: 18 Spo2 98 % on RA, afebrile. Labs significant for Na 119 (no baseline labs), creat 2.0, BNP 15k. COVID PCR positive. CXR: Left basilar consolidation. Right-sided fibrotic changes. CT chest/abd/pelvis: Bilateral peribronchial thickening. Left basilar consolidation with trace pleural effusion.Predominantly right upper lobe fibrotic changes with architectural distortion.Bilateral calcified pleural plaques.Circumferential distal esophageal wall thickening. Lobulated liver contour suggesting early cirrhotic changes.Trace abdominopelvic ascites, nonspecific. CT head: Age indeterminate right basal ganglia infarct. Rounded hyperdensity noted as noted within the left subinsular region potentially reflecting calcification though component of acute hemorrhage cannot entirely be excluded. Moderate chronic microvascular type changes as well as chronic infarcts involving the right occipital lobe and right cerebellar hemisphere. Patient given 1 L NS and admitted to ICU For further management.     Patient evaluated by NCC: hyperdense regions appear to be less likely hemorrhage and more likely calcification. The patient does NOT have any neurological deficits at the present moment on physical exam.  Recommend getting a second CT head non con 6hr post original scan on presentation and we will follow up imaging to assess for further recs.     Patient admitted to ICU for 1 day, in which sodium improved to 125 and repeat CT head was stable < from: CT Head No Cont (01.19.23 @ 15:58) >    No significant change in comparison to recent same day head CT: Redemonstration of a rounded hyperdensity within the left subinsular region potentially reflecting calcification, acute hemorrhage or possibly   an aneurysm. Age indeterminate right basal ganglia infarct right occipital and right cerebellar hemisphere. Patient evaluated by neurology, neurocritical care and neurosurgery.    SDU course:   No CTA was done due to pt KOBE. Pt pending MRI Head.  Pt stable to downgrade to the floor.     Patient stable on the floor, ambulating and eating well  Brain MRI showed no acute pathologies. 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   No intervention by neurosurgery. Neuro recommends  Patient ready for DC

## 2023-03-28 ENCOUNTER — MYC MEDICAL ADVICE (OUTPATIENT)
Dept: FAMILY MEDICINE | Facility: CLINIC | Age: 58
End: 2023-03-28
Payer: COMMERCIAL

## 2023-03-28 DIAGNOSIS — Z12.11 SPECIAL SCREENING FOR MALIGNANT NEOPLASMS, COLON: Primary | ICD-10-CM

## 2023-03-29 ENCOUNTER — LAB (OUTPATIENT)
Dept: FAMILY MEDICINE | Facility: CLINIC | Age: 58
End: 2023-03-29
Payer: COMMERCIAL

## 2023-03-29 DIAGNOSIS — Z12.11 SPECIAL SCREENING FOR MALIGNANT NEOPLASMS, COLON: ICD-10-CM

## 2023-03-30 ENCOUNTER — E-VISIT (OUTPATIENT)
Dept: URGENT CARE | Facility: CLINIC | Age: 58
End: 2023-03-30
Payer: COMMERCIAL

## 2023-03-30 ENCOUNTER — MYC MEDICAL ADVICE (OUTPATIENT)
Dept: FAMILY MEDICINE | Facility: CLINIC | Age: 58
End: 2023-03-30
Payer: COMMERCIAL

## 2023-03-30 DIAGNOSIS — B96.89 ACUTE BACTERIAL SINUSITIS: Primary | ICD-10-CM

## 2023-03-30 DIAGNOSIS — J01.90 ACUTE BACTERIAL SINUSITIS: Primary | ICD-10-CM

## 2023-03-30 PROCEDURE — 99421 OL DIG E/M SVC 5-10 MIN: CPT | Performed by: NURSE PRACTITIONER

## 2023-03-30 NOTE — PATIENT INSTRUCTIONS
* Sinusitis (No Antibiotics)    The sinuses are air-filled spaces within the bones of the face. They connect to the inside of the nose. Sinusitis is an inflammation of the tissue that lines the sinuses. Sinusitis can occur during a cold. It can also happen due to allergies to pollens and other particles in the air. It can cause symptoms such as sinus congestion, headache, sore throat, facial swelling, and a feeling of fullness. It may also cause a low-grade fever. Your sinusitis does not include an infection with bacteria. Because of this, antibiotics are not used to treat this problem.  Home care  Drink plenty of water, hot tea, and other liquids. This may help thin nasal mucus. It also may help your sinuses drain fluids.  Heat may help soothe painful areas of your face. Use a towel soaked in hot water. Or,  the shower and direct the warm spray onto your face. Using a vaporizer along with a menthol rub at night may also help soothe symptoms.   An expectorant with guaifenesin may help thin nasal mucus and help your sinuses drain fluids.  You can use an over-the-counter decongestant, unless a similar medicine was prescribed to you. Nasal sprays work the fastest. Use one that contains phenylephrine or oxymetazoline. First blow your nose gently. Then use the spray. Do not use these medicines more often than directed on the label. If you do, your symptoms may get worse. You may also take pills that contain pseudoephedrine. Don t use products that combine multiple medicines. This is because side effects may be increased. Read all medicine labels. You can also ask the pharmacist for help. (People with high blood pressure should not use decongestants. They can raise blood pressure.)  Over-the-counter antihistamines may help if allergies contributed to your sinusitis.    Use acetaminophen or ibuprofen to control pain, unless another pain medicine was prescribed to you. If you have chronic liver or kidney disease  or ever had a stomach ulcer, talk with your healthcare provider before using these medicines. (Aspirin should never be taken by anyone under age 18 who is ill with a fever. It may cause severe liver damage.)  Use nasal rinses or irrigation as instructed by your healthcare provider.  Don't smoke. This can make symptoms worse.  Follow-up care  Follow up with your healthcare provider or our staff if you are NOT better in 1 week.  When to seek medical advice  Call your healthcare provider if any of these occur:  Green or yellow fluid draining from your nose or into your throat  Facial pain or headache that gets worse  Stiff neck  Unusual drowsiness or confusion  Swelling of your forehead or eyelids  Vision problems, such as blurred or double vision  Fever of 100.4 F (38 C) or higher, or as directed by your healthcare provider  Seizure  Breathing problems  Symptoms that don't go away in 10 days  For informational purposes only. Not to replace the advice of your health care provider.  Copyright   2018 [facility]. All rights reserved.        Dear Fatou Simental    After reviewing your responses, I've been able to diagnose you with Acute sinusitis    It is also important to stay well hydrated, get lots of rest and take over-the-counter decongestants,?tylenol?or ibuprofen if you?are able to?take those medications per your primary care provider to help relieve discomfort.?     If your symptoms worsen, you develop severe headache, vomiting, high fever (>102), or are not improving in 7 days, please contact your primary care provider for an appointment or visit any of our convenient Walk-in Care or Urgent Care Centers to be seen which can be found on our website?here.?     Thanks again for choosing?us?as your health care partner,?   ?  ANGEL Nava CNP?

## 2023-04-02 ENCOUNTER — HEALTH MAINTENANCE LETTER (OUTPATIENT)
Age: 58
End: 2023-04-02

## 2023-04-10 ENCOUNTER — HOSPITAL ENCOUNTER (EMERGENCY)
Facility: CLINIC | Age: 58
Discharge: LEFT BEFORE DISCHARGE | End: 2023-04-10
Payer: COMMERCIAL

## 2023-04-10 ENCOUNTER — NURSE TRIAGE (OUTPATIENT)
Dept: FAMILY MEDICINE | Facility: CLINIC | Age: 58
End: 2023-04-10
Payer: COMMERCIAL

## 2023-04-10 VITALS
TEMPERATURE: 98.7 F | HEART RATE: 60 BPM | RESPIRATION RATE: 12 BRPM | SYSTOLIC BLOOD PRESSURE: 109 MMHG | WEIGHT: 103 LBS | DIASTOLIC BLOOD PRESSURE: 64 MMHG | OXYGEN SATURATION: 99 % | BODY MASS INDEX: 19.15 KG/M2

## 2023-04-10 NOTE — ED TRIAGE NOTES
Mid abdomen pain wrapping into back, states knows has a blockage, nausea, vomited acid yesterday     Triage Assessment     Row Name 04/10/23 1116       Triage Assessment (Adult)    Airway WDL WDL       Respiratory WDL    Respiratory WDL WDL       Peripheral/Neurovascular WDL    Peripheral Neurovascular WDL WDL       Cognitive/Neuro/Behavioral WDL    Cognitive/Neuro/Behavioral WDL WDL

## 2023-04-10 NOTE — TELEPHONE ENCOUNTER
"Patient calling stating she has been having abdominal pain for about a week to 10 days. Patient reports nausea and vomited once yesterday. Unable to pass a stool, but has had minimal diarrhea. Patient also states she has part of her colon removed. She has had bouts of diverticulitis and bowel obstructions. Patient went to emergency room and left due to waiting and not feeling well. Patient reports having chills and low grade temperature intermittently. Patient also reports she feels weak. Advised patient call insurance company and go back to ED now per disposition.     Disposition:     GO TO ED NOW    Reason for Disposition    Constant abdominal pain lasting > 2 hours    Additional Information    Negative: Passed out (i.e., fainted, collapsed and was not responding)    Negative: Shock suspected (e.g., cold/pale/clammy skin, too weak to stand, low BP, rapid pulse)    Negative: Sounds like a life-threatening emergency to the triager    Negative: Chest pain    Negative: Pain is mainly in upper abdomen (if needed ask: 'is it mainly above the belly button?')    Negative: Abdominal pain and pregnant < 20 weeks    Negative: Abdominal pain and pregnant 20 or more weeks    Negative: SEVERE abdominal pain (e.g., excruciating)    Negative: Vomiting red blood or black (coffee ground) material    Negative: Bloody, black, or tarry bowel movements  (Exception: Chronic-unchanged black-grey bowel movements and is taking iron pills or Pepto-Bismol.)    Answer Assessment - Initial Assessment Questions  1. LOCATION: \"Where does it hurt?\"       Middle of stomach-around belly button  2. RADIATION: \"Does the pain shoot anywhere else?\" (e.g., chest, back)      Shoots to the back; had part of colon removed, feels like diverticulitis, and bowel obstruction  3. ONSET: \"When did the pain begin?\" (e.g., minutes, hours or days ago)       Bowel blockage type symptoms for a week; 10 days;   4. SUDDEN: \"Gradual or sudden onset?\"      gradual  5. " "PATTERN \"Does the pain come and go, or is it constant?\"     - If constant: \"Is it getting better, staying the same, or worsening?\"       (Note: Constant means the pain never goes away completely; most serious pain is constant and it progresses)      - If intermittent: \"How long does it last?\" \"Do you have pain now?\"      (Note: Intermittent means the pain goes away completely between bouts)      Constant but has bouts of extreme pain  6. SEVERITY: \"How bad is the pain?\"  (e.g., Scale 1-10; mild, moderate, or severe)    - MILD (1-3): doesn't interfere with normal activities, abdomen soft and not tender to touch     - MODERATE (4-7): interferes with normal activities or awakens from sleep, abdomen tender to touch     - SEVERE (8-10): excruciating pain, doubled over, unable to do any normal activities       7  7. RECURRENT SYMPTOM: \"Have you ever had this type of stomach pain before?\" If Yes, ask: \"When was the last time?\" and \"What happened that time?\"       Bowel blockage; diverticulitis  8. CAUSE: \"What do you think is causing the stomach pain?\"      Bowel blockage; diverticulits  9. RELIEVING/AGGRAVATING FACTORS: \"What makes it better or worse?\" (e.g., movement, antacids, bowel movement)      No  10. OTHER SYMPTOMS: \"Do you have any other symptoms?\" (e.g., back pain, diarrhea, fever, urination pain, vomiting)        Minimal diarrhea; vomiting; chills; fever comes and goes  11. PREGNANCY: \"Is there any chance you are pregnant?\" \"When was your last menstrual period?\"        No    Protocols used: ABDOMINAL PAIN - FEMALE-A-[place]      Radha Xavier RN      "

## 2023-04-11 ENCOUNTER — APPOINTMENT (OUTPATIENT)
Dept: CT IMAGING | Facility: CLINIC | Age: 58
End: 2023-04-11
Attending: EMERGENCY MEDICINE
Payer: COMMERCIAL

## 2023-04-11 ENCOUNTER — HOSPITAL ENCOUNTER (EMERGENCY)
Facility: CLINIC | Age: 58
Discharge: HOME | End: 2023-04-11
Attending: EMERGENCY MEDICINE | Admitting: EMERGENCY MEDICINE
Payer: COMMERCIAL

## 2023-04-11 VITALS
RESPIRATION RATE: 20 BRPM | TEMPERATURE: 97.8 F | SYSTOLIC BLOOD PRESSURE: 112 MMHG | WEIGHT: 103 LBS | DIASTOLIC BLOOD PRESSURE: 56 MMHG | HEART RATE: 53 BPM | BODY MASS INDEX: 18.95 KG/M2 | OXYGEN SATURATION: 97 % | HEIGHT: 62 IN

## 2023-04-11 DIAGNOSIS — R10.32 ABDOMINAL PAIN, LEFT LOWER QUADRANT: ICD-10-CM

## 2023-04-11 DIAGNOSIS — K52.9 ENTEROCOLITIS: ICD-10-CM

## 2023-04-11 LAB
ALBUMIN SERPL BCG-MCNC: 4.5 G/DL (ref 3.5–5.2)
ALBUMIN UR-MCNC: NEGATIVE MG/DL
ALP SERPL-CCNC: 74 U/L (ref 35–104)
ALT SERPL W P-5'-P-CCNC: 15 U/L (ref 10–35)
ANION GAP SERPL CALCULATED.3IONS-SCNC: 10 MMOL/L (ref 7–15)
APPEARANCE UR: CLEAR
AST SERPL W P-5'-P-CCNC: 22 U/L (ref 10–35)
BASOPHILS # BLD AUTO: 0.1 10E3/UL (ref 0–0.2)
BASOPHILS NFR BLD AUTO: 1 %
BILIRUB SERPL-MCNC: 0.3 MG/DL
BILIRUB UR QL STRIP: NEGATIVE
BUN SERPL-MCNC: 10.4 MG/DL (ref 6–20)
CALCIUM SERPL-MCNC: 9.3 MG/DL (ref 8.6–10)
CHLORIDE SERPL-SCNC: 102 MMOL/L (ref 98–107)
COLOR UR AUTO: NORMAL
CREAT SERPL-MCNC: 0.59 MG/DL (ref 0.51–0.95)
DEPRECATED HCO3 PLAS-SCNC: 27 MMOL/L (ref 22–29)
EOSINOPHIL # BLD AUTO: 0.3 10E3/UL (ref 0–0.7)
EOSINOPHIL NFR BLD AUTO: 5 %
ERYTHROCYTE [DISTWIDTH] IN BLOOD BY AUTOMATED COUNT: 12.8 % (ref 10–15)
GFR SERPL CREATININE-BSD FRML MDRD: >90 ML/MIN/1.73M2
GLUCOSE SERPL-MCNC: 113 MG/DL (ref 70–99)
GLUCOSE UR STRIP-MCNC: NEGATIVE MG/DL
HCT VFR BLD AUTO: 45.6 % (ref 35–47)
HGB BLD-MCNC: 15.4 G/DL (ref 11.7–15.7)
HGB UR QL STRIP: NEGATIVE
HOLD SPECIMEN: NORMAL
HOLD SPECIMEN: NORMAL
IMM GRANULOCYTES # BLD: 0 10E3/UL
IMM GRANULOCYTES NFR BLD: 0 %
KETONES UR STRIP-MCNC: NEGATIVE MG/DL
LEUKOCYTE ESTERASE UR QL STRIP: NEGATIVE
LYMPHOCYTES # BLD AUTO: 2.5 10E3/UL (ref 0.8–5.3)
LYMPHOCYTES NFR BLD AUTO: 41 %
MCH RBC QN AUTO: 31.4 PG (ref 26.5–33)
MCHC RBC AUTO-ENTMCNC: 33.8 G/DL (ref 31.5–36.5)
MCV RBC AUTO: 93 FL (ref 78–100)
MONOCYTES # BLD AUTO: 0.6 10E3/UL (ref 0–1.3)
MONOCYTES NFR BLD AUTO: 10 %
NEUTROPHILS # BLD AUTO: 2.7 10E3/UL (ref 1.6–8.3)
NEUTROPHILS NFR BLD AUTO: 43 %
NITRATE UR QL: NEGATIVE
NRBC # BLD AUTO: 0 10E3/UL
NRBC BLD AUTO-RTO: 0 /100
PH UR STRIP: 6 [PH] (ref 5–7)
PLATELET # BLD AUTO: 292 10E3/UL (ref 150–450)
POTASSIUM SERPL-SCNC: 3.8 MMOL/L (ref 3.4–5.3)
PROT SERPL-MCNC: 7.3 G/DL (ref 6.4–8.3)
RBC # BLD AUTO: 4.9 10E6/UL (ref 3.8–5.2)
SODIUM SERPL-SCNC: 139 MMOL/L (ref 136–145)
SP GR UR STRIP: 1 (ref 1–1.03)
UROBILINOGEN UR STRIP-MCNC: NORMAL MG/DL
WBC # BLD AUTO: 6.1 10E3/UL (ref 4–11)

## 2023-04-11 PROCEDURE — 250N000011 HC RX IP 250 OP 636: Performed by: EMERGENCY MEDICINE

## 2023-04-11 PROCEDURE — 80053 COMPREHEN METABOLIC PANEL: CPT | Performed by: EMERGENCY MEDICINE

## 2023-04-11 PROCEDURE — 81003 URINALYSIS AUTO W/O SCOPE: CPT | Performed by: EMERGENCY MEDICINE

## 2023-04-11 PROCEDURE — 96374 THER/PROPH/DIAG INJ IV PUSH: CPT | Mod: 59 | Performed by: EMERGENCY MEDICINE

## 2023-04-11 PROCEDURE — 36415 COLL VENOUS BLD VENIPUNCTURE: CPT | Performed by: EMERGENCY MEDICINE

## 2023-04-11 PROCEDURE — 99284 EMERGENCY DEPT VISIT MOD MDM: CPT | Performed by: EMERGENCY MEDICINE

## 2023-04-11 PROCEDURE — 96375 TX/PRO/DX INJ NEW DRUG ADDON: CPT | Performed by: EMERGENCY MEDICINE

## 2023-04-11 PROCEDURE — 87506 IADNA-DNA/RNA PROBE TQ 6-11: CPT | Performed by: EMERGENCY MEDICINE

## 2023-04-11 PROCEDURE — 96361 HYDRATE IV INFUSION ADD-ON: CPT | Performed by: EMERGENCY MEDICINE

## 2023-04-11 PROCEDURE — 74177 CT ABD & PELVIS W/CONTRAST: CPT

## 2023-04-11 PROCEDURE — 258N000003 HC RX IP 258 OP 636: Performed by: EMERGENCY MEDICINE

## 2023-04-11 PROCEDURE — 250N000009 HC RX 250: Performed by: EMERGENCY MEDICINE

## 2023-04-11 PROCEDURE — 85025 COMPLETE CBC W/AUTO DIFF WBC: CPT | Performed by: EMERGENCY MEDICINE

## 2023-04-11 PROCEDURE — 99285 EMERGENCY DEPT VISIT HI MDM: CPT | Mod: 25 | Performed by: EMERGENCY MEDICINE

## 2023-04-11 RX ORDER — ONDANSETRON 2 MG/ML
4 INJECTION INTRAMUSCULAR; INTRAVENOUS ONCE
Status: COMPLETED | OUTPATIENT
Start: 2023-04-11 | End: 2023-04-11

## 2023-04-11 RX ORDER — IOPAMIDOL 755 MG/ML
46 INJECTION, SOLUTION INTRAVASCULAR ONCE
Status: COMPLETED | OUTPATIENT
Start: 2023-04-11 | End: 2023-04-11

## 2023-04-11 RX ORDER — HYDROMORPHONE HYDROCHLORIDE 2 MG/1
2 TABLET ORAL EVERY 6 HOURS PRN
Qty: 2 TABLET | Refills: 0 | Status: SHIPPED | OUTPATIENT
Start: 2023-04-11 | End: 2023-05-19

## 2023-04-11 RX ORDER — HYDROMORPHONE HYDROCHLORIDE 1 MG/ML
0.3 INJECTION, SOLUTION INTRAMUSCULAR; INTRAVENOUS; SUBCUTANEOUS ONCE
Status: COMPLETED | OUTPATIENT
Start: 2023-04-11 | End: 2023-04-11

## 2023-04-11 RX ADMIN — HYDROMORPHONE HYDROCHLORIDE 0.3 MG: 1 INJECTION, SOLUTION INTRAMUSCULAR; INTRAVENOUS; SUBCUTANEOUS at 07:50

## 2023-04-11 RX ADMIN — IOPAMIDOL 46 ML: 755 INJECTION, SOLUTION INTRAVENOUS at 07:52

## 2023-04-11 RX ADMIN — SODIUM CHLORIDE 500 ML: 9 INJECTION, SOLUTION INTRAVENOUS at 08:10

## 2023-04-11 RX ADMIN — SODIUM CHLORIDE 53 ML: 9 INJECTION, SOLUTION INTRAVENOUS at 07:52

## 2023-04-11 RX ADMIN — ONDANSETRON 4 MG: 2 INJECTION INTRAMUSCULAR; INTRAVENOUS at 07:49

## 2023-04-11 ASSESSMENT — ACTIVITIES OF DAILY LIVING (ADL)
ADLS_ACUITY_SCORE: 37
ADLS_ACUITY_SCORE: 37

## 2023-04-11 NOTE — ED NOTES
Discharge info reviewed with pt questions answered, sent with printed RX. Pt wishes to wait in lobby for ride.

## 2023-04-11 NOTE — ED TRIAGE NOTES
Patient reports spasming abdominal pain for the past week that has worsened within the past day. Patient has a history of diverticulitis and has had part of her colon taken out due to this. Patient states this feels like a diverticulitis attack. Patient states her last normal bowel movement around a week ago with small episodes of diarrhea since then. Reports nausea and vomiting.      Triage Assessment     Row Name 04/11/23 0638       Triage Assessment (Adult)    Airway WDL WDL       Respiratory WDL    Respiratory WDL WDL       Skin Circulation/Temperature WDL    Skin Circulation/Temperature WDL WDL       Cardiac WDL    Cardiac WDL WDL       Peripheral/Neurovascular WDL    Peripheral Neurovascular WDL WDL       Cognitive/Neuro/Behavioral WDL    Cognitive/Neuro/Behavioral WDL WDL

## 2023-04-11 NOTE — DISCHARGE INSTRUCTIONS
Return if symptoms worsen or new symptoms develop.  If any blood in stool please return for further assessment and care.  Take ibuprofen and Tylenol for pain take Dilaudid as needed for severe pain but if pain persists please return for recheck.  Stool sample has been sent off and we will call you with results if positive.  Drink plenty of fluids.

## 2023-04-11 NOTE — ED PROVIDER NOTES
History     Chief Complaint   Patient presents with     Abdominal Pain     HPI  Fatou Simental is a 58 year old female with past medical history significant for Raynaud's syndrome and depression anxiety esophageal reflux history irritable bowel syndrome and diverticulitis with history of colectomy who presents emergency department complaining of left lower quadrant abdominal pain which she describes as crampy.  Patient says she has had difficulty with bowel months over the past 4 to 5 days and her last bowel movement was yesterday and was watery she has been taking senna and MiraLAX without much relief.  Is concerned she might have some further diverticulitis.  Denies any fevers or chills she has been nauseated and been vomiting 2 times.  She denies any chest pain or shortness of breath has not had any headaches or visual changes denies any focal numbness weakness any extremity she has not had any urinary symptoms.  Currently rates her pain a 7 out of 10.    Allergies:  Allergies   Allergen Reactions     Cephalexin Hives     Patient states she gets hives - bad reaction not listed on her H & P.     Rizatriptan Dizziness     Acetaminophen Hives     Ciprofloxacin Hives     Got rash when taken with metronidazole + Vicodin  Other reaction(s): hives     Lactose Diarrhea     Metronidazole Hives     Rash when taken with cipro + Vicodin     Unknown [No Clinical Screening - See Comments]      Pt developed hives when taking flagyl, cipro, and vicodin, doesn't know which she is allergic to      Vicodin [Hydrocodone-Acetaminophen] Hives     Rash when taken with cipro and metronidazole       Problem List:    Patient Active Problem List    Diagnosis Date Noted     Migraine with aura and without status migrainosus, not intractable 10/16/2017     Priority: Medium     Diverticulosis 02/05/2015     Priority: Medium     Diverticular disease of colon 10/07/2013     Priority: Medium     Screening for malignant neoplasm of cervix  07/17/2013     Priority: Medium     S/p hysterectomy for endometriosis, still has cervix  Distant history of cervical dysplasia 2000  Pap 2010:  NIL with negative HPV  Pap 2012:  NIL  2014 NIL Pap, Neg HPV  2017 NIL Pap, Neg HPV  2020 NIL Pap, Neg HPV. Plan cotest in 3 years. Cotesting every 3 years through 2028 recommended per 2019 ASCCP guidelines.              CARDIOVASCULAR SCREENING; LDL GOAL LESS THAN 160 10/31/2010     Priority: Medium     IBS (irritable bowel syndrome) 05/28/2008     Priority: Medium     Ovarian cyst 08/23/2007     Priority: Medium     Oophorectomy bilateral - premalignant tissue ? 1998  Problem list name updated by automated process. Provider to review       Breast screening 01/22/2007     Priority: Medium      implants with 4mm superficial subq lesion at 2:00 left breast - unchanged over last year. Cyst identified on US 1/07  Problem list name updated by automated process. Provider to review       Ganglion of joint 01/22/2007     Priority: Medium     Esophageal reflux 11/13/2006     Priority: Medium     Generalized anxiety disorder 10/30/2006     Priority: Medium     Buspar prescribed 8/23/07       Raynaud's syndrome 01/04/2006     Priority: Medium     Tobacco use disorder 01/04/2006     Priority: Medium     Mild major depression (H) 01/04/2006     Priority: Medium     Need for prophylactic hormone replacement therapy (postmenopausal) 01/04/2006     Priority: Medium        Past Medical History:    Past Medical History:   Diagnosis Date     Cocaine abuse, unspecified 2003     Dysplasia of cervix, unspecified 2000     Esophageal reflux 11/13/2006     Generalized anxiety disorder 10/30/2006     IBS (irritable bowel syndrome) 5/28/2008     Need for prophylactic hormone replacement therapy (postmenopausal)        Past Surgical History:    Past Surgical History:   Procedure Laterality Date     ABLATE VEIN VARICOSE RADIO FREQUENCY WITHOUT PHLEBECTOMY MULTIPLE STAB  7/24/2014    Procedure: ABLATE  VEIN VARICOSE RADIO FREQUENCY WITHOUT PHLEBECTOMY MULTIPLE STAB;  Surgeon: Khanh Bunch MD;  Location: [facility] OR     BIOPSY BREAST Left      ENHANCE LASER REFRACTIVE BILATERAL EXISTING PT IN PARAMETERS Bilateral     LASIK     HYSTERECTOMY SUPRACERVICAL, BILATERAL SALPINGO-OOPHORECTOMY, COMBINED       LAPAROSCOPIC ASSISTED COLECTOMY N/A 2015    Procedure: LAPAROSCOPIC ASSISTED COLECTOMY;  Surgeon: Khanh Bunch MD;  Location: [facility]     LAPAROSCOPIC CHOLECYSTECTOMY N/A 2017    Procedure: LAPAROSCOPIC CHOLECYSTECTOMY;  Surgeon: Levi Louis MD;  Location: [facility]     NERVE BLOCK PERIPHERAL Bilateral 2015    Procedure: NERVE BLOCK PERIPHERAL;  Surgeon: Generic Anesthesia Provider;  Location: [facility]     REPAIR PTOSIS BILATERAL Bilateral 10/11/2018    Procedure: REPAIR PTOSIS BILATERAL;  Bilateral Upper Eyelid Ptosis Repair;  Surgeon: Wilton Viramontes MD;  Location: [facility]     SURGICAL HISTORY OF -       Breast implants     SURGICAL HISTORY OF -       Carpal  tunnel r hand     ZZC  DELIVERY ONLY  ,,    , Low Cervical       Family History:    Family History   Problem Relation Age of Onset     Cancer Father         liver     Cancer Brother         lung, asbestos     Cancer Mother         uterine     Cancer Sister         uterine     Breast Cancer Other      Breast Cancer Other      Glaucoma No family hx of      Macular Degeneration No family hx of        Social History:  Marital Status:   [4]  Social History     Tobacco Use     Smoking status: Every Day     Packs/day: 0.25     Years: 20.00     Pack years: 5.00     Types: Cigarettes     Smokeless tobacco: Never     Tobacco comments:     trying to quit   Vaping Use     Vaping status: Never Used   Substance Use Topics     Alcohol use: Never     Drug use: No        Medications:    albuterol (PROAIR HFA/PROVENTIL HFA/VENTOLIN HFA) 108 (90 Base) MCG/ACT inhaler  butalbital-aspirin-caffeine (FIORINAL)  "-40 MG capsule  cetirizine (ZYRTEC) 10 MG tablet  Cholecalciferol (VITAMIN D3 PO)  DiphenhydrAMINE HCl (BENADRYL PO)  estradiol (ESTRACE) 1 MG tablet  estradiol (VAGIFEM) 10 MCG TABS vaginal tablet  gabapentin (NEURONTIN) 300 MG capsule  hyoscyamine (LEVSIN) 0.125 MG tablet  LORazepam (ATIVAN) 0.5 MG tablet  methocarbamol (ROBAXIN) 500 MG tablet  methylcellulose (CITRUCEL) powder  montelukast (SINGULAIR) 10 MG tablet  multivitamin, therapeutic (THERA-VIT) TABS  naproxen (NAPROSYN) 500 MG tablet  omeprazole 20 MG tablet  oxybutynin ER (DITROPAN XL) 5 MG 24 hr tablet  SENEXON-S 8.6-50 MG tablet          Review of Systems  All systems reviewed and other than pertinent positives negatives in HPI all other systems are negative.  Physical Exam   BP: (!) 158/86  Pulse: 80  Temp: 97.9  F (36.6  C)  Resp: 20  Height: 157.5 cm (5' 2\")  Weight: 46.7 kg (103 lb)  SpO2: 99 %      Physical Exam  Vitals and nursing note reviewed.   Constitutional:       Appearance: She is well-developed. She is not ill-appearing or diaphoretic.      Comments: Mild distress secondary to abdominal pain.   HENT:      Head: Normocephalic and atraumatic.      Nose: Nose normal.      Mouth/Throat:      Mouth: Mucous membranes are moist.      Pharynx: Oropharynx is clear.   Eyes:      Conjunctiva/sclera: Conjunctivae normal.   Cardiovascular:      Rate and Rhythm: Normal rate and regular rhythm.      Pulses: Normal pulses.      Heart sounds: Normal heart sounds. No murmur heard.  Pulmonary:      Effort: Pulmonary effort is normal.      Breath sounds: Normal breath sounds. No stridor. No wheezing or rhonchi.   Abdominal:      Comments: Soft, nondistended, tender to palpation of the left lower quadrant mild guarding no rebound bowel sounds are positive.  No other abdominal tenderness no flank tenderness.   Musculoskeletal:         General: No swelling or tenderness. Normal range of motion.      Cervical back: Normal range of motion and neck supple. "      Right lower leg: No edema.      Left lower leg: No edema.   Skin:     General: Skin is warm and dry.      Capillary Refill: Capillary refill takes less than 2 seconds.      Findings: No rash.   Neurological:      General: No focal deficit present.      Mental Status: She is alert and oriented to person, place, and time.      Sensory: No sensory deficit.      Coordination: Coordination normal.   Psychiatric:         Mood and Affect: Mood normal.         ED Course                 Procedures              Critical Care time:  none               Results for orders placed or performed during the hospital encounter of 04/11/23 (from the past 24 hour(s))   CBC with platelets, differential    Narrative    The following orders were created for panel order CBC with platelets, differential.  Procedure                               Abnormality         Status                     ---------                               -----------         ------                     CBC with platelets and d...[[id number]]                      Final result                 Please view results for these tests on the individual orders.   Comprehensive metabolic panel   Result Value Ref Range    Sodium 139 136 - 145 mmol/L    Potassium 3.8 3.4 - 5.3 mmol/L    Chloride 102 98 - 107 mmol/L    Carbon Dioxide (CO2) 27 22 - 29 mmol/L    Anion Gap 10 7 - 15 mmol/L    Urea Nitrogen 10.4 6.0 - 20.0 mg/dL    Creatinine 0.59 0.51 - 0.95 mg/dL    Calcium 9.3 8.6 - 10.0 mg/dL    Glucose 113 (H) 70 - 99 mg/dL    Alkaline Phosphatase 74 35 - 104 U/L    AST 22 10 - 35 U/L    ALT 15 10 - 35 U/L    Protein Total 7.3 6.4 - 8.3 g/dL    Albumin 4.5 3.5 - 5.2 g/dL    Bilirubin Total 0.3 <=1.2 mg/dL    GFR Estimate >90 >60 mL/min/1.73m2   [place] Draw    Narrative    The following orders were created for panel order [place] Draw.  Procedure                               Abnormality         Status                     ---------                               -----------          ------                     Extra Blue Top Tube[[id number]]                              In process                 Extra Red Top Tube[[id number]]                               In process                   Please view results for these tests on the individual orders.   CBC with platelets and differential   Result Value Ref Range    WBC Count 6.1 4.0 - 11.0 10e3/uL    RBC Count 4.90 3.80 - 5.20 10e6/uL    Hemoglobin 15.4 11.7 - 15.7 g/dL    Hematocrit 45.6 35.0 - 47.0 %    MCV 93 78 - 100 fL    MCH 31.4 26.5 - 33.0 pg    MCHC 33.8 31.5 - 36.5 g/dL    RDW 12.8 10.0 - 15.0 %    Platelet Count 292 150 - 450 10e3/uL    % Neutrophils 43 %    % Lymphocytes 41 %    % Monocytes 10 %    % Eosinophils 5 %    % Basophils 1 %    % Immature Granulocytes 0 %    NRBCs per 100 WBC 0 <1 /100    Absolute Neutrophils 2.7 1.6 - 8.3 10e3/uL    Absolute Lymphocytes 2.5 0.8 - 5.3 10e3/uL    Absolute Monocytes 0.6 0.0 - 1.3 10e3/uL    Absolute Eosinophils 0.3 0.0 - 0.7 10e3/uL    Absolute Basophils 0.1 0.0 - 0.2 10e3/uL    Absolute Immature Granulocytes 0.0 <=0.4 10e3/uL    Absolute NRBCs 0.0 10e3/uL       Medications   0.9% sodium chloride BOLUS (500 mLs Intravenous $New Bag 4/11/23 0810)   ondansetron (ZOFRAN) injection 4 mg (4 mg Intravenous $Given 4/11/23 2598)   HYDROmorphone (PF) (DILAUDID) injection 0.3 mg (0.3 mg Intravenous $Given 4/11/23 0750)   iopamidol (ISOVUE-370) solution 46 mL (46 mLs Intravenous $Given 4/11/23 0752)   sodium chloride 0.9 % bag 500mL for CT scan flush use (53 mLs Intravenous $Given 4/11/23 0752)       Assessments & Plan (with Medical Decision Making) records were reviewed including past medical history and medications.  Recent triage phone visits ED visit were reviewed.  Office visit from June of last year was also reviewed.  Patient was given a fluid bolus along with hydromorphone and Zofran.  Labs were obtained.  Patient CBC was unremarkable.  Comprehensive metabolic panel with a glucose of 113.  Urine  analysis was negative.  Due to patient's history presentation and exam a CT scan abdomen pelvis with contrast was obtained.  Images were reviewed.  They revealed nondilated fluid filled loops of ileum and colon without surrounding inflammatory changes may be related to mild enterocolitis in the appropriate setting no other definitive acute finding in the abdomen.  Findings discussed with patient.  She is feeling better but still having some left lower quadrant abdominal pain I am going to give her a few pain pills and have her drink plenty of fluids.  If pain persists or worsens she should return for further evaluation and care.  She feels comfortable with this plan at this time.  There is no evidence of ischemia.     I have reviewed the nursing notes.    I have reviewed the findings, diagnosis, plan and need for follow up with the patient.             Discharge Medication List as of 4/11/2023  9:57 AM      START taking these medications    Details   HYDROmorphone (DILAUDID) 2 MG tablet Take 1 tablet (2 mg) by mouth every 6 hours as needed for pain or breakthrough pain, Disp-2 tablet, R-0, Local Print             Final diagnoses:   Abdominal pain, left lower quadrant   Enterocolitis - possible       4/11/2023   [facility] EMERGENCY DEPT     Shiva Aponte MD  04/12/23 8823

## 2023-04-12 ENCOUNTER — MYC MEDICAL ADVICE (OUTPATIENT)
Dept: FAMILY MEDICINE | Facility: CLINIC | Age: 58
End: 2023-04-12
Payer: COMMERCIAL

## 2023-04-12 DIAGNOSIS — R10.32 LLQ ABDOMINAL PAIN: Primary | ICD-10-CM

## 2023-04-12 DIAGNOSIS — R69 DIAGNOSIS UNKNOWN: Primary | ICD-10-CM

## 2023-04-12 NOTE — TELEPHONE ENCOUNTER
PCP is Karlene Shay; routing to [facility].     Margie Davison RN on 4/12/2023 at 10:18 AM

## 2023-04-13 NOTE — TELEPHONE ENCOUNTER
Bowel rest with bland foods and clear liquids avoiding dairy and caffeine is recommended.  Increase fluids.  I reviewed labs and CT from ED which did not show anything acute or inflammatory changes.  If something changes suddenly recommend return to ED or clinic.    Referral placed to GI.    These usually have to run there course.  DOREEN Valverde

## 2023-04-26 ENCOUNTER — HOSPITAL ENCOUNTER (EMERGENCY)
Facility: CLINIC | Age: 58
Discharge: HOME | End: 2023-04-26
Attending: EMERGENCY MEDICINE | Admitting: EMERGENCY MEDICINE
Payer: COMMERCIAL

## 2023-04-26 ENCOUNTER — APPOINTMENT (OUTPATIENT)
Dept: GENERAL RADIOLOGY | Facility: CLINIC | Age: 58
End: 2023-04-26
Attending: EMERGENCY MEDICINE
Payer: COMMERCIAL

## 2023-04-26 VITALS
SYSTOLIC BLOOD PRESSURE: 106 MMHG | WEIGHT: 103 LBS | HEART RATE: 61 BPM | HEIGHT: 62 IN | OXYGEN SATURATION: 97 % | TEMPERATURE: 97.7 F | DIASTOLIC BLOOD PRESSURE: 66 MMHG | RESPIRATION RATE: 16 BRPM | BODY MASS INDEX: 18.95 KG/M2

## 2023-04-26 DIAGNOSIS — R10.13 ABDOMINAL PAIN, EPIGASTRIC: ICD-10-CM

## 2023-04-26 DIAGNOSIS — R19.5 POSITIVE COLORECTAL CANCER SCREENING USING COLOGUARD TEST: Primary | ICD-10-CM

## 2023-04-26 LAB
ALBUMIN SERPL BCG-MCNC: 4.4 G/DL (ref 3.5–5.2)
ALP SERPL-CCNC: 66 U/L (ref 35–104)
ALT SERPL W P-5'-P-CCNC: 13 U/L (ref 10–35)
ANION GAP SERPL CALCULATED.3IONS-SCNC: 7 MMOL/L (ref 7–15)
AST SERPL W P-5'-P-CCNC: 17 U/L (ref 10–35)
BASOPHILS # BLD AUTO: 0.1 10E3/UL (ref 0–0.2)
BASOPHILS NFR BLD AUTO: 2 %
BILIRUB SERPL-MCNC: 0.3 MG/DL
BUN SERPL-MCNC: 11.1 MG/DL (ref 6–20)
CALCIUM SERPL-MCNC: 9.6 MG/DL (ref 8.6–10)
CHLORIDE SERPL-SCNC: 106 MMOL/L (ref 98–107)
CREAT SERPL-MCNC: 0.57 MG/DL (ref 0.51–0.95)
DEPRECATED HCO3 PLAS-SCNC: 28 MMOL/L (ref 22–29)
EOSINOPHIL # BLD AUTO: 0.1 10E3/UL (ref 0–0.7)
EOSINOPHIL NFR BLD AUTO: 2 %
ERYTHROCYTE [DISTWIDTH] IN BLOOD BY AUTOMATED COUNT: 12.8 % (ref 10–15)
GFR SERPL CREATININE-BSD FRML MDRD: >90 ML/MIN/1.73M2
GLUCOSE SERPL-MCNC: 95 MG/DL (ref 70–99)
HCT VFR BLD AUTO: 41.1 % (ref 35–47)
HGB BLD-MCNC: 14 G/DL (ref 11.7–15.7)
IMM GRANULOCYTES # BLD: 0 10E3/UL
IMM GRANULOCYTES NFR BLD: 0 %
LIPASE SERPL-CCNC: 31 U/L (ref 13–60)
LYMPHOCYTES # BLD AUTO: 1.4 10E3/UL (ref 0.8–5.3)
LYMPHOCYTES NFR BLD AUTO: 32 %
MCH RBC QN AUTO: 31.3 PG (ref 26.5–33)
MCHC RBC AUTO-ENTMCNC: 34.1 G/DL (ref 31.5–36.5)
MCV RBC AUTO: 92 FL (ref 78–100)
MONOCYTES # BLD AUTO: 0.4 10E3/UL (ref 0–1.3)
MONOCYTES NFR BLD AUTO: 10 %
NEUTROPHILS # BLD AUTO: 2.3 10E3/UL (ref 1.6–8.3)
NEUTROPHILS NFR BLD AUTO: 54 %
NONINV COLON CA DNA+OCC BLD SCRN STL QL: POSITIVE
NRBC # BLD AUTO: 0 10E3/UL
NRBC BLD AUTO-RTO: 0 /100
PLATELET # BLD AUTO: 268 10E3/UL (ref 150–450)
POTASSIUM SERPL-SCNC: 4 MMOL/L (ref 3.4–5.3)
PROT SERPL-MCNC: 6.8 G/DL (ref 6.4–8.3)
RBC # BLD AUTO: 4.47 10E6/UL (ref 3.8–5.2)
SODIUM SERPL-SCNC: 141 MMOL/L (ref 136–145)
WBC # BLD AUTO: 4.3 10E3/UL (ref 4–11)

## 2023-04-26 PROCEDURE — 36415 COLL VENOUS BLD VENIPUNCTURE: CPT | Performed by: EMERGENCY MEDICINE

## 2023-04-26 PROCEDURE — 96374 THER/PROPH/DIAG INJ IV PUSH: CPT | Performed by: EMERGENCY MEDICINE

## 2023-04-26 PROCEDURE — 250N000013 HC RX MED GY IP 250 OP 250 PS 637: Performed by: EMERGENCY MEDICINE

## 2023-04-26 PROCEDURE — 83690 ASSAY OF LIPASE: CPT | Performed by: EMERGENCY MEDICINE

## 2023-04-26 PROCEDURE — 250N000009 HC RX 250: Performed by: EMERGENCY MEDICINE

## 2023-04-26 PROCEDURE — 96361 HYDRATE IV INFUSION ADD-ON: CPT | Performed by: EMERGENCY MEDICINE

## 2023-04-26 PROCEDURE — 250N000011 HC RX IP 250 OP 636: Performed by: EMERGENCY MEDICINE

## 2023-04-26 PROCEDURE — 99284 EMERGENCY DEPT VISIT MOD MDM: CPT | Performed by: EMERGENCY MEDICINE

## 2023-04-26 PROCEDURE — 85025 COMPLETE CBC W/AUTO DIFF WBC: CPT | Performed by: EMERGENCY MEDICINE

## 2023-04-26 PROCEDURE — 258N000003 HC RX IP 258 OP 636: Performed by: EMERGENCY MEDICINE

## 2023-04-26 PROCEDURE — 99284 EMERGENCY DEPT VISIT MOD MDM: CPT | Mod: 25 | Performed by: EMERGENCY MEDICINE

## 2023-04-26 PROCEDURE — 74019 RADEX ABDOMEN 2 VIEWS: CPT

## 2023-04-26 PROCEDURE — 80053 COMPREHEN METABOLIC PANEL: CPT | Performed by: EMERGENCY MEDICINE

## 2023-04-26 RX ORDER — PANTOPRAZOLE SODIUM 40 MG/1
40 TABLET, DELAYED RELEASE ORAL DAILY
Qty: 30 TABLET | Refills: 0 | Status: SHIPPED | OUTPATIENT
Start: 2023-04-26 | End: 2023-04-26

## 2023-04-26 RX ORDER — SUCRALFATE 1 G/1
1 TABLET ORAL 4 TIMES DAILY
Qty: 30 TABLET | Refills: 0 | Status: SHIPPED | OUTPATIENT
Start: 2023-04-26 | End: 2023-05-11

## 2023-04-26 RX ORDER — PANTOPRAZOLE SODIUM 40 MG/1
40 TABLET, DELAYED RELEASE ORAL DAILY
Qty: 30 TABLET | Refills: 0 | Status: SHIPPED | OUTPATIENT
Start: 2023-04-26 | End: 2023-06-01 | Stop reason: ALTCHOICE

## 2023-04-26 RX ORDER — ONDANSETRON 2 MG/ML
4 INJECTION INTRAMUSCULAR; INTRAVENOUS ONCE
Status: COMPLETED | OUTPATIENT
Start: 2023-04-26 | End: 2023-04-26

## 2023-04-26 RX ORDER — SUCRALFATE 1 G/1
1 TABLET ORAL ONCE
Status: COMPLETED | OUTPATIENT
Start: 2023-04-26 | End: 2023-04-26

## 2023-04-26 RX ADMIN — LIDOCAINE HYDROCHLORIDE 30 ML: 20 SOLUTION ORAL; TOPICAL at 11:27

## 2023-04-26 RX ADMIN — ONDANSETRON 4 MG: 2 INJECTION INTRAMUSCULAR; INTRAVENOUS at 10:44

## 2023-04-26 RX ADMIN — SODIUM CHLORIDE 500 ML: 9 INJECTION, SOLUTION INTRAVENOUS at 10:42

## 2023-04-26 RX ADMIN — SUCRALFATE 1 G: 1 TABLET ORAL at 13:59

## 2023-04-26 ASSESSMENT — ACTIVITIES OF DAILY LIVING (ADL)
ADLS_ACUITY_SCORE: 37
ADLS_ACUITY_SCORE: 37

## 2023-04-26 NOTE — H&P (VIEW-ONLY)
History     Chief Complaint   Patient presents with     Abdominal Pain     Upper left abd pain, has been seen for this, no answers, pain is worse today     HPI  Fatou Simental is a 58 year old female with a past medical history significant for Raynaud's tobacco use disorder, mild major depression, anxiety, esophageal reflux, irritable bowel syndrome, diverticulosis, migraine with aura and without diverticular disease of the colon status post neurectomy who presents emergency department complaining epigastric abdominal pain.  Patient has chronic abdominal pain and over the past several weeks has had increasing abdominal pain.  She was seen on 11 April and had CT scan of the abdomen pelvis done which showed an ileus but no obvious acute abnormality.  Patient was also seen recently by gastroenterology in [facility] I have reviewed notes to both these.  Patient was placed on 2 medications and states these are not helping.  She still was having burning epigastric pain which is constant.  She denies any fevers or chills has not any headache denies any chest pain shortness of breath.  She has not any abdominal pain or back pain denies any focal numbness weakness any extremity has not had any bowel or bladder dysfunction.  Denies rash.    Allergies:  Allergies   Allergen Reactions     Cephalexin Hives     Patient states she gets hives - bad reaction not listed on her H & P.     Rizatriptan Dizziness     Acetaminophen Hives     Ciprofloxacin Hives     Got rash when taken with metronidazole + Vicodin  Other reaction(s): hives     Lactose Diarrhea     Metronidazole Hives     Rash when taken with cipro + Vicodin     Unknown [No Clinical Screening - See Comments]      Pt developed hives when taking flagyl, cipro, and vicodin, doesn't know which she is allergic to      Vicodin [Hydrocodone-Acetaminophen] Hives     Rash when taken with cipro and metronidazole       Problem List:    Patient Active Problem List     Diagnosis Date Noted     Migraine with aura and without status migrainosus, not intractable 10/16/2017     Priority: Medium     Diverticulosis 02/05/2015     Priority: Medium     Diverticular disease of colon 10/07/2013     Priority: Medium     Screening for malignant neoplasm of cervix 07/17/2013     Priority: Medium     S/p hysterectomy for endometriosis, still has cervix  Distant history of cervical dysplasia 2000  Pap 2010:  NIL with negative HPV  Pap 2012:  NIL  2014 NIL Pap, Neg HPV  2017 NIL Pap, Neg HPV  2020 NIL Pap, Neg HPV. Plan cotest in 3 years. Cotesting every 3 years through 2028 recommended per 2019 ASCCP guidelines.              CARDIOVASCULAR SCREENING; LDL GOAL LESS THAN 160 10/31/2010     Priority: Medium     IBS (irritable bowel syndrome) 05/28/2008     Priority: Medium     Ovarian cyst 08/23/2007     Priority: Medium     Oophorectomy bilateral - premalignant tissue ? 1998  Problem list name updated by automated process. Provider to review       Breast screening 01/22/2007     Priority: Medium      implants with 4mm superficial subq lesion at 2:00 left breast - unchanged over last year. Cyst identified on US 1/07  Problem list name updated by automated process. Provider to review       Ganglion of joint 01/22/2007     Priority: Medium     Esophageal reflux 11/13/2006     Priority: Medium     Generalized anxiety disorder 10/30/2006     Priority: Medium     Buspar prescribed 8/23/07       Raynaud's syndrome 01/04/2006     Priority: Medium     Tobacco use disorder 01/04/2006     Priority: Medium     Mild major depression (H) 01/04/2006     Priority: Medium     Need for prophylactic hormone replacement therapy (postmenopausal) 01/04/2006     Priority: Medium        Past Medical History:    Past Medical History:   Diagnosis Date     Cocaine abuse, unspecified 2003     Dysplasia of cervix, unspecified 2000     Esophageal reflux 11/13/2006     Generalized anxiety disorder 10/30/2006     IBS (irritable  bowel syndrome) 2008     Need for prophylactic hormone replacement therapy (postmenopausal)        Past Surgical History:    Past Surgical History:   Procedure Laterality Date     ABLATE VEIN VARICOSE RADIO FREQUENCY WITHOUT PHLEBECTOMY MULTIPLE STAB  2014    Procedure: ABLATE VEIN VARICOSE RADIO FREQUENCY WITHOUT PHLEBECTOMY MULTIPLE STAB;  Surgeon: Khanh Bunch MD;  Location: [facility] OR     BIOPSY BREAST Left      ENHANCE LASER REFRACTIVE BILATERAL EXISTING PT IN PARAMETERS Bilateral     LASIK     HYSTERECTOMY SUPRACERVICAL, BILATERAL SALPINGO-OOPHORECTOMY, COMBINED  2000     LAPAROSCOPIC ASSISTED COLECTOMY N/A 2015    Procedure: LAPAROSCOPIC ASSISTED COLECTOMY;  Surgeon: Khanh Bunch MD;  Location: [facility]     LAPAROSCOPIC CHOLECYSTECTOMY N/A 2017    Procedure: LAPAROSCOPIC CHOLECYSTECTOMY;  Surgeon: Levi Louis MD;  Location: [facility]     NERVE BLOCK PERIPHERAL Bilateral 2015    Procedure: NERVE BLOCK PERIPHERAL;  Surgeon: Generic Anesthesia Provider;  Location: [facility]     REPAIR PTOSIS BILATERAL Bilateral 10/11/2018    Procedure: REPAIR PTOSIS BILATERAL;  Bilateral Upper Eyelid Ptosis Repair;  Surgeon: Wilton Viramontes MD;  Location: [facility]     SURGICAL HISTORY OF -       Breast implants     SURGICAL HISTORY OF -       Carpal  tunnel r hand     ZZC  DELIVERY ONLY  ,,    , Low Cervical       Family History:    Family History   Problem Relation Age of Onset     Cancer Father         liver     Cancer Brother         lung, asbestos     Cancer Mother         uterine     Cancer Sister         uterine     Breast Cancer Other      Breast Cancer Other      Glaucoma No family hx of      Macular Degeneration No family hx of        Social History:  Marital Status:   [4]  Social History     Tobacco Use     Smoking status: Every Day     Packs/day: 0.25     Years: 20.00     Pack years: 5.00     Types: Cigarettes     Smokeless tobacco: Never      "Tobacco comments:     trying to quit   Vaping Use     Vaping status: Never Used   Substance Use Topics     Alcohol use: Never     Drug use: No        Medications:    albuterol (PROAIR HFA/PROVENTIL HFA/VENTOLIN HFA) 108 (90 Base) MCG/ACT inhaler  butalbital-aspirin-caffeine (FIORINAL) -40 MG capsule  cetirizine (ZYRTEC) 10 MG tablet  Cholecalciferol (VITAMIN D3 PO)  DiphenhydrAMINE HCl (BENADRYL PO)  estradiol (ESTRACE) 1 MG tablet  estradiol (VAGIFEM) 10 MCG TABS vaginal tablet  gabapentin (NEURONTIN) 300 MG capsule  HYDROmorphone (DILAUDID) 2 MG tablet  hyoscyamine (LEVSIN) 0.125 MG tablet  LORazepam (ATIVAN) 0.5 MG tablet  methocarbamol (ROBAXIN) 500 MG tablet  methylcellulose (CITRUCEL) powder  montelukast (SINGULAIR) 10 MG tablet  multivitamin, therapeutic (THERA-VIT) TABS  naproxen (NAPROSYN) 500 MG tablet  omeprazole 20 MG tablet  oxybutynin ER (DITROPAN XL) 5 MG 24 hr tablet  SENEXON-S 8.6-50 MG tablet          Review of Systems  All systems reviewed and other than pertinent positives and negatives in HPI all other systems are negative.  Physical Exam   BP: 115/74  Pulse: 68  Temp: 97.7  F (36.5  C)  Resp: 16  Height: 157.5 cm (5' 2\")  Weight: 46.7 kg (103 lb)  SpO2: 100 %      Physical Exam  Vitals and nursing note reviewed.   Constitutional:       Appearance: She is well-developed. She is not ill-appearing, toxic-appearing or diaphoretic.      Comments: Mild abdominal distress.   HENT:      Head: Normocephalic and atraumatic.      Nose: Nose normal.      Mouth/Throat:      Mouth: Mucous membranes are moist.      Pharynx: Oropharynx is clear.   Cardiovascular:      Rate and Rhythm: Normal rate and regular rhythm.      Pulses: Normal pulses.      Heart sounds: Normal heart sounds. No murmur heard.  Pulmonary:      Effort: Pulmonary effort is normal.      Breath sounds: Normal breath sounds. No stridor. No wheezing or rhonchi.   Abdominal:      Comments: Soft, nondistended tender to palpation of the " epigastric region no guarding or rebound present bowel sounds are positive.  No flank tenderness is noted no masses or hernia are palpated   Musculoskeletal:         General: No swelling or tenderness. Normal range of motion.      Cervical back: Normal range of motion and neck supple.      Right lower leg: No edema.      Left lower leg: No edema.   Skin:     General: Skin is warm and dry.      Capillary Refill: Capillary refill takes less than 2 seconds.      Findings: No rash.   Neurological:      General: No focal deficit present.      Mental Status: She is alert and oriented to person, place, and time.      Sensory: No sensory deficit.      Motor: No weakness.      Coordination: Coordination normal.   Psychiatric:         Mood and Affect: Mood normal.         ED Course                 Procedures              Critical Care time:  none               Results for orders placed or performed during the hospital encounter of 04/26/23 (from the past 24 hour(s))   CBC with platelets differential    Narrative    The following orders were created for panel order CBC with platelets differential.  Procedure                               Abnormality         Status                     ---------                               -----------         ------                     CBC with platelets and d...[[id number]]                      Final result                 Please view results for these tests on the individual orders.   Comprehensive metabolic panel   Result Value Ref Range    Sodium 141 136 - 145 mmol/L    Potassium 4.0 3.4 - 5.3 mmol/L    Chloride 106 98 - 107 mmol/L    Carbon Dioxide (CO2) 28 22 - 29 mmol/L    Anion Gap 7 7 - 15 mmol/L    Urea Nitrogen 11.1 6.0 - 20.0 mg/dL    Creatinine 0.57 0.51 - 0.95 mg/dL    Calcium 9.6 8.6 - 10.0 mg/dL    Glucose 95 70 - 99 mg/dL    Alkaline Phosphatase 66 35 - 104 U/L    AST 17 10 - 35 U/L    ALT 13 10 - 35 U/L    Protein Total 6.8 6.4 - 8.3 g/dL    Albumin 4.4 3.5 - 5.2 g/dL     Bilirubin Total 0.3 <=1.2 mg/dL    GFR Estimate >90 >60 mL/min/1.73m2   Lipase   Result Value Ref Range    Lipase 31 13 - 60 U/L   CBC with platelets and differential   Result Value Ref Range    WBC Count 4.3 4.0 - 11.0 10e3/uL    RBC Count 4.47 3.80 - 5.20 10e6/uL    Hemoglobin 14.0 11.7 - 15.7 g/dL    Hematocrit 41.1 35.0 - 47.0 %    MCV 92 78 - 100 fL    MCH 31.3 26.5 - 33.0 pg    MCHC 34.1 31.5 - 36.5 g/dL    RDW 12.8 10.0 - 15.0 %    Platelet Count 268 150 - 450 10e3/uL    % Neutrophils 54 %    % Lymphocytes 32 %    % Monocytes 10 %    % Eosinophils 2 %    % Basophils 2 %    % Immature Granulocytes 0 %    NRBCs per 100 WBC 0 <1 /100    Absolute Neutrophils 2.3 1.6 - 8.3 10e3/uL    Absolute Lymphocytes 1.4 0.8 - 5.3 10e3/uL    Absolute Monocytes 0.4 0.0 - 1.3 10e3/uL    Absolute Eosinophils 0.1 0.0 - 0.7 10e3/uL    Absolute Basophils 0.1 0.0 - 0.2 10e3/uL    Absolute Immature Granulocytes 0.0 <=0.4 10e3/uL    Absolute NRBCs 0.0 10e3/uL   Abdomen, flat/upright (2 views)    Narrative    ABDOMEN TWO VIEWS   4/26/2023 11:07 AM     HISTORY: Epigastric and right upper quadrant pain with constipation.    COMPARISON: CT abdomen and pelvis 4/11/2023.      Impression    IMPRESSION: Nonobstructive bowel gas pattern. Small gas within small  and large bowel loops may be a mild ileus. Cholecystectomy. No visible  free air. Clear lower lungs.          Medications   0.9% sodium chloride BOLUS (0 mLs Intravenous Stopped 4/26/23 1125)   ondansetron (ZOFRAN) injection 4 mg (4 mg Intravenous $Given 4/26/23 1044)   lidocaine (viscous) (XYLOCAINE) 2 % 15 mL, alum & mag hydroxide-simethicone (MAALOX) 15 mL GI Cocktail (30 mLs Oral $Given 4/26/23 1127)       Assessments & Plan (with Medical Decision Making) records were reviewed including past medical history medications and allergies.  Patient chart was reviewed including recent ED visit on 4/11/2023.  And gastroenterology visit on 4/21/2023.  Labs were obtained.  Patient CBC was  unremarkable.  Comprehensive metabolic panel without significant abnormality and lipase was 31.  Patient was given Zofran and a GI cocktail.  This did improve her pain.  She had had a recent CT scan of the abdomen and pelvis which had revealed nondilated fluid-filled loops of ileum and colon without surrounding inflammatory changes this could be related to mild enterocolitis no other acute findings present.  I discussed repeat CT scan risks and benefits were discussed including radiation exposure and with improvement with GI cocktail was decided to hold off on this.  I did proceed with an x-ray of the abdomen to rule out free air and obstruction.  These images were reviewed.  They revealed: Nonobstructive bowel gas pattern with small gas within small bowel and large bowel loops may be mild ileus no other abnormality noted.  Findings discussed with patient in detail.  Her pain appears to be gastric in nature possibly an ulcer GERD or gastritis.  Patient has been taking a fair amount of ibuprofen and advised her to hold off on this at this time.  Patient is not on a PPI I this time and I am going to start her Carafate and Protonix and refer her to general surgery for an upper endoscopy.  Patient should return if worsening abdominal pain fevers blood in her stool decreased urine output or other symptoms present.  Patient feels comfortable with this plan at this time.     I have reviewed the nursing notes.    I have reviewed the findings, diagnosis, plan and need for follow up with the patient.         Discharge Medication List as of 4/26/2023  1:54 PM      START taking these medications    Details   sucralfate (CARAFATE) 1 GM tablet Take 1 tablet (1 g) by mouth 4 times daily, Disp-30 tablet, R-0, Local Print             Final diagnoses:   Abdominal pain, epigastric       4/26/2023   [facility] EMERGENCY DEPT     Fay, Shiva Damon MD  04/28/23 1268

## 2023-04-26 NOTE — ED TRIAGE NOTES
Upper left abd pain, has been seen for this, no answers, pain is worse today     Triage Assessment     Row Name 04/26/23 0921       Triage Assessment (Adult)    Airway WDL WDL       Cardiac WDL    Cardiac WDL WDL       Cognitive/Neuro/Behavioral WDL    Cognitive/Neuro/Behavioral WDL WDL

## 2023-04-26 NOTE — ED PROVIDER NOTES
History     Chief Complaint   Patient presents with     Abdominal Pain     Upper left abd pain, has been seen for this, no answers, pain is worse today     HPI  Fatou Simental is a 58 year old female with a past medical history significant for Raynaud's tobacco use disorder, mild major depression, anxiety, esophageal reflux, irritable bowel syndrome, diverticulosis, migraine with aura and without diverticular disease of the colon status post neurectomy who presents emergency department complaining epigastric abdominal pain.  Patient has chronic abdominal pain and over the past several weeks has had increasing abdominal pain.  She was seen on 11 April and had CT scan of the abdomen pelvis done which showed an ileus but no obvious acute abnormality.  Patient was also seen recently by gastroenterology in [facility] I have reviewed notes to both these.  Patient was placed on 2 medications and states these are not helping.  She still was having burning epigastric pain which is constant.  She denies any fevers or chills has not any headache denies any chest pain shortness of breath.  She has not any abdominal pain or back pain denies any focal numbness weakness any extremity has not had any bowel or bladder dysfunction.  Denies rash.    Allergies:  Allergies   Allergen Reactions     Cephalexin Hives     Patient states she gets hives - bad reaction not listed on her H & P.     Rizatriptan Dizziness     Acetaminophen Hives     Ciprofloxacin Hives     Got rash when taken with metronidazole + Vicodin  Other reaction(s): hives     Lactose Diarrhea     Metronidazole Hives     Rash when taken with cipro + Vicodin     Unknown [No Clinical Screening - See Comments]      Pt developed hives when taking flagyl, cipro, and vicodin, doesn't know which she is allergic to      Vicodin [Hydrocodone-Acetaminophen] Hives     Rash when taken with cipro and metronidazole       Problem List:    Patient Active Problem List     Diagnosis Date Noted     Migraine with aura and without status migrainosus, not intractable 10/16/2017     Priority: Medium     Diverticulosis 02/05/2015     Priority: Medium     Diverticular disease of colon 10/07/2013     Priority: Medium     Screening for malignant neoplasm of cervix 07/17/2013     Priority: Medium     S/p hysterectomy for endometriosis, still has cervix  Distant history of cervical dysplasia 2000  Pap 2010:  NIL with negative HPV  Pap 2012:  NIL  2014 NIL Pap, Neg HPV  2017 NIL Pap, Neg HPV  2020 NIL Pap, Neg HPV. Plan cotest in 3 years. Cotesting every 3 years through 2028 recommended per 2019 ASCCP guidelines.              CARDIOVASCULAR SCREENING; LDL GOAL LESS THAN 160 10/31/2010     Priority: Medium     IBS (irritable bowel syndrome) 05/28/2008     Priority: Medium     Ovarian cyst 08/23/2007     Priority: Medium     Oophorectomy bilateral - premalignant tissue ? 1998  Problem list name updated by automated process. Provider to review       Breast screening 01/22/2007     Priority: Medium      implants with 4mm superficial subq lesion at 2:00 left breast - unchanged over last year. Cyst identified on US 1/07  Problem list name updated by automated process. Provider to review       Ganglion of joint 01/22/2007     Priority: Medium     Esophageal reflux 11/13/2006     Priority: Medium     Generalized anxiety disorder 10/30/2006     Priority: Medium     Buspar prescribed 8/23/07       Raynaud's syndrome 01/04/2006     Priority: Medium     Tobacco use disorder 01/04/2006     Priority: Medium     Mild major depression (H) 01/04/2006     Priority: Medium     Need for prophylactic hormone replacement therapy (postmenopausal) 01/04/2006     Priority: Medium        Past Medical History:    Past Medical History:   Diagnosis Date     Cocaine abuse, unspecified 2003     Dysplasia of cervix, unspecified 2000     Esophageal reflux 11/13/2006     Generalized anxiety disorder 10/30/2006     IBS (irritable  bowel syndrome) 2008     Need for prophylactic hormone replacement therapy (postmenopausal)        Past Surgical History:    Past Surgical History:   Procedure Laterality Date     ABLATE VEIN VARICOSE RADIO FREQUENCY WITHOUT PHLEBECTOMY MULTIPLE STAB  2014    Procedure: ABLATE VEIN VARICOSE RADIO FREQUENCY WITHOUT PHLEBECTOMY MULTIPLE STAB;  Surgeon: Khanh Bunch MD;  Location: [facility] OR     BIOPSY BREAST Left      ENHANCE LASER REFRACTIVE BILATERAL EXISTING PT IN PARAMETERS Bilateral     LASIK     HYSTERECTOMY SUPRACERVICAL, BILATERAL SALPINGO-OOPHORECTOMY, COMBINED  2000     LAPAROSCOPIC ASSISTED COLECTOMY N/A 2015    Procedure: LAPAROSCOPIC ASSISTED COLECTOMY;  Surgeon: Khanh Bunch MD;  Location: [facility]     LAPAROSCOPIC CHOLECYSTECTOMY N/A 2017    Procedure: LAPAROSCOPIC CHOLECYSTECTOMY;  Surgeon: Levi Louis MD;  Location: [facility]     NERVE BLOCK PERIPHERAL Bilateral 2015    Procedure: NERVE BLOCK PERIPHERAL;  Surgeon: Generic Anesthesia Provider;  Location: [facility]     REPAIR PTOSIS BILATERAL Bilateral 10/11/2018    Procedure: REPAIR PTOSIS BILATERAL;  Bilateral Upper Eyelid Ptosis Repair;  Surgeon: Wilton Viramontes MD;  [facility]     SURGICAL HISTORY OF -       Breast implants     SURGICAL HISTORY OF -       Carpal  tunnel r hand     ZZC  DELIVERY ONLY  ,,    , Low Cervical       Family History:    Family History   Problem Relation Age of Onset     Cancer Father         liver     Cancer Brother         lung, asbestos     Cancer Mother         uterine     Cancer Sister         uterine     Breast Cancer Other      Breast Cancer Other      Glaucoma No family hx of      Macular Degeneration No family hx of        Social History:  Marital Status:   [4]  Social History     Tobacco Use     Smoking status: Every Day     Packs/day: 0.25     Years: 20.00     Pack years: 5.00     Types: Cigarettes     Smokeless tobacco: Never      "Tobacco comments:     trying to quit   Vaping Use     Vaping status: Never Used   Substance Use Topics     Alcohol use: Never     Drug use: No        Medications:    albuterol (PROAIR HFA/PROVENTIL HFA/VENTOLIN HFA) 108 (90 Base) MCG/ACT inhaler  butalbital-aspirin-caffeine (FIORINAL) -40 MG capsule  cetirizine (ZYRTEC) 10 MG tablet  Cholecalciferol (VITAMIN D3 PO)  DiphenhydrAMINE HCl (BENADRYL PO)  estradiol (ESTRACE) 1 MG tablet  estradiol (VAGIFEM) 10 MCG TABS vaginal tablet  gabapentin (NEURONTIN) 300 MG capsule  HYDROmorphone (DILAUDID) 2 MG tablet  hyoscyamine (LEVSIN) 0.125 MG tablet  LORazepam (ATIVAN) 0.5 MG tablet  methocarbamol (ROBAXIN) 500 MG tablet  methylcellulose (CITRUCEL) powder  montelukast (SINGULAIR) 10 MG tablet  multivitamin, therapeutic (THERA-VIT) TABS  naproxen (NAPROSYN) 500 MG tablet  omeprazole 20 MG tablet  oxybutynin ER (DITROPAN XL) 5 MG 24 hr tablet  SENEXON-S 8.6-50 MG tablet          Review of Systems  All systems reviewed and other than pertinent positives and negatives in HPI all other systems are negative.  Physical Exam   BP: 115/74  Pulse: 68  Temp: 97.7  F (36.5  C)  Resp: 16  Height: 157.5 cm (5' 2\")  Weight: 46.7 kg (103 lb)  SpO2: 100 %      Physical Exam  Vitals and nursing note reviewed.   Constitutional:       Appearance: She is well-developed. She is not ill-appearing, toxic-appearing or diaphoretic.      Comments: Mild abdominal distress.   HENT:      Head: Normocephalic and atraumatic.      Nose: Nose normal.      Mouth/Throat:      Mouth: Mucous membranes are moist.      Pharynx: Oropharynx is clear.   Cardiovascular:      Rate and Rhythm: Normal rate and regular rhythm.      Pulses: Normal pulses.      Heart sounds: Normal heart sounds. No murmur heard.  Pulmonary:      Effort: Pulmonary effort is normal.      Breath sounds: Normal breath sounds. No stridor. No wheezing or rhonchi.   Abdominal:      Comments: Soft, nondistended tender to palpation of the " epigastric region no guarding or rebound present bowel sounds are positive.  No flank tenderness is noted no masses or hernia are palpated   Musculoskeletal:         General: No swelling or tenderness. Normal range of motion.      Cervical back: Normal range of motion and neck supple.      Right lower leg: No edema.      Left lower leg: No edema.   Skin:     General: Skin is warm and dry.      Capillary Refill: Capillary refill takes less than 2 seconds.      Findings: No rash.   Neurological:      General: No focal deficit present.      Mental Status: She is alert and oriented to person, place, and time.      Sensory: No sensory deficit.      Motor: No weakness.      Coordination: Coordination normal.   Psychiatric:         Mood and Affect: Mood normal.         ED Course                 Procedures              Critical Care time:  none               Results for orders placed or performed during the hospital encounter of 04/26/23 (from the past 24 hour(s))   CBC with platelets differential    Narrative    The following orders were created for panel order CBC with platelets differential.  Procedure                               Abnormality         Status                     ---------                               -----------         ------                     CBC with platelets and d...[[id number]]                      Final result                 Please view results for these tests on the individual orders.   Comprehensive metabolic panel   Result Value Ref Range    Sodium 141 136 - 145 mmol/L    Potassium 4.0 3.4 - 5.3 mmol/L    Chloride 106 98 - 107 mmol/L    Carbon Dioxide (CO2) 28 22 - 29 mmol/L    Anion Gap 7 7 - 15 mmol/L    Urea Nitrogen 11.1 6.0 - 20.0 mg/dL    Creatinine 0.57 0.51 - 0.95 mg/dL    Calcium 9.6 8.6 - 10.0 mg/dL    Glucose 95 70 - 99 mg/dL    Alkaline Phosphatase 66 35 - 104 U/L    AST 17 10 - 35 U/L    ALT 13 10 - 35 U/L    Protein Total 6.8 6.4 - 8.3 g/dL    Albumin 4.4 3.5 - 5.2 g/dL     Bilirubin Total 0.3 <=1.2 mg/dL    GFR Estimate >90 >60 mL/min/1.73m2   Lipase   Result Value Ref Range    Lipase 31 13 - 60 U/L   CBC with platelets and differential   Result Value Ref Range    WBC Count 4.3 4.0 - 11.0 10e3/uL    RBC Count 4.47 3.80 - 5.20 10e6/uL    Hemoglobin 14.0 11.7 - 15.7 g/dL    Hematocrit 41.1 35.0 - 47.0 %    MCV 92 78 - 100 fL    MCH 31.3 26.5 - 33.0 pg    MCHC 34.1 31.5 - 36.5 g/dL    RDW 12.8 10.0 - 15.0 %    Platelet Count 268 150 - 450 10e3/uL    % Neutrophils 54 %    % Lymphocytes 32 %    % Monocytes 10 %    % Eosinophils 2 %    % Basophils 2 %    % Immature Granulocytes 0 %    NRBCs per 100 WBC 0 <1 /100    Absolute Neutrophils 2.3 1.6 - 8.3 10e3/uL    Absolute Lymphocytes 1.4 0.8 - 5.3 10e3/uL    Absolute Monocytes 0.4 0.0 - 1.3 10e3/uL    Absolute Eosinophils 0.1 0.0 - 0.7 10e3/uL    Absolute Basophils 0.1 0.0 - 0.2 10e3/uL    Absolute Immature Granulocytes 0.0 <=0.4 10e3/uL    Absolute NRBCs 0.0 10e3/uL   Abdomen, flat/upright (2 views)    Narrative    ABDOMEN TWO VIEWS   4/26/2023 11:07 AM     HISTORY: Epigastric and right upper quadrant pain with constipation.    COMPARISON: CT abdomen and pelvis 4/11/2023.      Impression    IMPRESSION: Nonobstructive bowel gas pattern. Small gas within small  and large bowel loops may be a mild ileus. Cholecystectomy. No visible  free air. Clear lower lungs.          Medications   0.9% sodium chloride BOLUS (0 mLs Intravenous Stopped 4/26/23 1125)   ondansetron (ZOFRAN) injection 4 mg (4 mg Intravenous $Given 4/26/23 1044)   lidocaine (viscous) (XYLOCAINE) 2 % 15 mL, alum & mag hydroxide-simethicone (MAALOX) 15 mL GI Cocktail (30 mLs Oral $Given 4/26/23 1127)       Assessments & Plan (with Medical Decision Making) records were reviewed including past medical history medications and allergies.  Patient chart was reviewed including recent ED visit on 4/11/2023.  And gastroenterology visit on 4/21/2023.  Labs were obtained.  Patient CBC was  unremarkable.  Comprehensive metabolic panel without significant abnormality and lipase was 31.  Patient was given Zofran and a GI cocktail.  This did improve her pain.  She had had a recent CT scan of the abdomen and pelvis which had revealed nondilated fluid-filled loops of ileum and colon without surrounding inflammatory changes this could be related to mild enterocolitis no other acute findings present.  I discussed repeat CT scan risks and benefits were discussed including radiation exposure and with improvement with GI cocktail was decided to hold off on this.  I did proceed with an x-ray of the abdomen to rule out free air and obstruction.  These images were reviewed.  They revealed: Nonobstructive bowel gas pattern with small gas within small bowel and large bowel loops may be mild ileus no other abnormality noted.  Findings discussed with patient in detail.  Her pain appears to be gastric in nature possibly an ulcer GERD or gastritis.  Patient has been taking a fair amount of ibuprofen and advised her to hold off on this at this time.  Patient is not on a PPI I this time and I am going to start her Carafate and Protonix and refer her to general surgery for an upper endoscopy.  Patient should return if worsening abdominal pain fevers blood in her stool decreased urine output or other symptoms present.  Patient feels comfortable with this plan at this time.     I have reviewed the nursing notes.    I have reviewed the findings, diagnosis, plan and need for follow up with the patient.         Discharge Medication List as of 4/26/2023  1:54 PM      START taking these medications    Details   sucralfate (CARAFATE) 1 GM tablet Take 1 tablet (1 g) by mouth 4 times daily, Disp-30 tablet, R-0, Local Print             Final diagnoses:   Abdominal pain, epigastric       4/26/2023   [facility] EMERGENCY DEPT     Fay, Shiva Damon MD  04/28/23 7913

## 2023-04-26 NOTE — DISCHARGE INSTRUCTIONS
Return if symptoms worsen or new symptoms develop.  Follow-up with GI clinic next available if pain persists please return for recheck and CT scan of the abdomen pelvis.  We held off on this as you have recently had 1 2 weeks ago.  Your gallbladder is gone and your liver function tests are normal we decided to hold off on ultrasound.  Take Protonix and sucralfate as directed and follow-up with general surgery for possible upper endoscopy.

## 2023-04-27 ENCOUNTER — PATIENT OUTREACH (OUTPATIENT)
Dept: CARE COORDINATION | Facility: CLINIC | Age: 58
End: 2023-04-27
Payer: COMMERCIAL

## 2023-04-27 ENCOUNTER — TELEPHONE (OUTPATIENT)
Dept: SURGERY | Facility: CLINIC | Age: 58
End: 2023-04-27
Payer: COMMERCIAL

## 2023-04-27 ENCOUNTER — TELEPHONE (OUTPATIENT)
Dept: FAMILY MEDICINE | Facility: CLINIC | Age: 58
End: 2023-04-27
Payer: COMMERCIAL

## 2023-04-27 ENCOUNTER — TELEPHONE (OUTPATIENT)
Dept: BEHAVIORAL HEALTH | Facility: CLINIC | Age: 58
End: 2023-04-27
Payer: COMMERCIAL

## 2023-04-27 RX ORDER — BISACODYL 5 MG/1
TABLET, DELAYED RELEASE ORAL
Qty: 4 TABLET | Refills: 0 | Status: SHIPPED | OUTPATIENT
Start: 2023-04-27 | End: 2023-05-19

## 2023-04-27 NOTE — TELEPHONE ENCOUNTER
Notified pt of PCP's message; she says that she already spoke with her GI care team, and was told that they wouldn't advise a double prep d/t concerns for mild ileus (see xray report from 4/26/23). Her colonoscopy has now been scheduled for 5/4/23. Pt asks if she needs to hold any of her medications prior to this procedure, particularly the new meds that she's recently started (Linzess, Protonix, and carafate). Routing to PCP for review.    Dina Gauthier RN  [facility]

## 2023-04-27 NOTE — TELEPHONE ENCOUNTER
Symptoms    Describe your symptoms: Pt was seen in ED yesterday for ongoing constipation.  Pt doesn't want to schedule colonoscopy, that was ordered by DOMINIQUE Shay, with still having constipation issues.  I DID tell pt to call and get on the books scheduled for the  Colonoscopy, because they have been very booked up.   I reassured her that DOMINIQUE Shay RN will call her back regarding ongoing constipation and ED visit yesterday.  Please call patient and advise.     Any pain: Yes:     How long have you been having symptoms:   Ongoing    Have you been seen for this:  Yes:     Appointment offered?: No    Triage offered?: Yes:     Home remedies tried:     Could we [facility] this information to you [facility] or would you prefer to receive a phone call?:   Patient would prefer a phone call   Okay to leave a detailed message?: Yes at Home number on file [phone] (home)

## 2023-04-27 NOTE — TELEPHONE ENCOUNTER
[facility] attempted to call patient for [facility] offer following up on PROMPT alert. Patient answered but stated that she was busy and wanted to know what call was about. [facility] explained that writer was calling to give her more information about care coordination services. Patient requested that a [employer] message be sent to her and she will review it.     Isabela Ferguson, [facility]  [facility] ([facility])   [facility]  [phone]

## 2023-04-27 NOTE — TELEPHONE ENCOUNTER
Screening Questions  BLUE  KIND OF PREP RED  LOCATION [review exclusion criteria] GREEN  SEDATION TYPE        y Are you active on mychart?       [place] Ordering/Referring Provider?        [facility] What type of coverage do you have?      n Have you had a positive covid test in the last 14 days?     18.84 1. BMI  [BMI 40+ - review exclusion criteria& smart-phrase document]    y  2. Are you able to give consent for your medical care? [IF NO,RN REVIEW]          n  3. Are you taking any prescription pain medications on a routine schedule   (ex narcotics: oxycodone, roxicodone, oxycontin,  and percocet)? [RN Review]        n  3a. EXTENDED PREP What kind of prescription?     n 4. Do you have any chemical dependencies such as alcohol, street drugs, or methadone?        **If yes 3- 5 , please schedule with MAC sedation.**          IF YES TO ANY 6 - 10 - HOSPITAL SETTING ONLY.     n 6.   Do you need assistance transferring?     n 7.   Have you had a heart or lung transplant?    n 8.   Are you currently on dialysis?   n 9.   Do you use daily home oxygen?   n 10. Do you take nitroglycerin?   10a. n If yes, how often?     11. [FEMALES]  n Are you currently pregnant?    11a. n If yes, how many weeks? [ Greater than 12 weeks, OR NEEDED]    n 12. Do you have Pulmonary Hypertension? *NEED PAC APPT AT [facility] w/ MAC*     n 13. [review exclusion criteria]  Do you have any implantable devices in your body (pacemaker, defib, LVAD)?    n 14. In the past 6 months, have you had any heart related issues including cardiomyopathy or heart attack?     14a. n If yes, did it require cardiac stenting if so when?     n 15. Have you had a stroke or Transient ischemic attack (TIA - aka  mini stroke ) within 6 months?      n 16. Do you have mod to severe Obstructive Sleep Apnea?  [Hospital only]    n 17. Do you have SEVERE AND UNCONTROLLED asthma? *NEED PAC APPT AT [facility] w/MAC*     18. Are you currently taking any blood thinners?     18a. No.  "Continue to 19.   18b. Yes/no Blood Thinner: No [CONTINUE TO #19]    n 19. Do you take the medication Phentermine?    19a. If yes, \"Hold for 7 days before procedure.  Please consult your prescribing provider if you have questions about holding this medication.\"     n  20. Do you have chronic kidney disease?      n  21. Do you have a diagnosis of diabetes?     y  22. On a regular basis do you go 3-5 days between bowel movements?     y 23. Preferred LOCAL Pharmacy for Pre Prescription    [ LIST ONLY ONE PHARMACY]        [facility] [place], [place] - [address] [facility], [place] - [address]        - CLOSING REMINDERS -    Informed patient they will need an adult    Cannot take any type of public or medical transportation alone    Conscious Sedation- Needs  for 6 hours after the procedure       MAC/General-Needs  for 24 hours after procedure    Pre-Procedure Covid test to be completed [ESSC PCR Testing Required]    Confirmed Nurse will call to complete assessment       - SCHEDULING DETAILS -  n Hospital Setting Required? If yes, what is the exclusion?: n   Tran  Surgeon    5/1/23  Date of Procedure  Lower Endoscopy [Colonoscopy]  Type of Procedure Scheduled  [facility] EXTENDED - If you answer yes to questions #1, #3, #22 ([facility] and CF pts)Which Colonoscopy Prep was Sent?     general Sedation Type     n PAC / Pre-op Required                 "

## 2023-04-27 NOTE — TELEPHONE ENCOUNTER
"Called pt.  She states was in ER yesterday for abd pain & constipation. CT showed may have mild ileus.  Pt called surgery scheduling & was told they could get her in Monday 5/1. Was told they would have her do a \"double prep\".  Surgery scheduler wanted pt to speak with PCP to make sure its ok for her to do the double prep.  Pt also has call in to her GI provider as well. Left msg but has not heard back.    In ER pt was started on protonix & carafate.    4/21 GI took pt off of senna & miralax & started on Linzess.    Will route to provider for review.    Nereida Rolon RN    "

## 2023-04-27 NOTE — PROGRESS NOTES
[facility]    Background: Transitional Care Management program identified per system criteria and reviewed by [facility] team for possible outreach.    Assessment: Upon chart review, CCR Team member will not proceed with patient outreach related to this episode of Transitional Care Management program due to reason below:    Patient has active communication with a nurse, provider or care team for reason of post-hospital follow up plan.  Outreach call by CCR team not indicated to minimize duplicative efforts.     Plan: Transitional Care Management episode addressed appropriately per reason noted above.      DRU Bradley  [facility]    *Connected Care Resource Team does NOT follow patient ongoing. Referrals are identified based on internal discharge reports and the outreach is to ensure patient has an understanding of their discharge instructions.

## 2023-04-28 ENCOUNTER — TELEPHONE (OUTPATIENT)
Dept: FAMILY MEDICINE | Facility: CLINIC | Age: 58
End: 2023-04-28
Payer: COMMERCIAL

## 2023-04-28 DIAGNOSIS — R19.5 POSITIVE COLORECTAL CANCER SCREENING USING COLOGUARD TEST: Primary | ICD-10-CM

## 2023-04-28 DIAGNOSIS — R10.32 LLQ ABDOMINAL PAIN: ICD-10-CM

## 2023-04-28 RX ORDER — GABAPENTIN 300 MG/1
300 CAPSULE ORAL
COMMUNITY
End: 2023-05-19

## 2023-04-28 NOTE — TELEPHONE ENCOUNTER
Order/Referral Request    Who is requesting: patient    Orders being requested: Patient would like to be referred to [facility]. She had a positive Cologard test. For insurance to cover, needs in network benefit request, because she is complex case. [facility]  ID # -[id number].     Reason service is needed/diagnosis: See above    When are orders needed by: asap    Has this been discussed with Provider: No    Does patient have a preference on a Group/Provider/Facility? [facility]    Does patient have an appointment scheduled?: No    Where to send orders: Fax    Could we send this information to you in [email or web address] or would you prefer to receive a phone call?:   Patient would prefer a phone call   Okay to leave a detailed message?: Yes at Home number on file [phone] (home)

## 2023-04-28 NOTE — TELEPHONE ENCOUNTER
Patient called back and said that her insurance does not cover [place] that they require a PA for the referral.   Sending to referral team to review the referral.     Provider needs to place a referral first. Please place referral and then send to [facility] Referral pool [id number].    Alondra Cabrera PSC on 4/28/2023 at 8:27 AM

## 2023-05-02 NOTE — CONFIDENTIAL NOTE
I placed the referral but from my understanding [facility] is not taking any new patients in GI. There wait times are past 9  Months. She might be better off staying local with her GI specialist here.  DOREEN Valverde

## 2023-05-02 NOTE — TELEPHONE ENCOUNTER
Dayanara from [employer] calling in regards to [facility] referral for + cologuard test. She needed to know patient's last clinic visit-informed it was 06/21/22. Patient requested the referral to [facility] because she has a complicated case and has a [facility] ID per telephone message on 04/28/23.   Per Dayanara, she will send the referral for MD to review. They have 14 days to review and make a decision.   Xochilt HEREDIA RN

## 2023-05-02 NOTE — CONFIDENTIAL NOTE
All other medications okay to take but would discuss with GI if needing to hold Linzess prior to procedure.  DOREEN Valverde

## 2023-05-02 NOTE — TELEPHONE ENCOUNTER
I will need to submit the Prior Auth on the [facility] portal and wait for their decision.    Veronica  [facility] Referrals  [facility]

## 2023-05-03 ENCOUNTER — ANESTHESIA EVENT (OUTPATIENT)
Dept: GASTROENTEROLOGY | Facility: CLINIC | Age: 58
End: 2023-05-03
Payer: COMMERCIAL

## 2023-05-03 ASSESSMENT — LIFESTYLE VARIABLES: TOBACCO_USE: 1

## 2023-05-03 NOTE — ANESTHESIA PREPROCEDURE EVALUATION
Anesthesia Pre-Procedure Evaluation    Patient: Fatou Simental   MRN: [id number] : 1965        Procedure : Procedure(s):  Colonoscopy          Past Medical History:   Diagnosis Date     Cocaine abuse, unspecified      Dysplasia of cervix, unspecified      Esophageal reflux 2006     Generalized anxiety disorder 10/30/2006    Buspar prescribed 07     IBS (irritable bowel syndrome) 2008     Need for prophylactic hormone replacement therapy (postmenopausal)       Past Surgical History:   Procedure Laterality Date     ABLATE VEIN VARICOSE RADIO FREQUENCY WITHOUT PHLEBECTOMY MULTIPLE STAB  2014    Procedure: ABLATE VEIN VARICOSE RADIO FREQUENCY WITHOUT PHLEBECTOMY MULTIPLE STAB;  Surgeon: Khanh Bunch MD;  Location: [facility]     BIOPSY BREAST Left      ENHANCE LASER REFRACTIVE BILATERAL EXISTING PT IN PARAMETERS Bilateral     LASIK     HYSTERECTOMY SUPRACERVICAL, BILATERAL SALPINGO-OOPHORECTOMY, COMBINED       LAPAROSCOPIC ASSISTED COLECTOMY N/A 2015    Procedure: LAPAROSCOPIC ASSISTED COLECTOMY;  Surgeon: Khanh Bunch MD;  Location: [facility]     LAPAROSCOPIC CHOLECYSTECTOMY N/A 2017    Procedure: LAPAROSCOPIC CHOLECYSTECTOMY;  Surgeon: Levi Louis MD;  Location: [facility]     NERVE BLOCK PERIPHERAL Bilateral 2015    Procedure: NERVE BLOCK PERIPHERAL;  Surgeon: Generic Anesthesia Provider;  Location: [facility]     REPAIR PTOSIS BILATERAL Bilateral 10/11/2018    Procedure: REPAIR PTOSIS BILATERAL;  Bilateral Upper Eyelid Ptosis Repair;  Surgeon: Wilton Viramontes MD;  [facility]     SURGICAL HISTORY OF -       Breast implants     SURGICAL HISTORY OF -       Carpal  tunnel r hand     ZZC  DELIVERY ONLY  ,,    , Low Cervical      Allergies   Allergen Reactions     Cephalexin Hives     Patient states she gets hives - bad reaction not listed on her H & P.     Rizatriptan Dizziness     Acetaminophen Hives      Ciprofloxacin Hives     Got rash when taken with metronidazole + Vicodin  Other reaction(s): hives     Lactose Diarrhea     Metronidazole Hives     Rash when taken with cipro + Vicodin     Unknown [No Clinical Screening - See Comments]      Pt developed hives when taking flagyl, cipro, and vicodin, doesn't know which she is allergic to      Vicodin [Hydrocodone-Acetaminophen] Hives     Rash when taken with cipro and metronidazole      Social History     Tobacco Use     Smoking status: Every Day     Packs/day: 0.25     Years: 20.00     Pack years: 5.00     Types: Cigarettes     Smokeless tobacco: Never     Tobacco comments:     trying to quit   Vaping Use     Vaping status: Never Used   Substance Use Topics     Alcohol use: Never      Wt Readings from Last 1 Encounters:   04/26/23 46.7 kg (103 lb)        Anesthesia Evaluation   Pt has had prior anesthetic. Type: General and MAC.        ROS/MED HX  ENT/Pulmonary:     (+) tobacco use, Current use, 5  Pack-Year Hx,      Neurologic:     (+) migraines (with aura and without status migrainosus, not intractable),     Cardiovascular:  - neg cardiovascular ROS     METS/Exercise Tolerance:     Hematologic:  - neg hematologic  ROS     Musculoskeletal:  - neg musculoskeletal ROS     GI/Hepatic: Comment: IBS (irritable bowel syndrome)    (+) GERD,     Renal/Genitourinary:  - neg Renal ROS     Endo:  - neg endo ROS     Psychiatric/Substance Use:     (+) psychiatric history anxiety and depression Recreational drug usage: Cocaine.    Infectious Disease:  - neg infectious disease ROS     Malignancy:  - neg malignancy ROS     Other: Comment: Need for prophylactic hormone replacement therapy (postmenopausal)           Physical Exam    Airway  airway exam normal      Mallampati: II   TM distance: > 3 FB   Neck ROM: full   Mouth opening: > 3 cm    Respiratory Devices and Support         Dental       (+) Completely normal teeth      Cardiovascular   cardiovascular exam normal           Pulmonary   pulmonary exam normal        breath sounds clear to auscultation           OUTSIDE LABS:  CBC:   Lab Results   Component Value Date    WBC 4.3 04/26/2023    WBC 6.1 04/11/2023    HGB 14.0 04/26/2023    HGB 15.4 04/11/2023    HCT 41.1 04/26/2023    HCT 45.6 04/11/2023     04/26/2023     04/11/2023     BMP:   Lab Results   Component Value Date     04/26/2023     04/11/2023    POTASSIUM 4.0 04/26/2023    POTASSIUM 3.8 04/11/2023    CHLORIDE 106 04/26/2023    CHLORIDE 102 04/11/2023    CO2 28 04/26/2023    CO2 27 04/11/2023    BUN 11.1 04/26/2023    BUN 10.4 04/11/2023    CR 0.57 04/26/2023    CR 0.59 04/11/2023    GLC 95 04/26/2023     (H) 04/11/2023     COAGS: No results found for: PTT, INR, FIBR  POC:   Lab Results   Component Value Date     (H) 02/07/2015    HCG Negative 02/13/2015     HEPATIC:   Lab Results   Component Value Date    ALBUMIN 4.4 04/26/2023    PROTTOTAL 6.8 04/26/2023    ALT 13 04/26/2023    AST 17 04/26/2023    GGT 16 11/13/2006    ALKPHOS 66 04/26/2023    BILITOTAL 0.3 04/26/2023     OTHER:   Lab Results   Component Value Date    LACT 1.1 02/13/2019    A1C 5.4 08/12/2015    YON 9.6 04/26/2023    LIPASE 31 04/26/2023    TSH 0.94 12/18/2020    CRP <2.9 04/07/2020    SED 7 09/25/2014       Anesthesia Plan    ASA Status:  3   NPO Status:  NPO Appropriate    Anesthesia Type: General.     - Airway: Native airway   Induction: Intravenous.   Maintenance: TIVA.        Consents    Anesthesia Plan(s) and associated risks, benefits, and realistic alternatives discussed. Questions answered and patient/representative(s) expressed understanding.     - Discussed: Risks, Benefits and Alternatives for BOTH SEDATION and the PROCEDURE were discussed     - Discussed with:  Patient         Postoperative Care            Comments:                ANGEL Prince CRNA

## 2023-05-04 ENCOUNTER — ANESTHESIA (OUTPATIENT)
Dept: GASTROENTEROLOGY | Facility: CLINIC | Age: 58
End: 2023-05-04
Payer: COMMERCIAL

## 2023-05-04 ENCOUNTER — HOSPITAL ENCOUNTER (OUTPATIENT)
Facility: CLINIC | Age: 58
Discharge: HOME | End: 2023-05-04
Attending: SURGERY | Admitting: SURGERY
Payer: COMMERCIAL

## 2023-05-04 VITALS
HEART RATE: 73 BPM | RESPIRATION RATE: 15 BRPM | WEIGHT: 99.1 LBS | HEIGHT: 62 IN | TEMPERATURE: 98.1 F | OXYGEN SATURATION: 99 % | SYSTOLIC BLOOD PRESSURE: 106 MMHG | DIASTOLIC BLOOD PRESSURE: 74 MMHG | BODY MASS INDEX: 18.24 KG/M2

## 2023-05-04 DIAGNOSIS — Z12.11 SPECIAL SCREENING FOR MALIGNANT NEOPLASMS, COLON: Primary | ICD-10-CM

## 2023-05-04 LAB — COLONOSCOPY: NORMAL

## 2023-05-04 PROCEDURE — 370N000017 HC ANESTHESIA TECHNICAL FEE, PER MIN: Performed by: SURGERY

## 2023-05-04 PROCEDURE — 258N000003 HC RX IP 258 OP 636: Performed by: NURSE ANESTHETIST, CERTIFIED REGISTERED

## 2023-05-04 PROCEDURE — 250N000011 HC RX IP 250 OP 636: Performed by: NURSE ANESTHETIST, CERTIFIED REGISTERED

## 2023-05-04 PROCEDURE — 88305 TISSUE EXAM BY PATHOLOGIST: CPT | Mod: 26 | Performed by: PATHOLOGY

## 2023-05-04 PROCEDURE — 45380 COLONOSCOPY AND BIOPSY: CPT | Mod: PT | Performed by: SURGERY

## 2023-05-04 PROCEDURE — 250N000009 HC RX 250: Performed by: NURSE ANESTHETIST, CERTIFIED REGISTERED

## 2023-05-04 PROCEDURE — 258N000003 HC RX IP 258 OP 636: Performed by: SURGERY

## 2023-05-04 PROCEDURE — 45380 COLONOSCOPY AND BIOPSY: CPT | Performed by: SURGERY

## 2023-05-04 PROCEDURE — 88305 TISSUE EXAM BY PATHOLOGIST: CPT | Mod: TC | Performed by: SURGERY

## 2023-05-04 RX ORDER — PROPOFOL 10 MG/ML
INJECTION, EMULSION INTRAVENOUS PRN
Status: DISCONTINUED | OUTPATIENT
Start: 2023-05-04 | End: 2023-05-04

## 2023-05-04 RX ORDER — SODIUM CHLORIDE, SODIUM LACTATE, POTASSIUM CHLORIDE, CALCIUM CHLORIDE 600; 310; 30; 20 MG/100ML; MG/100ML; MG/100ML; MG/100ML
INJECTION, SOLUTION INTRAVENOUS CONTINUOUS
Status: DISCONTINUED | OUTPATIENT
Start: 2023-05-04 | End: 2023-05-04 | Stop reason: HOSPADM

## 2023-05-04 RX ORDER — SODIUM CHLORIDE, SODIUM LACTATE, POTASSIUM CHLORIDE, CALCIUM CHLORIDE 600; 310; 30; 20 MG/100ML; MG/100ML; MG/100ML; MG/100ML
INJECTION, SOLUTION INTRAVENOUS CONTINUOUS PRN
Status: DISCONTINUED | OUTPATIENT
Start: 2023-05-04 | End: 2023-05-04

## 2023-05-04 RX ORDER — LIDOCAINE 40 MG/G
CREAM TOPICAL
Status: DISCONTINUED | OUTPATIENT
Start: 2023-05-04 | End: 2023-05-04 | Stop reason: HOSPADM

## 2023-05-04 RX ORDER — LIDOCAINE HYDROCHLORIDE 20 MG/ML
INJECTION, SOLUTION INFILTRATION; PERINEURAL PRN
Status: DISCONTINUED | OUTPATIENT
Start: 2023-05-04 | End: 2023-05-04

## 2023-05-04 RX ORDER — PROPOFOL 10 MG/ML
INJECTION, EMULSION INTRAVENOUS CONTINUOUS PRN
Status: DISCONTINUED | OUTPATIENT
Start: 2023-05-04 | End: 2023-05-04

## 2023-05-04 RX ADMIN — SODIUM CHLORIDE, POTASSIUM CHLORIDE, SODIUM LACTATE AND CALCIUM CHLORIDE: 600; 310; 30; 20 INJECTION, SOLUTION INTRAVENOUS at 09:39

## 2023-05-04 RX ADMIN — SODIUM CHLORIDE, POTASSIUM CHLORIDE, SODIUM LACTATE AND CALCIUM CHLORIDE 30 ML: 600; 310; 30; 20 INJECTION, SOLUTION INTRAVENOUS at 09:21

## 2023-05-04 RX ADMIN — LIDOCAINE HYDROCHLORIDE 60 MG: 20 INJECTION, SOLUTION INFILTRATION; PERINEURAL at 09:39

## 2023-05-04 RX ADMIN — PROPOFOL 200 MCG/KG/MIN: 10 INJECTION, EMULSION INTRAVENOUS at 09:39

## 2023-05-04 RX ADMIN — PROPOFOL 50 MG: 10 INJECTION, EMULSION INTRAVENOUS at 09:39

## 2023-05-04 ASSESSMENT — ACTIVITIES OF DAILY LIVING (ADL)
ADLS_ACUITY_SCORE: 37
ADLS_ACUITY_SCORE: 37

## 2023-05-04 NOTE — ANESTHESIA POSTPROCEDURE EVALUATION
Patient: Fatou Simental    Procedure: Procedure(s):  COLONOSCOPY, WITH POLYPECTOMY AND BIOPSY       Anesthesia Type:  General    Note:  Disposition: Outpatient   Postop Pain Control: Uneventful            Sign Out: Well controlled pain   PONV: No   Neuro/Psych: Uneventful            Sign Out: Acceptable/Baseline neuro status   Airway/Respiratory: Uneventful            Sign Out: Acceptable/Baseline resp. status   CV/Hemodynamics: Uneventful            Sign Out: Acceptable CV status; No obvious hypovolemia; No obvious fluid overload   Other NRE: NONE   DID A NON-ROUTINE EVENT OCCUR? No           Last vitals:  Vitals Value Taken Time   BP 96/62 05/04/23 1005   Temp     Pulse 75 05/04/23 1005   Resp     SpO2 97 % 05/04/23 1006   Vitals shown include unvalidated device data.    Electronically Signed By: ANGEL Soto CRNA  May 4, 2023  10:08 AM

## 2023-05-04 NOTE — ANESTHESIA CARE TRANSFER NOTE
Patient: Fatou Simental    Procedure: Procedure(s):  COLONOSCOPY, WITH POLYPECTOMY AND BIOPSY       Diagnosis: Positive colorectal cancer screening using Cologuard test [R19.5]  Diagnosis Additional Information: No value filed.    Anesthesia Type:   General     Note:    Oropharynx: spontaneously breathing  Level of Consciousness: drowsy  Oxygen Supplementation: room air    Independent Airway: airway patency satisfactory and stable  Dentition: dentition unchanged  Vital Signs Stable: post-procedure vital signs reviewed and stable  Report to RN Given: handoff report given  Patient transferred to: Phase II    Handoff Report: Identifed the Patient, Identified the Reponsible Provider, Reviewed the pertinent medical history, Discussed the surgical course, Reviewed Intra-OP anesthesia mangement and issues during anesthesia, Set expectations for post-procedure period and Allowed opportunity for questions and acknowledgement of understanding      Vitals:  Vitals Value Taken Time   BP 96/62 05/04/23 1005   Temp     Pulse 75 05/04/23 1005   Resp     SpO2 97 % 05/04/23 1006   Vitals shown include unvalidated device data.    Electronically Signed By: ANGEL Soto CRNA  May 4, 2023  10:07 AM

## 2023-05-04 NOTE — INTERVAL H&P NOTE
I have reviewed the surgical (or preoperative) H&P that is linked to this encounter, and examined the patient. There are no significant changes    positive cologuard; last colon 2013 - was not able to get through sigmoid. lap sigmoidectomy in 2015.  no blood thinner; no famhx of colon cancer    Clinical Conditions Present on Arrival:  Clinically Significant Risk Factors Present on Admission

## 2023-05-05 LAB
PATH REPORT.COMMENTS IMP SPEC: NORMAL
PATH REPORT.COMMENTS IMP SPEC: NORMAL
PATH REPORT.FINAL DX SPEC: NORMAL
PATH REPORT.GROSS SPEC: NORMAL
PATH REPORT.MICROSCOPIC SPEC OTHER STN: NORMAL
PATH REPORT.RELEVANT HX SPEC: NORMAL
PHOTO IMAGE: NORMAL

## 2023-05-05 NOTE — TELEPHONE ENCOUNTER
Per fax from [facility], request for referral to [place] has been denied.  Care available within network and not considered an emergency.  Patient does not have out of network benefits.    Veronica  [facility] Referrals  [facility]

## 2023-05-08 ENCOUNTER — TELEPHONE (OUTPATIENT)
Dept: FAMILY MEDICINE | Facility: CLINIC | Age: 58
End: 2023-05-08
Payer: COMMERCIAL

## 2023-05-08 DIAGNOSIS — R06.00 DYSPNEA, UNSPECIFIED TYPE: ICD-10-CM

## 2023-05-08 DIAGNOSIS — K21.00 GASTROESOPHAGEAL REFLUX DISEASE WITH ESOPHAGITIS, UNSPECIFIED WHETHER HEMORRHAGE: Primary | ICD-10-CM

## 2023-05-08 NOTE — TELEPHONE ENCOUNTER
Reason for Call:  Appointment Request    Patient requesting this type of appt:  PAP/Pelvic exam    Requested provider: Karlene Shay    Reason patient unable to be scheduled: Needs to be scheduled by clinic    When does patient want to be seen/preferred time: 3-7 days    Comments: Patient is requesting pelvic exam due to recent abdominal pain.     Could we send this information to you in [place] or would you prefer to receive a phone call?:   Patient would prefer a phone call   Okay to leave a detailed message?: Yes at Cell number on file:    Telephone Information:   Mobile [phone]       Call taken on 5/8/2023 at 9:20 AM by Karlene Almeida

## 2023-05-09 NOTE — TELEPHONE ENCOUNTER
"Routing refill request to provider for review/approval because:  Last signed by another provider.    Requested Prescriptions   Pending Prescriptions Disp Refills     VENTOLIN  (90 Base) MCG/ACT inhaler [Pharmacy Med Name: VENTOLIN HFA 108MCG/ACT AERS] 18 g 0     Sig: INHALE 2 PUFFS INTO THE LUNGS EVERY 4 HOURS AS NEEDED FOR SHORTNESS OF BREATH / DYSPNEA       Asthma Maintenance Inhalers - Anticholinergics Passed - 5/8/2023  9:08 AM        Passed - Patient is age 12 years or older        Passed - Recent (12 mo) or future (30 days) visit within the authorizing provider's specialty     Patient has had an office visit with the authorizing provider or a provider within the authorizing providers department within the previous 12 mos or has a future within next 30 days. See \"Patient Info\" tab in inbasket, or \"Choose Columns\" in Meds & Orders section of the refill encounter.              Passed - Medication is active on med list       Short-Acting Beta Agonist Inhalers Protocol  Passed - 5/8/2023  9:08 AM        Passed - Patient is age 12 or older        Passed - Recent (12 mo) or future (30 days) visit within the authorizing provider's specialty     Patient has had an office visit with the authorizing provider or a provider within the authorizing providers department within the previous 12 mos or has a future within next 30 days. See \"Patient Info\" tab in inbasket, or \"Choose Columns\" in Meds & Orders section of the refill encounter.              Passed - Medication is active on med list           sucralfate (CARAFATE) 1 GM tablet [Pharmacy Med Name: SUCRALFATE 1GM TABS] 30 tablet 0     Sig: TAKE ONE TABLET BY MOUTH FOUR TIMES A DAY       Miscellaneous Gastrointestinal Agents Passed - 5/8/2023  9:08 AM        Passed - Recent (12 mo) or future (30 days) visit within the authorizing provider's specialty     Patient has had an office visit with the authorizing provider or a provider within the authorizing providers " "department within the previous 12 mos or has a future within next 30 days. See \"Patient Info\" tab in inbasket, or \"Choose Columns\" in Meds & Orders section of the refill encounter.              Passed - Medication is active on med list        Passed - Patient is 18 years of age or older                   "

## 2023-05-09 NOTE — PROGRESS NOTES
She follows with GI for her stomach symptoms, etc..  Should continue to follow up with them for updates and ongoing symptoms management.    Pap/physical not urgent.  DOREEN Valverde

## 2023-05-09 NOTE — TELEPHONE ENCOUNTER
See message below, writer called pt for more informatin. Pt says her symptoms are ongoing, not new. Has been having stomach issues for a while now, and had a colonoscopy on 5/4/23. She says that it's been a long time since her last PAP/pelvic exam, and she'd like to get this done to ensure that there isn't something else that could be contributing to her symptoms. PCP's next available appt not until 6/27; ok to wait until then, or should she be worked in sooner?     Dina Gauthier RN  [facility]

## 2023-05-10 NOTE — PROGRESS NOTES
What else is needed here. This was routed back to me.    She is requesting pap/pe and it is not urgent. See note below.  If patient insistent can schedule in same day

## 2023-05-11 RX ORDER — ALBUTEROL SULFATE 90 UG/1
AEROSOL, METERED RESPIRATORY (INHALATION)
Qty: 18 G | Refills: 0 | Status: SHIPPED | OUTPATIENT
Start: 2023-05-11 | End: 2023-06-01

## 2023-05-11 RX ORDER — SUCRALFATE 1 G/1
TABLET ORAL
Qty: 30 TABLET | Refills: 0 | Status: SHIPPED | OUTPATIENT
Start: 2023-05-11 | End: 2023-06-01

## 2023-05-11 NOTE — TELEPHONE ENCOUNTER
She follows with GI for Gi symptoms/complications - would follow up with them for recommendations regarding that.    As far as pap/PE she can do when able - not urgent.  Karlene Shay, LEONORP

## 2023-05-17 ENCOUNTER — TELEPHONE (OUTPATIENT)
Dept: FAMILY MEDICINE | Facility: CLINIC | Age: 58
End: 2023-05-17
Payer: COMMERCIAL

## 2023-05-17 NOTE — TELEPHONE ENCOUNTER
Covering for primary/ordering provider:  Should have at least a virtual visit, there may be an evisit option?  Zeina Ramsay, CNP

## 2023-05-17 NOTE — TELEPHONE ENCOUNTER
New Medication Request    Contacts       Type Contact Phone/Fax    05/17/2023 11:48 AM CDT Phone (Incoming) Fatou Simental (Self) [phone] (H)        What medication are you requesting?: Chantix    Reason for medication request: Smoking Cessation    Have you taken this medication before?: Yes:     Controlled Substance Agreement on file:   CSA -- Patient Level:    CSA: None found at the patient level.       Patient offered an appointment? No    Preferred Pharmacy:    [facility] [address]  [address] [address]  [facility] [address]  Phone: [phone] Fax: [phone] Alternate Fax: [phone]    Could we send this information to you in [facility] or would you prefer to receive a phone call?:   Patient would prefer a phone call   Okay to leave a detailed message?: Yes at Cell number on file:    Telephone Information:   Mobile [phone]

## 2023-05-17 NOTE — TELEPHONE ENCOUNTER
Karlene  Pt requesting Chantix for smoking cessation. Last rx for this was 2008. Last OV 6/2022. Would you like an appt for this request?      Brent Weinberg RN

## 2023-05-18 ASSESSMENT — PATIENT HEALTH QUESTIONNAIRE - PHQ9
SUM OF ALL RESPONSES TO PHQ QUESTIONS 1-9: 9
SUM OF ALL RESPONSES TO PHQ QUESTIONS 1-9: 9

## 2023-05-18 NOTE — TELEPHONE ENCOUNTER
Pt set up virtual visit to quit smoking. Pt said she would like to been in [facility] the clinic is closer to home.    .Kat Sylvester PSC

## 2023-05-19 ENCOUNTER — VIRTUAL VISIT (OUTPATIENT)
Dept: FAMILY MEDICINE | Facility: CLINIC | Age: 58
End: 2023-05-19
Payer: COMMERCIAL

## 2023-05-19 DIAGNOSIS — F43.0 ACUTE REACTION TO STRESS: ICD-10-CM

## 2023-05-19 DIAGNOSIS — F33.41 RECURRENT MAJOR DEPRESSIVE DISORDER, IN PARTIAL REMISSION (H): ICD-10-CM

## 2023-05-19 DIAGNOSIS — Z87.891 PERSONAL HISTORY OF TOBACCO USE, PRESENTING HAZARDS TO HEALTH: Primary | ICD-10-CM

## 2023-05-19 PROBLEM — F33.9 MAJOR DEPRESSION, RECURRENT (H): Status: ACTIVE | Noted: 2023-05-19

## 2023-05-19 PROCEDURE — 99214 OFFICE O/P EST MOD 30 MIN: CPT | Mod: 93 | Performed by: NURSE PRACTITIONER

## 2023-05-19 PROCEDURE — 96127 BRIEF EMOTIONAL/BEHAV ASSMT: CPT | Mod: 95 | Performed by: NURSE PRACTITIONER

## 2023-05-19 RX ORDER — LORAZEPAM 1 MG/1
.5-1 TABLET ORAL EVERY 6 HOURS PRN
Qty: 20 TABLET | Refills: 1 | Status: SHIPPED | OUTPATIENT
Start: 2023-05-19 | End: 2024-06-11

## 2023-05-19 ASSESSMENT — PATIENT HEALTH QUESTIONNAIRE - PHQ9
SUM OF ALL RESPONSES TO PHQ QUESTIONS 1-9: 9
10. IF YOU CHECKED OFF ANY PROBLEMS, HOW DIFFICULT HAVE THESE PROBLEMS MADE IT FOR YOU TO DO YOUR WORK, TAKE CARE OF THINGS AT HOME, OR GET ALONG WITH OTHER PEOPLE: VERY DIFFICULT

## 2023-05-19 NOTE — PROGRESS NOTES
Fatou is a 58 year old who is being evaluated via a billable telephone visit.      What phone number would you like to be contacted at? [phone]  How would you like to obtain your AVS? Jose    Distant Location (provider location):  On-site    Assessment & Plan     Personal history of tobacco use, presenting hazards to health    - varenicline (CHANTIX JOHNNY) 0.5 MG X 11 & 1 MG X 42 tablet; Take 0.5 mg tab daily for 3 days, THEN 0.5 mg tab twice daily for 4 days, THEN 1 mg twice daily.    Acute reaction to stress    - LORazepam (ATIVAN) 1 MG tablet; Take 0.5-1 tablets (0.5-1 mg) by mouth every 6 hours as needed for anxiety    Moderate episode of recurrent major depressive disorder (H)  Stable off medication, monitoring               Nicotine/Tobacco Cessation:  She reports that she has been smoking cigarettes. She has a 20.00 pack-year smoking history. She has never used smokeless tobacco.  Nicotine/Tobacco Cessation Plan:   Pharmacotherapies : varenicline (Chantix)      FUTURE APPOINTMENTS:       - Follow-up for annual visit or as needed    ANGEL Singh CNP [facility]   Fatou is a 58 year old, presenting for the following health issues:  Smoking Cessation        5/19/2023    10:16 AM   Additional Questions   Roomed by Allyson TINAJERO     History of Present Illness       Reason for visit:  Quit smoking    She eats 2-3 servings of fruits and vegetables daily.She consumes 1 sweetened beverage(s) daily.She exercises with enough effort to increase her heart rate 9 or less minutes per day.  She exercises with enough effort to increase her heart rate 3 or less days per week.   She is taking medications regularly.    Today's PHQ-9         PHQ-9 Total Score: 9    PHQ-9 Q9 Thoughts of better off dead/self-harm past 2 weeks :   Not at all    How difficult have these problems made it for you to do your work, take care of things at home, or get along with other people: Very  hari       Has taken Chantix in the past and was able to quit smoking. Reports she tolerated it well without side effects. Recently had a + Cologuard with subsequent colonoscopy and may need to have another colon surgery. Causing a lot of stress for her. Meets with surgery next week.       Review of Systems   Constitutional, HEENT, cardiovascular, pulmonary, gi and gu systems are negative, except as otherwise noted.      Objective           Vitals:  No vitals were obtained today due to virtual visit.    Physical Exam   alert and no distress  PSYCH: Alert and oriented times 3; coherent speech, normal   rate and volume, able to articulate logical thoughts, able   to abstract reason, no tangential thoughts, no hallucinations   or delusions  Her affect is anxious  RESP: No cough, no audible wheezing, able to talk in full sentences  Remainder of exam unable to be completed due to telephone visits                Phone call duration: 15 minutes

## 2023-05-26 RX ORDER — NICOTINE POLACRILEX 4 MG/1
20 GUM, CHEWING ORAL DAILY
COMMUNITY

## 2023-05-26 RX ORDER — LUBIPROSTONE 24 UG/1
24 CAPSULE ORAL 2 TIMES DAILY WITH MEALS
COMMUNITY

## 2023-05-30 ENCOUNTER — ANESTHESIA EVENT (OUTPATIENT)
Dept: GASTROENTEROLOGY | Facility: CLINIC | Age: 58
End: 2023-05-30
Payer: COMMERCIAL

## 2023-05-30 ASSESSMENT — LIFESTYLE VARIABLES: TOBACCO_USE: 1

## 2023-05-30 NOTE — ANESTHESIA PREPROCEDURE EVALUATION
Anesthesia Pre-Procedure Evaluation    Patient: Fatou Simental   MRN: [id number] : 1965        Procedure : Procedure(s):  Esophagoscopy, gastroscopy, duodenoscopy (EGD), combined          Past Medical History:   Diagnosis Date     Cocaine abuse, unspecified      Dysplasia of cervix, unspecified      Esophageal reflux 2006     Ganglion of joint 2007     Generalized anxiety disorder 10/30/2006    Buspar prescribed 07     IBS (irritable bowel syndrome) 2008     Need for prophylactic hormone replacement therapy (postmenopausal)       Past Surgical History:   Procedure Laterality Date     ABLATE VEIN VARICOSE RADIO FREQUENCY WITHOUT PHLEBECTOMY MULTIPLE STAB  2014    Procedure: ABLATE VEIN VARICOSE RADIO FREQUENCY WITHOUT PHLEBECTOMY MULTIPLE STAB;  Surgeon: Khanh Bunch MD;  Location: [facility]     BIOPSY BREAST Left      COLONOSCOPY N/A 2023    Procedure: COLONOSCOPY, WITH POLYPECTOMY AND BIOPSY;  Surgeon: Ning Tran MD;  Location: [facility]     ENHANCE LASER REFRACTIVE BILATERAL EXISTING PT IN PARAMETERS Bilateral     LASIK     HYSTERECTOMY SUPRACERVICAL, BILATERAL SALPINGO-OOPHORECTOMY, COMBINED       LAPAROSCOPIC ASSISTED COLECTOMY N/A 2015    Procedure: LAPAROSCOPIC ASSISTED COLECTOMY;  Surgeon: Khanh Bunch MD;  Location: [facility]     LAPAROSCOPIC CHOLECYSTECTOMY N/A 2017    Procedure: LAPAROSCOPIC CHOLECYSTECTOMY;  Surgeon: Levi Louis MD;  Location: [facility]     NERVE BLOCK PERIPHERAL Bilateral 2015    Procedure: NERVE BLOCK PERIPHERAL;  Surgeon: Generic Anesthesia Provider;  Location: [facility]     REPAIR PTOSIS BILATERAL Bilateral 10/11/2018    Procedure: REPAIR PTOSIS BILATERAL;  Bilateral Upper Eyelid Ptosis Repair;  Surgeon: Wilton Viramontes MD;  [facility]     SURGICAL HISTORY OF -       Breast implants     SURGICAL HISTORY OF -       Carpal  tunnel r hand     ZZC  DELIVERY ONLY  ,,     , Low Cervical      Allergies   Allergen Reactions     Cephalexin Hives     Patient states she gets hives - bad reaction not listed on her H & P.     Rizatriptan Dizziness     Acetaminophen Hives     Ciprofloxacin Hives     Got rash when taken with metronidazole + Vicodin  Other reaction(s): hives     Lactose Diarrhea     Metronidazole Hives     Rash when taken with cipro + Vicodin     Unknown [No Clinical Screening - See Comments]      Pt developed hives when taking flagyl, cipro, and vicodin, doesn't know which she is allergic to      Vicodin [Hydrocodone-Acetaminophen] Hives     Rash when taken with cipro and metronidazole      Social History     Tobacco Use     Smoking status: Every Day     Packs/day: 1.00     Years: 20.00     Pack years: 20.00     Types: Cigarettes     Smokeless tobacco: Never     Tobacco comments:     trying to quit   Vaping Use     Vaping status: Never Used   Substance Use Topics     Alcohol use: Never      Wt Readings from Last 1 Encounters:   23 45 kg (99 lb 3.3 oz)        Anesthesia Evaluation   Pt has had prior anesthetic. Type: General, MAC and Regional.        ROS/MED HX  ENT/Pulmonary:     (+) tobacco use, Current use,     Neurologic:       Cardiovascular:       METS/Exercise Tolerance:     Hematologic:       Musculoskeletal: Comment: Raynaud's     (+) arthritis,     GI/Hepatic:     (+) GERD, Inflammatory bowel disease,     Renal/Genitourinary:       Endo:       Psychiatric/Substance Use:     (+) psychiatric history anxiety and depression Recreational drug usage: Cocaine.    Infectious Disease:       Malignancy:       Other:            Physical Exam    Airway        Mallampati: I   TM distance: > 3 FB   Neck ROM: full   Mouth opening: > 3 cm    Respiratory Devices and Support         Dental       (+) Minor Abnormalities - some fillings, tiny chips      Cardiovascular   cardiovascular exam normal          Pulmonary   pulmonary exam normal                OUTSIDE  LABS:  CBC:   Lab Results   Component Value Date    WBC 4.3 04/26/2023    WBC 6.1 04/11/2023    HGB 14.0 04/26/2023    HGB 15.4 04/11/2023    HCT 41.1 04/26/2023    HCT 45.6 04/11/2023     04/26/2023     04/11/2023     BMP:   Lab Results   Component Value Date     04/26/2023     04/11/2023    POTASSIUM 4.0 04/26/2023    POTASSIUM 3.8 04/11/2023    CHLORIDE 106 04/26/2023    CHLORIDE 102 04/11/2023    CO2 28 04/26/2023    CO2 27 04/11/2023    BUN 11.1 04/26/2023    BUN 10.4 04/11/2023    CR 0.57 04/26/2023    CR 0.59 04/11/2023    GLC 95 04/26/2023     (H) 04/11/2023     COAGS: No results found for: PTT, INR, FIBR  POC:   Lab Results   Component Value Date     (H) 02/07/2015    HCG Negative 02/13/2015     HEPATIC:   Lab Results   Component Value Date    ALBUMIN 4.4 04/26/2023    PROTTOTAL 6.8 04/26/2023    ALT 13 04/26/2023    AST 17 04/26/2023    GGT 16 11/13/2006    ALKPHOS 66 04/26/2023    BILITOTAL 0.3 04/26/2023     OTHER:   Lab Results   Component Value Date    LACT 1.1 02/13/2019    A1C 5.4 08/12/2015    YON 9.6 04/26/2023    LIPASE 31 04/26/2023    TSH 0.94 12/18/2020    CRP <2.9 04/07/2020    SED 7 09/25/2014       Anesthesia Plan    ASA Status:  2      Anesthesia Type: General.              Consents    Anesthesia Plan(s) and associated risks, benefits, and realistic alternatives discussed. Questions answered and patient/representative(s) expressed understanding.     - Discussed: Risks, Benefits and Alternatives for BOTH SEDATION and the PROCEDURE were discussed     - Discussed with:  Patient         Postoperative Care       PONV prophylaxis: Background Propofol Infusion     Comments:                ANGEL De La Cruz CRNA

## 2023-05-31 ENCOUNTER — HOSPITAL ENCOUNTER (OUTPATIENT)
Facility: CLINIC | Age: 58
Discharge: HOME | End: 2023-05-31
Attending: SURGERY | Admitting: SURGERY
Payer: COMMERCIAL

## 2023-05-31 ENCOUNTER — ANESTHESIA (OUTPATIENT)
Dept: GASTROENTEROLOGY | Facility: CLINIC | Age: 58
End: 2023-05-31
Payer: COMMERCIAL

## 2023-05-31 VITALS
BODY MASS INDEX: 18.22 KG/M2 | OXYGEN SATURATION: 97 % | HEART RATE: 87 BPM | DIASTOLIC BLOOD PRESSURE: 67 MMHG | RESPIRATION RATE: 16 BRPM | HEIGHT: 62 IN | TEMPERATURE: 98.1 F | WEIGHT: 99 LBS | SYSTOLIC BLOOD PRESSURE: 109 MMHG

## 2023-05-31 LAB — UPPER GI ENDOSCOPY: NORMAL

## 2023-05-31 PROCEDURE — 88305 TISSUE EXAM BY PATHOLOGIST: CPT | Mod: TC | Performed by: SURGERY

## 2023-05-31 PROCEDURE — 250N000009 HC RX 250: Performed by: NURSE ANESTHETIST, CERTIFIED REGISTERED

## 2023-05-31 PROCEDURE — 250N000011 HC RX IP 250 OP 636: Performed by: NURSE ANESTHETIST, CERTIFIED REGISTERED

## 2023-05-31 PROCEDURE — 370N000017 HC ANESTHESIA TECHNICAL FEE, PER MIN: Performed by: SURGERY

## 2023-05-31 PROCEDURE — 43239 EGD BIOPSY SINGLE/MULTIPLE: CPT | Performed by: SURGERY

## 2023-05-31 PROCEDURE — 250N000009 HC RX 250: Performed by: SURGERY

## 2023-05-31 PROCEDURE — 88305 TISSUE EXAM BY PATHOLOGIST: CPT | Mod: 26 | Performed by: PATHOLOGY

## 2023-05-31 PROCEDURE — 258N000003 HC RX IP 258 OP 636: Performed by: SURGERY

## 2023-05-31 PROCEDURE — 258N000003 HC RX IP 258 OP 636: Performed by: NURSE ANESTHETIST, CERTIFIED REGISTERED

## 2023-05-31 RX ORDER — SODIUM CHLORIDE, SODIUM LACTATE, POTASSIUM CHLORIDE, CALCIUM CHLORIDE 600; 310; 30; 20 MG/100ML; MG/100ML; MG/100ML; MG/100ML
INJECTION, SOLUTION INTRAVENOUS CONTINUOUS PRN
Status: DISCONTINUED | OUTPATIENT
Start: 2023-05-31 | End: 2023-05-31

## 2023-05-31 RX ORDER — PROPOFOL 10 MG/ML
INJECTION, EMULSION INTRAVENOUS CONTINUOUS PRN
Status: DISCONTINUED | OUTPATIENT
Start: 2023-05-31 | End: 2023-05-31

## 2023-05-31 RX ORDER — SODIUM CHLORIDE, SODIUM LACTATE, POTASSIUM CHLORIDE, CALCIUM CHLORIDE 600; 310; 30; 20 MG/100ML; MG/100ML; MG/100ML; MG/100ML
INJECTION, SOLUTION INTRAVENOUS CONTINUOUS
Status: DISCONTINUED | OUTPATIENT
Start: 2023-05-31 | End: 2023-05-31 | Stop reason: HOSPADM

## 2023-05-31 RX ORDER — LIDOCAINE 40 MG/G
CREAM TOPICAL
Status: DISCONTINUED | OUTPATIENT
Start: 2023-05-31 | End: 2023-05-31 | Stop reason: HOSPADM

## 2023-05-31 RX ORDER — ONDANSETRON 2 MG/ML
4 INJECTION INTRAMUSCULAR; INTRAVENOUS EVERY 30 MIN PRN
Status: DISCONTINUED | OUTPATIENT
Start: 2023-05-31 | End: 2023-05-31 | Stop reason: HOSPADM

## 2023-05-31 RX ORDER — ONDANSETRON 4 MG/1
4 TABLET, ORALLY DISINTEGRATING ORAL EVERY 30 MIN PRN
Status: DISCONTINUED | OUTPATIENT
Start: 2023-05-31 | End: 2023-05-31 | Stop reason: HOSPADM

## 2023-05-31 RX ORDER — PROPOFOL 10 MG/ML
INJECTION, EMULSION INTRAVENOUS PRN
Status: DISCONTINUED | OUTPATIENT
Start: 2023-05-31 | End: 2023-05-31

## 2023-05-31 RX ADMIN — PROPOFOL 100 MG: 10 INJECTION, EMULSION INTRAVENOUS at 10:02

## 2023-05-31 RX ADMIN — SODIUM CHLORIDE, POTASSIUM CHLORIDE, SODIUM LACTATE AND CALCIUM CHLORIDE: 600; 310; 30; 20 INJECTION, SOLUTION INTRAVENOUS at 09:59

## 2023-05-31 RX ADMIN — SODIUM CHLORIDE, POTASSIUM CHLORIDE, SODIUM LACTATE AND CALCIUM CHLORIDE 1000 ML: 600; 310; 30; 20 INJECTION, SOLUTION INTRAVENOUS at 08:35

## 2023-05-31 RX ADMIN — TOPICAL ANESTHETIC 2 SPRAY: 200 SPRAY DENTAL; PERIODONTAL at 10:02

## 2023-05-31 RX ADMIN — LIDOCAINE HYDROCHLORIDE 0.1 ML: 10 INJECTION, SOLUTION EPIDURAL; INFILTRATION; INTRACAUDAL; PERINEURAL at 08:36

## 2023-05-31 RX ADMIN — PROPOFOL 200 MCG/KG/MIN: 10 INJECTION, EMULSION INTRAVENOUS at 10:07

## 2023-05-31 ASSESSMENT — ACTIVITIES OF DAILY LIVING (ADL): ADLS_ACUITY_SCORE: 37

## 2023-05-31 NOTE — ANESTHESIA CARE TRANSFER NOTE
Patient: Fatou Simental    Procedure: Procedure(s):  Esophagoscopy, gastroscopy, duodenoscopy (EGD), combined       Diagnosis: Epigastric pain [R10.13]  Diagnosis Additional Information: No value filed.    Anesthesia Type:   General     Note:    Oropharynx: oropharynx clear of all foreign objects and spontaneously breathing  Level of Consciousness: awake and drowsy  Oxygen Supplementation: room air    Independent Airway: airway patency satisfactory and stable  Dentition: dentition unchanged  Vital Signs Stable: post-procedure vital signs reviewed and stable  Report to RN Given: handoff report given  Patient transferred to: Phase II    Handoff Report: Identifed the Patient, Identified the Reponsible Provider, Reviewed the pertinent medical history, Discussed the surgical course, Reviewed Intra-OP anesthesia mangement and issues during anesthesia, Set expectations for post-procedure period and Allowed opportunity for questions and acknowledgement of understanding      Vitals:  Vitals Value Taken Time   BP     Temp     Pulse     Resp     SpO2         Electronically Signed By: Johnna Edwards CRNA, APRN CRNA  May 31, 2023  10:15 AM

## 2023-05-31 NOTE — ANESTHESIA POSTPROCEDURE EVALUATION
Patient: Fatou Simental    Procedure: Procedure(s):  Esophagoscopy, gastroscopy, duodenoscopy (EGD), combined       Anesthesia Type:  General    Note:  Disposition: Outpatient   Postop Pain Control: Uneventful            Sign Out: Well controlled pain   PONV: No   Neuro/Psych: Uneventful            Sign Out: Acceptable/Baseline neuro status   Airway/Respiratory: Uneventful            Sign Out: Acceptable/Baseline resp. status   CV/Hemodynamics: Uneventful            Sign Out: Acceptable CV status; No obvious hypovolemia; No obvious fluid overload   Other NRE: NONE   DID A NON-ROUTINE EVENT OCCUR? No           Last vitals:  Vitals:    05/31/23 0805   BP: 120/65   Pulse: 74   Temp: 36.7  C (98.1  F)   SpO2: 98%       Electronically Signed By: Johnna Edwards CRNA, APRN CRNA  May 31, 2023  10:17 AM

## 2023-05-31 NOTE — LETTER
June 2, 2023      Fatou Simental  [address]  [place] [address]        Dear ,    We are writing to inform you of your test results.    To make an appointment call Please call [phone], for  [facility] or  for [facility], to schedule a follow up appointment if needed.     Your pathology report was:  Normal esophagus and stomach, please follow up by having a repeat upper endoscopy (EGD), if your symptoms worsen.        If you have questions, please contact your primary care doctor.    Negative for bacteria that sometimes causes inflammation of the stomach. .    Sincerely,         Aime Mak M.D.      Resulted Orders   Surgical Pathology Exam   Result Value Ref Range    Case Report       Surgical Pathology Report                         Case: [id number]                                  Authorizing Provider:  Aime Mak MD Collected:           05/31/2023 10:08 AM          Ordering Location:     [facility]   Received:            05/31/2023 10:43 AM                                 [place]                                                                      Pathologist:           Jerry Sarah MD                                                                           Specimens:   A) - Stomach, Antrum, antrum - R/O H pylori                                                         B) - Esophagus, Distal, distal esophagus                                                            C) - Esophagus, Mid, mid and upper esophagus                                               Final Diagnosis       A: Stomach, antrum, biopsy:  - Gastric antrum and body mucosa without diagnostic abnormality.  - Negative for Helicobacter pylori on H&E examination.     B: Esophagus, distal, biopsy:  - Esophageal squamous mucosa without diagnostic abnormality.  -  "Negative for intraepithelial eosinophils.  - No columnar (gastric) component present.  - Negative for intestinal metaplasia (\"Farrell's esophagus\").  - Negative for dysplasia or malignancy.     C: Esophagus, mid and upper, biopsy:  - Esophageal squamous mucosa without diagnostic abnormality.  - Negative for intraepithelial eosinophils.      Clinical Information       Procedure:  Esophagoscopy, gastroscopy, duodenoscopy (EGD), combined  Pre-op Diagnosis: Epigastric pain [R10.13]  Post-op Diagnosis: R10.13 - Epigastric pain [ICD-10-CM]    Findings:        Mucosal changes including ringed esophagus were found in the middle        third of the esophagus. Biopsies were taken with a cold forceps for        histology.        Diffuse moderately erythematous mucosa without bleeding was found in the        stomach. Biopsies were taken with a cold forceps for Helicobacter pylori        testing.        The exam was otherwise without abnormality.                                                                                     Impression:            - Esophageal mucosal changes suspicious for                          eosinophilic esophagitis. Biopsied.                          - Erythematous mucosa in the stomach. Biopsied.                          - The examination was otherwise normal.                          4 cm hiatal hernia       Gross Description       A(1). Stomach, Antrum, antrum - R/O H pylori:  The specimen is received in formalin, labeled with the patient's name, medical record number and other identifying information and designated  stomach, antrum . It consists of 2 tan soft tissue fragments ranging from 0.1-0.5 cm. Entirely submitted in one cassette.    B(2). Esophagus, Distal, distal esophagus:  The specimen is received in formalin, labeled with the patient's name, medical record number and other identifying information and designated  distal esophagus . It consists of a single, less than 0.1 cm pale-tan soft " tissue fragment which is submitted entirely in 1 cassette.    C(3). Esophagus, Mid, mid and upper esophagus:  The specimen is received in formalin, labeled with the patient's name, medical record number and other identifying information and designated  mid and upper esophagus . It consists of 2 tan soft tissue fragments less than 0.1 cm. Entirely submitted in one cassette.   (Mary Ellen Rodriguez)      Microscopic Description       A microscopic examination is performed.       Performing Labs       The technical component of this testing was completed at [facility]        Case Images         If you have any questions or concerns, please call the clinic at the number listed above.       Sincerely,      Aime Mak MD

## 2023-05-31 NOTE — H&P
ENDOSCOPY PRE-SEDATION H&P FOR OUTPATIENT PROCEDURES    Fatou Simental  [id number]  1965    Procedure:   EGD with possible biopsy possible dilatation with MAC sedation.     Pre-procedure diagnosis: epigastric pain and cramping     Past medical history:   Past Medical History:   Diagnosis Date     Cocaine abuse, unspecified 2003     Dysplasia of cervix, unspecified 2000     Esophageal reflux 11/13/2006     Ganglion of joint 1/22/2007     Generalized anxiety disorder 10/30/2006    Buspar prescribed 8/23/07     IBS (irritable bowel syndrome) 5/28/2008     Need for prophylactic hormone replacement therapy (postmenopausal)        Past surgical history:   Past Surgical History:   Procedure Laterality Date     ABLATE VEIN VARICOSE RADIO FREQUENCY WITHOUT PHLEBECTOMY MULTIPLE STAB  7/24/2014    Procedure: ABLATE VEIN VARICOSE RADIO FREQUENCY WITHOUT PHLEBECTOMY MULTIPLE STAB;  Surgeon: Khanh Bunch MD;  Location: [facility]     BIOPSY BREAST Left      COLONOSCOPY N/A 5/4/2023    Procedure: COLONOSCOPY, WITH POLYPECTOMY AND BIOPSY;  Surgeon: Ning Tran MD;  Location: [facility] GI     ENHANCE LASER REFRACTIVE BILATERAL EXISTING PT IN PARAMETERS Bilateral     LASIK     HYSTERECTOMY SUPRACERVICAL, BILATERAL SALPINGO-OOPHORECTOMY, COMBINED  2000     LAPAROSCOPIC ASSISTED COLECTOMY N/A 2/5/2015    Procedure: LAPAROSCOPIC ASSISTED COLECTOMY;  Surgeon: Khanh Bunch MD;  Location: [facility]     LAPAROSCOPIC CHOLECYSTECTOMY N/A 4/11/2017    Procedure: LAPAROSCOPIC CHOLECYSTECTOMY;  Surgeon: Levi Louis MD;  Location: [facility]     NERVE BLOCK PERIPHERAL Bilateral 2/6/2015    Procedure: NERVE BLOCK PERIPHERAL;  Surgeon: Generic Anesthesia Provider;  Location: [facility]     REPAIR PTOSIS BILATERAL Bilateral 10/11/2018    Procedure: REPAIR PTOSIS BILATERAL;  Bilateral Upper Eyelid Ptosis Repair;  Surgeon: Wilton Viramontes MD;  Location:  OR     SURGICAL HISTORY OF -   1998    Breast implants     SURGICAL HISTORY OF -        Carpal  tunnel r hand     ZZC  DELIVERY ONLY  ,,    , Low Cervical       Current Facility-Administered Medications   Medication     lactated ringers infusion     lidocaine (LMX4) kit     lidocaine 1 % 0.1-1 mL     sodium chloride (PF) 0.9% PF flush 3 mL     sodium chloride (PF) 0.9% PF flush 3 mL       Allergies   Allergen Reactions     Cephalexin Hives     Patient states she gets hives - bad reaction not listed on her H & P.     Rizatriptan Dizziness     Acetaminophen Hives     Ciprofloxacin Hives     Got rash when taken with metronidazole + Vicodin  Other reaction(s): hives     Lactose Diarrhea     Metronidazole Hives     Rash when taken with cipro + Vicodin     Unknown [No Clinical Screening - See Comments]      Pt developed hives when taking flagyl, cipro, and vicodin, doesn't know which she is allergic to      Vicodin [Hydrocodone-Acetaminophen] Hives     Rash when taken with cipro and metronidazole       History of Anesthesia/Sedation Problems: no    Physical Exam:    Mental status: alert  Heart: Normal  Lung: Normal  Assessment of patient's airway: Normal  Other as pertinent for procedure: None     ASA Score: See Provation note    Mallampati score:  II - Faucial pillars and soft palate may be seen, but uvula is masked by the base of the tongue    Assessment/Plan:     The patient is an appropriate candidate to receive sedation.    Informed consent was discussed with the patient/family, including the risks, benefits, potential complications and any alternative options associated with sedation.    Patient assessment completed just prior to sedation and while under constant observation by the provider. Condition determined to be adequate for proceeding with sedation.    The specific risks for the procedure were discussed with the patient at the time of informed consent and include but are not limited to perforation which could require surgery, missing significant neoplasm or  lesion, hemorrhage and adverse sedative complication.      Aime Mak MD

## 2023-06-01 ENCOUNTER — OFFICE VISIT (OUTPATIENT)
Dept: FAMILY MEDICINE | Facility: CLINIC | Age: 58
End: 2023-06-01
Payer: COMMERCIAL

## 2023-06-01 VITALS
RESPIRATION RATE: 16 BRPM | DIASTOLIC BLOOD PRESSURE: 74 MMHG | OXYGEN SATURATION: 98 % | SYSTOLIC BLOOD PRESSURE: 106 MMHG | HEART RATE: 82 BPM | BODY MASS INDEX: 19.43 KG/M2 | WEIGHT: 105.6 LBS | TEMPERATURE: 98.6 F | HEIGHT: 62 IN

## 2023-06-01 DIAGNOSIS — Z79.890 NEED FOR PROPHYLACTIC HORMONE REPLACEMENT THERAPY (POSTMENOPAUSAL): ICD-10-CM

## 2023-06-01 DIAGNOSIS — B02.29 POST HERPETIC NEURALGIA: ICD-10-CM

## 2023-06-01 DIAGNOSIS — Z12.4 SCREENING FOR MALIGNANT NEOPLASM OF CERVIX: ICD-10-CM

## 2023-06-01 DIAGNOSIS — Z78.0 ASYMPTOMATIC POSTMENOPAUSAL STATUS: ICD-10-CM

## 2023-06-01 DIAGNOSIS — G89.4 CHRONIC PAIN SYNDROME: ICD-10-CM

## 2023-06-01 DIAGNOSIS — K44.9 HIATAL HERNIA: ICD-10-CM

## 2023-06-01 DIAGNOSIS — Z00.00 ROUTINE PHYSICAL EXAMINATION: Primary | ICD-10-CM

## 2023-06-01 DIAGNOSIS — N63.21 MASS OF UPPER OUTER QUADRANT OF LEFT BREAST: ICD-10-CM

## 2023-06-01 DIAGNOSIS — R06.00 DYSPNEA, UNSPECIFIED TYPE: ICD-10-CM

## 2023-06-01 DIAGNOSIS — J30.1 NON-SEASONAL ALLERGIC RHINITIS DUE TO POLLEN: ICD-10-CM

## 2023-06-01 DIAGNOSIS — K21.00 GASTROESOPHAGEAL REFLUX DISEASE WITH ESOPHAGITIS, UNSPECIFIED WHETHER HEMORRHAGE: ICD-10-CM

## 2023-06-01 PROCEDURE — 87624 HPV HI-RISK TYP POOLED RSLT: CPT | Performed by: NURSE PRACTITIONER

## 2023-06-01 PROCEDURE — 99214 OFFICE O/P EST MOD 30 MIN: CPT | Mod: 25 | Performed by: NURSE PRACTITIONER

## 2023-06-01 PROCEDURE — G0145 SCR C/V CYTO,THINLAYER,RESCR: HCPCS | Performed by: NURSE PRACTITIONER

## 2023-06-01 PROCEDURE — 99396 PREV VISIT EST AGE 40-64: CPT | Performed by: NURSE PRACTITIONER

## 2023-06-01 RX ORDER — ESTRADIOL 1 MG/1
1 TABLET ORAL DAILY
Qty: 90 TABLET | Refills: 3 | Status: SHIPPED | OUTPATIENT
Start: 2023-06-01 | End: 2024-08-15

## 2023-06-01 RX ORDER — MONTELUKAST SODIUM 10 MG/1
10 TABLET ORAL AT BEDTIME
Qty: 30 TABLET | Refills: 11 | Status: SHIPPED | OUTPATIENT
Start: 2023-06-01 | End: 2024-04-22

## 2023-06-01 RX ORDER — ALBUTEROL SULFATE 90 UG/1
2 AEROSOL, METERED RESPIRATORY (INHALATION) EVERY 4 HOURS PRN
Qty: 18 G | Refills: 4 | Status: SHIPPED | OUTPATIENT
Start: 2023-06-01

## 2023-06-01 RX ORDER — SUCRALFATE 1 G/1
1 TABLET ORAL 4 TIMES DAILY
Qty: 120 TABLET | Refills: 1 | Status: SHIPPED | OUTPATIENT
Start: 2023-06-01

## 2023-06-01 RX ORDER — GABAPENTIN 300 MG/1
600 CAPSULE ORAL AT BEDTIME
Qty: 180 CAPSULE | Refills: 3 | Status: SHIPPED | OUTPATIENT
Start: 2023-06-01 | End: 2023-07-25

## 2023-06-01 RX ORDER — CETIRIZINE HYDROCHLORIDE 10 MG/1
10 TABLET ORAL DAILY
Qty: 90 TABLET | Refills: 3 | Status: SHIPPED | OUTPATIENT
Start: 2023-06-01 | End: 2024-04-22

## 2023-06-01 RX ORDER — TRAMADOL HYDROCHLORIDE 50 MG/1
50 TABLET ORAL EVERY 6 HOURS PRN
Qty: 10 TABLET | Refills: 0 | Status: SHIPPED | OUTPATIENT
Start: 2023-06-01 | End: 2023-06-04

## 2023-06-01 RX ORDER — ESTRADIOL 10 UG/1
10 INSERT VAGINAL
Qty: 24 TABLET | Refills: 3 | Status: SHIPPED | OUTPATIENT
Start: 2023-06-01

## 2023-06-01 ASSESSMENT — ENCOUNTER SYMPTOMS
NERVOUS/ANXIOUS: 1
CHILLS: 0
DIARRHEA: 1
WEAKNESS: 1
JOINT SWELLING: 0
CONSTIPATION: 1
FEVER: 0
SORE THROAT: 0
DYSURIA: 0
ABDOMINAL PAIN: 1
HEMATOCHEZIA: 0
EYE PAIN: 0
FREQUENCY: 0
ARTHRALGIAS: 0
BREAST MASS: 0
HEARTBURN: 1
HEADACHES: 1
DIZZINESS: 0
HEMATURIA: 0
PALPITATIONS: 1
NAUSEA: 1
MYALGIAS: 1
COUGH: 1
SHORTNESS OF BREATH: 1
PARESTHESIAS: 0

## 2023-06-01 ASSESSMENT — PATIENT HEALTH QUESTIONNAIRE - PHQ9
SUM OF ALL RESPONSES TO PHQ QUESTIONS 1-9: 8
10. IF YOU CHECKED OFF ANY PROBLEMS, HOW DIFFICULT HAVE THESE PROBLEMS MADE IT FOR YOU TO DO YOUR WORK, TAKE CARE OF THINGS AT HOME, OR GET ALONG WITH OTHER PEOPLE: SOMEWHAT DIFFICULT
SUM OF ALL RESPONSES TO PHQ QUESTIONS 1-9: 8

## 2023-06-01 NOTE — PATIENT INSTRUCTIONS
Please call [facility] Imaging Department to schedule your imaging [phone]

## 2023-06-01 NOTE — TELEPHONE ENCOUNTER
Patient will be out of medication Friday 6/2/23.    Thank you,    Carmina Ley  Certified Pharmacy Technician II, [facility]  Ph: [phone]  Fx: [phone]

## 2023-06-01 NOTE — TELEPHONE ENCOUNTER
Gaby Shelton:    Routing refill request to provider for review/approval because:  Patient was seen today by you, ok to continue this medication?      PA Yanez

## 2023-06-01 NOTE — PROGRESS NOTES
SUBJECTIVE:   CC: Fatou is an 58 year old who presents for preventive health visit.       6/1/2023     3:19 PM   Additional Questions   Roomed by Allyson TINAJERO     Healthy Habits:     Getting at least 3 servings of Calcium per day:  Yes    Bi-annual eye exam:  NO    Dental care twice a year:  Yes    Sleep apnea or symptoms of sleep apnea:  Daytime drowsiness    Diet:  Regular (no restrictions)    Frequency of exercise:  None    Taking medications regularly:  Yes    Medication side effects:  Not applicable    PHQ-2 Total Score: 3    Additional concerns today:  Yes      ABDOMINAL   PAIN     Onset: 3 months     Description:   Character: Sharp, Dull ache and Burning  Location: epigastric region  Radiation: Back    Intensity: moderate    Progression of Symptoms:  worsening    Accompanying Signs & Symptoms:  Fever/Chills?: no   Gas/Bloating: YES  Nausea: YES  Vomitting: no   Diarrhea?: no   Constipation:YES- has bowel obstruction  Dysuria or Hematuria: YES- frequent    History:   Trauma: no   Previous similar pain: no    Previous tests done: Upper Endoscopy- hiatal hernia- needs referral for surgery    Precipitating factors:   Does the pain change with:     Food: YES- worse      BM: no     Urination: no     Alleviating factors:  None     Therapies Tried and outcome: ibuprofen, antacids    LMP:  Hysterectomy-still has cervix and one ovary        Medication Followup of Gabapentin    Taking Medication as prescribed: yes    Side Effects:  None    Medication Helping Symptoms:  yes    Medication Followup of Singulair/Albuterol/Cetirizine     Taking Medication as prescribed: yes    Side Effects:  None    Medication Helping Symptoms:  yes    Medication Followup of Estrogen    Taking Medication as prescribed: yes    Side Effects:  None    Medication Helping Symptoms:  yes    Social History     Tobacco Use     Smoking status: Every Day     Packs/day: 1.00     Years: 20.00     Pack years: 20.00     Types: Cigarettes     Smokeless  tobacco: Never     Tobacco comments:     trying to quit   Vaping Use     Vaping status: Never Used   Substance Use Topics     Alcohol use: Never             6/1/2023     3:15 PM   Alcohol Use   Prescreen: >3 drinks/day or >7 drinks/week? No     Reviewed orders with patient.  Reviewed health maintenance and updated orders accordingly - Yes  BP Readings from Last 3 Encounters:   06/01/23 106/74   05/31/23 109/67   05/24/23 108/68    Wt Readings from Last 3 Encounters:   06/01/23 47.9 kg (105 lb 9.6 oz)   05/31/23 44.9 kg (99 lb)   05/24/23 45 kg (99 lb 3.3 oz)                  Patient Active Problem List   Diagnosis     Raynaud's syndrome     Tobacco use disorder     Need for prophylactic hormone replacement therapy (postmenopausal)     Generalized anxiety disorder     Esophageal reflux     Ovarian cyst     IBS (irritable bowel syndrome)     Migraine with aura and without status migrainosus, not intractable     Diverticular disease of colon     Major depression, recurrent (H)     Past Surgical History:   Procedure Laterality Date     ABLATE VEIN VARICOSE RADIO FREQUENCY WITHOUT PHLEBECTOMY MULTIPLE STAB  7/24/2014    Procedure: ABLATE VEIN VARICOSE RADIO FREQUENCY WITHOUT PHLEBECTOMY MULTIPLE STAB;  Surgeon: Khanh Bunch MD;  Location: [facility]     BIOPSY BREAST Left      COLONOSCOPY N/A 5/4/2023    Procedure: COLONOSCOPY, WITH POLYPECTOMY AND BIOPSY;  Surgeon: Ning Tran MD;  Location: [facility]     ENHANCE LASER REFRACTIVE BILATERAL EXISTING PT IN PARAMETERS Bilateral     LASIK     ESOPHAGOSCOPY, GASTROSCOPY, DUODENOSCOPY (EGD), COMBINED N/A 5/31/2023    Procedure: Esophagoscopy, gastroscopy, duodenoscopy, combined;  Surgeon: Aime Mak MD;  Location: [facility]     HYSTERECTOMY SUPRACERVICAL, BILATERAL SALPINGO-OOPHORECTOMY, COMBINED  2000     LAPAROSCOPIC ASSISTED COLECTOMY N/A 2/5/2015    Procedure: LAPAROSCOPIC ASSISTED COLECTOMY;  Surgeon: Khanh Bunch MD;  Location: [facility]      LAPAROSCOPIC CHOLECYSTECTOMY N/A 2017    Procedure: LAPAROSCOPIC CHOLECYSTECTOMY;  Surgeon: Levi Louis MD;  Location: [facility]     NERVE BLOCK PERIPHERAL Bilateral 2015    Procedure: NERVE BLOCK PERIPHERAL;  Surgeon: Generic Anesthesia Provider;  Location: [facility]     REPAIR PTOSIS BILATERAL Bilateral 10/11/2018    Procedure: REPAIR PTOSIS BILATERAL;  Bilateral Upper Eyelid Ptosis Repair;  Surgeon: Wilton Viramontes MD;  [facility]     SURGICAL HISTORY OF -       Breast implants     SURGICAL HISTORY OF -       Carpal  tunnel r hand     ZZC  DELIVERY ONLY  ,,    , Low Cervical       Social History     Tobacco Use     Smoking status: Every Day     Packs/day: 1.00     Years: 20.00     Pack years: 20.00     Types: Cigarettes     Smokeless tobacco: Never     Tobacco comments:     trying to quit   Vaping Use     Vaping status: Never Used   Substance Use Topics     Alcohol use: Never     Family History   Problem Relation Age of Onset     Cancer Father         liver     Cancer Brother         lung, asbestos     Cancer Mother         uterine     Cancer Sister         uterine     Breast Cancer Other      Breast Cancer Other      Glaucoma No family hx of      Macular Degeneration No family hx of          Current Outpatient Medications   Medication Sig Dispense Refill     albuterol (VENTOLIN HFA) 108 (90 Base) MCG/ACT inhaler Inhale 2 puffs into the lungs every 4 hours as needed for shortness of breath 18 g 4     butalbital-aspirin-caffeine (FIORINAL) -40 MG capsule TAKE 1 CAPSULE BY MOUTH EVERY 6 HOURS AS NEEDED FOR HEADACHES 30 capsule 5     cetirizine (ZYRTEC) 10 MG tablet Take 1 tablet (10 mg) by mouth daily 90 tablet 3     Cholecalciferol (VITAMIN D3 PO) Take 400 Units by mouth daily        estradiol (ESTRACE) 1 MG tablet Take 1 tablet (1 mg) by mouth daily 90 tablet 3     estradiol (VAGIFEM) 10 MCG TABS vaginal tablet Place 1 tablet (10 mcg) vaginally twice a week  24 tablet 3     gabapentin (NEURONTIN) 300 MG capsule Take 2 capsules (600 mg) by mouth At Bedtime 180 capsule 3     hyoscyamine (LEVSIN) 0.125 MG tablet Take 1 tablet (125 mcg) by mouth every 4 hours as needed for cramping 30 tablet 4     LORazepam (ATIVAN) 1 MG tablet Take 0.5-1 tablets (0.5-1 mg) by mouth every 6 hours as needed for anxiety 20 tablet 1     lubiprostone (AMITIZA) 24 MCG capsule Take 24 mcg by mouth 2 times daily (with meals)       montelukast (SINGULAIR) 10 MG tablet Take 1 tablet (10 mg) by mouth At Bedtime 30 tablet 11     multivitamin, therapeutic (THERA-VIT) TABS Take 1 tablet by mouth daily       omeprazole 20 MG tablet Take 20 mg by mouth daily       traMADol (ULTRAM) 50 MG tablet Take 1 tablet (50 mg) by mouth every 6 hours as needed for severe pain 10 tablet 0     sucralfate (CARAFATE) 1 GM tablet Take 1 tablet (1 g) by mouth 4 times daily 120 tablet 1     varenicline (CHANTIX JOHNNY) 0.5 MG X 11 & 1 MG X 42 tablet Take 0.5 mg tab daily for 3 days, THEN 0.5 mg tab twice daily for 4 days, THEN 1 mg twice daily. (Patient not taking: Reported on 2023) 53 tablet 0       Breast Cancer Screenin/1/2023     3:15 PM   Breast CA Risk Assessment (FHS-7)   Do you have a family history of breast, colon, or ovarian cancer? No / Unknown         Mammogram Screening: Recommended mammography every 1-2 years with patient discussion and risk factor consideration  Pertinent mammograms are reviewed under the imaging tab.    History of abnormal Pap smear: Status post hysterectomy. Pap still indicated.       Latest Ref Rng & Units 2020     9:20 AM 2020     9:07 AM 10/13/2017     4:01 PM   PAP / HPV   PAP (Historical)   NIL   NIL     HPV 16 DNA NEG^Negative Negative    Negative     HPV 18 DNA NEG^Negative Negative    Negative     Other HR HPV NEG^Negative Negative    Negative       Reviewed and updated as needed this visit by clinical staff   Tobacco  Allergies  Meds           "    Reviewed and updated as needed this visit by Provider                     Review of Systems   Constitutional: Negative for chills and fever.   HENT: Positive for congestion. Negative for ear pain, hearing loss and sore throat.    Eyes: Negative for pain and visual disturbance.   Respiratory: Positive for cough and shortness of breath.    Cardiovascular: Positive for palpitations. Negative for chest pain and peripheral edema.   Gastrointestinal: Positive for abdominal pain, constipation, diarrhea, heartburn and nausea. Negative for hematochezia.   Breasts:  Negative for tenderness, breast mass and discharge.   Genitourinary: Positive for urgency and vaginal discharge. Negative for dysuria, frequency, genital sores, hematuria, pelvic pain and vaginal bleeding.   Musculoskeletal: Positive for myalgias. Negative for arthralgias and joint swelling.   Skin: Negative for rash.   Neurological: Positive for weakness and headaches. Negative for dizziness and paresthesias.   Psychiatric/Behavioral: Positive for mood changes. The patient is nervous/anxious.           OBJECTIVE:   /74   Pulse 82   Temp 98.6  F (37  C) (Tympanic)   Resp 16   Ht 1.562 m (5' 1.5\")   Wt 47.9 kg (105 lb 9.6 oz)   SpO2 98%   BMI 19.63 kg/m    Physical Exam  GENERAL: healthy, alert and no distress  EYES: Eyes grossly normal to inspection and conjunctivae and sclerae normal  HENT: ear canals and TM's normal, nose and mouth without ulcers or lesions  NECK: no adenopathy, no asymmetry, masses, or scars and thyroid normal to palpation  RESP: lungs clear to auscultation - no rales, rhonchi or wheezes  BREAST: implants in place, no rash/lesions, no axillary masses/adenopathy, peas size mass vs adenopathy to left chest wall/outer breast quadrant at 3 o'clock  CV: regular rates and rhythm, normal S1 S2, no S3 or S4, no murmur, click or rub and no peripheral edema  ABDOMEN: tenderness epigastric and no organomegaly or masses   (female): " normal female external genitalia, normal urethral meatus , vaginal discharge - milky and normal cervix  MS: no gross musculoskeletal defects noted, no edema  SKIN: no suspicious lesions or rashes  NEURO: Normal strength and tone, mentation intact and speech normal  PSYCH: mentation appears normal, affect normal/bright    Diagnostic Test Results:  Labs reviewed in Epic    ASSESSMENT/PLAN:   Fatou was seen today for physical.    Diagnoses and all orders for this visit:    Routine physical examination    Hiatal hernia  -     Thoracic Surgery  Referral; Future  -     traMADol (ULTRAM) 50 MG tablet; Take 1 tablet (50 mg) by mouth every 6 hours as needed for severe pain    Mass of upper outer quadrant of left breast  -     MA Diagnostic Digital Bilateral; Future  -     US Breast Left Limited 1-3 Quadrants; Future    Asymptomatic postmenopausal status  -     DX Hip/Pelvis/Spine; Future    POSTMENOPAUSAL HORMONAL REPLACEMENT TX  -     estradiol (ESTRACE) 1 MG tablet; Take 1 tablet (1 mg) by mouth daily  -     estradiol (VAGIFEM) 10 MCG TABS vaginal tablet; Place 1 tablet (10 mcg) vaginally twice a week    Post herpetic neuralgia  -     gabapentin (NEURONTIN) 300 MG capsule; Take 2 capsules (600 mg) by mouth At Bedtime    Chronic pain syndrome  -     gabapentin (NEURONTIN) 300 MG capsule; Take 2 capsules (600 mg) by mouth At Bedtime    Non-seasonal allergic rhinitis due to pollen  -     montelukast (SINGULAIR) 10 MG tablet; Take 1 tablet (10 mg) by mouth At Bedtime  -     cetirizine (ZYRTEC) 10 MG tablet; Take 1 tablet (10 mg) by mouth daily    Dyspnea, unspecified type  -     albuterol (VENTOLIN HFA) 108 (90 Base) MCG/ACT inhaler; Inhale 2 puffs into the lungs every 4 hours as needed for shortness of breath    Screening for malignant neoplasm of cervix  -     Pap screen with HPV - recommended age 30 - 65 years  -     HPV Hold (Lab Only)              COUNSELING:  Reviewed preventive health counseling, as reflected  in patient instructions        She reports that she has been smoking cigarettes. She has a 20.00 pack-year smoking history. She has never used smokeless tobacco.  Nicotine/Tobacco Cessation Plan:   Pharmacotherapies : varenicline (Chantix)          ANGEL Singh CNP  [facility]  Answers for HPI/ROS submitted by the patient on 6/1/2023  If you checked off any problems, how difficult have these problems made it for you to do your work, take care of things at home, or get along with other people?: Somewhat difficult  PHQ9 TOTAL SCORE: 8

## 2023-06-06 LAB
BKR LAB AP GYN ADEQUACY: NORMAL
BKR LAB AP GYN INTERPRETATION: NORMAL
BKR LAB AP HPV REFLEX: NORMAL
BKR LAB AP PREVIOUS ABNORMAL: NORMAL
PATH REPORT.COMMENTS IMP SPEC: NORMAL
PATH REPORT.COMMENTS IMP SPEC: NORMAL
PATH REPORT.RELEVANT HX SPEC: NORMAL

## 2023-06-07 LAB
HUMAN PAPILLOMA VIRUS 16 DNA: NEGATIVE
HUMAN PAPILLOMA VIRUS 18 DNA: NEGATIVE
HUMAN PAPILLOMA VIRUS FINAL DIAGNOSIS: NORMAL
HUMAN PAPILLOMA VIRUS OTHER HR: NEGATIVE

## 2023-06-08 NOTE — TELEPHONE ENCOUNTER
RECORDS STATUS - ALL OTHER DIAGNOSIS      RECORDS RECEIVED FROM: [facility]   DATE RECEIVED: 6/8   NOTES STATUS DETAILS   OFFICE NOTE from referring provider [facility] Gaby Shelton APRN CNP in  FAMILY PRACTICE   OFFICE NOTE from medical oncologist     DISCHARGE SUMMARY from hospital [facility] 5/31/23, 5/4/23, 4/11/17, 2/5/15, 7/24/14   DISCHARGE REPORT from the ER [facility]   OPERATIVE REPORT Epic 5/31/23: EGD  5/4/23: Colonoscopy  4/11/17: Laparoscopic Cholecystectomy  2/5/15: Sigmoid Colectomy   MEDICATION LIST [facility]    LABS     PATHOLOGY REPORTS [facility] 5/31/23, 5/4/23, 4/11/17, 2/5/15: Surg Path   ANYTHING RELATED TO DIAGNOSIS Epic 4/26/23   IMAGING (NEED IMAGES & REPORT)     CT SCANS PACS [facility]   MRI PACS [facility]   MAMMO PACS Epic   ULTRASOUND PACS Epic

## 2023-06-14 ENCOUNTER — ONCOLOGY VISIT (OUTPATIENT)
Dept: SURGERY | Facility: CLINIC | Age: 58
End: 2023-06-14
Attending: NURSE PRACTITIONER
Payer: COMMERCIAL

## 2023-06-14 ENCOUNTER — PRE VISIT (OUTPATIENT)
Dept: SURGERY | Facility: CLINIC | Age: 58
End: 2023-06-14
Payer: COMMERCIAL

## 2023-06-14 VITALS
OXYGEN SATURATION: 99 % | DIASTOLIC BLOOD PRESSURE: 74 MMHG | BODY MASS INDEX: 19.51 KG/M2 | SYSTOLIC BLOOD PRESSURE: 131 MMHG | RESPIRATION RATE: 16 BRPM | HEART RATE: 73 BPM | TEMPERATURE: 98.4 F | HEIGHT: 62 IN | WEIGHT: 106 LBS

## 2023-06-14 DIAGNOSIS — K21.9 HIATAL HERNIA WITH GERD: Primary | ICD-10-CM

## 2023-06-14 DIAGNOSIS — K44.9 HIATAL HERNIA: ICD-10-CM

## 2023-06-14 DIAGNOSIS — K44.9 HIATAL HERNIA WITH GERD: Primary | ICD-10-CM

## 2023-06-14 PROCEDURE — 99204 OFFICE O/P NEW MOD 45 MIN: CPT | Performed by: THORACIC SURGERY (CARDIOTHORACIC VASCULAR SURGERY)

## 2023-06-14 PROCEDURE — G0463 HOSPITAL OUTPT CLINIC VISIT: HCPCS | Performed by: THORACIC SURGERY (CARDIOTHORACIC VASCULAR SURGERY)

## 2023-06-14 RX ORDER — TRAMADOL HYDROCHLORIDE 50 MG/1
50 TABLET ORAL EVERY 6 HOURS PRN
COMMUNITY
End: 2024-06-11

## 2023-06-14 ASSESSMENT — PAIN SCALES - GENERAL: PAINLEVEL: MODERATE PAIN (5)

## 2023-06-14 NOTE — PROGRESS NOTES
THORACIC SURGERY - NEW PATIENT OFFICE VISIT        I saw Fatou Simental in consultation for the evaluation and treatment of GERD and hiatal hernia.    HPI  Fatou Simental is a 58 year old female who presented with multiple abdominal complains but what bothers her the most is constipation and abd cramps. She also has heartburn and regurgitation. She is on daily antiacids that help, and she is miserable when she forgets to take her meds.     Previsit Tests   EGD 5/31/23: Ringed esophagus, biopsies taken (negative for intraepithelial eosinophils). 4 cm hiatal hernia ??      CT scan 4/11/23: No evidence of hiatal hernia.       PMH    Past Medical History:   Diagnosis Date     Cocaine abuse, unspecified 2003     Dysplasia of cervix, unspecified 2000     Esophageal reflux 11/13/2006     Ganglion of joint 1/22/2007     Generalized anxiety disorder 10/30/2006    Buspar prescribed 8/23/07     IBS (irritable bowel syndrome) 5/28/2008     Need for prophylactic hormone replacement therapy (postmenopausal)         PSH    Past Surgical History:   Procedure Laterality Date     ABLATE VEIN VARICOSE RADIO FREQUENCY WITHOUT PHLEBECTOMY MULTIPLE STAB  7/24/2014    Procedure: ABLATE VEIN VARICOSE RADIO FREQUENCY WITHOUT PHLEBECTOMY MULTIPLE STAB;  Surgeon: Khanh Bunch MD;  Location: [facility]     BIOPSY BREAST Left      COLONOSCOPY N/A 5/4/2023    Procedure: COLONOSCOPY, WITH POLYPECTOMY AND BIOPSY;  Surgeon: Ning Tran MD;  Location: [facility]     ENHANCE LASER REFRACTIVE BILATERAL EXISTING PT IN PARAMETERS Bilateral     LASIK     ESOPHAGOSCOPY, GASTROSCOPY, DUODENOSCOPY (EGD), COMBINED N/A 5/31/2023    Procedure: Esophagoscopy, gastroscopy, duodenoscopy, combined;  Surgeon: Aime Mak MD;  Location: [facility]     HYSTERECTOMY SUPRACERVICAL, BILATERAL SALPINGO-OOPHORECTOMY, COMBINED  2000     LAPAROSCOPIC ASSISTED COLECTOMY N/A 2/5/2015    Procedure: LAPAROSCOPIC ASSISTED COLECTOMY;  Surgeon: Khanh Bunch  MD June;  Location: [facility]     LAPAROSCOPIC CHOLECYSTECTOMY N/A 2017    Procedure: LAPAROSCOPIC CHOLECYSTECTOMY;  Surgeon: Levi Louis MD;  [facility]: [facility]     NERVE BLOCK PERIPHERAL Bilateral 2015    Procedure: NERVE BLOCK PERIPHERAL;  Surgeon: Generic Anesthesia Provider;  Location: [facility]     REPAIR PTOSIS BILATERAL Bilateral 10/11/2018    Procedure: REPAIR PTOSIS BILATERAL;  Bilateral Upper Eyelid Ptosis Repair;  Surgeon: Wilton Viramontes MD;  Location:  OR     SURGICAL HISTORY OF -       Breast implants     SURGICAL HISTORY OF -       Carpal  tunnel r hand     ZZC  DELIVERY ONLY  ,,    , Low Cervical         Allergies   Allergen Reactions     Cephalexin Hives     Patient states she gets hives - bad reaction not listed on her H & P.     Rizatriptan Dizziness     Acetaminophen Hives     Ciprofloxacin Hives     Got rash when taken with metronidazole + Vicodin  Other reaction(s): hives     Lactose Diarrhea     Metronidazole Hives     Rash when taken with cipro + Vicodin     Unknown [No Clinical Screening - See Comments]      Pt developed hives when taking flagyl, cipro, and vicodin, doesn't know which she is allergic to      Vicodin [Hydrocodone-Acetaminophen] Hives     Rash when taken with cipro and metronidazole     Current Outpatient Medications   Medication     albuterol (VENTOLIN HFA) 108 (90 Base) MCG/ACT inhaler     butalbital-aspirin-caffeine (FIORINAL) -40 MG capsule     cetirizine (ZYRTEC) 10 MG tablet     Cholecalciferol (VITAMIN D3 PO)     estradiol (ESTRACE) 1 MG tablet     estradiol (VAGIFEM) 10 MCG TABS vaginal tablet     gabapentin (NEURONTIN) 300 MG capsule     hyoscyamine (LEVSIN) 0.125 MG tablet     LORazepam (ATIVAN) 1 MG tablet     lubiprostone (AMITIZA) 24 MCG capsule     montelukast (SINGULAIR) 10 MG tablet     multivitamin, therapeutic (THERA-VIT) TABS     omeprazole 20 MG tablet     sucralfate (CARAFATE) 1 GM tablet     varenicline  "(CHANTIX JOHNNY) 0.5 MG X 11 & 1 MG X 42 tablet     No current facility-administered medications for this visit.       Social History     Tobacco Use     Smoking status: Every Day     Packs/day: 1.00     Years: 20.00     Pack years: 20.00     Types: Cigarettes     Smokeless tobacco: Never     Tobacco comments:     trying to quit   Vaping Use     Vaping status: Never Used   Substance Use Topics     Alcohol use: Never     Drug use: No       Physical examination  /74   Pulse 73   Temp 98.4  F (36.9  C) (Oral)   Resp 16   Ht 1.566 m (5' 1.65\")   Wt 48.1 kg (106 lb)   SpO2 99%   BMI 19.61 kg/m    Alert and oriented. NAD.   Bilateral breath sounds.   Abd soft ND NT. Small scars from lap radha and lap colectomy. No abd wall hernias.     From a personal perspective, she comes to clinic with her .     IMPRESSION   58 year old female with GERD and hiatal hernia.     PLAN  I spent 45 min on the date of the encounter in chart review, patient visit, review of tests, documentation and/or discussion with other providers about the issues documented above. I reviewed the plan as follows:    I talked to her about her current situation. We had a long discussion and it seems that what is bothering her at this point is the constipation and abd cramps. She would like a referral to GI for further work up prior to considering HH repair and antireflux surgery.    I will refer her to GI and see her back in 4 months.     Necessary Preop Tests & Appointments: HRM, ambulatory pH study, esophagram.     Smoking and substance abuse: We need to ensure that she remains free from substance abuse and quits smoking prior to surgery. Ms. Simental was counseled on the importance of smoking cessation and its impact on the carmen-operative course. If the patient is unable to quit smoking for a minimum of 4 weeks, due to the benign nature of the disease and increased carmen-operative risk, surgery will be deferred. This guideline is in " accordance with the World Health Organization (WHO) and has shown to lower the risk of both surgical and anesthesia complications as well as improve post-operative outcomes.     I appreciate the opportunity to participate in the care of your patient and will keep you updated.    Sincerely,    Lee Ann Curran MD

## 2023-06-14 NOTE — NURSING NOTE
"Oncology Rooming Note    June 14, 2023 4:34 PM   Fatou Simental is a 58 year old female who presents for:    Chief Complaint   Patient presents with     Oncology Clinic Visit     Hiatal hernia      Initial Vitals: /74   Pulse 73   Temp 98.4  F (36.9  C) (Oral)   Resp 16   Ht 1.566 m (5' 1.65\")   Wt 48.1 kg (106 lb)   SpO2 99%   BMI 19.61 kg/m   Estimated body mass index is 19.61 kg/m  as calculated from the following:    Height as of this encounter: 1.566 m (5' 1.65\").    Weight as of this encounter: 48.1 kg (106 lb). Body surface area is 1.45 meters squared.  Moderate Pain (5) Comment: Data Unavailable   No LMP recorded. Patient has had a hysterectomy.  Allergies reviewed: Yes  Medications reviewed: Yes    Medications: Medication refills not needed today.  Pharmacy name entered into EPIC:    [facility] - [place], [place] - [address] [facility] [place] [facility] [place] - [place], [place] - [address]  [id number] - [place], [place] - [address] [facility] [place], [place] - [id number]    Clinical concerns: No new clinical concerns other than reason for visit today.     Ashley Cabrera, EMT    "

## 2023-06-14 NOTE — LETTER
6/14/2023         RE: Fatou Simental  [address] [place] [address]  [place] [address]        Dear Colleague,    Thank you for referring your patient, Fatou Simental, to the [facility]. Please see a copy of my visit note below.    THORACIC SURGERY - NEW PATIENT OFFICE VISIT        I saw Fatou Simental in consultation for the evaluation and treatment of GERD and hiatal hernia.    HPI  Fatou Simental is a 58 year old female who presented with multiple abdominal complains but what bothers her the most is constipation and abd cramps. She also has heartburn and regurgitation. She is on daily antiacids that help, and she is miserable when she forgets to take her meds.     Previsit Tests   EGD 5/31/23: Ringed esophagus, biopsies taken (negative for intraepithelial eosinophils). 4 cm hiatal hernia ??      CT scan 4/11/23: No evidence of hiatal hernia.       PMH    Past Medical History:   Diagnosis Date    Cocaine abuse, unspecified 2003    Dysplasia of cervix, unspecified 2000    Esophageal reflux 11/13/2006    Ganglion of joint 1/22/2007    Generalized anxiety disorder 10/30/2006    Buspar prescribed 8/23/07    IBS (irritable bowel syndrome) 5/28/2008    Need for prophylactic hormone replacement therapy (postmenopausal)         PSH    Past Surgical History:   Procedure Laterality Date    ABLATE VEIN VARICOSE RADIO FREQUENCY WITHOUT PHLEBECTOMY MULTIPLE STAB  7/24/2014    Procedure: ABLATE VEIN VARICOSE RADIO FREQUENCY WITHOUT PHLEBECTOMY MULTIPLE STAB;  Surgeon: Khanh Bunch MD;  [facility]    BIOPSY BREAST Left     COLONOSCOPY N/A 5/4/2023    Procedure: COLONOSCOPY, WITH POLYPECTOMY AND BIOPSY;  Surgeon: Ning Tran MD;  [facility]: [facility]    ENHANCE LASER REFRACTIVE BILATERAL EXISTING PT IN PARAMETERS Bilateral     LASIK    ESOPHAGOSCOPY, GASTROSCOPY, DUODENOSCOPY (EGD), COMBINED N/A 5/31/2023    Procedure: Esophagoscopy, gastroscopy, duodenoscopy, combined;  Surgeon:  Aime Mak MD;  Location: [facility]    HYSTERECTOMY SUPRACERVICAL, BILATERAL SALPINGO-OOPHORECTOMY, COMBINED      LAPAROSCOPIC ASSISTED COLECTOMY N/A 2015    Procedure: LAPAROSCOPIC ASSISTED COLECTOMY;  Surgeon: Khanh Bunch MD;  [facility]: [facility]    LAPAROSCOPIC CHOLECYSTECTOMY N/A 2017    Procedure: LAPAROSCOPIC CHOLECYSTECTOMY;  Surgeon: Levi Louis MD;  Location: [facility]    NERVE BLOCK PERIPHERAL Bilateral 2015    Procedure: NERVE BLOCK PERIPHERAL;  Surgeon: Generic Anesthesia Provider;  Location: [facility]    REPAIR PTOSIS BILATERAL Bilateral 10/11/2018    Procedure: REPAIR PTOSIS BILATERAL;  Bilateral Upper Eyelid Ptosis Repair;  Surgeon: Wilton Viramontes MD;  Location:  OR    SURGICAL HISTORY OF -       Breast implants    SURGICAL HISTORY OF -       Carpal  tunnel r hand    ZZC  DELIVERY ONLY  ,,    , Low Cervical         Allergies   Allergen Reactions    Cephalexin Hives     Patient states she gets hives - bad reaction not listed on her H & P.    Rizatriptan Dizziness    Acetaminophen Hives    Ciprofloxacin Hives     Got rash when taken with metronidazole + Vicodin  Other reaction(s): hives    Lactose Diarrhea    Metronidazole Hives     Rash when taken with cipro + Vicodin    Unknown [No Clinical Screening - See Comments]      Pt developed hives when taking flagyl, cipro, and vicodin, doesn't know which she is allergic to     Vicodin [Hydrocodone-Acetaminophen] Hives     Rash when taken with cipro and metronidazole     Current Outpatient Medications   Medication    albuterol (VENTOLIN HFA) 108 (90 Base) MCG/ACT inhaler    butalbital-aspirin-caffeine (FIORINAL) -40 MG capsule    cetirizine (ZYRTEC) 10 MG tablet    Cholecalciferol (VITAMIN D3 PO)    estradiol (ESTRACE) 1 MG tablet    estradiol (VAGIFEM) 10 MCG TABS vaginal tablet    gabapentin (NEURONTIN) 300 MG capsule    hyoscyamine (LEVSIN) 0.125 MG tablet    LORazepam (ATIVAN) 1  "MG tablet    lubiprostone (AMITIZA) 24 MCG capsule    montelukast (SINGULAIR) 10 MG tablet    multivitamin, therapeutic (THERA-VIT) TABS    omeprazole 20 MG tablet    sucralfate (CARAFATE) 1 GM tablet    varenicline (CHANTIX JOHNNY) 0.5 MG X 11 & 1 MG X 42 tablet     No current facility-administered medications for this visit.       Social History     Tobacco Use    Smoking status: Every Day     Packs/day: 1.00     Years: 20.00     Pack years: 20.00     Types: Cigarettes    Smokeless tobacco: Never    Tobacco comments:     trying to quit   Vaping Use    Vaping status: Never Used   Substance Use Topics    Alcohol use: Never    Drug use: No       Physical examination  /74   Pulse 73   Temp 98.4  F (36.9  C) (Oral)   Resp 16   Ht 1.566 m (5' 1.65\")   Wt 48.1 kg (106 lb)   SpO2 99%   BMI 19.61 kg/m    Alert and oriented. NAD.   Bilateral breath sounds.   Abd soft ND NT. Small scars from lap radha and lap colectomy. No abd wall hernias.     From a personal perspective, she comes to clinic with her .     IMPRESSION   58 year old female with GERD and hiatal hernia.     PLAN  I spent 45 min on the date of the encounter in chart review, patient visit, review of tests, documentation and/or discussion with other providers about the issues documented above. I reviewed the plan as follows:    I talked to her about her current situation. We had a long discussion and it seems that what is bothering her at this point is the constipation and abd cramps. She would like a referral to GI for further work up prior to considering HH repair and antireflux surgery.    I will refer her to GI and see her back in 4 months.     Necessary Preop Tests & Appointments: HRM, ambulatory pH study, esophagram.     Smoking and substance abuse: We need to ensure that she remains free from substance abuse and quits smoking prior to surgery. Ms. Simental was counseled on the importance of smoking cessation and its impact on the " carmen-operative course. If the patient is unable to quit smoking for a minimum of 4 weeks, due to the benign nature of the disease and increased carmen-operative risk, surgery will be deferred. This guideline is in accordance with the World Health Organization (WHO) and has shown to lower the risk of both surgical and anesthesia complications as well as improve post-operative outcomes.     I appreciate the opportunity to participate in the care of your patient and will keep you updated.    Sincerely,    Lee Ann Curran MD

## 2023-06-21 DIAGNOSIS — R10.9 ABDOMINAL PAIN: ICD-10-CM

## 2023-06-21 DIAGNOSIS — K21.9 HIATAL HERNIA WITH GERD: Primary | ICD-10-CM

## 2023-06-21 DIAGNOSIS — K59.00 CONSTIPATION: ICD-10-CM

## 2023-06-21 DIAGNOSIS — K44.9 HIATAL HERNIA WITH GERD: Primary | ICD-10-CM

## 2023-07-06 ENCOUNTER — HOSPITAL ENCOUNTER (OUTPATIENT)
Dept: ULTRASOUND IMAGING | Facility: CLINIC | Age: 58
Discharge: HOME | End: 2023-07-06
Attending: NURSE PRACTITIONER
Payer: COMMERCIAL

## 2023-07-06 ENCOUNTER — HOSPITAL ENCOUNTER (OUTPATIENT)
Dept: MAMMOGRAPHY | Facility: CLINIC | Age: 58
Discharge: HOME | End: 2023-07-06
Attending: NURSE PRACTITIONER
Payer: COMMERCIAL

## 2023-07-06 ENCOUNTER — HOSPITAL ENCOUNTER (OUTPATIENT)
Dept: BONE DENSITY | Facility: CLINIC | Age: 58
Discharge: HOME | End: 2023-07-06
Attending: NURSE PRACTITIONER
Payer: COMMERCIAL

## 2023-07-06 DIAGNOSIS — N63.21 MASS OF UPPER OUTER QUADRANT OF LEFT BREAST: ICD-10-CM

## 2023-07-06 DIAGNOSIS — Z78.0 ASYMPTOMATIC POSTMENOPAUSAL STATUS: ICD-10-CM

## 2023-07-06 PROBLEM — M85.89 OSTEOPENIA OF MULTIPLE SITES: Status: ACTIVE | Noted: 2023-07-06

## 2023-07-06 PROCEDURE — 77080 DXA BONE DENSITY AXIAL: CPT

## 2023-07-06 PROCEDURE — 76642 ULTRASOUND BREAST LIMITED: CPT | Mod: LT

## 2023-07-06 PROCEDURE — G0279 TOMOSYNTHESIS, MAMMO: HCPCS

## 2023-07-06 NOTE — RESULT ENCOUNTER NOTE
Giuliana Lainez    Your bone density was notable for osteopenia - loss of bone density.  Recommendations to preserve bone density include 1,200 mg of calcium and 800 units of vitamin D3 daily, as well as avoiding smoking, excess alcohol intake and getting at least 30 minutes of weight bearing exercise 5 days per week (walking, jogging).    -Calcium sources (goal 1200 mg a day):  Foods and drinks with calcium  Food Calcium, milligrams   Milk (skim, 2 percent, or whole, 8 oz [240 mL]) 300   Yogurt (6 oz [168 g]) 250   Orange juice (with calcium, 8 oz [240 mL]) 300   Tofu with calcium (1/2 cup [113 g]) 435   Cheese (1 oz [28 g]) 195 to 335 (hard cheese = higher calcium)   Cottage cheese (1/2 cup [113 g]) 130   Ice cream or frozen yogurt (1/2 cup [113 g]) 100   Soy milk (8 oz [240 mL]) 300   Beans (1/2 cup cooked [113 g]) 60 to 80   Dark, leafy green vegetables (1/2 cup cooked [113 g]) 50 to 135   Almonds (24 whole) 70   Orange (1 medium) 60     Repeat scan in 3 years.    Please let us know if you have any questions.     Take care,    ANGEL Avitia CNP

## 2023-07-25 DIAGNOSIS — B02.29 POST HERPETIC NEURALGIA: ICD-10-CM

## 2023-07-25 DIAGNOSIS — G89.4 CHRONIC PAIN SYNDROME: ICD-10-CM

## 2023-07-25 RX ORDER — GABAPENTIN 300 MG/1
CAPSULE ORAL
Qty: 180 CAPSULE | Refills: 3 | Status: SHIPPED | OUTPATIENT
Start: 2023-07-25 | End: 2024-08-15

## 2023-08-30 ENCOUNTER — TELEPHONE (OUTPATIENT)
Dept: FAMILY MEDICINE | Facility: CLINIC | Age: 58
End: 2023-08-30
Payer: COMMERCIAL

## 2023-08-30 NOTE — TELEPHONE ENCOUNTER
Patient had not been taking oxybutynin ER 5 mg tablet and it was discontinued.     Patient is having mixed urinary incontinence symptoms that started again last week.    Urinary issues:  Frequency   Urinary retention   Small amounts of Dysuria     Patient denies abdominal pain, painful urination, flank pain, blood in urine, nausea, vomiting, fever or any other issues.     Gaby Shelton has never treated patient for mixed urinary incontinence and has not prescribed oxybutynin. Last prescriber was Karlene Shay.     Patient offered a clinic appointment and wants to wait to be able to schedule an appointment with Gaby Shelton and is scheduled for an office visit 9/12/23.     Margie Davison RN on 8/30/2023 at 10:42 AM

## 2023-08-30 NOTE — TELEPHONE ENCOUNTER
Patient believes she is still supposed to be taking this medication and would like a refill.     Thank you,    Carmina Ley  Certified Pharmacy Technician II, [facility]  Ph: [phone]  Fx: [phone]

## 2023-09-12 ENCOUNTER — OFFICE VISIT (OUTPATIENT)
Dept: FAMILY MEDICINE | Facility: CLINIC | Age: 58
End: 2023-09-12
Payer: COMMERCIAL

## 2023-09-12 VITALS
HEIGHT: 62 IN | TEMPERATURE: 98.2 F | SYSTOLIC BLOOD PRESSURE: 112 MMHG | OXYGEN SATURATION: 99 % | HEART RATE: 68 BPM | DIASTOLIC BLOOD PRESSURE: 70 MMHG | WEIGHT: 105.6 LBS | RESPIRATION RATE: 16 BRPM | BODY MASS INDEX: 19.43 KG/M2

## 2023-09-12 DIAGNOSIS — S20.212A RIB CONTUSION, LEFT, INITIAL ENCOUNTER: ICD-10-CM

## 2023-09-12 DIAGNOSIS — N39.46 MIXED INCONTINENCE: Primary | ICD-10-CM

## 2023-09-12 LAB
ALBUMIN UR-MCNC: NEGATIVE MG/DL
APPEARANCE UR: CLEAR
BACTERIA #/AREA URNS HPF: ABNORMAL /HPF
BILIRUB UR QL STRIP: NEGATIVE
COLOR UR AUTO: YELLOW
GLUCOSE UR STRIP-MCNC: NEGATIVE MG/DL
HGB UR QL STRIP: ABNORMAL
KETONES UR STRIP-MCNC: NEGATIVE MG/DL
LEUKOCYTE ESTERASE UR QL STRIP: NEGATIVE
NITRATE UR QL: NEGATIVE
PH UR STRIP: 7 [PH] (ref 5–7)
RBC #/AREA URNS AUTO: ABNORMAL /HPF
SP GR UR STRIP: 1.02 (ref 1–1.03)
SQUAMOUS #/AREA URNS AUTO: ABNORMAL /LPF
UROBILINOGEN UR STRIP-ACNC: 0.2 E.U./DL
WBC #/AREA URNS AUTO: ABNORMAL /HPF

## 2023-09-12 PROCEDURE — 81001 URINALYSIS AUTO W/SCOPE: CPT | Performed by: NURSE PRACTITIONER

## 2023-09-12 PROCEDURE — 99213 OFFICE O/P EST LOW 20 MIN: CPT | Performed by: NURSE PRACTITIONER

## 2023-09-12 RX ORDER — OXYCODONE HYDROCHLORIDE 5 MG/1
2.5-5 TABLET ORAL EVERY 8 HOURS PRN
Qty: 21 TABLET | Refills: 0 | Status: SHIPPED | OUTPATIENT
Start: 2023-09-12 | End: 2024-06-11

## 2023-09-12 RX ORDER — OXYBUTYNIN CHLORIDE 5 MG/1
5 TABLET, EXTENDED RELEASE ORAL DAILY
Qty: 90 TABLET | Refills: 3 | Status: SHIPPED | OUTPATIENT
Start: 2023-09-12

## 2023-09-12 ASSESSMENT — PATIENT HEALTH QUESTIONNAIRE - PHQ9
SUM OF ALL RESPONSES TO PHQ QUESTIONS 1-9: 7
10. IF YOU CHECKED OFF ANY PROBLEMS, HOW DIFFICULT HAVE THESE PROBLEMS MADE IT FOR YOU TO DO YOUR WORK, TAKE CARE OF THINGS AT HOME, OR GET ALONG WITH OTHER PEOPLE: SOMEWHAT DIFFICULT
SUM OF ALL RESPONSES TO PHQ QUESTIONS 1-9: 7

## 2023-09-12 NOTE — PROGRESS NOTES
Assessment & Plan     Mixed incontinence  Restart:  - oxyBUTYnin ER (DITROPAN XL) 5 MG 24 hr tablet; Take 1 tablet (5 mg) by mouth daily  Consider Urology or Pelvic floor referral and/or dose increase if needed    Rib contusion, left, initial encounter    - oxyCODONE (ROXICODONE) 5 MG tablet; Take 0.5-1 tablets (2.5-5 mg) by mouth every 8 hours as needed for pain- do not take with Lorazepam             FUTURE APPOINTMENTS:       - Follow-up visit in 3-4 weeks for persistent symptoms - sooner PRN new or worsening symptoms     See Patient Instructions    ANGEL Singh CNP [facility]    Chery Lainez is a 58 year old, presenting for the following health issues:  Urinary Problem and Musculoskeletal Problem        9/12/2023     1:51 PM   Additional Questions   Roomed by Allyson TINAJERO       History of Present Illness       Reason for visit:  Bladder problems and injury    She eats 2-3 servings of fruits and vegetables daily.She consumes 1 sweetened beverage(s) daily.She exercises with enough effort to increase her heart rate 9 or less minutes per day.  She exercises with enough effort to increase her heart rate 3 or less days per week.   She is taking medications regularly.       Genitourinary - Female  Onset/Duration: over 6 months   Description: incontinence when she coughs, sneezes, etc   Painful urination (Dysuria): No           Frequency: YES  Blood in urine (Hematuria): No  Delay in urine (Hesitency): No  Intensity: moderate  Progression of Symptoms:  worsening  Accompanying Signs & Symptoms:  Fever/chills: No  Flank pain: No  Nausea and vomiting: No  Vaginal symptoms: none  Abdominal/Pelvic Pain: No  Chronic constipation  History:   History of frequent UTI s: No  History of kidney stones: No  Sexually Active: YES  Possibility of pregnancy: No  Precipitating or alleviating factors: coughing,  sneezing   Therapies tried and outcome: Oxybutinin- was helping, stopped because  "it seemed like the problem was resolved and now it's back    Pain History:  When did you first notice your pain? 4 days    Have you seen anyone else for your pain? Yes - ED visit yesterday at [facility]   How has your pain affected your ability to work? Not applicable  Where in your body do you have pain? Musculoskeletal problem/pain  Onset/Duration: 4 days   Description  Location: ribs - left  Joint Swelling: YES- feels swollen under left anterior ribs  Redness: No  Pain: YES- stabbing   Warmth: No  Intensity:  severe  Progression of Symptoms:  worsening  Accompanying signs and symptoms:   Fevers: No  Numbness/tingling/weakness: No  History  Trauma to the area: YES- fell going up steps   Recent illness:  No  Previous similar problem: No  Previous evaluation:  YES-normal xray yesterday, DEXA recently- no osteoporosis   Precipitating or alleviating factors:  Aggravating factors include: any movement, coughing, sneezing  Therapies tried and outcome: ice, Ibuprofen, and celebrex-not effective           Review of Systems   Constitutional, HEENT, cardiovascular, pulmonary, gi and gu systems are negative, except as otherwise noted.      Objective    /70   Pulse 68   Temp 98.2  F (36.8  C) (Tympanic)   Resp 16   Ht 1.562 m (5' 1.5\")   Wt 47.9 kg (105 lb 9.6 oz)   SpO2 99%   BMI 19.63 kg/m    Body mass index is 19.63 kg/m .  Physical Exam   GENERAL: alert and appears uncomfortable  CV: regular rates and rhythm  MS: tenderness to palpation to left anterior ribs, no rash, lesion, bruising, edema, deformity or subcutaneous emphysema   SKIN: no suspicious lesions or rashes  NEURO: Normal strength and tone, mentation intact and speech normal      Results for orders placed or performed in visit on 09/12/23   UA Macroscopic with reflex to Microscopic and Culture - Clinic Collect     Status: Abnormal    Specimen: Urine, Clean Catch   Result Value Ref Range    Color Urine Yellow Colorless, Straw, Light Yellow, Yellow    " Appearance Urine Clear Clear    Glucose Urine Negative Negative mg/dL    Bilirubin Urine Negative Negative    Ketones Urine Negative Negative mg/dL    Specific Gravity Urine 1.020 1.003 - 1.035    Blood Urine Trace (A) Negative    pH Urine 7.0 5.0 - 7.0    Protein Albumin Urine Negative Negative mg/dL    Urobilinogen Urine 0.2 0.2, 1.0 E.U./dL    Nitrite Urine Negative Negative    Leukocyte Esterase Urine Negative Negative   UA Microscopic with Reflex to Culture     Status: Abnormal   Result Value Ref Range    Bacteria Urine None Seen None Seen /HPF    RBC Urine 0-2 0-2 /HPF /HPF    WBC Urine None Seen 0-5 /HPF /HPF    Squamous Epithelials Urine Few (A) None Seen /LPF    Narrative    Urine Culture not indicated

## 2023-10-03 ENCOUNTER — PATIENT OUTREACH (OUTPATIENT)
Dept: GASTROENTEROLOGY | Facility: CLINIC | Age: 58
End: 2023-10-03
Payer: COMMERCIAL

## 2023-10-09 ENCOUNTER — OFFICE VISIT (OUTPATIENT)
Dept: GASTROENTEROLOGY | Facility: CLINIC | Age: 58
End: 2023-10-09
Attending: CLINICAL NURSE SPECIALIST
Payer: COMMERCIAL

## 2023-10-09 VITALS
HEART RATE: 81 BPM | TEMPERATURE: 99.3 F | HEIGHT: 61 IN | OXYGEN SATURATION: 100 % | DIASTOLIC BLOOD PRESSURE: 79 MMHG | RESPIRATION RATE: 16 BRPM | SYSTOLIC BLOOD PRESSURE: 113 MMHG | BODY MASS INDEX: 18.88 KG/M2 | WEIGHT: 100 LBS

## 2023-10-09 DIAGNOSIS — K21.9 HIATAL HERNIA WITH GERD: ICD-10-CM

## 2023-10-09 DIAGNOSIS — K44.9 HIATAL HERNIA WITH GERD: ICD-10-CM

## 2023-10-09 PROCEDURE — 91010 ESOPHAGUS MOTILITY STUDY: CPT | Performed by: INTERNAL MEDICINE

## 2023-10-09 PROCEDURE — 91038 ESOPH IMPED FUNCT TEST > 1HR: CPT | Performed by: INTERNAL MEDICINE

## 2023-10-09 ASSESSMENT — PAIN SCALES - GENERAL: PAINLEVEL: NO PAIN (0)

## 2023-10-09 NOTE — LETTER
10/9/2023         RE: Fatou Simental  [address]  [place] [address]        Dear Colleague,    Thank you for referring your patient, Ftaou Simental, to the [facility] [place]. Please see a copy of my visit note below.    Non-Invasive GI Procedure Visit    Fatou Simental is a 58 year old female with history of Hiatal hernia with GERD.   Patient stated reason for procedure: GERD and Pre-surgical Evaluation      COVID-19 PCR Results          12/7/2021    14:34 6/15/2022    11:37   COVID-19 PCR Results   SARS CoV2 PCR Negative  Negative      COVID-19 Antibody Results, Testing for Immunity           No data to display                Pre-Procedure Assessment  Patient presents to clinic today for Esophageal Manometry Study with pH    Referring Provider: Yulia Welsh NP  Patient has undergone previous endoscopy.    Does patient report taking a PPI (omeprazole, pantoprazole, rabeprazole, lansoprazole, esomeprazole, dexlansoprazole)? Yes (held for the last week per procedure instructions) Is patient taking a PPI twice daily? No  Does patient report taking a H2 blocker (ranitidine, or famotidine)? No  Does patient report taking opioids? No  Patient reported that last food and/or drink was last consumed 3 hours ago.  Esophageal Questionnaire(s) Completed: Yes - Esophageal Questionnaire(s)    BEDQ Questionnaire      10/9/2023     2:41 PM   BEDQ Questionnaire: How Often Have You Had the Following?   Trouble eating solid food (meat, bread, vegetables) 0   Trouble eating soft foods (yogurt, jello, pudding) 0   Trouble swallowing liquids 0   Pain while swallowing 0   Coughing or choking while swallowing foods or liquids 0   Total Score: 0         10/9/2023     2:41 PM   BEDQ Questionnaire: Discomfort/Pain Ratings   Eating solid food (meat, bread, vegetables) 0   Eating soft foods (yogurt, jello, pudding) 0   Drinking liquid 0   Total Score: 0       Eckardt Questionnaire       10/9/2023     2:41 PM   Eckardt Questionnaire   Dysphagia 0   Regurgitation 1   Retrosternal Pain 0   Weight Loss (kg) 1   Total Score:  2       Promis 10 Questionnaire      10/9/2023     2:41 PM   PROMIS 10 FLOWSHEET DATA   In general, would you say your health is: 1   In general, would you say your quality of life is: 2   In general, how would you rate your physical health? 2   In general, how would you rate your mental health, including your mood and your ability to think? 2   In general, how would you rate your satisfaction with your social activities and relationships? 2   In general, please rate how well you carry out your usual social activities and roles. (This includes activities at home, at work and in your community, and responsibilities as a parent, child, spouse, employee, friend, etc.) 2   To what extent are you able to carry out your everyday physical activities such as walking, climbing stairs, carrying groceries, or moving a chair? 4   In the past 7 days, how often have you been bothered by emotional problems such as feeling anxious, depressed, or irritable? 4   In the past 7 days, how would you rate your fatigue on average? 4   In the past 7 days, how would you rate your pain on average, where 0 means no pain, and 10 means worst imaginable pain? 4   Mental health question re-calculation - no clinical value 2   Physical health question re-calculation - no clinical value 2   Pain question re-calculation - no clinical value 3   Global Mental Health Score 8   Global Physical Health Score 11   PROMIS TOTAL - SUBSCORES 19   .    Patient Hx  Patient's history, medications and allergies were reviewed.     Height: Data Unavailable   Weight: 0 lbs 0 oz    Patient Active Problem List    Diagnosis Date Noted     Osteopenia of multiple sites 07/06/2023     Priority: Medium     Major depression, recurrent (H24) 05/19/2023     Priority: Medium     Migraine with aura and without status migrainosus, not intractable  10/16/2017     Priority: Medium     Diverticular disease of colon 10/07/2013     Priority: Medium     IBS (irritable bowel syndrome) 05/28/2008     Priority: Medium     Ovarian cyst 08/23/2007     Priority: Medium     Oophorectomy bilateral - premalignant tissue ? 1998  Problem list name updated by automated process. Provider to review       Esophageal reflux 11/13/2006     Priority: Medium     Generalized anxiety disorder 10/30/2006     Priority: Medium     Buspar prescribed 8/23/07       Raynaud's syndrome 01/04/2006     Priority: Medium     Tobacco use disorder 01/04/2006     Priority: Medium     Need for prophylactic hormone replacement therapy (postmenopausal) 01/04/2006     Priority: Medium      Prior to Admission medications    Medication Sig Start Date End Date Taking? Authorizing Provider   albuterol (VENTOLIN HFA) 108 (90 Base) MCG/ACT inhaler Inhale 2 puffs into the lungs every 4 hours as needed for shortness of breath 6/1/23   Gaby Shelton APRN CNP   butalbital-aspirin-caffeine (FIORINAL) -40 MG capsule TAKE 1 CAPSULE BY MOUTH EVERY 6 HOURS AS NEEDED FOR HEADACHES 6/21/23   Arturo Melendez MD   cetirizine (ZYRTEC) 10 MG tablet Take 1 tablet (10 mg) by mouth daily 6/1/23   Gaby Shelton APRN CNP   Cholecalciferol (VITAMIN D3 PO) Take 400 Units by mouth daily     Reported, Patient   estradiol (ESTRACE) 1 MG tablet Take 1 tablet (1 mg) by mouth daily 6/1/23   Gaby Shelton APRN CNP   estradiol (VAGIFEM) 10 MCG TABS vaginal tablet Place 1 tablet (10 mcg) vaginally twice a week 6/1/23   Gaby Shelton APRN CNP   gabapentin (NEURONTIN) 300 MG capsule TAKE 2 CAPSULES BY MOUTH AT BEDTIME 7/25/23   Gaby Shelton APRN CNP   hyoscyamine (LEVSIN) 0.125 MG tablet Take 1 tablet (125 mcg) by mouth every 4 hours as needed for cramping 6/21/22   Karlene Shay, SHAUNA   LORazepam (ATIVAN) 1 MG tablet Take 0.5-1 tablets (0.5-1 mg) by mouth every 6 hours as needed  for anxiety 5/19/23   Gaby Shelton APRN CNP   lubiprostone (AMITIZA) 24 MCG capsule Take 24 mcg by mouth 2 times daily (with meals)    Reported, Patient   montelukast (SINGULAIR) 10 MG tablet Take 1 tablet (10 mg) by mouth At Bedtime 6/1/23   Gaby Shelton APRN CNP   multivitamin, therapeutic (THERA-VIT) TABS Take 1 tablet by mouth daily    Reported, Patient   omeprazole 20 MG tablet Take 20 mg by mouth daily    Reported, Patient   oxyBUTYnin ER (DITROPAN XL) 5 MG 24 hr tablet Take 1 tablet (5 mg) by mouth daily 9/12/23   Gaby Shelton APRN CNP   oxyCODONE (ROXICODONE) 5 MG tablet Take 0.5-1 tablets (2.5-5 mg) by mouth every 8 hours as needed for pain 9/12/23   Gaby Shelton APRN CNP   sucralfate (CARAFATE) 1 GM tablet Take 1 tablet (1 g) by mouth 4 times daily 6/1/23   Gaby Shelton APRN CNP   traMADol (ULTRAM) 50 MG tablet Take 50 mg by mouth every 6 hours as needed for severe pain  Patient not taking: Reported on 9/12/2023    Reported, Patient   varenicline (CHANTIX JOHNNY) 0.5 MG X 11 & 1 MG X 42 tablet Take 0.5 mg tab daily for 3 days, THEN 0.5 mg tab twice daily for 4 days, THEN 1 mg twice daily.  Patient not taking: Reported on 6/1/2023 5/19/23   Gaby Shelton APRN CNP     Allergies   Allergen Reactions     Cephalexin Hives     Patient states she gets hives - bad reaction not listed on her H & P.     Rizatriptan Dizziness     Acetaminophen Hives     Ciprofloxacin Hives     Got rash when taken with metronidazole + Vicodin  Other reaction(s): hives     Lactose Diarrhea     Metronidazole Hives     Rash when taken with cipro + Vicodin     Unknown [No Clinical Screening - See Comments]      Pt developed hives when taking flagyl, cipro, and vicodin, doesn't know which she is allergic to      Vicodin [Hydrocodone-Acetaminophen] Hives     Rash when taken with cipro and metronidazole     Past Medical History:   Diagnosis Date     Cocaine abuse, unspecified 2003      Dysplasia of cervix, unspecified      Esophageal reflux 2006     Ganglion of joint 2007     Generalized anxiety disorder 10/30/2006    Buspar prescribed 07     IBS (irritable bowel syndrome) 2008     Need for prophylactic hormone replacement therapy (postmenopausal)      Past Surgical History:   Procedure Laterality Date     ABLATE VEIN VARICOSE RADIO FREQUENCY WITHOUT PHLEBECTOMY MULTIPLE STAB  2014    Procedure: ABLATE VEIN VARICOSE RADIO FREQUENCY WITHOUT PHLEBECTOMY MULTIPLE STAB;  Surgeon: Khanh Bunch MD;  Location: [facility]     BIOPSY BREAST Left      COLONOSCOPY N/A 2023    Procedure: COLONOSCOPY, WITH POLYPECTOMY AND BIOPSY;  Surgeon: Ning Tran MD;  Location: [facility]     ENHANCE LASER REFRACTIVE BILATERAL EXISTING PT IN PARAMETERS Bilateral     LASIK     ESOPHAGOSCOPY, GASTROSCOPY, DUODENOSCOPY (EGD), COMBINED N/A 2023    Procedure: Esophagoscopy, gastroscopy, duodenoscopy, combined;  Surgeon: Aime Mak MD;  Location: [facility]     HYSTERECTOMY SUPRACERVICAL, BILATERAL SALPINGO-OOPHORECTOMY, COMBINED       LAPAROSCOPIC ASSISTED COLECTOMY N/A 2015    Procedure: LAPAROSCOPIC ASSISTED COLECTOMY;  Surgeon: Khanh Bunch MD;  Location: [facility]     LAPAROSCOPIC CHOLECYSTECTOMY N/A 2017    Procedure: LAPAROSCOPIC CHOLECYSTECTOMY;  Surgeon: Levi Louis MD;  Location: [facility]     NERVE BLOCK PERIPHERAL Bilateral 2015    Procedure: NERVE BLOCK PERIPHERAL;  Surgeon: Generic Anesthesia Provider;  Location: [facility]     REPAIR PTOSIS BILATERAL Bilateral 10/11/2018    Procedure: REPAIR PTOSIS BILATERAL;  Bilateral Upper Eyelid Ptosis Repair;  Surgeon: Wilton Viramontes MD;  [facility]     SURGICAL HISTORY OF -       Breast implants     SURGICAL HISTORY OF -       Carpal  tunnel r hand     ZZC  DELIVERY ONLY  ,,    , Low Cervical     Family History   Problem Relation Age of Onset     Cancer Father          liver     Cancer Brother         lung, asbestos     Cancer Mother         uterine     Cancer Sister         uterine     Breast Cancer Other      Breast Cancer Other      Glaucoma No family hx of      Macular Degeneration No family hx of      Social History     Tobacco Use     Smoking status: Every Day     Packs/day: 1.00     Years: 20.00     Pack years: 20.00     Types: Cigarettes     Smokeless tobacco: Never     Tobacco comments:     trying to quit   Substance Use Topics     Alcohol use: Never        Pre-Procedure Education & Consent  Procedure education was provided to: Patient  Teaching method: Explanation  Barriers to learning: No Barrier    Patient indicated understanding of pre-procedure instruction and appropriate consent was obtained and documented.    ____________________________________________________________________    Post-Procedure Documentation: GI Esophageal Manometry with 24 Hour Ph    Manometry catheter was placed via right nare to  52 cm and normal saline swallows given per protocol. Manometry catheter was removed at the end of test and the pH cathter was placed via right nare to 35 cm.      Diary and discharge instructions given to patient and patent instructed to return tomorrow for catheter removal.    Notification of pending test results sent to provider for interpretation. Please reference scanned document for final interpretation of results. Patient will follow up with referring provider for test results.    Jennifer Kirkpatrick RN on 10/9/2023 at 2:37 PM             Again, thank you for allowing me to participate in the care of your patient.      Sincerely,    Non-Invasive GI Nurse

## 2023-10-09 NOTE — PROGRESS NOTES
Non-Invasive GI Procedure Visit    Fatou Simental is a 58 year old female with history of Hiatal hernia with GERD.   Patient stated reason for procedure: GERD and Pre-surgical Evaluation      COVID-19 PCR Results          12/7/2021    14:34 6/15/2022    11:37   COVID-19 PCR Results   SARS CoV2 PCR Negative  Negative      COVID-19 Antibody Results, Testing for Immunity           No data to display                Pre-Procedure Assessment  Patient presents to clinic today for Esophageal Manometry Study with pH    Referring Provider: Yulia Welsh NP  Patient has undergone previous endoscopy.    Does patient report taking a PPI (omeprazole, pantoprazole, rabeprazole, lansoprazole, esomeprazole, dexlansoprazole)? Yes (held for the last week per procedure instructions) Is patient taking a PPI twice daily? No  Does patient report taking a H2 blocker (ranitidine, or famotidine)? No  Does patient report taking opioids? No  Patient reported that last food and/or drink was last consumed 3 hours ago.  Esophageal Questionnaire(s) Completed: Yes - Esophageal Questionnaire(s)    BEDQ Questionnaire      10/9/2023     2:41 PM   BEDQ Questionnaire: How Often Have You Had the Following?   Trouble eating solid food (meat, bread, vegetables) 0   Trouble eating soft foods (yogurt, jello, pudding) 0   Trouble swallowing liquids 0   Pain while swallowing 0   Coughing or choking while swallowing foods or liquids 0   Total Score: 0         10/9/2023     2:41 PM   BEDQ Questionnaire: Discomfort/Pain Ratings   Eating solid food (meat, bread, vegetables) 0   Eating soft foods (yogurt, jello, pudding) 0   Drinking liquid 0   Total Score: 0       Eckardt Questionnaire      10/9/2023     2:41 PM   Eckardt Questionnaire   Dysphagia 0   Regurgitation 1   Retrosternal Pain 0   Weight Loss (kg) 1   Total Score:  2       Promis 10 Questionnaire      10/9/2023     2:41 PM   PROMIS 10 FLOWSHEET DATA   In general, would you say your health is: 1    In general, would you say your quality of life is: 2   In general, how would you rate your physical health? 2   In general, how would you rate your mental health, including your mood and your ability to think? 2   In general, how would you rate your satisfaction with your social activities and relationships? 2   In general, please rate how well you carry out your usual social activities and roles. (This includes activities at home, at work and in your community, and responsibilities as a parent, child, spouse, employee, friend, etc.) 2   To what extent are you able to carry out your everyday physical activities such as walking, climbing stairs, carrying groceries, or moving a chair? 4   In the past 7 days, how often have you been bothered by emotional problems such as feeling anxious, depressed, or irritable? 4   In the past 7 days, how would you rate your fatigue on average? 4   In the past 7 days, how would you rate your pain on average, where 0 means no pain, and 10 means worst imaginable pain? 4   Mental health question re-calculation - no clinical value 2   Physical health question re-calculation - no clinical value 2   Pain question re-calculation - no clinical value 3   Global Mental Health Score 8   Global Physical Health Score 11   PROMIS TOTAL - SUBSCORES 19   .    Patient Hx  Patient's history, medications and allergies were reviewed.     Height: Data Unavailable   Weight: 0 lbs 0 oz    Patient Active Problem List    Diagnosis Date Noted    Osteopenia of multiple sites 07/06/2023     Priority: Medium    Major depression, recurrent (H24) 05/19/2023     Priority: Medium    Migraine with aura and without status migrainosus, not intractable 10/16/2017     Priority: Medium    Diverticular disease of colon 10/07/2013     Priority: Medium    IBS (irritable bowel syndrome) 05/28/2008     Priority: Medium    Ovarian cyst 08/23/2007     Priority: Medium     Oophorectomy bilateral - premalignant tissue ?  1998  Problem list name updated by automated process. Provider to review      Esophageal reflux 11/13/2006     Priority: Medium    Generalized anxiety disorder 10/30/2006     Priority: Medium     Buspar prescribed 8/23/07      Raynaud's syndrome 01/04/2006     Priority: Medium    Tobacco use disorder 01/04/2006     Priority: Medium    Need for prophylactic hormone replacement therapy (postmenopausal) 01/04/2006     Priority: Medium      Prior to Admission medications    Medication Sig Start Date End Date Taking? Authorizing Provider   albuterol (VENTOLIN HFA) 108 (90 Base) MCG/ACT inhaler Inhale 2 puffs into the lungs every 4 hours as needed for shortness of breath 6/1/23   Gaby Shelton APRN CNP   butalbital-aspirin-caffeine (FIORINAL) -40 MG capsule TAKE 1 CAPSULE BY MOUTH EVERY 6 HOURS AS NEEDED FOR HEADACHES 6/21/23   Arturo Melendez MD   cetirizine (ZYRTEC) 10 MG tablet Take 1 tablet (10 mg) by mouth daily 6/1/23   Gaby Shelton APRN CNP   Cholecalciferol (VITAMIN D3 PO) Take 400 Units by mouth daily     Reported, Patient   estradiol (ESTRACE) 1 MG tablet Take 1 tablet (1 mg) by mouth daily 6/1/23   Gaby Shelton APRN CNP   estradiol (VAGIFEM) 10 MCG TABS vaginal tablet Place 1 tablet (10 mcg) vaginally twice a week 6/1/23   Gaby Shelton APRN CNP   gabapentin (NEURONTIN) 300 MG capsule TAKE 2 CAPSULES BY MOUTH AT BEDTIME 7/25/23   Gaby Shelton APRN CNP   hyoscyamine (LEVSIN) 0.125 MG tablet Take 1 tablet (125 mcg) by mouth every 4 hours as needed for cramping 6/21/22   Karlene Shay DNP   LORazepam (ATIVAN) 1 MG tablet Take 0.5-1 tablets (0.5-1 mg) by mouth every 6 hours as needed for anxiety 5/19/23   Gaby Shelton APRN CNP   lubiprostone (AMITIZA) 24 MCG capsule Take 24 mcg by mouth 2 times daily (with meals)    Reported, Patient   montelukast (SINGULAIR) 10 MG tablet Take 1 tablet (10 mg) by mouth At Bedtime 6/1/23   Gaby Shelton  ANGEL [place] CNP   multivitamin, therapeutic (THERA-VIT) TABS Take 1 tablet by mouth daily    Reported, Patient   omeprazole 20 MG tablet Take 20 mg by mouth daily    Reported, Patient   oxyBUTYnin ER (DITROPAN XL) 5 MG 24 hr tablet Take 1 tablet (5 mg) by mouth daily 9/12/23   Gaby Shelton APRN CNP   oxyCODONE (ROXICODONE) 5 MG tablet Take 0.5-1 tablets (2.5-5 mg) by mouth every 8 hours as needed for pain 9/12/23   Gaby Shelton APRN CNP   sucralfate (CARAFATE) 1 GM tablet Take 1 tablet (1 g) by mouth 4 times daily 6/1/23   Gaby Shelton APRN CNP   traMADol (ULTRAM) 50 MG tablet Take 50 mg by mouth every 6 hours as needed for severe pain  Patient not taking: Reported on 9/12/2023    Reported, Patient   varenicline (CHANTIX JOHNNY) 0.5 MG X 11 & 1 MG X 42 tablet Take 0.5 mg tab daily for 3 days, THEN 0.5 mg tab twice daily for 4 days, THEN 1 mg twice daily.  Patient not taking: Reported on 6/1/2023 5/19/23   Gaby Shelton APRN CNP     Allergies   Allergen Reactions    Cephalexin Hives     Patient states she gets hives - bad reaction not listed on her H & P.    Rizatriptan Dizziness    Acetaminophen Hives    Ciprofloxacin Hives     Got rash when taken with metronidazole + Vicodin  Other reaction(s): hives    Lactose Diarrhea    Metronidazole Hives     Rash when taken with cipro + Vicodin    Unknown [No Clinical Screening - See Comments]      Pt developed hives when taking flagyl, cipro, and vicodin, doesn't know which she is allergic to     Vicodin [Hydrocodone-Acetaminophen] Hives     Rash when taken with cipro and metronidazole     Past Medical History:   Diagnosis Date    Cocaine abuse, unspecified 2003    Dysplasia of cervix, unspecified 2000    Esophageal reflux 11/13/2006    Ganglion of joint 1/22/2007    Generalized anxiety disorder 10/30/2006    Buspar prescribed 8/23/07    IBS (irritable bowel syndrome) 5/28/2008    Need for prophylactic hormone replacement therapy  (postmenopausal)      Past Surgical History:   Procedure Laterality Date    ABLATE VEIN VARICOSE RADIO FREQUENCY WITHOUT PHLEBECTOMY MULTIPLE STAB  2014    Procedure: ABLATE VEIN VARICOSE RADIO FREQUENCY WITHOUT PHLEBECTOMY MULTIPLE STAB;  Surgeon: Khanh Bunch MD;  Location: [facility]    BIOPSY BREAST Left     COLONOSCOPY N/A 2023    Procedure: COLONOSCOPY, WITH POLYPECTOMY AND BIOPSY;  Surgeon: Ning Tran MD;  Location: [facility]    ENHANCE LASER REFRACTIVE BILATERAL EXISTING PT IN PARAMETERS Bilateral     LASIK    ESOPHAGOSCOPY, GASTROSCOPY, DUODENOSCOPY (EGD), COMBINED N/A 2023    Procedure: Esophagoscopy, gastroscopy, duodenoscopy, combined;  Surgeon: Aime Mak MD;  Location: [facility]    HYSTERECTOMY SUPRACERVICAL, BILATERAL SALPINGO-OOPHORECTOMY, COMBINED  2000    LAPAROSCOPIC ASSISTED COLECTOMY N/A 2015    Procedure: LAPAROSCOPIC ASSISTED COLECTOMY;  Surgeon: Khanh Bunch MD;  Location: [facility]    LAPAROSCOPIC CHOLECYSTECTOMY N/A 2017    Procedure: LAPAROSCOPIC CHOLECYSTECTOMY;  Surgeon: Levi Louis MD;  Location: [facility]    NERVE BLOCK PERIPHERAL Bilateral 2015    Procedure: NERVE BLOCK PERIPHERAL;  Surgeon: Generic Anesthesia Provider;  Location: [facility]    REPAIR PTOSIS BILATERAL Bilateral 10/11/2018    Procedure: REPAIR PTOSIS BILATERAL;  Bilateral Upper Eyelid Ptosis Repair;  Surgeon: Wilton Viramontes MD;  Location:  OR    SURGICAL HISTORY OF -       Breast implants    SURGICAL HISTORY OF -       Carpal  tunnel r hand    ZZC  DELIVERY ONLY  ,,    , Low Cervical     Family History   Problem Relation Age of Onset    Cancer Father         liver    Cancer Brother         lung, asbestos    Cancer Mother         uterine    Cancer Sister         uterine    Breast Cancer Other     Breast Cancer Other     Glaucoma No family hx of     Macular Degeneration No family hx of      Social History     Tobacco Use    Smoking  status: Every Day     Packs/day: 1.00     Years: 20.00     Pack years: 20.00     Types: Cigarettes    Smokeless tobacco: Never    Tobacco comments:     trying to quit   Substance Use Topics    Alcohol use: Never        Pre-Procedure Education & Consent  Procedure education was provided to: Patient  Teaching method: Explanation  Barriers to learning: No Barrier    Patient indicated understanding of pre-procedure instruction and appropriate consent was obtained and documented.    ____________________________________________________________________    Post-Procedure Documentation: GI Esophageal Manometry with 24 Hour Ph    Manometry catheter was placed via right nare to  52 cm and normal saline swallows given per protocol. Manometry catheter was removed at the end of test and the pH cathter was placed via right nare to 35 cm.      Diary and discharge instructions given to patient and patent instructed to return tomorrow for catheter removal.    Notification of pending test results sent to provider for interpretation. Please reference scanned document for final interpretation of results. Patient will follow up with referring provider for test results.    Jennifer Kirkpatrick RN on 10/9/2023 at 2:37 PM

## 2023-10-09 NOTE — PATIENT INSTRUCTIONS
Esophogeal Manometry with pH  1. Resume regular diet.  2. You may have a bloody nose or sore throat after the procedure.  3. You had a 24-hour probe placed, return the next day to have the probe removed.  Removal will take 5 minutes.  4. If you have questions call [phone] from 7:00am-5:00pm.  For afterhours questions call GI doctor on call at [phone].

## 2023-10-10 ENCOUNTER — ALLIED HEALTH/NURSE VISIT (OUTPATIENT)
Dept: GASTROENTEROLOGY | Facility: CLINIC | Age: 58
End: 2023-10-10
Payer: COMMERCIAL

## 2023-10-10 DIAGNOSIS — K21.9 HIATAL HERNIA WITH GERD: Primary | ICD-10-CM

## 2023-10-10 DIAGNOSIS — K44.9 HIATAL HERNIA WITH GERD: Primary | ICD-10-CM

## 2023-10-10 PROCEDURE — 99207 PR NO BILLABLE SERVICE THIS VISIT: CPT

## 2023-10-10 NOTE — PROGRESS NOTES
24hr pH probe removed without issue.  Monitor and diary retrieved for data upload.  Sent to reading provider: Dr. Raj Ocampo

## 2023-10-25 ENCOUNTER — MYC REFILL (OUTPATIENT)
Dept: FAMILY MEDICINE | Facility: CLINIC | Age: 58
End: 2023-10-25
Payer: COMMERCIAL

## 2023-10-25 DIAGNOSIS — G43.009 MIGRAINE WITHOUT AURA AND WITHOUT STATUS MIGRAINOSUS, NOT INTRACTABLE: ICD-10-CM

## 2023-10-25 RX ORDER — BUTALBITAL, ASPIRIN, AND CAFFEINE 325; 50; 40 MG/1; MG/1; MG/1
1 CAPSULE ORAL EVERY 6 HOURS PRN
Qty: 30 CAPSULE | Refills: 0 | OUTPATIENT
Start: 2023-10-25

## 2023-10-26 ENCOUNTER — MYC MEDICAL ADVICE (OUTPATIENT)
Dept: FAMILY MEDICINE | Facility: CLINIC | Age: 58
End: 2023-10-26
Payer: COMMERCIAL

## 2023-10-26 ENCOUNTER — E-VISIT (OUTPATIENT)
Dept: FAMILY MEDICINE | Facility: CLINIC | Age: 58
End: 2023-10-26
Payer: COMMERCIAL

## 2023-10-26 DIAGNOSIS — G47.00 INSOMNIA, UNSPECIFIED TYPE: Primary | ICD-10-CM

## 2023-10-26 PROCEDURE — 99421 OL DIG E/M SVC 5-10 MIN: CPT | Performed by: NURSE PRACTITIONER

## 2023-10-26 RX ORDER — BUTALBITAL, ASPIRIN, AND CAFFEINE 325; 50; 40 MG/1; MG/1; MG/1
1 CAPSULE ORAL EVERY 6 HOURS PRN
Qty: 30 CAPSULE | Refills: 0 | Status: SHIPPED | OUTPATIENT
Start: 2023-10-26 | End: 2024-04-19

## 2023-10-26 RX ORDER — ZOLPIDEM TARTRATE 5 MG/1
5 TABLET ORAL
Qty: 30 TABLET | Refills: 5 | Status: SHIPPED | OUTPATIENT
Start: 2023-10-26 | End: 2023-11-06

## 2023-10-26 NOTE — PATIENT INSTRUCTIONS
Thank you for choosing us for your care. I have placed an order for a prescription (Ambien) so that you can start treatment. View your full visit summary for details by clicking on the link below. Your pharmacist will able to address any questions you may have about the medication.     If you're not feeling better within 5-7 days, please schedule an appointment.  You can schedule an appointment right here in [facility], or call [phone]  If the visit is for the same symptoms as your eVisit, we'll refund the cost of your eVisit if seen within seven days.

## 2023-11-06 ENCOUNTER — MYC MEDICAL ADVICE (OUTPATIENT)
Dept: FAMILY MEDICINE | Facility: CLINIC | Age: 58
End: 2023-11-06
Payer: COMMERCIAL

## 2023-11-06 DIAGNOSIS — G47.00 INSOMNIA, UNSPECIFIED TYPE: Primary | ICD-10-CM

## 2023-11-06 DIAGNOSIS — G43.009 MIGRAINE WITHOUT AURA AND WITHOUT STATUS MIGRAINOSUS, NOT INTRACTABLE: ICD-10-CM

## 2023-11-06 NOTE — TELEPHONE ENCOUNTER
See my chart message    Requesting medication for sleep Amitriptyline    Reports headache from Ambien    [facility]    [facility]

## 2023-12-06 NOTE — MR AVS SNAPSHOT
After Visit Summary   10/13/2017    Fatou Simental    MRN: [id number]           Patient Information     Date Of Birth          1965        Visit Information        Provider Department      10/13/2017 3:30 PM Adrian Amaya DO [facility]        Today's Diagnoses     Vaginal atrophy    -  1    POSTMENOPAUSAL HORMONAL REPLACEMENT TX          Care Instructions      Preventive Health Recommendations  Female Ages 50 - 64    Yearly exam: See your health care provider every year in order to  o Review health changes.   o Discuss preventive care.    o Review your medicines if your doctor has prescribed any.      Get a Pap test every three years (unless you have an abnormal result and your provider advises testing more often).    If you get Pap tests with HPV test, you only need to test every 5 years, unless you have an abnormal result.     You do not need a Pap test if your uterus was removed (hysterectomy) and you have not had cancer.    You should be tested each year for STDs (sexually transmitted diseases) if you're at risk.     Have a mammogram every 1 to 2 years.    Have a colonoscopy at age 50, or have a yearly FIT test (stool test). These exams screen for colon cancer.      Have a cholesterol test every 5 years, or more often if advised.    Have a diabetes test (fasting glucose) every three years. If you are at risk for diabetes, you should have this test more often.     If you are at risk for osteoporosis (brittle bone disease), think about having a bone density scan (DEXA).    Shots: Get a flu shot each year. Get a tetanus shot every 10 years.    Nutrition:     Eat at least 5 servings of fruits and vegetables each day.    Eat whole-grain bread, whole-wheat pasta and brown rice instead of white grains and rice.    Talk to your provider about Calcium and Vitamin D.     Lifestyle    Exercise at least 150 minutes a week (30 minutes a day, 5 days a week). This will help you  "control your weight and prevent disease.    Limit alcohol to one drink per day.    No smoking.     Wear sunscreen to prevent skin cancer.     See your dentist every six months for an exam and cleaning.    See your eye doctor every 1 to 2 years.            Follow-ups after your visit        Follow-up notes from your care team     Return in about 6 weeks (around 11/24/2017).      Who to contact     If you have questions or need follow up information about today's clinic visit or your schedule please contact [facility] directly at [phone].  Normal or non-critical lab and imaging results will be communicated to you by Meiaojuhart, letter or phone within 4 business days after the clinic has received the results. If you do not hear from us within 7 days, please contact the clinic through BALALIKEAt or phone. If you have a critical or abnormal lab result, we will notify you by phone as soon as possible.  Submit refill requests through Hello Market or call your pharmacy and they will forward the refill request to us. Please allow 3 business days for your refill to be completed.          Additional Information About Your Visit        [email or web address] Information     Hello Market gives you secure access to your electronic health record. If you see a primary care provider, you can also send messages to your care team and make appointments. If you have questions, please call your primary care clinic.  If you do not have a primary care provider, please call [phone] and they will assist you.        Care EveryWhere ID     This is your Care EveryWhere ID. This could be used by other organizations to access your [place] medical records  [id number]        Your Vitals Were     Pulse Height BMI (Body Mass Index)             67 5' 2\" (1.575 m) 21.22 kg/m2          Blood Pressure from Last 3 Encounters:   10/13/17 99/62   10/10/17 106/60   09/05/17 122/89    Weight from Last 3 Encounters:   10/13/17 116 lb (52.6 kg)   10/10/17 113 lb " 3.2 oz (51.3 kg)   09/05/17 116 lb (52.6 kg)              We Performed the Following     Anti Treponema     CBC with platelets     Chlamydia trachomatis PCR     Estradiol     Hepatitis B surface antigen     Hepatitis C antibody     HIV Antigen Antibody Combo     HPV High Risk Types DNA Cervical     Neisseria gonorrhoeae PCR     Pap imaged thin layer screen with HPV - recommended age 30 - 65 years (select HPV order below)     Testosterone Free and Total     TSH with free T4 reflex          Today's Medication Changes          These changes are accurate as of: 10/13/17  4:08 PM.  If you have any questions, ask your nurse or doctor.               Start taking these medicines.        Dose/Directions    estradiol 10 MCG Tabs vaginal tablet   Commonly known as:  VAGIFEM   Used for:  Need for prophylactic hormone replacement therapy (postmenopausal)   Started by:  Adrian Amaya DO        Dose:  10 mcg   Start taking on:  10/16/2017   Place 1 tablet (10 mcg) vaginally twice a week   Quantity:  8 tablet   Refills:  1            Where to get your medicines      These medications were sent to [facility] [place] [address] [facility]  [address] [facility] [address]     Phone:  [phone]     estradiol 1 MG tablet    estradiol 10 MCG Tabs vaginal tablet                Primary Care Provider Office Phone # Fax #    R Chucho [place] [phone] [address] [facility] [address]        Equal Access to Services     LAURA SHARIF AH: Hadii katelyn sosa hadasho Soomaali, waaxda luqadaha, qaybta kaalmada adeegyada, tiffanie farias. So [facility] [phone].    ATENCIÓN: Si habla español, tiene a saldaña disposición servicios gratuitos de asistencia lingüística. Llame al [phone].    We comply with applicable federal civil rights laws and [place] laws. We do not discriminate on the basis of race, color, national origin, age, disability, sex, sexual orientation,  or gender identity.            Thank you!     Thank you for choosing [facility]  for your care. Our goal is always to provide you with excellent care. Hearing back from our patients is one way we can continue to improve our services. Please take a few minutes to complete the written survey that you may receive in the mail after your visit with us. Thank you!             Your Updated Medication List - Protect others around you: Learn how to safely use, store and throw away your medicines at www.disposemymeds.org.          This list is accurate as of: 10/13/17  4:08 PM.  Always use your most recent med list.                   Brand Name Dispense Instructions for use Diagnosis    BENADRYL PO      Take 50 mg by mouth nightly as needed        buPROPion 150 MG 24 hr tablet    WELLBUTRIN XL    30 tablet    Take 1 tablet (150 mg) by mouth every morning    Nicotine dependence, uncomplicated, unspecified nicotine product type       cetirizine 10 MG tablet    zyrTEC    30 tablet    Take 1 tablet (10 mg) by mouth every evening    Seasonal allergic rhinitis, unspecified chronicity, unspecified trigger       estradiol 1 MG tablet    ESTRACE    90 tablet    Take 1 tablet (1 mg) by mouth every 72 hours    Need for prophylactic hormone replacement therapy (postmenopausal)       estradiol 10 MCG Tabs vaginal tablet   Start taking on:  10/16/2017    VAGIFEM    8 tablet    Place 1 tablet (10 mcg) vaginally twice a week    Need for prophylactic hormone replacement therapy (postmenopausal)       gabapentin 100 MG capsule    NEURONTIN    90 capsule    Take 1 capsule (100 mg) by mouth At Bedtime    Vulvodynia       LORazepam 0.5 MG tablet    ATIVAN    60 tablet    Take 1 tablet by mouth twice daily as needed    Generalized anxiety disorder       meclizine 25 MG tablet    ANTIVERT    30 tablet    Take 1 tablet (25 mg) by mouth every 6 hours as needed for dizziness    Benign paroxysmal positional vertigo of right ear        multivitamin, therapeutic Tabs tablet      Take 1 tablet by mouth daily        omeprazole 20 MG tablet     90 tablet    Take 1 tablet (20 mg) by mouth daily Take 30-60 minutes before a meal.    Gastroesophageal reflux disease without esophagitis       SUMAtriptan 50 MG tablet    IMITREX    10 tablet    Take 1 tablet (50 mg) by mouth at onset of headache for migraine (May repeat x1 after 2hour if HA recurs)    Migraine with aura and without status migrainosus, not intractable       VITAMIN D3 PO      Take 400 Units by mouth daily           Home

## 2023-12-13 ENCOUNTER — ONCOLOGY VISIT (OUTPATIENT)
Dept: SURGERY | Facility: CLINIC | Age: 58
End: 2023-12-13
Attending: THORACIC SURGERY (CARDIOTHORACIC VASCULAR SURGERY)
Payer: COMMERCIAL

## 2023-12-13 ENCOUNTER — ANCILLARY PROCEDURE (OUTPATIENT)
Dept: GENERAL RADIOLOGY | Facility: CLINIC | Age: 58
End: 2023-12-13
Attending: CLINICAL NURSE SPECIALIST
Payer: COMMERCIAL

## 2023-12-13 VITALS
SYSTOLIC BLOOD PRESSURE: 106 MMHG | HEART RATE: 73 BPM | TEMPERATURE: 98 F | RESPIRATION RATE: 16 BRPM | OXYGEN SATURATION: 100 % | WEIGHT: 104.9 LBS | BODY MASS INDEX: 19.82 KG/M2 | DIASTOLIC BLOOD PRESSURE: 67 MMHG

## 2023-12-13 DIAGNOSIS — K21.9 HIATAL HERNIA WITH GERD: Primary | ICD-10-CM

## 2023-12-13 DIAGNOSIS — K44.9 HIATAL HERNIA WITH GERD: Primary | ICD-10-CM

## 2023-12-13 DIAGNOSIS — K21.9 HIATAL HERNIA WITH GERD: ICD-10-CM

## 2023-12-13 DIAGNOSIS — K44.9 HIATAL HERNIA WITH GERD: ICD-10-CM

## 2023-12-13 PROCEDURE — G0463 HOSPITAL OUTPT CLINIC VISIT: HCPCS | Performed by: THORACIC SURGERY (CARDIOTHORACIC VASCULAR SURGERY)

## 2023-12-13 PROCEDURE — 74221 X-RAY XM ESOPHAGUS 2CNTRST: CPT | Mod: GC | Performed by: STUDENT IN AN ORGANIZED HEALTH CARE EDUCATION/TRAINING PROGRAM

## 2023-12-13 PROCEDURE — 99214 OFFICE O/P EST MOD 30 MIN: CPT | Performed by: THORACIC SURGERY (CARDIOTHORACIC VASCULAR SURGERY)

## 2023-12-13 RX ORDER — LINACLOTIDE 290 UG/1
CAPSULE, GELATIN COATED ORAL
COMMUNITY
Start: 2023-06-19 | End: 2024-06-11

## 2023-12-13 ASSESSMENT — PAIN SCALES - GENERAL: PAINLEVEL: NO PAIN (0)

## 2023-12-13 NOTE — LETTER
12/13/2023         RE: Fatou Simental  [address] [place] [address]  [place] [address]        Dear Colleague,    Thank you for referring your patient, Fatou Simental, to the [facility]. Please see a copy of my visit note below.    THORACIC SURGERY FOLLOW UP VISIT    I saw Ms Monica Simental in follow-up today. The clinical summary follows:     DIAGNOSIS   GERD and hiatal hernia     INTERVAL STUDIES  HRM 10/9/23: EGJOO with hiatal hernia.   OFF therapy pH study 10/9/23: Normal acid exposure.  DeMeester 5.5  Esophagram        Previsit tests  EGD 5/31/23: Ringed esophagus, biopsies taken (negative for intraepithelial eosinophils). 4 cm hiatal hernia         CT scan 4/11/23: No evidence of hiatal hernia.        Past Medical History:   Diagnosis Date    Cocaine abuse, unspecified 2003    Dysplasia of cervix, unspecified 2000    Esophageal reflux 11/13/2006    Ganglion of joint 1/22/2007    Generalized anxiety disorder 10/30/2006    Buspar prescribed 8/23/07    IBS (irritable bowel syndrome) 5/28/2008    Need for prophylactic hormone replacement therapy (postmenopausal)      Past Surgical History:   Procedure Laterality Date    ABLATE VEIN VARICOSE RADIO FREQUENCY WITHOUT PHLEBECTOMY MULTIPLE STAB  7/24/2014    Procedure: ABLATE VEIN VARICOSE RADIO FREQUENCY WITHOUT PHLEBECTOMY MULTIPLE STAB;  Surgeon: Khanh Bunch MD;  Location: [facility]    BIOPSY BREAST Left     COLONOSCOPY N/A 5/4/2023    Procedure: COLONOSCOPY, WITH POLYPECTOMY AND BIOPSY;  Surgeon: Ning Tran MD;  Location: [facility]    ENHANCE LASER REFRACTIVE BILATERAL EXISTING PT IN PARAMETERS Bilateral     LASIK    ESOPHAGOSCOPY, GASTROSCOPY, DUODENOSCOPY (EGD), COMBINED N/A 5/31/2023    Procedure: Esophagoscopy, gastroscopy, duodenoscopy, combined;  Surgeon: Aime Mak MD;  Location: [facility]    HYSTERECTOMY SUPRACERVICAL, BILATERAL SALPINGO-OOPHORECTOMY, COMBINED  2000    LAPAROSCOPIC ASSISTED COLECTOMY N/A  2015    Procedure: LAPAROSCOPIC ASSISTED COLECTOMY;  Surgeon: Khanh Bunch MD;  Location: [facility]    LAPAROSCOPIC CHOLECYSTECTOMY N/A 2017    Procedure: LAPAROSCOPIC CHOLECYSTECTOMY;  Surgeon: Levi Louis MD;  Location: [facility]    NERVE BLOCK PERIPHERAL Bilateral 2015    Procedure: NERVE BLOCK PERIPHERAL;  Surgeon: Generic Anesthesia Provider;  Location: [facility]    REPAIR PTOSIS BILATERAL Bilateral 10/11/2018    Procedure: REPAIR PTOSIS BILATERAL;  Bilateral Upper Eyelid Ptosis Repair;  Surgeon: Wilton Viramontes MD;  Location:  OR    SURGICAL HISTORY OF -       Breast implants    SURGICAL HISTORY OF -       Carpal  tunnel r hand    ZZC  DELIVERY ONLY  ,,    , Low Cervical        Allergies   Allergen Reactions    Cephalexin Hives     Patient states she gets hives - bad reaction not listed on her H & P.    Rizatriptan Dizziness    Acetaminophen Hives    Ciprofloxacin Hives     Got rash when taken with metronidazole + Vicodin  Other reaction(s): hives    Lactose Diarrhea    Metronidazole Hives     Rash when taken with cipro + Vicodin    Unknown [No Clinical Screening - See Comments]      Pt developed hives when taking flagyl, cipro, and vicodin, doesn't know which she is allergic to     Vicodin [Hydrocodone-Acetaminophen] Hives     Rash when taken with cipro and metronidazole     Current Outpatient Medications   Medication    albuterol (VENTOLIN HFA) 108 (90 Base) MCG/ACT inhaler    amitriptyline (ELAVIL) 25 MG tablet    butalbital-aspirin-caffeine (FIORINAL) -40 MG capsule    cetirizine (ZYRTEC) 10 MG tablet    Cholecalciferol (VITAMIN D3 PO)    estradiol (ESTRACE) 1 MG tablet    estradiol (VAGIFEM) 10 MCG TABS vaginal tablet    gabapentin (NEURONTIN) 300 MG capsule    hyoscyamine (LEVSIN) 0.125 MG tablet    LORazepam (ATIVAN) 1 MG tablet    lubiprostone (AMITIZA) 24 MCG capsule    montelukast (SINGULAIR) 10 MG tablet    multivitamin, therapeutic  (THERA-VIT) TABS    omeprazole 20 MG tablet    oxyBUTYnin ER (DITROPAN XL) 5 MG 24 hr tablet    oxyCODONE (ROXICODONE) 5 MG tablet    sucralfate (CARAFATE) 1 GM tablet    traMADol (ULTRAM) 50 MG tablet    varenicline (CHANTIX JOHNNY) 0.5 MG X 11 & 1 MG X 42 tablet     No current facility-administered medications for this visit.     Social History     Tobacco Use    Smoking status: Every Day     Packs/day: 1.00     Years: 20.00     Additional pack years: 0.00     Total pack years: 20.00     Types: Cigarettes    Smokeless tobacco: Never    Tobacco comments:     trying to quit   Vaping Use    Vaping Use: Never used   Substance Use Topics    Alcohol use: Never    Drug use: No       Exam  /67   Pulse 73   Temp 98  F (36.7  C)   Resp 16   Wt 47.6 kg (104 lb 14.4 oz)   SpO2 100%   BMI 19.82 kg/m    Alert and oriented. NAD  Bilateral breath sounds.   Abd ND NT    SUBJECTIVE  She comes to clinic for a follow up. She was not able to get into GI clinic. Her main symptoms are constipation and cramping. She has minimal heartburn and occasional regurgitation. She takes PPI almost daily.     From a personal perspective, she comes to clinic alone.     IMPRESSION   58 year old female patient with GERD and hiatal hernia.     PLAN  I spent 30 min on the date of the encounter in chart review, patient visit, review of tests, documentation and/or discussion with other providers about the issues documented above. I reviewed the plan as follows:  I discussed the results of the ambulatory pH monitoring (which were normal) and the esophagram.   Referral to GI for diagnosis and managing of constipation and cramping. I suggested start taking soluble fiber.   F/up in 6 months.   Smoking Cessation: we strongly encouraged her to quit smoking.   All questions were answered and the patient and present family were in agreement with the plan.  I appreciate the opportunity to participate in the care of your patient and will keep you  updated.  Sincerely,        Horace Arias MD

## 2023-12-13 NOTE — PROGRESS NOTES
THORACIC SURGERY FOLLOW UP VISIT    I saw Ms Monica Simental in follow-up today. The clinical summary follows:     DIAGNOSIS   GERD and hiatal hernia     INTERVAL STUDIES  HRM 10/9/23: EGJOO with hiatal hernia.   OFF therapy pH study 10/9/23: Normal acid exposure.  DeMeester 5.5  Esophagram        Previsit tests  EGD 5/31/23: Ringed esophagus, biopsies taken (negative for intraepithelial eosinophils). 4 cm hiatal hernia         CT scan 4/11/23: No evidence of hiatal hernia.        Past Medical History:   Diagnosis Date    Cocaine abuse, unspecified 2003    Dysplasia of cervix, unspecified 2000    Esophageal reflux 11/13/2006    Ganglion of joint 1/22/2007    Generalized anxiety disorder 10/30/2006    Buspar prescribed 8/23/07    IBS (irritable bowel syndrome) 5/28/2008    Need for prophylactic hormone replacement therapy (postmenopausal)      Past Surgical History:   Procedure Laterality Date    ABLATE VEIN VARICOSE RADIO FREQUENCY WITHOUT PHLEBECTOMY MULTIPLE STAB  7/24/2014    Procedure: ABLATE VEIN VARICOSE RADIO FREQUENCY WITHOUT PHLEBECTOMY MULTIPLE STAB;  Surgeon: Khanh Bunch MD;  Location: [facility]    BIOPSY BREAST Left     COLONOSCOPY N/A 5/4/2023    Procedure: COLONOSCOPY, WITH POLYPECTOMY AND BIOPSY;  Surgeon: Ning Tran MD;  Location: [facility]    ENHANCE LASER REFRACTIVE BILATERAL EXISTING PT IN PARAMETERS Bilateral     LASIK    ESOPHAGOSCOPY, GASTROSCOPY, DUODENOSCOPY (EGD), COMBINED N/A 5/31/2023    Procedure: Esophagoscopy, gastroscopy, duodenoscopy, combined;  Surgeon: Aime Mak MD;  Location: [facility]    HYSTERECTOMY SUPRACERVICAL, BILATERAL SALPINGO-OOPHORECTOMY, COMBINED  2000    LAPAROSCOPIC ASSISTED COLECTOMY N/A 2/5/2015    Procedure: LAPAROSCOPIC ASSISTED COLECTOMY;  Surgeon: Khanh Bunch MD;  Location: [facility]    LAPAROSCOPIC CHOLECYSTECTOMY N/A 4/11/2017    Procedure: LAPAROSCOPIC CHOLECYSTECTOMY;  Surgeon: Levi Louis MD;  Location: [facility]    NERVE BLOCK  PERIPHERAL Bilateral 2015    Procedure: NERVE BLOCK PERIPHERAL;  Surgeon: Generic Anesthesia Provider;  Location: [facility]    REPAIR PTOSIS BILATERAL Bilateral 10/11/2018    Procedure: REPAIR PTOSIS BILATERAL;  Bilateral Upper Eyelid Ptosis Repair;  Surgeon: Wilton Viramontes MD;  Location:  OR    SURGICAL HISTORY OF -       Breast implants    SURGICAL HISTORY OF -       Carpal  tunnel r hand    ZZC  DELIVERY ONLY  ,,    , Low Cervical        Allergies   Allergen Reactions    Cephalexin Hives     Patient states she gets hives - bad reaction not listed on her H & P.    Rizatriptan Dizziness    Acetaminophen Hives    Ciprofloxacin Hives     Got rash when taken with metronidazole + Vicodin  Other reaction(s): hives    Lactose Diarrhea    Metronidazole Hives     Rash when taken with cipro + Vicodin    Unknown [No Clinical Screening - See Comments]      Pt developed hives when taking flagyl, cipro, and vicodin, doesn't know which she is allergic to     Vicodin [Hydrocodone-Acetaminophen] Hives     Rash when taken with cipro and metronidazole     Current Outpatient Medications   Medication    albuterol (VENTOLIN HFA) 108 (90 Base) MCG/ACT inhaler    amitriptyline (ELAVIL) 25 MG tablet    butalbital-aspirin-caffeine (FIORINAL) -40 MG capsule    cetirizine (ZYRTEC) 10 MG tablet    Cholecalciferol (VITAMIN D3 PO)    estradiol (ESTRACE) 1 MG tablet    estradiol (VAGIFEM) 10 MCG TABS vaginal tablet    gabapentin (NEURONTIN) 300 MG capsule    hyoscyamine (LEVSIN) 0.125 MG tablet    LORazepam (ATIVAN) 1 MG tablet    lubiprostone (AMITIZA) 24 MCG capsule    montelukast (SINGULAIR) 10 MG tablet    multivitamin, therapeutic (THERA-VIT) TABS    omeprazole 20 MG tablet    oxyBUTYnin ER (DITROPAN XL) 5 MG 24 hr tablet    oxyCODONE (ROXICODONE) 5 MG tablet    sucralfate (CARAFATE) 1 GM tablet    traMADol (ULTRAM) 50 MG tablet    varenicline (CHANTIX JOHNNY) 0.5 MG X 11 & 1 MG X 42 tablet      No current facility-administered medications for this visit.     Social History     Tobacco Use    Smoking status: Every Day     Packs/day: 1.00     Years: 20.00     Additional pack years: 0.00     Total pack years: 20.00     Types: Cigarettes    Smokeless tobacco: Never    Tobacco comments:     trying to quit   Vaping Use    Vaping Use: Never used   Substance Use Topics    Alcohol use: Never    Drug use: No       Exam  /67   Pulse 73   Temp 98  F (36.7  C)   Resp 16   Wt 47.6 kg (104 lb 14.4 oz)   SpO2 100%   BMI 19.82 kg/m    Alert and oriented. NAD  Bilateral breath sounds.   Abd ND NT    SUBJECTIVE  She comes to clinic for a follow up. She was not able to get into GI clinic. Her main symptoms are constipation and cramping. She has minimal heartburn and occasional regurgitation. She takes PPI almost daily.     From a personal perspective, she comes to clinic alone.     IMPRESSION   58 year old female patient with GERD and hiatal hernia.     PLAN  I spent 30 min on the date of the encounter in chart review, patient visit, review of tests, documentation and/or discussion with other providers about the issues documented above. I reviewed the plan as follows:  I discussed the results of the ambulatory pH monitoring (which were normal) and the esophagram.   Referral to GI for diagnosis and managing of constipation and cramping. I suggested start taking soluble fiber.   F/up in 6 months.   Smoking Cessation: we strongly encouraged her to quit smoking.   All questions were answered and the patient and present family were in agreement with the plan.  I appreciate the opportunity to participate in the care of your patient and will keep you updated.  Sincerely,

## 2023-12-13 NOTE — NURSING NOTE
"Oncology Rooming Note    December 13, 2023 2:26 PM   Fatou Simental is a 58 year old female who presents for:    Chief Complaint   Patient presents with    Oncology Clinic Visit     Hiatal hernia with GERD       Initial Vitals: /67   Pulse 73   Temp 98  F (36.7  C)   Resp 16   Wt 47.6 kg (104 lb 14.4 oz)   SpO2 100%   BMI 19.82 kg/m   Estimated body mass index is 19.82 kg/m  as calculated from the following:    Height as of 10/9/23: 1.549 m (5' 1\").    Weight as of this encounter: 47.6 kg (104 lb 14.4 oz). Body surface area is 1.43 meters squared.  No Pain (0) Comment: Data Unavailable   No LMP recorded. Patient has had a hysterectomy.  Allergies reviewed: Yes  Medications reviewed: Yes    Medications: Medication refills not needed today.  Pharmacy name entered into EPIC:    [facility] - [place], [place] - [address] [facility] [place] - [address]  [facility] [id number] - [place], [place] - [facility] [facility] [place], [place] - [id number]    Clinical concerns: no other complaints        Solomon Alvarez"

## 2023-12-21 DIAGNOSIS — K59.00 CONSTIPATION: Primary | ICD-10-CM

## 2024-01-17 ENCOUNTER — TELEPHONE (OUTPATIENT)
Dept: GASTROENTEROLOGY | Facility: CLINIC | Age: 59
End: 2024-01-17
Payer: COMMERCIAL

## 2024-01-17 NOTE — TELEPHONE ENCOUNTER
Writer received a message from Leslie Calvin to help get Pt scheduled for an appointment, [place]. Writer called and left a message for the Pt, along with call back number. Writer will also send Pt a CitiSent message.

## 2024-01-18 NOTE — TELEPHONE ENCOUNTER
"              After Visit Summary   2017    Charleen Childress    MRN: [id number]           Patient Information     Date Of Birth          1944        Visit Information        Provider Department      2017 1:00 PM Rosa M Ayoub PA-C [facility]        Today's Diagnoses     Impacted cerumen of right ear    -  1    Discomfort of right ear          Care Instructions      Please FOLLOW UP at primary care clinic if not improving, new symptoms, worse or this does not resolve.  [facility]  [phone]            Follow-ups after your visit        Who to contact     You can reach your care team any time of the day by calling [phone].  Notification of test results:  If you have an abnormal lab result, we will notify you by phone as soon as possible.         Additional Information About Your Visit        MyChart Information     [email or web address] lets you send messages to your doctor, view your test results, renew your prescriptions, schedule appointments and more. To sign up, go to [email or web address] . Click on \"Log in\" on the left side of the screen, which will take you to the Welcome page. Then click on \"Sign up Now\" on the right side of the page.     You will be asked to enter the access code listed below, as well as some personal information. Please follow the directions to create your username and password.     Your access code is: [email or web address]  Expires: 3/1/2018  1:29 PM     Your access code will  in 90 days. If you need help or a new code, please call your [facility] or [phone].        Care EveryWhere ID     This is your Care EveryWhere ID. This could be used by other organizations to access your [place] medical records  [id number]        Your Vitals Were     Pulse Temperature Pulse Oximetry             60 97.9  F (36.6  C) (Oral) 98%          Blood Pressure from Last 3 Encounters:   17 140/71   17 124/77   16 150/74    Weight " Pt called back. Pt is scheduled for a virtual visit with Leslie Calvin on 1/30/2024 at 1 PM.    from Last 3 Encounters:   11/23/16 141 lb (64 kg)   02/06/13 149 lb (67.6 kg)   06/12/12 145 lb (65.8 kg)              We Performed the Following     HC REMOVAL IMPACTED CERUMEN IRRIGATION/LVG UNILAT        Primary Care Provider Office Phone # Fax #    Tai Randhawa [phone]       XXX RESIGNED  [address] [place] [address]        Equal Access to Services     [facility]: Hadii aad ku hadasho Soomaali, waaxda luqadaha, qaybta kaalmada adeegyada, waxay idiin hayaan adeeg kharaben lakristofer . So [facility] [phone].    ATENCIÓN: Si habla español, tiene a medrano disposición servicios gratuitos de asistencia lingüística. [facility] [phone].    We comply with applicable federal civil rights laws and [place] laws. We do not discriminate on the basis of race, color, national origin, age, disability, sex, sexual orientation, or gender identity.            Thank you!     Thank you for choosing [facility]  for your care. Our goal is always to provide you with excellent care. Hearing back from our patients is one way we can continue to improve our services. Please take a few minutes to complete the written survey that you may receive in the mail after your visit with us. Thank you!             Your Updated Medication List - Protect others around you: Learn how to safely use, store and throw away your medicines at www.disposemymeds.org.          This list is accurate as of: 12/1/17  1:29 PM.  Always use your most recent med list.                   Brand Name Dispense Instructions for use Diagnosis    BENADRYL PO

## 2024-01-23 DIAGNOSIS — F41.1 GENERALIZED ANXIETY DISORDER: ICD-10-CM

## 2024-01-23 RX ORDER — LORAZEPAM 1 MG/1
.5-1 TABLET ORAL EVERY 6 HOURS PRN
Qty: 20 TABLET | Refills: 0 | Status: SHIPPED | OUTPATIENT
Start: 2024-01-23

## 2024-01-26 NOTE — TELEPHONE ENCOUNTER
Records Requested     January 26, 2024 9:50 AM  [place] [address]   Facility  [facility]   Fax: [phone]   Outcome Urgent request faxed to [facility] for records.     1/29/24 3:20 PM - Second request faxed to [facility] for records.     1/30/24 8:10 AM - Called [facility] to follow up on records. No answer, sent 3rd urgent request to send records.        REFERRAL INFORMATION:  Referring Provider:  Lee Ann Smith MD  Referring Clinic:  [facility]   Reason for Visit/Diagnosis: Dr Arias is referring. Wants patient evaluated by GI HERE. Has a small hiatal hernia with normal pH study OFF therapy. Is no indication for surgery. Moreover, her symptoms are related to constipation not to acid reflux. And... she is a current smoker     FUTURE VISIT INFORMATION:  Appointment Date: 1/30/24  Appointment Time: 1:00 PM      NOTES STATUS DETAILS   OFFICE NOTE from Referring Provider Internal 12/13/23, 6/14/23 - ONC Surg OV with Lee Ann Kelely MD    OFFICE NOTE from Other Specialist Internal 9/12/23, 6/1/23, 1/18/22 - PCC OV with Quiana Shelton APRN CNP at [facility]     5/26/23, 4/21/23 - GI [facility]    6/21/22, 2/8/22, 1/20/22, 10/30/20 - PCC OV with Karlene Shay NP at [facility]     11/9/20 - GI OV with Jess Nagel PA-C at [facility]     4/13/17 - Gen Surg OV with Levi Louis MD at [facility]     1/27/16, 3/27/15, 2/20/15, 12/5/14 - Gen Surg OV with Khanh Bunch MD at [facility]    HOSPITAL DISCHARGE SUMMARY/  ED VISITS Internal 4/26/23 - [facility] ED visit with Shiva Aponte MD     4/11/23 - [facility] ED visit with Shiva Aponte MD     1/17/22 - [facility] ED visit with Reena Gambino PA-C     10/6/20 - [facility] ED visit with Bonifacio Colon MD     2/13/19 - [facility] ED visit with Shine Rivera MD     11/14/16 - [facility] ED visit with Rebekah  Anita Fraga, APRN CNP     9/11/14 - [facility] ED visit with Armand Jenkins MD     8/3/13 - [facility] ED visit with Raj Melendez PA-C     7/23/13 - [facility] ED visit with Isabel Elaine PA-C     7/21/23 - [facility] ED visit with Quiana Hurd PA-C    OPERATIVE REPORT Internal 4/11/17 - LAPAROSCOPIC CHOLECYSTECTOMY - Levi Louis MD - [facility]     2/5/15 - LAPAROSCOPIC ASSISTED SIGMOID COLECTOMY - Khanh Bunch MD - [facility]    MEDICATION LIST Internal         ENDOSCOPY  Internal [facility]:   10/9/23 - Esophageal Manometry and Motility study   5/31/23 - EGD     COLONOSCOPY Internal [facility]:   5/4/23, 6/7/10 - Colonoscopy     [facility]:   10/10/14 - Flex Sig  10/1/13 - Colonoscopy    STOOL TESTING Internal Cologuard - 4/18/23(+)    PERTINENT LABS Internal 4/26/23 - CMP; CBC/diff   PATHOLOGY REPORTS (RELATED) Internal 4/31/23 - EGD bx   5/4/23, 10/10/14 - Colon bx   4/11/17 - Gallbladder  2/5/15 - Sigmoid colon resection   IMAGING (CT, MRI, EGD, MRCP, Small Bowel Follow Through/SBT, MR/CT Enterography) Internal XR Esophagram - 12/13/23    XR Abdomen - 4/26/23, 1/20/22, 2/13/19, 11/14/16, 2/13/15, 9/11/14, 8/4/13, 7/23/13    CT Abdomen Pelvis - 4/11/23, 1/17/22, 10/6/20, 11/11/16, 9/11/14, 8/9/13, 7/22/13    US Pelvic - 1/19/22    XR Ribs & Chest - 8/12/21    XR Chest - 7/19/18    NM Hepatobiliary Scan - 3/23/17

## 2024-01-30 ENCOUNTER — VIRTUAL VISIT (OUTPATIENT)
Dept: GASTROENTEROLOGY | Facility: CLINIC | Age: 59
End: 2024-01-30
Payer: COMMERCIAL

## 2024-01-30 ENCOUNTER — DOCUMENTATION ONLY (OUTPATIENT)
Dept: GASTROENTEROLOGY | Facility: CLINIC | Age: 59
End: 2024-01-30

## 2024-01-30 ENCOUNTER — PRE VISIT (OUTPATIENT)
Dept: GASTROENTEROLOGY | Facility: CLINIC | Age: 59
End: 2024-01-30

## 2024-01-30 DIAGNOSIS — R19.8 ABNORMAL BOWEL MOVEMENT: ICD-10-CM

## 2024-01-30 DIAGNOSIS — K59.00 DIFFICULT BOWEL MOVEMENTS: Primary | ICD-10-CM

## 2024-01-30 PROCEDURE — 99204 OFFICE O/P NEW MOD 45 MIN: CPT | Mod: 95 | Performed by: PHYSICIAN ASSISTANT

## 2024-01-30 ASSESSMENT — PAIN SCALES - GENERAL: PAINLEVEL: SEVERE PAIN (6)

## 2024-01-30 NOTE — PROGRESS NOTES
Virtual Visit Details    Type of service:  Video Visit   Video Start Time: 109pm  Video End Time:144PM    Originating Location (pt. Location): Home    Distant Location (provider location):  Off-site  Platform used for Video Visit: YongChe

## 2024-01-30 NOTE — PROGRESS NOTES
[facility] order, demographics, last office note was faxed to the [facility] at [phone]. Order scanned into chart.     [facility] attempted to reach patient to scheduled and have been unable.        Moriah Sanderson LPN

## 2024-01-30 NOTE — LETTER
1/30/2024         RE: Fatou Simental  [address] [facility] [address]  [place] [address]        Dear Colleague,    Thank you for referring your patient, Fatou Simental, to the [facility] [place]. Please see a copy of my visit note below.    Gastroenterology Visit for: Fatou Simental 1965   MRN: [id number]     Reason for Visit:  chief complaint    Referred by: No ref. provider found  /   Patient Care Team:  Gaby Shelton APRN CNP as PCP - General  Sal Elias MD as MD (Gastroenterology)  Wilton Viramontes MD as MD (Ophthalmology)  Lee Ann Smith MD as MD (Cardiovascular & Thoracic Surgery)  Gaby Shelton APRN CNP as Nurse Practitioner (Family Medicine)  Gaby Shelton APRN CNP as Assigned PCP  Lee Ann Smith MD as Assigned Surgical Provider    History of Present Illness:   Fatou Simetnal is a 58 year old female with significant past medical history pertinent for anxiety, multiple intraabdominal surgeries (see HPI) chronic constipation who is being seen as a new patient at the request of Yulia Welsh APRN  with a chief complaint of constipation.    Today Fatou presents with concerns of inconsistent bowel habits.  She has previously been seen by [facility] and [facility] for similar concerns.  She was prescribed Linzess 290 mcg which resulted in abdominal cramping and diarrhea with stools occurring 3-4x per day consistent with [place] stool scale type VII.  Because of this she switched to using the medication as needed. Subsequently she had trialed Amitiza however had difficulties with the twice daily dosing.  Noting she had hesitancy taking this in the afternoon especially in relation to mealtime additional did not want to experience diarrhea outside of the house.  It is unknown to the specifics regarding the quantity of Amitiza that she took. In the past she has also used Miralax 17g 2-3x throughout the  "week. Unknown if she had previously used Senokot although it is documented within the chart.     Now she reports that for the past two weeks has had a \"burning in the colon and watery diarrhea stools.\" Feels as there is \"a fire\" that is located to suprapubic area and can also involve the right/left lower quadrants. Yesterday had 5 stools consistent with [place] Stool Scale Type 7. The day prior to this has had 2 stools consistent with [place] Stool Scale Type 7. The burning sensation is not related to defecation however she does have cramping that will precede defecation and resolve after a bowel movement. Associated with urgency and borborygmi.  She reports that with the onset of the abdominal burning sensation her stool patterns have not changed.  The sensation that she is experiencing now is different than when she has had episodes of diverticulitis in the past.  She denies having abdominal pain at this time.    Prior to the past 2 weeks she was having stools that would fluctuate between [place] Stool Scale 4/5 - 7 occurring every other day.  She does have difficulties with initiation of the BM associated with pushing/straining and sensation of incomplete evacuation. No prior use of enemas or suppositories.     She reports struggling with defecatory concerns throughout the majority of her life.  After her sigmoidectomy symptoms had resolved for multiple years however then again gradually returned.     Currently she is taking 2 tablespoons Benefiber daily for the past 1.5 months. Noted improvement/softer stools with use and that it was easier to initiate the bowel movement.     No recent ABX, travel or sick contacts.     Denies weight loss, nausea, emesis, dysphagia, odynophagia, heartburn, regurgitation, abdominal pain, early satiation, early satiety, nocturnal stooling, incontinence of feces, rectal digitation, perianal support, vaginal splinting, steatorrhea, melena, hematochezia and BRBR.     Past surgical " history for sigmoidectomy 2015 secondary to diverticulosis in and bilateral oophorectomy and laparoscopic hysterectomy (endometriosis per pt reports)  and cholecystectomy in ~5992-4216. History of prior vaginal births x0/  section x3.      Will take Ibuprofen seldomly once per month.     Smokes tobacco 1/2 - 3/4 pack for 20+ years.     Denies use of ETOH. No recreational drug use.     Paternal family history is limited. No known family history or GI related malignancy (esophageal, gastric, pancreatic, liver or colon) or family history of IBD/celiac disease.       2020 seen and evaluated with [facility] with concerns for stooling patterns oscillating between constipation and diarrhea.  At the time she was taking Colace and as needed MiraLAX.  She reported sensation of incomplete evacuation as well as bloating.  Suspicion was for constipation with overflow diarrhea and she was initiated on Linzess 72 mcg daily.    2023 [facility] Consultation - Reported minimal benefit with the use of Linzess 290 mcg. Started on Amitiza unknown to specifics and requested a third opinion at [facility] however declined secondary to insurance coverage.  For her chronic abdominal pain symptoms it was advised that she continues amitriptyline which she is taking 25 mg nightly and gabapentin 600 mg nightly.  It was mentioned to defer to consultation to pain management to patient's primary care provider.          Esophageal Questionnaire(s)    BEDQ Questionnaire      10/9/2023     2:41 PM 2024    12:50 PM   BEDQ Questionnaire: How Often Have You Had the Following?   Trouble eating solid food (meat, bread, vegetables) 0 0   Trouble eating soft foods (yogurt, jello, pudding) 0 0   Trouble swallowing liquids 0 0   Pain while swallowing 0 0   Coughing or choking while swallowing foods or liquids 0 0   Total Score: 0 0         10/9/2023     2:41 PM 2024    12:50 PM   BEDQ Questionnaire: Discomfort/Pain  Ratings   Eating solid food (meat, bread, vegetables) 0 0   Eating soft foods (yogurt, jello, pudding) 0 0   Drinking liquid 0 0   Total Score: 0 0       Eckardt Questionnaire      10/9/2023     2:41 PM 1/30/2024    12:51 PM   Eckardt Questionnaire   Dysphagia 0 0   Regurgitation 1 1   Retrosternal Pain 0 0   Weight Loss (kg) 1 0   Total Score:  2 1       Promis 10 Questionnaire      10/9/2023     2:41 PM 1/30/2024    12:52 PM   PROMIS 10 FLOWSHEET DATA   In general, would you say your health is: 1 2   In general, would you say your quality of life is: 2 2   In general, how would you rate your physical health? 2 2   In general, how would you rate your mental health, including your mood and your ability to think? 2 2   In general, how would you rate your satisfaction with your social activities and relationships? 2 2   In general, please rate how well you carry out your usual social activities and roles. (This includes activities at home, at work and in your community, and responsibilities as a parent, child, spouse, employee, friend, etc.) 2 2   To what extent are you able to carry out your everyday physical activities such as walking, climbing stairs, carrying groceries, or moving a chair? 4 4   In the past 7 days, how often have you been bothered by emotional problems such as feeling anxious, depressed, or irritable? 4 3   In the past 7 days, how would you rate your fatigue on average? 4 3   In the past 7 days, how would you rate your pain on average, where 0 means no pain, and 10 means worst imaginable pain? 4 5   Mental health question re-calculation - no clinical value 2 3   Physical health question re-calculation - no clinical value 2 3   Pain question re-calculation - no clinical value 3 3   Global Mental Health Score 8 9   Global Physical Health Score 11 12   PROMIS TOTAL - SUBSCORES 19 21           STUDIES & PROCEDURES:    EGD:     5/2023   Findings:        Mucosal changes including ringed esophagus were  "found in the middle        third of the esophagus. Biopsies were taken with a cold forceps for        histology.        Diffuse moderately erythematous mucosa without bleeding was found in the        stomach. Biopsies were taken with a cold forceps for Helicobacter pylori        testing.        The exam was otherwise without abnormality.                                                                                     Impression:            - Esophageal mucosal changes suspicious for                          eosinophilic esophagitis. Biopsied.                          - Erythematous mucosa in the stomach. Biopsied.                          - The examination was otherwise normal.                          4 cm hiatal hernia     Final Diagnosis   A: Stomach, antrum, biopsy:  - Gastric antrum and body mucosa without diagnostic abnormality.  - Negative for Helicobacter pylori on H&E examination.      B: Esophagus, distal, biopsy:  - Esophageal squamous mucosa without diagnostic abnormality.  - Negative for intraepithelial eosinophils.  - No columnar (gastric) component present.  - Negative for intestinal metaplasia (\"Farrell's esophagus\").  - Negative for dysplasia or malignancy.      C: Esophagus, mid and upper, biopsy:  - Esophageal squamous mucosa without diagnostic abnormality.  - Negative for intraepithelial eosinophils.       Colonoscopy:    5/4/2023   Findings:        The perianal and digital rectal examinations were normal. Pertinent        negatives include normal sphincter tone.        A 3 mm polyp was found in the rectum. The polyp was sessile. The polyp        was removed with a cold biopsy forceps. Resection and retrieval were        complete. Verification of patient identification for the specimen was        done by the physician, nurse and technician using the patient's name,        birth date and medical record number. Estimated blood loss was minimal.        Non-bleeding external and internal " hemorrhoids were found during        retroflexion. The hemorrhoids were mild and Grade I (internal        hemorrhoids that do not prolapse).        A few small-mouthed diverticula were found in the recto-sigmoid colon.                                                                                     Impression:            - One 3 mm polyp in the rectum, removed with a cold                          biopsy forceps. Resected and retrieved.                          - Non-bleeding external and internal hemorrhoids.                          - Diverticulosis in the recto-sigmoid colon.     Final Diagnosis   Large intestine, rectum, polypectomy:  -Hyperplastic polyp          2013 [facility]          6/2010                                                                                 Findings:        The colon was significantly tortuous in the entire colon. The patient        was repositioned in order to accomplish the maneuver. A        benign-appearing, intrinsic moderate stenosis measuring 2 cm (in length)        x 1.5 cm (inner diameter) was found in the mid sigmoid colon and was        traversed. The exam was otherwise without abnormality.                                                                                    Impression:          - Tortuous colon.                        - Stricture in the mid sigmoid colon.                        - The examination was otherwise normal.     EndoFLIP directed at the UES or LES (8cm (EF-325) balloon length or 16cm (EF-322) balloon length):   Date:  8cm balloon  Balloon inflation Balloon pressure CSA (mm^2) DI (mm^2/mmHg) Dmin (mm) Compliance   20 (ladmark ID)        30        40        50           16cm balloon  Balloon inflation Balloon pressure CSA (mm^2) DI (mm^2/mmHg) Dmin (mm) Compliance   30 (ladmark ID)        40        50        60        70           High Resolution Manometry:    PH/Impedance:     Bravo:    CT:    4/11/2023 CT AP W Contrast   IMPRESSION:   1.   Nondilated fluid-filled loops of ileum and colon without  surrounding inflammatory changes are nonspecific, may be related to  mild enterocolitis in the appropriate setting.  2.  No other definite acute findings in the abdomen and pelvis    Esophagram:    FL VSS:     GES:    U/S:     XRAY:    Other:       Prior medical records were reviewed including, but not limited to, notes from referring providers, lab work, radiographic tests, and other diagnostic tests. Pertinent results were summarized above.     History     Past Medical History:   Diagnosis Date    Cocaine abuse, unspecified 2003    Dysplasia of cervix, unspecified 2000    Esophageal reflux 11/13/2006    Ganglion of joint 1/22/2007    Generalized anxiety disorder 10/30/2006    Buspar prescribed 8/23/07    IBS (irritable bowel syndrome) 5/28/2008    Need for prophylactic hormone replacement therapy (postmenopausal)        Past Surgical History:   Procedure Laterality Date    ABLATE VEIN VARICOSE RADIO FREQUENCY WITHOUT PHLEBECTOMY MULTIPLE STAB  7/24/2014    Procedure: ABLATE VEIN VARICOSE RADIO FREQUENCY WITHOUT PHLEBECTOMY MULTIPLE STAB;  Surgeon: Khanh Bunch MD;  Location: [facility]    BIOPSY BREAST Left     COLONOSCOPY N/A 5/4/2023    Procedure: COLONOSCOPY, WITH POLYPECTOMY AND BIOPSY;  Surgeon: Ning Tran MD;  Location: [facility]    ENHANCE LASER REFRACTIVE BILATERAL EXISTING PT IN PARAMETERS Bilateral     LASIK    ESOPHAGOSCOPY, GASTROSCOPY, DUODENOSCOPY (EGD), COMBINED N/A 5/31/2023    Procedure: Esophagoscopy, gastroscopy, duodenoscopy, combined;  Surgeon: Aime Mak MD;  Location: [facility]    HYSTERECTOMY SUPRACERVICAL, BILATERAL SALPINGO-OOPHORECTOMY, COMBINED  2000    LAPAROSCOPIC ASSISTED COLECTOMY N/A 2/5/2015    Procedure: LAPAROSCOPIC ASSISTED COLECTOMY;  Surgeon: Khanh Bunch MD;  Location: [facility]    LAPAROSCOPIC CHOLECYSTECTOMY N/A 4/11/2017    Procedure: LAPAROSCOPIC CHOLECYSTECTOMY;  Surgeon: Levi Louis MD;   Location: [facility]    NERVE BLOCK PERIPHERAL Bilateral 2015    Procedure: NERVE BLOCK PERIPHERAL;  Surgeon: Generic Anesthesia Provider;  Location: [facility]    REPAIR PTOSIS BILATERAL Bilateral 10/11/2018    Procedure: REPAIR PTOSIS BILATERAL;  Bilateral Upper Eyelid Ptosis Repair;  Surgeon: Wilton Viramontes MD;  Location:  OR    SURGICAL HISTORY OF -       Breast implants    SURGICAL HISTORY OF -       Carpal  tunnel r hand    ZZC  DELIVERY ONLY  ,,    , Low Cervical       Social History     Socioeconomic History    Marital status:      Spouse name: Not on file    Number of children: 3    Years of education: 14    Highest education level: Not on file   Occupational History    Occupation: Customer Service     Employer: [employer]     Comment: 1 year   Tobacco Use    Smoking status: Every Day     Packs/day: 1.00     Years: 20.00     Additional pack years: 0.00     Total pack years: 20.00     Types: Cigarettes    Smokeless tobacco: Never    Tobacco comments:     trying to quit   Vaping Use    Vaping Use: Never used   Substance and Sexual Activity    Alcohol use: Never    Drug use: No    Sexual activity: Yes     Partners: Male     Birth control/protection: Surgical     Comment: hysterectomy   Other Topics Concern    Parent/sibling w/ CABG, MI or angioplasty before 65F 55M? No   Social History Narrative    Not on file     Social Determinants of Health     Financial Resource Strain: Not on file   Food Insecurity: Not on file   Transportation Needs: Not on file   Physical Activity: Not on file   Stress: Not on file   Social Connections: Not on file   Interpersonal Safety: Not on file   Housing Stability: Not on file       Family History   Problem Relation Age of Onset    Cancer Father         liver    Cancer Brother         lung, asbestos    Cancer Mother         uterine    Cancer Sister         uterine    Breast Cancer Other     Breast Cancer Other     Glaucoma No  family hx of     Macular Degeneration No family hx of      Family history reviewed and edited as appropriate    Medications and Allergies:     Outpatient Encounter Medications as of 1/30/2024   Medication Sig Dispense Refill    albuterol (VENTOLIN HFA) 108 (90 Base) MCG/ACT inhaler Inhale 2 puffs into the lungs every 4 hours as needed for shortness of breath 18 g 4    amitriptyline (ELAVIL) 25 MG tablet Take 1 tablet (25 mg) by mouth at bedtime 90 tablet 0    butalbital-aspirin-caffeine (FIORINAL) -40 MG capsule TAKE 1 CAPSULE BY MOUTH EVERY 6 HOURS AS NEEDED FOR HEADACHES 30 capsule 0    cetirizine (ZYRTEC) 10 MG tablet Take 1 tablet (10 mg) by mouth daily (Patient not taking: Reported on 12/13/2023) 90 tablet 3    Cholecalciferol (VITAMIN D3 PO) Take 400 Units by mouth daily  (Patient not taking: Reported on 12/13/2023)      estradiol (ESTRACE) 1 MG tablet Take 1 tablet (1 mg) by mouth daily 90 tablet 3    estradiol (VAGIFEM) 10 MCG TABS vaginal tablet Place 1 tablet (10 mcg) vaginally twice a week 24 tablet 3    gabapentin (NEURONTIN) 300 MG capsule TAKE 2 CAPSULES BY MOUTH AT BEDTIME 180 capsule 3    hyoscyamine (LEVSIN) 0.125 MG tablet Take 1 tablet (125 mcg) by mouth every 4 hours as needed for cramping (Patient not taking: Reported on 12/13/2023) 30 tablet 4    LINZESS 290 MCG capsule       LORazepam (ATIVAN) 1 MG tablet Take 0.5-1 tablets (0.5-1 mg) by mouth every 6 hours as needed for anxiety 20 tablet 0    LORazepam (ATIVAN) 1 MG tablet Take 0.5-1 tablets (0.5-1 mg) by mouth every 6 hours as needed for anxiety 20 tablet 1    lubiprostone (AMITIZA) 24 MCG capsule Take 24 mcg by mouth 2 times daily (with meals)      montelukast (SINGULAIR) 10 MG tablet Take 1 tablet (10 mg) by mouth At Bedtime 30 tablet 11    multivitamin, therapeutic (THERA-VIT) TABS Take 1 tablet by mouth daily      omeprazole 20 MG tablet Take 20 mg by mouth daily      oxyBUTYnin ER (DITROPAN XL) 5 MG 24 hr tablet Take 1 tablet (5  mg) by mouth daily 90 tablet 3    oxyCODONE (ROXICODONE) 5 MG tablet Take 0.5-1 tablets (2.5-5 mg) by mouth every 8 hours as needed for pain (Patient not taking: Reported on 12/13/2023) 21 tablet 0    sucralfate (CARAFATE) 1 GM tablet Take 1 tablet (1 g) by mouth 4 times daily 120 tablet 1    traMADol (ULTRAM) 50 MG tablet Take 50 mg by mouth every 6 hours as needed for severe pain (Patient not taking: Reported on 12/13/2023)      varenicline (CHANTIX JOHNNY) 0.5 MG X 11 & 1 MG X 42 tablet Take 0.5 mg tab daily for 3 days, THEN 0.5 mg tab twice daily for 4 days, THEN 1 mg twice daily. 53 tablet 0     No facility-administered encounter medications on file as of 1/30/2024.        Allergies   Allergen Reactions    Cephalexin Hives     Patient states she gets hives - bad reaction not listed on her H & P.    Rizatriptan Dizziness    Acetaminophen Hives    Ciprofloxacin Hives     Got rash when taken with metronidazole + Vicodin  Other reaction(s): hives    Lactose Diarrhea    Metronidazole Hives     Rash when taken with cipro + Vicodin    Unknown [No Clinical Screening - See Comments]      Pt developed hives when taking flagyl, cipro, and vicodin, doesn't know which she is allergic to     Vicodin [Hydrocodone-Acetaminophen] Hives     Rash when taken with cipro and metronidazole        Review of systems:  A full 10 point review of systems was obtained and was negative except for the pertinent positives and negatives stated within the HPI.    Objective Findings:   Physical Exam:    Constitutional: There were no vitals taken for this visit.  General: Alert, cooperative, no distress, well-appearing  Head: Atraumatic, normocephalic, no obvious abnormalities   Eyes: Sclera anicteric, no obvious conjunctival hemorrhage   Nose: Nares normal, no obvious malformation, no obvious rhinorrhea   Skin: No jaundice, no obvious rash  Neurologic: AAOx3, no obvious neurologic abnormality  Psychiatric: Normal Affect, appropriate  mood  Extremities: No obvious edema, no obvious malformation     Labs, Radiology, Pathology     Lab Results   Component Value Date    WBC 4.3 04/26/2023    WBC 6.1 04/11/2023    WBC 4.4 01/17/2022    HGB 14.0 04/26/2023    HGB 15.4 04/11/2023    HGB 14.1 01/17/2022     04/26/2023     04/11/2023     01/17/2022    CHOL 165 12/18/2020    CHOL 157 10/11/2010    CHOL 148 09/20/2004    TRIG 149 12/18/2020    HDL 89 12/18/2020    HDL 67 10/11/2010    HDL 44 09/20/2004    ALT 13 04/26/2023    ALT 15 04/11/2023    ALT 19 01/17/2022    AST 17 04/26/2023    AST 22 04/11/2023    AST 15 01/17/2022     04/26/2023     04/11/2023     01/17/2022    BUN 11.1 04/26/2023    BUN 10.4 04/11/2023    BUN 22 01/17/2022    CO2 28 04/26/2023    CO2 27 04/11/2023    CO2 27 01/17/2022    TSH 0.94 12/18/2020    TSH 0.82 10/13/2017    TSH 0.91 09/25/2014        Liver Function Studies -   Recent Labs   Lab Test 04/26/23  1041   PROTTOTAL 6.8   ALBUMIN 4.4   BILITOTAL 0.3   ALKPHOS 66   AST 17   ALT 13          Patient Active Problem List    Diagnosis Date Noted    Osteopenia of multiple sites 07/06/2023     Priority: Medium    Major depression, recurrent (H24) 05/19/2023     Priority: Medium    Migraine with aura and without status migrainosus, not intractable 10/16/2017     Priority: Medium    Diverticular disease of colon 10/07/2013     Priority: Medium    IBS (irritable bowel syndrome) 05/28/2008     Priority: Medium    Ovarian cyst 08/23/2007     Priority: Medium     Oophorectomy bilateral - premalignant tissue ? 1998  Problem list name updated by automated process. Provider to review      Esophageal reflux 11/13/2006     Priority: Medium    Generalized anxiety disorder 10/30/2006     Priority: Medium     Buspar prescribed 8/23/07      Raynaud's syndrome 01/04/2006     Priority: Medium    Tobacco use disorder 01/04/2006     Priority: Medium    Need for prophylactic hormone replacement therapy  (postmenopausal) 2006     Priority: Medium      Assessment and Plan   Assessment/Plan:    Fatou Simental is a 58 year old female with significant past medical history pertinent for anxiety, multiple intraabdominal surgeries (see HPI) chronic constipation who is being seen as a new patient at the request of Yulia Welsh APRN  with a chief complaint of constipation.    Today Fatou continues to report inconsistent bowel patterns which fluctuate between [place] 4/5 - 7 occurring on an every other day basis.  Associated symptoms include abdominal cramping that is relieved with defecation, fecal urgency, but borborygmi, difficulty with initiation of the bowel movement and sensation of incomplete evacuation.  Pertinent intra-abdominal surgical history for cholecystectomy, laparoscopic oophorectomy and hysterectomy, sigmoidectomy in 2015 and 3 prior  sections. Fatou has struggled with inconsistent bowel habits throughout the entirety of her adult life however concern is for possible intra-abdominal adhesions, pelvic floor dysfunction and medication side effects contributing (amitriptyline, butalbital, gabapentin, oxybutynin and oxycodone).    Additionally she describes new onset suprapubic abdominal burning which can intermittently also involve the bilateral lower abdominal quadrants.  This is not impacted by deprecatory patterns.  She does have a past history pertinent for diverticulitis however she reports this is a more dull burning sensation rather than pain which she had experienced during her prior episodes of diverticulitis.    - Please complete labs and abdominal x-ray at your next best convenience.  Pending results of the abdominal x-ray will then consider bowel cleanse and adjustments in daily bowel regimen  - Please increase your daily fiber supplementation from 2 -> 3 tablespoons as needed  - Consultation to dietician for low FODMAPs diet.  Consider consultation with on staff  dietitian  - Consultation to the pelvic floor center   - Patient was directed to contact provider if she were to develop abdominal pain, nausea/vomiting or fevers would then consider CT AP with contrast       Follow up plan:   Return to clinic 4 months and as needed.    The risks and benefits of my recommendations, as well as other treatment options were discussed with the patient and any available family today. All questions were answered.     Follow up: As planned above. Today, I personally spent 35 minutes in direct face to face time with the patient, of which greater than 50% of the time was spent in patient education and counseling as described above. Approximately 19 minutes were spent on indirect care associated with the patient's consultation including but not limited to review of: patient medical records to date, clinic visits, hospital records, lab results, imaging studies, procedural documentation, and coordinating care with other providers. The findings from this review are summarized in the above note. All of the above accounted for a cumulative time of 54 minutes and was performed on the date of service.     The patient verbalized understanding of the plan and was appreciative for the time spent and information provided during the office visit.       Documentation assisted by voice recognition and documentation system.          Again, thank you for allowing me to participate in the care of your patient.      Sincerely,    Leslie Calvin PA-C

## 2024-01-30 NOTE — NURSING NOTE
Is the patient currently in the state of [place]? YES    Visit mode:VIDEO    If the visit is dropped, the patient can be reconnected by: VIDEO VISIT: Text to cell phone:   Telephone Information:   Mobile [phone]       Will anyone else be joining the visit? NO  (If patient encounters technical issues they should call [phone]    How would you like to obtain your AVS? MyChart    Are changes needed to the allergy or medication list? Pt stated no changes to allergies and Pt stated no med changes    Reason for visit: Consult    Carina GARG

## 2024-01-30 NOTE — PROGRESS NOTES
"Gastroenterology Visit for: Fatou Simental 1965   MRN: [id number]     Reason for Visit:  chief complaint    Referred by: No ref. provider found  /   Patient Care Team:  Gaby Shelton APRN CNP as PCP - Sal Sosa MD as MD (Gastroenterology)  Wilton Viramontes MD as MD (Ophthalmology)  Lee Ann Smith MD as MD (Cardiovascular & Thoracic Surgery)  Gaby Shelton APRN CNP as Nurse Practitioner (Family Medicine)  Gaby Shelton APRN CNP as Assigned PCP  Lee Ann Smith MD as Assigned Surgical Provider    History of Present Illness:   Fatou Simental is a 58 year old female with significant past medical history pertinent for anxiety, multiple intraabdominal surgeries (see HPI) chronic constipation who is being seen as a new patient at the request of Yulia Welsh APRN  with a chief complaint of constipation.    Today Fatou presents with concerns of inconsistent bowel habits.  She has previously been seen by [facility] and [facility] for similar concerns.  She was prescribed Linzess 290 mcg which resulted in abdominal cramping and diarrhea with stools occurring 3-4x per day consistent with [place] stool scale type VII.  Because of this she switched to using the medication as needed. Subsequently she had trialed Amitiza however had difficulties with the twice daily dosing.  Noting she had hesitancy taking this in the afternoon especially in relation to mealtime additional did not want to experience diarrhea outside of the house.  It is unknown to the specifics regarding the quantity of Amitiza that she took. In the past she has also used Miralax 17g 2-3x throughout the week. Unknown if she had previously used Senokot although it is documented within the chart.     Now she reports that for the past two weeks has had a \"burning in the colon and watery diarrhea stools.\" Feels as there is \"a fire\" that is located to suprapubic area and can also " involve the right/left lower quadrants. Yesterday had 5 stools consistent with [place] Stool Scale Type 7. The day prior to this has had 2 stools consistent with [place] Stool Scale Type 7. The burning sensation is not related to defecation however she does have cramping that will precede defecation and resolve after a bowel movement. Associated with urgency and borborygmi.  She reports that with the onset of the abdominal burning sensation her stool patterns have not changed.  The sensation that she is experiencing now is different than when she has had episodes of diverticulitis in the past.  She denies having abdominal pain at this time.    Prior to the past 2 weeks she was having stools that would fluctuate between [place] Stool Scale 4/5 - 7 occurring every other day.  She does have difficulties with initiation of the BM associated with pushing/straining and sensation of incomplete evacuation. No prior use of enemas or suppositories.     She reports struggling with defecatory concerns throughout the majority of her life.  After her sigmoidectomy symptoms had resolved for multiple years however then again gradually returned.     Currently she is taking 2 tablespoons Benefiber daily for the past 1.5 months. Noted improvement/softer stools with use and that it was easier to initiate the bowel movement.     No recent ABX, travel or sick contacts.     Denies weight loss, nausea, emesis, dysphagia, odynophagia, heartburn, regurgitation, abdominal pain, early satiation, early satiety, nocturnal stooling, incontinence of feces, rectal digitation, perianal support, vaginal splinting, steatorrhea, melena, hematochezia and BRBR.     Past surgical history for sigmoidectomy  secondary to diverticulosis in and bilateral oophorectomy and laparoscopic hysterectomy (endometriosis per pt reports)  and cholecystectomy in ~4039-3158. History of prior vaginal births x0/  section x3.      Will take Ibuprofen seldomly  once per month.     Smokes tobacco 1/2 - 3/4 pack for 20+ years.     Denies use of ETOH. No recreational drug use.     Paternal family history is limited. No known family history or GI related malignancy (esophageal, gastric, pancreatic, liver or colon) or family history of IBD/celiac disease.         Esophageal Questionnaire(s)    BEDQ Questionnaire      10/9/2023     2:41 PM 1/30/2024    12:50 PM   BEDQ Questionnaire: How Often Have You Had the Following?   Trouble eating solid food (meat, bread, vegetables) 0 0   Trouble eating soft foods (yogurt, jello, pudding) 0 0   Trouble swallowing liquids 0 0   Pain while swallowing 0 0   Coughing or choking while swallowing foods or liquids 0 0   Total Score: 0 0         10/9/2023     2:41 PM 1/30/2024    12:50 PM   BEDQ Questionnaire: Discomfort/Pain Ratings   Eating solid food (meat, bread, vegetables) 0 0   Eating soft foods (yogurt, jello, pudding) 0 0   Drinking liquid 0 0   Total Score: 0 0       Eckardt Questionnaire      10/9/2023     2:41 PM 1/30/2024    12:51 PM   E[place]t Questionnaire   Dysphagia 0 0   Regurgitation 1 1   Retrosternal Pain 0 0   Weight Loss (kg) 1 0   Total Score:  2 1       Promis 10 Questionnaire      10/9/2023     2:41 PM 1/30/2024    12:52 PM   PROMIS 10 FLOWSHEET DATA   In general, would you say your health is: 1 2   In general, would you say your quality of life is: 2 2   In general, how would you rate your physical health? 2 2   In general, how would you rate your mental health, including your mood and your ability to think? 2 2   In general, how would you rate your satisfaction with your social activities and relationships? 2 2   In general, please rate how well you carry out your usual social activities and roles. (This includes activities at home, at work and in your community, and responsibilities as a parent, child, spouse, employee, friend, etc.) 2 2   To what extent are you able to carry out your everyday physical activities such  "as walking, climbing stairs, carrying groceries, or moving a chair? 4 4   In the past 7 days, how often have you been bothered by emotional problems such as feeling anxious, depressed, or irritable? 4 3   In the past 7 days, how would you rate your fatigue on average? 4 3   In the past 7 days, how would you rate your pain on average, where 0 means no pain, and 10 means worst imaginable pain? 4 5   Mental health question re-calculation - no clinical value 2 3   Physical health question re-calculation - no clinical value 2 3   Pain question re-calculation - no clinical value 3 3   Global Mental Health Score 8 9   Global Physical Health Score 11 12   PROMIS TOTAL - SUBSCORES 19 21           STUDIES & PROCEDURES:    EGD:     5/2023   Findings:        Mucosal changes including ringed esophagus were found in the middle        third of the esophagus. Biopsies were taken with a cold forceps for        histology.        Diffuse moderately erythematous mucosa without bleeding was found in the        stomach. Biopsies were taken with a cold forceps for Helicobacter pylori        testing.        The exam was otherwise without abnormality.                                                                                     Impression:            - Esophageal mucosal changes suspicious for                          eosinophilic esophagitis. Biopsied.                          - Erythematous mucosa in the stomach. Biopsied.                          - The examination was otherwise normal.                          4 cm hiatal hernia     Final Diagnosis   A: Stomach, antrum, biopsy:  - Gastric antrum and body mucosa without diagnostic abnormality.  - Negative for Helicobacter pylori on H&E examination.      B: Esophagus, distal, biopsy:  - Esophageal squamous mucosa without diagnostic abnormality.  - Negative for intraepithelial eosinophils.  - No columnar (gastric) component present.  - Negative for intestinal metaplasia (\"Farrell's " "esophagus\").  - Negative for dysplasia or malignancy.      C: Esophagus, mid and upper, biopsy:  - Esophageal squamous mucosa without diagnostic abnormality.  - Negative for intraepithelial eosinophils.       Colonoscopy:    5/4/2023   Findings:        The perianal and digital rectal examinations were normal. Pertinent        negatives include normal sphincter tone.        A 3 mm polyp was found in the rectum. The polyp was sessile. The polyp        was removed with a cold biopsy forceps. Resection and retrieval were        complete. Verification of patient identification for the specimen was        done by the physician, nurse and technician using the patient's name,        birth date and medical record number. Estimated blood loss was minimal.        Non-bleeding external and internal hemorrhoids were found during        retroflexion. The hemorrhoids were mild and Grade I (internal        hemorrhoids that do not prolapse).        A few small-mouthed diverticula were found in the recto-sigmoid colon.                                                                                     Impression:            - One 3 mm polyp in the rectum, removed with a cold                          biopsy forceps. Resected and retrieved.                          - Non-bleeding external and internal hemorrhoids.                          - Diverticulosis in the recto-sigmoid colon.     Final Diagnosis   Large intestine, rectum, polypectomy:  -Hyperplastic polyp          2013 [facility]          6/2010                                                                                 Findings:        The colon was significantly tortuous in the entire colon. The patient        was repositioned in order to accomplish the maneuver. A        benign-appearing, intrinsic moderate stenosis measuring 2 cm (in length)        x 1.5 cm (inner diameter) was found in the mid sigmoid colon and was        traversed. The exam was otherwise without " abnormality.                                                                                    Impression:          - Tortuous colon.                        - Stricture in the mid sigmoid colon.                        - The examination was otherwise normal.     EndoFLIP directed at the UES or LES (8cm (EF-325) balloon length or 16cm (EF-322) balloon length):   Date:  8cm balloon  Balloon inflation Balloon pressure CSA (mm^2) DI (mm^2/mmHg) Dmin (mm) Compliance   20 (ladmark ID)        30        40        50           16cm balloon  Balloon inflation Balloon pressure CSA (mm^2) DI (mm^2/mmHg) Dmin (mm) Compliance   30 (ladmark ID)        40        50        60        70           High Resolution Manometry:    PH/Impedance:     Bravo:    CT:    4/11/2023 CT AP W Contrast   IMPRESSION:   1.  Nondilated fluid-filled loops of ileum and colon without  surrounding inflammatory changes are nonspecific, may be related to  mild enterocolitis in the appropriate setting.  2.  No other definite acute findings in the abdomen and pelvis    Esophagram:    FL VSS:     GES:    U/S:     XRAY:    Other:       Prior medical records were reviewed including, but not limited to, notes from referring providers, lab work, radiographic tests, and other diagnostic tests. Pertinent results were summarized above.     History     Past Medical History:   Diagnosis Date    Cocaine abuse, unspecified 2003    Dysplasia of cervix, unspecified 2000    Esophageal reflux 11/13/2006    Ganglion of joint 1/22/2007    Generalized anxiety disorder 10/30/2006    Buspar prescribed 8/23/07    IBS (irritable bowel syndrome) 5/28/2008    Need for prophylactic hormone replacement therapy (postmenopausal)        Past Surgical History:   Procedure Laterality Date    ABLATE VEIN VARICOSE RADIO FREQUENCY WITHOUT PHLEBECTOMY MULTIPLE STAB  7/24/2014    Procedure: ABLATE VEIN VARICOSE RADIO FREQUENCY WITHOUT PHLEBECTOMY MULTIPLE STAB;  Surgeon: Khanh Bunch,  MD;  Location: [facility]    BIOPSY BREAST Left     COLONOSCOPY N/A 2023    Procedure: COLONOSCOPY, WITH POLYPECTOMY AND BIOPSY;  Surgeon: Ning Tran MD;  Location: [facility]    ENHANCE LASER REFRACTIVE BILATERAL EXISTING PT IN PARAMETERS Bilateral     LASIK    ESOPHAGOSCOPY, GASTROSCOPY, DUODENOSCOPY (EGD), COMBINED N/A 2023    Procedure: Esophagoscopy, gastroscopy, duodenoscopy, combined;  Surgeon: Aime Mak MD;  Location: [facility]    HYSTERECTOMY SUPRACERVICAL, BILATERAL SALPINGO-OOPHORECTOMY, COMBINED  2000    LAPAROSCOPIC ASSISTED COLECTOMY N/A 2015    Procedure: LAPAROSCOPIC ASSISTED COLECTOMY;  Surgeon: Khanh Bunch MD;  Location: [facility]    LAPAROSCOPIC CHOLECYSTECTOMY N/A 2017    Procedure: LAPAROSCOPIC CHOLECYSTECTOMY;  Surgeon: Levi Louis MD;  Location: [facility]    NERVE BLOCK PERIPHERAL Bilateral 2015    Procedure: NERVE BLOCK PERIPHERAL;  Surgeon: Generic Anesthesia Provider;  Location: [facility]    REPAIR PTOSIS BILATERAL Bilateral 10/11/2018    Procedure: REPAIR PTOSIS BILATERAL;  Bilateral Upper Eyelid Ptosis Repair;  Surgeon: Wilton Viramontes MD;  Location: [facility]    SURGICAL HISTORY OF -       Breast implants    SURGICAL HISTORY OF -       Carpal  tunnel r hand    ZZC  DELIVERY ONLY  ,,    , Low Cervical       Social History     Socioeconomic History    Marital status:      Spouse name: Not on file    Number of children: 3    Years of education: 14    Highest education level: Not on file   Occupational History    Occupation: Customer Service     Employer: [employer]     Comment: 1 year   Tobacco Use    Smoking status: Every Day     Packs/day: 1.00     Years: 20.00     Additional pack years: 0.00     Total pack years: 20.00     Types: Cigarettes    Smokeless tobacco: Never    Tobacco comments:     trying to quit   Vaping Use    Vaping Use: Never used   Substance and Sexual Activity    Alcohol use: Never    Drug  use: No    Sexual activity: Yes     Partners: Male     Birth control/protection: Surgical     Comment: hysterectomy   Other Topics Concern    Parent/sibling w/ CABG, MI or angioplasty before 65F 55M? No   Social History Narrative    Not on file     Social Determinants of Health     Financial Resource Strain: Not on file   Food Insecurity: Not on file   Transportation Needs: Not on file   Physical Activity: Not on file   Stress: Not on file   Social Connections: Not on file   Interpersonal Safety: Not on file   Housing Stability: Not on file       Family History   Problem Relation Age of Onset    Cancer Father         liver    Cancer Brother         lung, asbestos    Cancer Mother         uterine    Cancer Sister         uterine    Breast Cancer Other     Breast Cancer Other     Glaucoma No family hx of     Macular Degeneration No family hx of      Family history reviewed and edited as appropriate    Medications and Allergies:     Outpatient Encounter Medications as of 1/30/2024   Medication Sig Dispense Refill    albuterol (VENTOLIN HFA) 108 (90 Base) MCG/ACT inhaler Inhale 2 puffs into the lungs every 4 hours as needed for shortness of breath 18 g 4    amitriptyline (ELAVIL) 25 MG tablet Take 1 tablet (25 mg) by mouth at bedtime 90 tablet 0    butalbital-aspirin-caffeine (FIORINAL) -40 MG capsule TAKE 1 CAPSULE BY MOUTH EVERY 6 HOURS AS NEEDED FOR HEADACHES 30 capsule 0    cetirizine (ZYRTEC) 10 MG tablet Take 1 tablet (10 mg) by mouth daily (Patient not taking: Reported on 12/13/2023) 90 tablet 3    Cholecalciferol (VITAMIN D3 PO) Take 400 Units by mouth daily  (Patient not taking: Reported on 12/13/2023)      estradiol (ESTRACE) 1 MG tablet Take 1 tablet (1 mg) by mouth daily 90 tablet 3    estradiol (VAGIFEM) 10 MCG TABS vaginal tablet Place 1 tablet (10 mcg) vaginally twice a week 24 tablet 3    gabapentin (NEURONTIN) 300 MG capsule TAKE 2 CAPSULES BY MOUTH AT BEDTIME 180 capsule 3    hyoscyamine (LEVSIN)  0.125 MG tablet Take 1 tablet (125 mcg) by mouth every 4 hours as needed for cramping (Patient not taking: Reported on 12/13/2023) 30 tablet 4    LINZESS 290 MCG capsule       LORazepam (ATIVAN) 1 MG tablet Take 0.5-1 tablets (0.5-1 mg) by mouth every 6 hours as needed for anxiety 20 tablet 0    LORazepam (ATIVAN) 1 MG tablet Take 0.5-1 tablets (0.5-1 mg) by mouth every 6 hours as needed for anxiety 20 tablet 1    lubiprostone (AMITIZA) 24 MCG capsule Take 24 mcg by mouth 2 times daily (with meals)      montelukast (SINGULAIR) 10 MG tablet Take 1 tablet (10 mg) by mouth At Bedtime 30 tablet 11    multivitamin, therapeutic (THERA-VIT) TABS Take 1 tablet by mouth daily      omeprazole 20 MG tablet Take 20 mg by mouth daily      oxyBUTYnin ER (DITROPAN XL) 5 MG 24 hr tablet Take 1 tablet (5 mg) by mouth daily 90 tablet 3    oxyCODONE (ROXICODONE) 5 MG tablet Take 0.5-1 tablets (2.5-5 mg) by mouth every 8 hours as needed for pain (Patient not taking: Reported on 12/13/2023) 21 tablet 0    sucralfate (CARAFATE) 1 GM tablet Take 1 tablet (1 g) by mouth 4 times daily 120 tablet 1    traMADol (ULTRAM) 50 MG tablet Take 50 mg by mouth every 6 hours as needed for severe pain (Patient not taking: Reported on 12/13/2023)      varenicline (CHANTIX JOHNNY) 0.5 MG X 11 & 1 MG X 42 tablet Take 0.5 mg tab daily for 3 days, THEN 0.5 mg tab twice daily for 4 days, THEN 1 mg twice daily. 53 tablet 0     No facility-administered encounter medications on file as of 1/30/2024.        Allergies   Allergen Reactions    Cephalexin Hives     Patient states she gets hives - bad reaction not listed on her H & P.    Rizatriptan Dizziness    Acetaminophen Hives    Ciprofloxacin Hives     Got rash when taken with metronidazole + Vicodin  Other reaction(s): hives    Lactose Diarrhea    Metronidazole Hives     Rash when taken with cipro + Vicodin    Unknown [No Clinical Screening - See Comments]      Pt developed hives when taking flagyl, cipro, and  vicodin, doesn't know which she is allergic to     Vicodin [Hydrocodone-Acetaminophen] Hives     Rash when taken with cipro and metronidazole        Review of systems:  A full 10 point review of systems was obtained and was negative except for the pertinent positives and negatives stated within the HPI.    Objective Findings:   Physical Exam:    Constitutional: There were no vitals taken for this visit.  General: Alert, cooperative, no distress, well-appearing  Head: Atraumatic, normocephalic, no obvious abnormalities   Eyes: Sclera anicteric, no obvious conjunctival hemorrhage   Nose: Nares normal, no obvious malformation, no obvious rhinorrhea   Skin: No jaundice, no obvious rash  Neurologic: AAOx3, no obvious neurologic abnormality  Psychiatric: Normal Affect, appropriate mood  Extremities: No obvious edema, no obvious malformation     Labs, Radiology, Pathology     Lab Results   Component Value Date    WBC 4.3 04/26/2023    WBC 6.1 04/11/2023    WBC 4.4 01/17/2022    HGB 14.0 04/26/2023    HGB 15.4 04/11/2023    HGB 14.1 01/17/2022     04/26/2023     04/11/2023     01/17/2022    CHOL 165 12/18/2020    CHOL 157 10/11/2010    CHOL 148 09/20/2004    TRIG 149 12/18/2020    HDL 89 12/18/2020    HDL 67 10/11/2010    HDL 44 09/20/2004    ALT 13 04/26/2023    ALT 15 04/11/2023    ALT 19 01/17/2022    AST 17 04/26/2023    AST 22 04/11/2023    AST 15 01/17/2022     04/26/2023     04/11/2023     01/17/2022    BUN 11.1 04/26/2023    BUN 10.4 04/11/2023    BUN 22 01/17/2022    CO2 28 04/26/2023    CO2 27 04/11/2023    CO2 27 01/17/2022    TSH 0.94 12/18/2020    TSH 0.82 10/13/2017    TSH 0.91 09/25/2014        Liver Function Studies -   Recent Labs   Lab Test 04/26/23  1041   PROTTOTAL 6.8   ALBUMIN 4.4   BILITOTAL 0.3   ALKPHOS 66   AST 17   ALT 13          Patient Active Problem List    Diagnosis Date Noted    Osteopenia of multiple sites 07/06/2023     Priority: Medium    Major  depression, recurrent (H24) 05/19/2023     Priority: Medium    Migraine with aura and without status migrainosus, not intractable 10/16/2017     Priority: Medium    Diverticular disease of colon 10/07/2013     Priority: Medium    IBS (irritable bowel syndrome) 05/28/2008     Priority: Medium    Ovarian cyst 08/23/2007     Priority: Medium     Oophorectomy bilateral - premalignant tissue ? 1998  Problem list name updated by automated process. Provider to review      Esophageal reflux 11/13/2006     Priority: Medium    Generalized anxiety disorder 10/30/2006     Priority: Medium     Buspar prescribed 8/23/07      Raynaud's syndrome 01/04/2006     Priority: Medium    Tobacco use disorder 01/04/2006     Priority: Medium    Need for prophylactic hormone replacement therapy (postmenopausal) 01/04/2006     Priority: Medium      Assessment and Plan   Assessment/Plan:    Fatou Simental is a 58 year old female with significant past medical history pertinent for anxiety, multiple intraabdominal surgeries (see HPI) chronic constipation who is being seen as a new patient at the request of Yulia Welsh APRN  with a chief complaint of constipation.    11/9/2020 seen and evaluated with [facility] with concerns for stooling patterns oscillating between constipation and diarrhea.  At the time she was taking Colace and as needed MiraLAX.  She reported sensation of incomplete evacuation as well as bloating.  Suspicion was for constipation with overflow diarrhea and she was initiated on Linzess 72 mcg daily.    5/2023 [facility] Consultation - Reported minimal benefit with the use of Linzess 290 mcg. Started on Amitiza unknown to specifics and requested a third opinion at [facility] however declined secondary to insurance coverage.  For her chronic abdominal pain symptoms it was advised that she continues amitriptyline which she is taking 25 mg nightly and gabapentin 600 mg nightly.  It was mentioned to defer  to consultation to pain management to patient's primary care provider.    Today Fatou continues to report inconsistent bowel patterns which fluctuate between [place] 4/5 - 7 occurring on an every other day basis.  Associated symptoms include abdominal cramping that is relieved with defecation, fecal urgency, but borborygmi, difficulty with initiation of the bowel movement and sensation of incomplete evacuation.  Pertinent intra-abdominal surgical history for cholecystectomy, laparoscopic oophorectomy and hysterectomy, sigmoidectomy in 2015 and 3 prior  sections. Fatou has struggled with inconsistent bowel habits throughout the entirety of her adult life however concern is for possible intra-abdominal adhesions, pelvic floor dysfunction and medication side effects contributing (amitriptyline, butalbital, gabapentin, oxybutynin and oxycodone).    Additionally she describes new onset suprapubic abdominal burning which can intermittently also involve the bilateral lower abdominal quadrants.  This is not impacted by defecatory patterns.  She does have a past history pertinent for diverticulitis however she reports this is a more dull burning sensation rather than pain which she had experienced during her prior episodes of diverticulitis.    - Please complete labs and abdominal x-ray at your next best convenience.  Pending results of the abdominal x-ray will then consider bowel cleanse and adjustments in daily bowel regimen  - Please increase your daily fiber supplementation from 2 -> 3 tablespoons as needed  - Consultation to dietician for low FODMAPs diet.  Consider consultation with on staff dietitian  - Consultation to the pelvic floor center   - Patient was directed to contact provider if she were to develop abdominal pain, nausea/vomiting or fevers would then consider CT AP with contrast       Follow up plan:   Return to clinic 4 months and as needed.    The risks and benefits of my recommendations, as  well as other treatment options were discussed with the patient and any available family today. All questions were answered.     Follow up: As planned above. Today, I personally spent 35 minutes in direct face to face time with the patient, of which greater than 50% of the time was spent in patient education and counseling as described above. Approximately 19 minutes were spent on indirect care associated with the patient's consultation including but not limited to review of: patient medical records to date, clinic visits, hospital records, lab results, imaging studies, procedural documentation, and coordinating care with other providers. The findings from this review are summarized in the above note. All of the above accounted for a cumulative time of 54 minutes and was performed on the date of service.     The patient verbalized understanding of the plan and was appreciative for the time spent and information provided during the office visit.           Leslie Calvin PA-C  Division of Gastroenterology, Hepatology, and Nutrition  [facility]       Documentation assisted by voice recognition and documentation system.

## 2024-01-30 NOTE — PATIENT INSTRUCTIONS
It was a pleasure taking care of you today.  I've included a brief summary of our discussion and care plan from today's visit below.  Please review this information with your primary care provider.  _______________________________________________________________________    My recommendations are summarized as follows:    - Please complete labs and abdominal x-ray at your next best convenience.  Pending results of the abdominal x-ray will then consider bowel cleanse and adjustments in daily bowel regimen  - Please increase your daily fiber supplementation from 2 -> 3 tablespoons as needed  - Consultation to dietician for low FODMAPs diet.  Consider consultation with on staff dietitian  - Consultation to the pelvic floor center     P[facility]  Address: [address] [place] [address] [address], [place] [address]  Phone: [phone]      To schedule endoscopic procedures you may call: [phone]  To schedule radiology (imaging) tests you may call: [phone]  To schedule an ENT appointment you may call: [phone]    Please call my nurse Jennifer ([phone])Damari ([phone]) with any questions or concerns.      Return to GI Clinic in 3 months to review your progress.    _______________________________________________________________________    Who do I call with any questions after my visit?  Please be in touch if there are any further questions that arise following today's visit.  There are multiple ways to contact your gastroenterology care team.      During business hours, you may reach a Gastroenterology nurse at [phone] and choose option 3.       To schedule or reschedule an appointment, please call [phone].     You can always send a secure message through [employer].  [employer] messages are answered by your nurse or doctor typically within 24 hours.  Please allow extra time on weekends and holidays.      For urgent/emergent questions after business hours, you may reach the on-call GI Fellow by  contacting the [facility]  at [phone].     How will I get the results of any tests ordered?    You will receive all of your results.  If you have signed up for [employer], any tests ordered at your visit will be available to you after your physician reviews them.  Typically this takes 1-2 weeks.  If there are urgent results that require a change in your care plan, your physician or nurse will call you to discuss the next steps.      What is [employer]?  [employer] is a secure way for you to access all of your healthcare records from the [facility].  It is a web based computer program, so you can sign on to it from any location.  It also allows you to send secure messages to your care team.  I recommend signing up for [employer] access if you have not already done so and are comfortable with using a computer.      How to I schedule a follow-up visit?  If you did not schedule a follow-up visit today, please call [phone] to schedule a follow-up office visit.      If you feel you received exceptional care and are interested in supporting the clinical and research goals of Leslie Calvin PA-C or the Division of Gastroenterology, Hepatology, and Nutrition please contact [email or web address] from the [facility] to discuss opportunities to donate.    Sincerely,    Leslie Calvin PA-C  Division of Gastroenterology, Hepatology, and Nutrition  [facility]

## 2024-01-31 ENCOUNTER — E-VISIT (OUTPATIENT)
Dept: FAMILY MEDICINE | Facility: CLINIC | Age: 59
End: 2024-01-31
Payer: COMMERCIAL

## 2024-01-31 DIAGNOSIS — R10.2 PELVIC CRAMPING: ICD-10-CM

## 2024-01-31 DIAGNOSIS — N89.8 VAGINAL DISCHARGE: Primary | ICD-10-CM

## 2024-01-31 PROCEDURE — 99421 OL DIG E/M SVC 5-10 MIN: CPT | Performed by: NURSE PRACTITIONER

## 2024-02-01 ENCOUNTER — TELEPHONE (OUTPATIENT)
Dept: GASTROENTEROLOGY | Facility: CLINIC | Age: 59
End: 2024-02-01
Payer: COMMERCIAL

## 2024-02-01 NOTE — TELEPHONE ENCOUNTER
Left Voicemail (1st Attempt) and Sent Mychart (1st Attempt) for the patient to call back and schedule the following:    Appointment type: Return GI  Provider: Jeff Calvin PA-C  Return date: 04/30/24  Specialty phone number: [phone]  Additional appointment(s) needed: N/A  Additonal Notes: N/A

## 2024-02-01 NOTE — PATIENT INSTRUCTIONS
Thank you for choosing us for your care. Given your symptoms, I would like you to do a lab-only visit to determine what is causing them.  I have placed the orders.  Please schedule an appointment with the lab right here in [facility], or call [phone].  I will let you know when the results are back and next steps to take.

## 2024-02-05 ENCOUNTER — LAB (OUTPATIENT)
Dept: LAB | Facility: CLINIC | Age: 59
End: 2024-02-05
Payer: COMMERCIAL

## 2024-02-05 ENCOUNTER — HOSPITAL ENCOUNTER (OUTPATIENT)
Dept: GENERAL RADIOLOGY | Facility: CLINIC | Age: 59
Discharge: HOME | End: 2024-02-05
Attending: PHYSICIAN ASSISTANT | Admitting: PHYSICIAN ASSISTANT
Payer: COMMERCIAL

## 2024-02-05 DIAGNOSIS — R10.2 PELVIC CRAMPING: ICD-10-CM

## 2024-02-05 DIAGNOSIS — K59.00 DIFFICULT BOWEL MOVEMENTS: ICD-10-CM

## 2024-02-05 DIAGNOSIS — R19.8 ABNORMAL BOWEL MOVEMENT: ICD-10-CM

## 2024-02-05 DIAGNOSIS — N89.8 VAGINAL DISCHARGE: ICD-10-CM

## 2024-02-05 LAB
ALBUMIN SERPL BCG-MCNC: 4.3 G/DL (ref 3.5–5.2)
ALBUMIN UR-MCNC: NEGATIVE MG/DL
ALP SERPL-CCNC: 80 U/L (ref 40–150)
ALT SERPL W P-5'-P-CCNC: 14 U/L (ref 0–50)
ANION GAP SERPL CALCULATED.3IONS-SCNC: 10 MMOL/L (ref 7–15)
APPEARANCE UR: CLEAR
AST SERPL W P-5'-P-CCNC: 21 U/L (ref 0–45)
BILIRUB SERPL-MCNC: 0.3 MG/DL
BILIRUB UR QL STRIP: NEGATIVE
BUN SERPL-MCNC: 9.6 MG/DL (ref 6–20)
C TRACH DNA SPEC QL NAA+PROBE: NEGATIVE
CALCIUM SERPL-MCNC: 9.6 MG/DL (ref 8.6–10)
CHLORIDE SERPL-SCNC: 101 MMOL/L (ref 98–107)
CLUE CELLS: ABNORMAL
COLOR UR AUTO: YELLOW
CREAT SERPL-MCNC: 0.74 MG/DL (ref 0.51–0.95)
DEPRECATED HCO3 PLAS-SCNC: 27 MMOL/L (ref 22–29)
EGFRCR SERPLBLD CKD-EPI 2021: >90 ML/MIN/1.73M2
ERYTHROCYTE [DISTWIDTH] IN BLOOD BY AUTOMATED COUNT: 12.6 % (ref 10–15)
GLUCOSE SERPL-MCNC: 84 MG/DL (ref 70–99)
GLUCOSE UR STRIP-MCNC: NEGATIVE MG/DL
HCT VFR BLD AUTO: 44.2 % (ref 35–47)
HGB BLD-MCNC: 14.6 G/DL (ref 11.7–15.7)
HGB UR QL STRIP: NEGATIVE
KETONES UR STRIP-MCNC: NEGATIVE MG/DL
LEUKOCYTE ESTERASE UR QL STRIP: NEGATIVE
LIPASE SERPL-CCNC: 28 U/L (ref 13–60)
MCH RBC QN AUTO: 30.4 PG (ref 26.5–33)
MCHC RBC AUTO-ENTMCNC: 33 G/DL (ref 31.5–36.5)
MCV RBC AUTO: 92 FL (ref 78–100)
N GONORRHOEA DNA SPEC QL NAA+PROBE: NEGATIVE
NITRATE UR QL: NEGATIVE
PH UR STRIP: 5.5 [PH] (ref 5–7)
PLATELET # BLD AUTO: 286 10E3/UL (ref 150–450)
POTASSIUM SERPL-SCNC: 4 MMOL/L (ref 3.4–5.3)
PROT SERPL-MCNC: 7.2 G/DL (ref 6.4–8.3)
RBC # BLD AUTO: 4.81 10E6/UL (ref 3.8–5.2)
SODIUM SERPL-SCNC: 138 MMOL/L (ref 135–145)
SP GR UR STRIP: <=1.005 (ref 1–1.03)
TRICHOMONAS, WET PREP: ABNORMAL
TSH SERPL DL<=0.005 MIU/L-ACNC: 0.99 UIU/ML (ref 0.3–4.2)
UROBILINOGEN UR STRIP-ACNC: 0.2 E.U./DL
WBC # BLD AUTO: 4.2 10E3/UL (ref 4–11)
WBC'S/HIGH POWER FIELD, WET PREP: ABNORMAL
YEAST, WET PREP: ABNORMAL

## 2024-02-05 PROCEDURE — 87210 SMEAR WET MOUNT SALINE/INK: CPT

## 2024-02-05 PROCEDURE — 83690 ASSAY OF LIPASE: CPT

## 2024-02-05 PROCEDURE — 85027 COMPLETE CBC AUTOMATED: CPT

## 2024-02-05 PROCEDURE — 87491 CHLMYD TRACH DNA AMP PROBE: CPT

## 2024-02-05 PROCEDURE — 81003 URINALYSIS AUTO W/O SCOPE: CPT

## 2024-02-05 PROCEDURE — 82784 ASSAY IGA/IGD/IGG/IGM EACH: CPT

## 2024-02-05 PROCEDURE — 80053 COMPREHEN METABOLIC PANEL: CPT

## 2024-02-05 PROCEDURE — 74018 RADEX ABDOMEN 1 VIEW: CPT

## 2024-02-05 PROCEDURE — 86364 TISS TRNSGLTMNASE EA IG CLAS: CPT

## 2024-02-05 PROCEDURE — 87591 N.GONORRHOEAE DNA AMP PROB: CPT

## 2024-02-05 PROCEDURE — 36415 COLL VENOUS BLD VENIPUNCTURE: CPT

## 2024-02-05 PROCEDURE — 84443 ASSAY THYROID STIM HORMONE: CPT

## 2024-02-05 NOTE — RESULT ENCOUNTER NOTE
Giuliana Lainez    Your urine is not infected. The vaginal swab does not show yeast or bacterial overgrowth. I'm still waiting for the other urine tests and then we'll come up with a plan. Please let us know if you have any questions.     Take care,    ANGEL Avitia CNP

## 2024-02-06 LAB — IGA SERPL-MCNC: 179 MG/DL (ref 84–499)

## 2024-02-06 NOTE — RESULT ENCOUNTER NOTE
Giuliana Lainez    Your vaginal swab and urine results all came back within normal limits. If you are still having symptoms please follow up in clinic so we can do a pelvic exam and additional work up. Please let us know if you have any questions.     Take care,    ANGEL Avitia CNP

## 2024-02-07 LAB
TTG IGA SER-ACNC: 0.5 U/ML
TTG IGG SER-ACNC: 1 U/ML

## 2024-02-08 ENCOUNTER — TELEPHONE (OUTPATIENT)
Dept: GASTROENTEROLOGY | Facility: CLINIC | Age: 59
End: 2024-02-08
Payer: COMMERCIAL

## 2024-02-08 NOTE — TELEPHONE ENCOUNTER
Left Voicemail (2nd Attempt) and Sent Mychart (2nd Attempt) for the patient to call back and schedule the following:    Appointment type: Return GI  Provider: Leslie Calvin PA-C  Return date: 04/30/24  Specialty phone number: [phone]  Additional appointment(s) needed: N/A  Additonal Notes: N/A

## 2024-02-20 DIAGNOSIS — G43.009 MIGRAINE WITHOUT AURA AND WITHOUT STATUS MIGRAINOSUS, NOT INTRACTABLE: ICD-10-CM

## 2024-02-20 DIAGNOSIS — G47.00 INSOMNIA, UNSPECIFIED TYPE: ICD-10-CM

## 2024-03-05 ENCOUNTER — MEDICAL CORRESPONDENCE (OUTPATIENT)
Dept: HEALTH INFORMATION MANAGEMENT | Facility: CLINIC | Age: 59
End: 2024-03-05
Payer: COMMERCIAL

## 2024-04-19 ENCOUNTER — MYC REFILL (OUTPATIENT)
Dept: FAMILY MEDICINE | Facility: CLINIC | Age: 59
End: 2024-04-19
Payer: COMMERCIAL

## 2024-04-19 ENCOUNTER — MYC MEDICAL ADVICE (OUTPATIENT)
Dept: FAMILY MEDICINE | Facility: CLINIC | Age: 59
End: 2024-04-19
Payer: COMMERCIAL

## 2024-04-19 DIAGNOSIS — J30.1 NON-SEASONAL ALLERGIC RHINITIS DUE TO POLLEN: ICD-10-CM

## 2024-04-19 DIAGNOSIS — G43.009 MIGRAINE WITHOUT AURA AND WITHOUT STATUS MIGRAINOSUS, NOT INTRACTABLE: ICD-10-CM

## 2024-04-19 RX ORDER — MONTELUKAST SODIUM 10 MG/1
10 TABLET ORAL AT BEDTIME
Qty: 30 TABLET | Refills: 11 | OUTPATIENT
Start: 2024-04-19

## 2024-04-19 RX ORDER — BUTALBITAL, ASPIRIN, AND CAFFEINE 325; 50; 40 MG/1; MG/1; MG/1
1 CAPSULE ORAL EVERY 6 HOURS PRN
Qty: 30 CAPSULE | Refills: 0 | Status: SHIPPED | OUTPATIENT
Start: 2024-04-19

## 2024-04-22 ENCOUNTER — MYC MEDICAL ADVICE (OUTPATIENT)
Dept: FAMILY MEDICINE | Facility: CLINIC | Age: 59
End: 2024-04-22
Payer: COMMERCIAL

## 2024-04-22 ENCOUNTER — MYC REFILL (OUTPATIENT)
Dept: FAMILY MEDICINE | Facility: CLINIC | Age: 59
End: 2024-04-22
Payer: COMMERCIAL

## 2024-04-22 DIAGNOSIS — J30.1 NON-SEASONAL ALLERGIC RHINITIS DUE TO POLLEN: ICD-10-CM

## 2024-04-22 RX ORDER — MONTELUKAST SODIUM 10 MG/1
10 TABLET ORAL AT BEDTIME
Qty: 90 TABLET | Refills: 0 | Status: SHIPPED | OUTPATIENT
Start: 2024-04-22 | End: 2024-08-15

## 2024-04-22 RX ORDER — CETIRIZINE HYDROCHLORIDE 10 MG/1
10 TABLET ORAL DAILY
Qty: 90 TABLET | Refills: 1 | Status: SHIPPED | OUTPATIENT
Start: 2024-04-22

## 2024-04-22 RX ORDER — CETIRIZINE HYDROCHLORIDE 10 MG/1
10 TABLET ORAL DAILY
Qty: 90 TABLET | Refills: 3 | Status: CANCELLED | OUTPATIENT
Start: 2024-04-22

## 2024-04-22 NOTE — TELEPHONE ENCOUNTER
Prescription approved per [facility] Refill Protocol.    Signed Prescriptions:                        Disp   Refills    montelukast (SINGULAIR) 10 MG tablet       90 tab*0        Sig: Take 1 tablet (10 mg) by mouth at bedtime  Authorizing Provider: REYNALDO MAHMOOD  Ordering User: ANA BRUMFIELD      Requested Prescriptions   Signed Prescriptions Disp Refills    montelukast (SINGULAIR) 10 MG tablet 90 tablet 0     Sig: Take 1 tablet (10 mg) by mouth at bedtime       Leukotriene Inhibitors Protocol Passed - 4/22/2024  9:35 AM        Passed - Patient is age 12 or older     If patient is under 16, ok to refill using age based dosing.           Passed - Medication is active on med list        Passed - Recent (12 mo) or future (90 days) visit within the authorizing provider's specialty     The patient must have completed an in-person or virtual visit within the past 12 months or has a future visit scheduled within the next 90 days with the authorizing provider s specialty.  Urgent care and e-visits do not quality as an office visit for this protocol.          Passed - Medication indicated for associated diagnosis     Medication is associated with one or more of the following diagnoses:   Allergies   Asthma   Atopic Dermatitis   Nasal Congestion   Nasal discharge   Rhinitis   Urticaria, chronic               Ana Brumfield RN on 4/22/2024 at 9:37 AM

## 2024-04-22 NOTE — TELEPHONE ENCOUNTER
Prescription approved per [facility] Refill Protocol.    Pending Prescriptions:                       Disp   Refills    cetirizine (ZYRTEC) 10 MG tablet          90 tab*3            Sig: Take 1 tablet (10 mg) by mouth daily      Requested Prescriptions   Pending Prescriptions Disp Refills    cetirizine (ZYRTEC) 10 MG tablet 90 tablet 3     Sig: Take 1 tablet (10 mg) by mouth daily       Antihistamines Protocol Passed - 4/22/2024 12:29 PM        Passed - Patient is 3-64 years of age     Apply weight-based dosing for peds patients age 3 - 12 years of age.    Forward request to provider for patients under the age of 3 or over the age of 64.          Passed - Recent (12 mo) or future (30 days) visit within the authorizing provider's specialty     The patient must have completed an in-person or virtual visit within the past 12 months or has a future visit scheduled within the next 90 days with the authorizing provider s specialty.  Urgent care and e-visits do not quality as an office visit for this protocol.          Passed - Medication is active on med list        Passed - Medication indicated for associated diagnosis     The medication is associated with one or more of the following diagnoses:  Allergies  Rhinitis  Upper respiratory tract allergy  Urticaria             Xochilt Peñaloza RN on 4/22/2024 at 12:31 PM

## 2024-05-02 ENCOUNTER — PATIENT OUTREACH (OUTPATIENT)
Dept: CARE COORDINATION | Facility: CLINIC | Age: 59
End: 2024-05-02
Payer: COMMERCIAL

## 2024-05-07 ENCOUNTER — VIRTUAL VISIT (OUTPATIENT)
Dept: GASTROENTEROLOGY | Facility: CLINIC | Age: 59
End: 2024-05-07
Attending: PHYSICIAN ASSISTANT
Payer: COMMERCIAL

## 2024-05-07 ENCOUNTER — MYC MEDICAL ADVICE (OUTPATIENT)
Dept: FAMILY MEDICINE | Facility: CLINIC | Age: 59
End: 2024-05-07

## 2024-05-07 VITALS — BODY MASS INDEX: 19.58 KG/M2 | WEIGHT: 103.62 LBS

## 2024-05-07 DIAGNOSIS — R14.0 BLOATING: Primary | ICD-10-CM

## 2024-05-07 DIAGNOSIS — R14.0 ABDOMINAL DISTENSION: ICD-10-CM

## 2024-05-07 PROCEDURE — 99215 OFFICE O/P EST HI 40 MIN: CPT | Mod: 95 | Performed by: PHYSICIAN ASSISTANT

## 2024-05-07 ASSESSMENT — PAIN SCALES - GENERAL: PAINLEVEL: NO PAIN (0)

## 2024-05-07 NOTE — PROGRESS NOTES
"Virtual Visit Details    Type of service:  Video/Telephone Visit   Video Start Time: 8:49 AM  Video End Time:9:15 AM    Originating Location (pt. Location): Home    Distant Location (provider location):  Off-site  Platform used for Video Visit: [employer]     Gastroenterology Visit for: Fatou Simental 1965   MRN: [id number]     Reason for Visit:  chief complaint    Referred by: No ref. provider found  /   Patient Care Team:  Gaby Shelton APRN CNP as PCP - Sal Sosa MD as MD (Gastroenterology)  Wilton Viramontes MD as MD (Ophthalmology)  Lee Ann Smith MD as MD (Cardiovascular & Thoracic Surgery)  Gaby Shelton APRN CNP as Nurse Practitioner (Family Medicine)  Gaby Shelton APRN CNP as Assigned PCP  Lee Ann Smith MD as Assigned Surgical Provider  Leslei Calvin PA-C as Assigned Gastroenterology Provider    History of Present Illness:   Fatou Simental is a 59 year old female with significant past medical history pertinent for anxiety, multiple intraabdominal surgeries (see HPI) chronic constipation who is being seen as a follow-up patient with a chief complaint of abdominal bloating/distention and difficulty with initiating a bowel movement.  -----------------------------------------------------------------------------------------------------------------------------------------------------------------------------------------------------------------------------------  Interval History May 7, 2024:    Unable to palate Metamucil. Now taking Benefiber 2-3 teaspoons daily.     Without the use of laxatives Fatou states \"it hurts to pass and the stools come out thin.\" She explains \"she tries not bare down/press to hard.\" With the use Miralax 17g three capfuls as well as when she decreases to Miralax 17g BID nightly she will have a bowel movement occurring once daily that are consistent with [place] Stool Scale Type 6/7. With the use of " "Linzess 290mcg this results in multiple liquid stools with significant fecal urgency.  Without the use of laxatives Fatou explains \"it is not satisfying.\"     With consumption of large meals or over indulgence will experience regurgitation. Will use Omeprazole 20mg as needed with relief of symptoms.  She has also noted that she has changed her eating patterns now eating small frequent meals throughout the day and decrease portion sizes.    Denies unintentional weight loss, nausea, emesis, dysphagia, odynophagia, heartburn, nocturnal stooling, incontinence of feces, melena, hematochezia and BRBR.     ---------------------------------------------------------------------------------------------------------------------------------------------------------------------------------------------  Today Fatou presents with concerns of inconsistent bowel habits.  She has previously been seen by [facility] and [facility] for similar concerns.  She was prescribed Linzess 290 mcg which resulted in abdominal cramping and diarrhea with stools occurring 3-4x per day consistent with [place] stool scale type VII.  Because of this she switched to using the medication as needed. Subsequently she had trialed Amitiza however had difficulties with the twice daily dosing.  Noting she had hesitancy taking this in the afternoon especially in relation to mealtime additional did not want to experience diarrhea outside of the house.  It is unknown to the specifics regarding the quantity of Amitiza that she took. In the past she has also used Miralax 17g 2-3x throughout the week. Unknown if she had previously used Senokot although it is documented within the chart.     Now she reports that for the past two weeks has had a \"burning in the colon and watery diarrhea stools.\" Feels as there is \"a fire\" that is located to suprapubic area and can also involve the right/left lower quadrants. Yesterday had 5 stools consistent with [place] Stool " Scale Type 7. The day prior to this has had 2 stools consistent with [place] Stool Scale Type 7. The burning sensation is not related to defecation however she does have cramping that will precede defecation and resolve after a bowel movement. Associated with urgency and borborygmi.  She reports that with the onset of the abdominal burning sensation her stool patterns have not changed.  The sensation that she is experiencing now is different than when she has had episodes of diverticulitis in the past.  She denies having abdominal pain at this time.    Prior to the past 2 weeks she was having stools that would fluctuate between [place] Stool Scale 4/5 - 7 occurring every other day.  She does have difficulties with initiation of the BM associated with pushing/straining and sensation of incomplete evacuation. No prior use of enemas or suppositories.     She reports struggling with defecatory concerns throughout the majority of her life.  After her sigmoidectomy symptoms had resolved for multiple years however then again gradually returned.     Currently she is taking 2 tablespoons Benefiber daily for the past 1.5 months. Noted improvement/softer stools with use and that it was easier to initiate the bowel movement.     No recent ABX, travel or sick contacts.     Denies weight loss, nausea, emesis, dysphagia, odynophagia, heartburn, regurgitation, abdominal pain, early satiation, early satiety, nocturnal stooling, incontinence of feces, rectal digitation, perianal support, vaginal splinting, steatorrhea, melena, hematochezia and BRBR.     Past surgical history for sigmoidectomy  secondary to diverticulosis in and bilateral oophorectomy and laparoscopic hysterectomy (endometriosis per pt reports)  and cholecystectomy in ~0460-0097. History of prior vaginal births x0/  section x3.      Will take Ibuprofen seldomly once per month.     Smokes tobacco 1/2 - 3/4 pack for 20+ years.     Denies use of ETOH. No  recreational drug use.     Paternal family history is limited. No known family history or GI related malignancy (esophageal, gastric, pancreatic, liver or colon) or family history of IBD/celiac disease.         Esophageal Questionnaire(s)    BEDQ Questionnaire      10/9/2023     2:41 PM 1/30/2024    12:50 PM   BEDQ Questionnaire: How Often Have You Had the Following?   Trouble eating solid food (meat, bread, vegetables) 0 0   Trouble eating soft foods (yogurt, jello, pudding) 0 0   Trouble swallowing liquids 0 0   Pain while swallowing 0 0   Coughing or choking while swallowing foods or liquids 0 0   Total Score: 0 0         10/9/2023     2:41 PM 1/30/2024    12:50 PM   BEDQ Questionnaire: Discomfort/Pain Ratings   Eating solid food (meat, bread, vegetables) 0 0   Eating soft foods (yogurt, jello, pudding) 0 0   Drinking liquid 0 0   Total Score: 0 0       Eckardt Questionnaire      10/9/2023     2:41 PM 1/30/2024    12:51 PM   Eckardt Questionnaire   Dysphagia 0 0   Regurgitation 1 1   Retrosternal Pain 0 0   Weight Loss (kg) 1 0   Total Score:  2 1       Promis 10 Questionnaire      10/9/2023     2:41 PM 1/30/2024    12:52 PM   PROMIS 10 FLOWSHEET DATA   In general, would you say your health is: 1 2   In general, would you say your quality of life is: 2 2   In general, how would you rate your physical health? 2 2   In general, how would you rate your mental health, including your mood and your ability to think? 2 2   In general, how would you rate your satisfaction with your social activities and relationships? 2 2   In general, please rate how well you carry out your usual social activities and roles. (This includes activities at home, at work and in your community, and responsibilities as a parent, child, spouse, employee, friend, etc.) 2 2   To what extent are you able to carry out your everyday physical activities such as walking, climbing stairs, carrying groceries, or moving a chair? 4 4   In the past 7  "days, how often have you been bothered by emotional problems such as feeling anxious, depressed, or irritable? 4 3   In the past 7 days, how would you rate your fatigue on average? 4 3   In the past 7 days, how would you rate your pain on average, where 0 means no pain, and 10 means worst imaginable pain? 4 5   Mental health question re-calculation - no clinical value 2 3   Physical health question re-calculation - no clinical value 2 3   Pain question re-calculation - no clinical value 3 3   Global Mental Health Score 8 9   Global Physical Health Score 11 12   PROMIS TOTAL - SUBSCORES 19 21           STUDIES & PROCEDURES:    EGD:     5/2023   Findings:        Mucosal changes including ringed esophagus were found in the middle        third of the esophagus. Biopsies were taken with a cold forceps for        histology.        Diffuse moderately erythematous mucosa without bleeding was found in the        stomach. Biopsies were taken with a cold forceps for Helicobacter pylori        testing.        The exam was otherwise without abnormality.                                                                                     Impression:            - Esophageal mucosal changes suspicious for                          eosinophilic esophagitis. Biopsied.                          - Erythematous mucosa in the stomach. Biopsied.                          - The examination was otherwise normal.                          4 cm hiatal hernia     Final Diagnosis   A: Stomach, antrum, biopsy:  - Gastric antrum and body mucosa without diagnostic abnormality.  - Negative for Helicobacter pylori on H&E examination.      B: Esophagus, distal, biopsy:  - Esophageal squamous mucosa without diagnostic abnormality.  - Negative for intraepithelial eosinophils.  - No columnar (gastric) component present.  - Negative for intestinal metaplasia (\"Farrell's esophagus\").  - Negative for dysplasia or malignancy.      C: Esophagus, mid and upper, " biopsy:  - Esophageal squamous mucosa without diagnostic abnormality.  - Negative for intraepithelial eosinophils.       Colonoscopy:    5/4/2023   Findings:        The perianal and digital rectal examinations were normal. Pertinent        negatives include normal sphincter tone.        A 3 mm polyp was found in the rectum. The polyp was sessile. The polyp        was removed with a cold biopsy forceps. Resection and retrieval were        complete. Verification of patient identification for the specimen was        done by the physician, nurse and technician using the patient's name,        birth date and medical record number. Estimated blood loss was minimal.        Non-bleeding external and internal hemorrhoids were found during        retroflexion. The hemorrhoids were mild and Grade I (internal        hemorrhoids that do not prolapse).        A few small-mouthed diverticula were found in the recto-sigmoid colon.                                                                                     Impression:            - One 3 mm polyp in the rectum, removed with a cold                          biopsy forceps. Resected and retrieved.                          - Non-bleeding external and internal hemorrhoids.                          - Diverticulosis in the recto-sigmoid colon.     Final Diagnosis   Large intestine, rectum, polypectomy:  -Hyperplastic polyp          2013 [facility]          6/2010                                                                                 Findings:        The colon was significantly tortuous in the entire colon. The patient        was repositioned in order to accomplish the maneuver. A        benign-appearing, intrinsic moderate stenosis measuring 2 cm (in length)        x 1.5 cm (inner diameter) was found in the mid sigmoid colon and was        traversed. The exam was otherwise without abnormality.                                                                                     Impression:          - Tortuous colon.                        - Stricture in the mid sigmoid colon.                        - The examination was otherwise normal.     EndoFLIP directed at the UES or LES (8cm (EF-325) balloon length or 16cm (EF-322) balloon length):   Date:  8cm balloon  Balloon inflation Balloon pressure CSA (mm^2) DI (mm^2/mmHg) Dmin (mm) Compliance   20 (ladmark ID)        30        40        50           16cm balloon  Balloon inflation Balloon pressure CSA (mm^2) DI (mm^2/mmHg) Dmin (mm) Compliance   30 (ladmark ID)        40        50        60        70           High Resolution Manometry:    PH/Impedance:     Bravo:    CT:    4/11/2023 CT AP W Contrast   IMPRESSION:   1.  Nondilated fluid-filled loops of ileum and colon without  surrounding inflammatory changes are nonspecific, may be related to  mild enterocolitis in the appropriate setting.  2.  No other definite acute findings in the abdomen and pelvis    Esophagram:    FL VSS:     GES:    U/S:     XRAY:    Other:       Prior medical records were reviewed including, but not limited to, notes from referring providers, lab work, radiographic tests, and other diagnostic tests. Pertinent results were summarized above.     History     Past Medical History:   Diagnosis Date     Cocaine abuse, unspecified 2003     Dysplasia of cervix, unspecified 2000     Esophageal reflux 11/13/2006     Ganglion of joint 1/22/2007     Generalized anxiety disorder 10/30/2006    Buspar prescribed 8/23/07     IBS (irritable bowel syndrome) 5/28/2008     Need for prophylactic hormone replacement therapy (postmenopausal)        Past Surgical History:   Procedure Laterality Date     ABLATE VEIN VARICOSE RADIO FREQUENCY WITHOUT PHLEBECTOMY MULTIPLE STAB  7/24/2014    Procedure: ABLATE VEIN VARICOSE RADIO FREQUENCY WITHOUT PHLEBECTOMY MULTIPLE STAB;  Surgeon: Khanh Bunch MD;  Location: [facility]     BIOPSY BREAST Left      COLONOSCOPY N/A 5/4/2023    Procedure:  COLONOSCOPY, WITH POLYPECTOMY AND BIOPSY;  Surgeon: Ning Tran MD;  Location: [facility]     ENHANCE LASER REFRACTIVE BILATERAL EXISTING PT IN PARAMETERS Bilateral     LASIK     ESOPHAGOSCOPY, GASTROSCOPY, DUODENOSCOPY (EGD), COMBINED N/A 2023    Procedure: Esophagoscopy, gastroscopy, duodenoscopy, combined;  Surgeon: Aime Mak MD;  Location: [facility]     HYSTERECTOMY SUPRACERVICAL, BILATERAL SALPINGO-OOPHORECTOMY, COMBINED       LAPAROSCOPIC ASSISTED COLECTOMY N/A 2015    Procedure: LAPAROSCOPIC ASSISTED COLECTOMY;  Surgeon: Khanh Bunch MD;  Location: [facility]     LAPAROSCOPIC CHOLECYSTECTOMY N/A 2017    Procedure: LAPAROSCOPIC CHOLECYSTECTOMY;  Surgeon: Levi Louis MD;  Location: [facility]     NERVE BLOCK PERIPHERAL Bilateral 2015    Procedure: NERVE BLOCK PERIPHERAL;  Surgeon: Generic Anesthesia Provider;  Location: [facility]     REPAIR PTOSIS BILATERAL Bilateral 10/11/2018    Procedure: REPAIR PTOSIS BILATERAL;  Bilateral Upper Eyelid Ptosis Repair;  Surgeon: Wilton Viramontes MD;  Location:  OR     SURGICAL HISTORY OF -       Breast implants     SURGICAL HISTORY OF -       Carpal  tunnel r hand     ZZC  DELIVERY ONLY  ,,    , Low Cervical       Social History     Socioeconomic History     Marital status:      Spouse name: Not on file     Number of children: 3     Years of education: 14     Highest education level: Not on file   Occupational History     Occupation: Customer Service     Employer: [employer]     Comment: 1 year   Tobacco Use     Smoking status: Every Day     Current packs/day: 1.00     Average packs/day: 1 pack/day for 20.0 years (20.0 ttl pk-yrs)     Types: Cigarettes     Smokeless tobacco: Never     Tobacco comments:     trying to quit   Vaping Use     Vaping status: Never Used   Substance and Sexual Activity     Alcohol use: Never     Drug use: No     Sexual activity: Yes     Partners: Male     Birth  control/protection: Surgical     Comment: hysterectomy   Other Topics Concern     Parent/sibling w/ CABG, MI or angioplasty before 65F 55M? No   Social History Narrative     Not on file     Social Determinants of Health     Financial Resource Strain: Not on file   Food Insecurity: Not on file   Transportation Needs: Not on file   Physical Activity: Not on file   Stress: Not on file   Social Connections: Not on file   Interpersonal Safety: Not on file   Housing Stability: Not on file       Family History   Problem Relation Age of Onset     Cancer Father         liver     Cancer Brother         lung, asbestos     Cancer Mother         uterine     Cancer Sister         uterine     Breast Cancer Other      Breast Cancer Other      Glaucoma No family hx of      Macular Degeneration No family hx of      Family history reviewed and edited as appropriate    Medications and Allergies:     Outpatient Encounter Medications as of 5/7/2024   Medication Sig Dispense Refill     albuterol (VENTOLIN HFA) 108 (90 Base) MCG/ACT inhaler Inhale 2 puffs into the lungs every 4 hours as needed for shortness of breath 18 g 4     amitriptyline (ELAVIL) 25 MG tablet TAKE ONE TABLET BY MOUTH EVERY NIGHT AT BEDTIME 90 tablet 1     butalbital-aspirin-caffeine (FIORINAL) -40 MG capsule Take 1 capsule by mouth every 6 hours as needed for headaches 30 capsule 0     cetirizine (ZYRTEC) 10 MG tablet Take 1 tablet (10 mg) by mouth daily 90 tablet 1     Cholecalciferol (VITAMIN D3 PO) Take 400 Units by mouth daily  (Patient not taking: Reported on 12/13/2023)       estradiol (ESTRACE) 1 MG tablet Take 1 tablet (1 mg) by mouth daily 90 tablet 3     estradiol (VAGIFEM) 10 MCG TABS vaginal tablet Place 1 tablet (10 mcg) vaginally twice a week 24 tablet 3     gabapentin (NEURONTIN) 300 MG capsule TAKE 2 CAPSULES BY MOUTH AT BEDTIME 180 capsule 3     hyoscyamine (LEVSIN) 0.125 MG tablet Take 1 tablet (125 mcg) by mouth every 4 hours as needed for  cramping (Patient not taking: Reported on 12/13/2023) 30 tablet 4     LINZESS 290 MCG capsule        LORazepam (ATIVAN) 1 MG tablet Take 0.5-1 tablets (0.5-1 mg) by mouth every 6 hours as needed for anxiety 20 tablet 0     LORazepam (ATIVAN) 1 MG tablet Take 0.5-1 tablets (0.5-1 mg) by mouth every 6 hours as needed for anxiety 20 tablet 1     lubiprostone (AMITIZA) 24 MCG capsule Take 24 mcg by mouth 2 times daily (with meals)       montelukast (SINGULAIR) 10 MG tablet Take 1 tablet (10 mg) by mouth at bedtime 90 tablet 0     multivitamin, therapeutic (THERA-VIT) TABS Take 1 tablet by mouth daily       omeprazole 20 MG tablet Take 20 mg by mouth daily       oxyBUTYnin ER (DITROPAN XL) 5 MG 24 hr tablet Take 1 tablet (5 mg) by mouth daily 90 tablet 3     oxyCODONE (ROXICODONE) 5 MG tablet Take 0.5-1 tablets (2.5-5 mg) by mouth every 8 hours as needed for pain (Patient not taking: Reported on 12/13/2023) 21 tablet 0     sucralfate (CARAFATE) 1 GM tablet Take 1 tablet (1 g) by mouth 4 times daily 120 tablet 1     traMADol (ULTRAM) 50 MG tablet Take 50 mg by mouth every 6 hours as needed for severe pain (Patient not taking: Reported on 12/13/2023)       varenicline (CHANTIX JOHNNY) 0.5 MG X 11 & 1 MG X 42 tablet Take 0.5 mg tab daily for 3 days, THEN 0.5 mg tab twice daily for 4 days, THEN 1 mg twice daily. 53 tablet 0     No facility-administered encounter medications on file as of 5/7/2024.        Allergies   Allergen Reactions     Cephalexin Hives     Patient states she gets hives - bad reaction not listed on her H & P.     Rizatriptan Dizziness     Acetaminophen Hives     Ciprofloxacin Hives     Got rash when taken with metronidazole + Vicodin  Other reaction(s): hives     Lactose Diarrhea     Metronidazole Hives     Rash when taken with cipro + Vicodin     Unknown [No Clinical Screening - See Comments]      Pt developed hives when taking flagyl, cipro, and vicodin, doesn't know which she is allergic to      Vicodin  [Hydrocodone-Acetaminophen] Hives     Rash when taken with cipro and metronidazole        Review of systems:  A full 10 point review of systems was obtained and was negative except for the pertinent positives and negatives stated within the HPI.    Objective Findings:   Physical Exam:    Constitutional: Wt 47 kg (103 lb 9.9 oz)   BMI 19.58 kg/m    General: Alert, cooperative, no distress, well-appearing  Head: Atraumatic, normocephalic, no obvious abnormalities   Eyes: Sclera anicteric, no obvious conjunctival hemorrhage   Nose: Nares normal, no obvious malformation, no obvious rhinorrhea   Skin: No jaundice, no obvious rash  Neurologic: AAOx3, no obvious neurologic abnormality  Psychiatric: Normal Affect, appropriate mood  Extremities: No obvious edema, no obvious malformation     Labs, Radiology, Pathology     Lab Results   Component Value Date    WBC 4.2 02/05/2024    WBC 4.3 04/26/2023    WBC 6.1 04/11/2023    HGB 14.6 02/05/2024    HGB 14.0 04/26/2023    HGB 15.4 04/11/2023     02/05/2024     04/26/2023     04/11/2023    CHOL 165 12/18/2020    CHOL 157 10/11/2010    CHOL 148 09/20/2004    TRIG 149 12/18/2020    HDL 89 12/18/2020    HDL 67 10/11/2010    HDL 44 09/20/2004    ALT 14 02/05/2024    ALT 13 04/26/2023    ALT 15 04/11/2023    AST 21 02/05/2024    AST 17 04/26/2023    AST 22 04/11/2023     02/05/2024     04/26/2023     04/11/2023    BUN 9.6 02/05/2024    BUN 11.1 04/26/2023    BUN 10.4 04/11/2023    CO2 27 02/05/2024    CO2 28 04/26/2023    CO2 27 04/11/2023    TSH 0.99 02/05/2024    TSH 0.94 12/18/2020    TSH 0.82 10/13/2017        Liver Function Studies -   Recent Labs   Lab Test 04/26/23  1041   PROTTOTAL 6.8   ALBUMIN 4.4   BILITOTAL 0.3   ALKPHOS 66   AST 17   ALT 13          Patient Active Problem List    Diagnosis Date Noted     Osteopenia of multiple sites 07/06/2023     Priority: Medium     Major depression, recurrent (H24) 05/19/2023     Priority: Medium      Migraine with aura and without status migrainosus, not intractable 10/16/2017     Priority: Medium     Diverticular disease of colon 10/07/2013     Priority: Medium     IBS (irritable bowel syndrome) 05/28/2008     Priority: Medium     Ovarian cyst 08/23/2007     Priority: Medium     Oophorectomy bilateral - premalignant tissue ? 1998  Problem list name updated by automated process. Provider to review       Esophageal reflux 11/13/2006     Priority: Medium     Generalized anxiety disorder 10/30/2006     Priority: Medium     Buspar prescribed 8/23/07       Raynaud's syndrome 01/04/2006     Priority: Medium     Tobacco use disorder 01/04/2006     Priority: Medium     Need for prophylactic hormone replacement therapy (postmenopausal) 01/04/2006     Priority: Medium      Assessment and Plan   Assessment/Plan:    Fatou Simental is a 59 year old female with significant past medical history pertinent for anxiety, multiple intraabdominal surgeries (see HPI) chronic constipation who is being seen as a follow-up patient with a chief complaint of abdominal bloating/distention and difficulty with initiating a bowel movement..    11/9/2020 seen and evaluated with [facility] with concerns for stooling patterns oscillating between constipation and diarrhea.  At the time she was taking Colace and as needed MiraLAX.  She reported sensation of incomplete evacuation as well as bloating.  Suspicion was for constipation with overflow diarrhea and she was initiated on Linzess 72 mcg daily.    5/2023 [facility] Consultation - Reported minimal benefit with the use of Linzess 290 mcg. Started on Amitiza unknown to specifics and requested a third opinion at [facility] however declined secondary to insurance coverage.  For her chronic abdominal pain symptoms it was advised that she continues amitriptyline which she is taking 25 mg nightly and gabapentin 600 mg nightly.  It was mentioned to defer to consultation to pain  management to patient's primary care provider.    1/2023 normal KUB without increased stool burden, celiac serologies/thyroid function testing within normal limits    Initially Fatou  was seen in consultation January 2024 for concerns of inconsistent bowel patterns with fluctuation between [place] 4 - 7 stools occurring on an every other day basis.  Associated symptoms included abdominal cramping that is relieved with defecation, fecal urgency, borborygmi, difficulty with initiation of the bowel movement and sensation of incomplete evacuation.     Today, Fatou reports symptoms of abdominal bloating/distention as well as early satiation for which she has been using various laxatives in attempts to provide symptomatic relief. Ultimately, this is resulting in loose/watery stools occurring on a daily basis however with transient relief of her primary concerns. Alternative etiologies for bloating/distention and early satiation should be considered including gastroparesis for which a formal gastric emptying study will be obtained.  There is less concern for gastric outlet obstruction with a normal upper endoscopy May 2023.  Decreased gastric accommodation in the setting of a moderate sized hiatal hernia should also be considered. Future considerations would be consultation to the dietician for low FODMAPs diet vs additional diagnostics including breath testing for SIBO/fructose intolerence.     Fatou previously noted that she has experienced inconsistent bowel habits throughout the entirety of her adult life however concern is for possible intra-abdominal adhesions, pelvic floor dysfunction and medication side effects contributing (amitriptyline, butalbital, gabapentin, oxybutynin and oxycodone).  Ultimately, there still remains concern for pelvic floor dysfunction and consultation will be placed once again to the pelvic floor center.     - Please refrain from additional use of Linzess  - Start MiraLAX 17g (1  capful) daily this can be increased to twice daily dosing if needed  - Please increase your daily fiber supplementation to 2 - 3 tablespoons daily.  Ultimately the goal is for approximately 25 g of daily fiber between dietary intake/supplementation  - Consider consultation with on staff dietitian  - Consultation to the pelvic floor center   - Gastric emptying study. To schedule imaging, please call [phone]       Follow up plan:   Return to clinic 4 months and as needed.    The risks and benefits of my recommendations, as well as other treatment options were discussed with the patient and any available family today. All questions were answered.     o Follow up: As planned above. Today, I personally spent 23 minutes in direct face to face time with the patient, of which greater than 50% of the time was spent in patient education and counseling as described above. Approximately 19 minutes were spent on indirect care associated with the patient's consultation including but not limited to review of: patient medical records to date, clinic visits, hospital records, lab results, imaging studies, procedural documentation, and coordinating care with other providers. The findings from this review are summarized in the above note. All of the above accounted for a cumulative time of 42 minutes and was performed on the date of service.     The patient verbalized understanding of the plan and was appreciative for the time spent and information provided during the office visit.           Leslie Calvin PA-C  Division of Gastroenterology, Hepatology, and Nutrition  [facility]       Documentation assisted by voice recognition and documentation system.

## 2024-05-07 NOTE — NURSING NOTE
Is the patient currently in the state of [place]? YES    Visit mode:VIDEO    If the visit is dropped, the patient can be reconnected by: VIDEO VISIT: Text to cell phone:   Telephone Information:   Mobile [phone]       Will anyone else be joining the visit? NO  (If patient encounters technical issues they should call [phone]    How would you like to obtain your AVS? MyChart    Are changes needed to the allergy or medication list? No    Are refills needed on medications prescribed by this physician? YES    Reason for visit: Video Visit (Recheck)    Sarita GARG

## 2024-05-07 NOTE — PATIENT INSTRUCTIONS
It was a pleasure taking care of you today.  I've included a brief summary of our discussion and care plan from today's visit below.  Please review this information with your primary care provider.  _______________________________________________________________________    My recommendations are summarized as follows:    - Please refrain from additional use of Linzess  - Start MiraLAX 17g (1 capful) daily this can be increased to twice daily dosing if needed  - Please increase your daily fiber supplementation to 2 - 3 tablespoons daily.  Ultimately the goal is for approximately 25 g of daily fiber between dietary intake/supplementation  - Consider consultation with on staff dietitian  - Consultation to the pelvic floor center   - Gastric emptying study. To schedule imaging, please call [phone]       To schedule endoscopic procedures you may call: [phone]  To schedule radiology (imaging) tests you may call: [phone]  To schedule an ENT appointment you may call: [phone]    Please call my nurse Jennifer ([phone])Damari ([phone]) with any questions or concerns.      Return to GI Clinic in 4 months to review your progress.    _______________________________________________________________________    Who do I call with any questions after my visit?  Please be in touch if there are any further questions that arise following today's visit.  There are multiple ways to contact your gastroenterology care team.      During business hours, you may reach a Gastroenterology nurse at [phone] and choose option 3.       To schedule or reschedule an appointment, please call [phone].     You can always send a secure message through [employer].  [employer] messages are answered by your nurse or doctor typically within 24 hours.  Please allow extra time on weekends and holidays.      For urgent/emergent questions after business hours, you may reach the on-call GI Fellow by contacting the [facility]   at [phone].     How will I get the results of any tests ordered?    You will receive all of your results.  If you have signed up for [employer], any tests ordered at your visit will be available to you after your physician reviews them.  Typically this takes 1-2 weeks.  If there are urgent results that require a change in your care plan, your physician or nurse will call you to discuss the next steps.      What is [employer]?  [employer] is a secure way for you to access all of your healthcare records from the [facility].  It is a web based computer program, so you can sign on to it from any location.  It also allows you to send secure messages to your care team.  I recommend signing up for [employer] access if you have not already done so and are comfortable with using a computer.      How to I schedule a follow-up visit?  If you did not schedule a follow-up visit today, please call [phone] to schedule a follow-up office visit.      If you feel you received exceptional care and are interested in supporting the clinical and research goals of Leslie Calvin PA-C or the Division of Gastroenterology, Hepatology, and Nutrition please contact [email or web address] from the [facility] to discuss opportunities to donate.    Sincerely,    Leslie Calvin PA-C  Division of Gastroenterology, Hepatology, and Nutrition  [facility]

## 2024-05-07 NOTE — LETTER
"    5/7/2024         RE: Fatou Simental  [address] [place]  [place] [address]        Dear Colleague,    Thank you for referring your patient, Fatou Simental, to the [facility] [place]. Please see a copy of my visit note below.    Gastroenterology Visit for: Fatou Simental 1965   MRN: [id number]     Reason for Visit:  chief complaint    Referred by: No ref. provider found  /   Patient Care Team:  Gaby Shelton APRN CNP as PCP - General  Sal Elias MD as MD (Gastroenterology)  Wilton Viramontes MD as MD (Ophthalmology)  Lee Ann Smith MD as MD (Cardiovascular & Thoracic Surgery)  Gaby Shelton APRN CNP as Nurse Practitioner (Family Medicine)  Gaby Shelton APRN CNP as Assigned PCP  Lee Ann Smith MD as Assigned Surgical Provider  Leslie Calvin PA-C as Assigned Gastroenterology Provider    History of Present Illness:   Fatou Simental is a 59 year old female with significant past medical history pertinent for anxiety, multiple intraabdominal surgeries (see HPI) chronic constipation who is being seen as a follow-up patient with a chief complaint of abdominal bloating/distention and difficulty with initiating a bowel movement.  -----------------------------------------------------------------------------------------------------------------------------------------------------------------------------------------------------------------------------------  Interval History May 7, 2024:    Unable to palate Metamucil. Now taking Benefiber 2-3 teaspoons daily.     Without the use of laxatives Fatou states \"it hurts to pass and the stools come out thin.\" She explains \"she tries not bare down/press to hard.\" With the use Miralax 17g three capfuls as well as when she decreases to Miralax 17g BID nightly she will have a bowel movement occurring once daily that are consistent with Ward Stool Scale Type 6/7. With the use " "of Linzess 290mcg this results in multiple liquid stools with significant fecal urgency.  Without the use of laxatives Fatou explains \"it is not satisfying.\"     With consumption of large meals or over indulgence will experience regurgitation. Will use Omeprazole 20mg as needed with relief of symptoms.  She has also noted that she has changed her eating patterns now eating small frequent meals throughout the day and decrease portion sizes.    Denies unintentional weight loss, nausea, emesis, dysphagia, odynophagia, heartburn, nocturnal stooling, incontinence of feces, melena, hematochezia and BRBR.     ---------------------------------------------------------------------------------------------------------------------------------------------------------------------------------------------  Today Fatou presents with concerns of inconsistent bowel habits.  She has previously been seen by [facility] and [facility] for similar concerns.  She was prescribed Linzess 290 mcg which resulted in abdominal cramping and diarrhea with stools occurring 3-4x per day consistent with [place] stool scale type VII.  Because of this she switched to using the medication as needed. Subsequently she had trialed Amitiza however had difficulties with the twice daily dosing.  Noting she had hesitancy taking this in the afternoon especially in relation to mealtime additional did not want to experience diarrhea outside of the house.  It is unknown to the specifics regarding the quantity of Amitiza that she took. In the past she has also used Miralax 17g 2-3x throughout the week. Unknown if she had previously used Senokot although it is documented within the chart.     Now she reports that for the past two weeks has had a \"burning in the colon and watery diarrhea stools.\" Feels as there is \"a fire\" that is located to suprapubic area and can also involve the right/left lower quadrants. Yesterday had 5 stools consistent with [place] Stool " Scale Type 7. The day prior to this has had 2 stools consistent with Lenoir Stool Scale Type 7. The burning sensation is not related to defecation however she does have cramping that will precede defecation and resolve after a bowel movement. Associated with urgency and borborygmi.  She reports that with the onset of the abdominal burning sensation her stool patterns have not changed.  The sensation that she is experiencing now is different than when she has had episodes of diverticulitis in the past.  She denies having abdominal pain at this time.    Prior to the past 2 weeks she was having stools that would fluctuate between Lenoir Stool Scale 4/5 - 7 occurring every other day.  She does have difficulties with initiation of the BM associated with pushing/straining and sensation of incomplete evacuation. No prior use of enemas or suppositories.     She reports struggling with defecatory concerns throughout the majority of her life.  After her sigmoidectomy symptoms had resolved for multiple years however then again gradually returned.     Currently she is taking 2 tablespoons Benefiber daily for the past 1.5 months. Noted improvement/softer stools with use and that it was easier to initiate the bowel movement.     No recent ABX, travel or sick contacts.     Denies weight loss, nausea, emesis, dysphagia, odynophagia, heartburn, regurgitation, abdominal pain, early satiation, early satiety, nocturnal stooling, incontinence of feces, rectal digitation, perianal support, vaginal splinting, steatorrhea, melena, hematochezia and BRBR.     Past surgical history for sigmoidectomy  secondary to diverticulosis in and bilateral oophorectomy and laparoscopic hysterectomy (endometriosis per pt reports)  and cholecystectomy in ~5561-0655. History of prior vaginal births x0/  section x3.      Will take Ibuprofen seldomly once per month.     Smokes tobacco 1/2 - 3/4 pack for 20+ years.     Denies use of ETOH. No  recreational drug use.     Paternal family history is limited. No known family history or GI related malignancy (esophageal, gastric, pancreatic, liver or colon) or family history of IBD/celiac disease.         Esophageal Questionnaire(s)    BEDQ Questionnaire      10/9/2023     2:41 PM 1/30/2024    12:50 PM   BEDQ Questionnaire: How Often Have You Had the Following?   Trouble eating solid food (meat, bread, vegetables) 0 0   Trouble eating soft foods (yogurt, jello, pudding) 0 0   Trouble swallowing liquids 0 0   Pain while swallowing 0 0   Coughing or choking while swallowing foods or liquids 0 0   Total Score: 0 0         10/9/2023     2:41 PM 1/30/2024    12:50 PM   BEDQ Questionnaire: Discomfort/Pain Ratings   Eating solid food (meat, bread, vegetables) 0 0   Eating soft foods (yogurt, jello, pudding) 0 0   Drinking liquid 0 0   Total Score: 0 0       Eckardt Questionnaire      10/9/2023     2:41 PM 1/30/2024    12:51 PM   Eckardt Questionnaire   Dysphagia 0 0   Regurgitation 1 1   Retrosternal Pain 0 0   Weight Loss (kg) 1 0   Total Score:  2 1       Promis 10 Questionnaire      10/9/2023     2:41 PM 1/30/2024    12:52 PM   PROMIS 10 FLOWSHEET DATA   In general, would you say your health is: 1 2   In general, would you say your quality of life is: 2 2   In general, how would you rate your physical health? 2 2   In general, how would you rate your mental health, including your mood and your ability to think? 2 2   In general, how would you rate your satisfaction with your social activities and relationships? 2 2   In general, please rate how well you carry out your usual social activities and roles. (This includes activities at home, at work and in your community, and responsibilities as a parent, child, spouse, employee, friend, etc.) 2 2   To what extent are you able to carry out your everyday physical activities such as walking, climbing stairs, carrying groceries, or moving a chair? 4 4   In the past 7  "days, how often have you been bothered by emotional problems such as feeling anxious, depressed, or irritable? 4 3   In the past 7 days, how would you rate your fatigue on average? 4 3   In the past 7 days, how would you rate your pain on average, where 0 means no pain, and 10 means worst imaginable pain? 4 5   Mental health question re-calculation - no clinical value 2 3   Physical health question re-calculation - no clinical value 2 3   Pain question re-calculation - no clinical value 3 3   Global Mental Health Score 8 9   Global Physical Health Score 11 12   PROMIS TOTAL - SUBSCORES 19 21           STUDIES & PROCEDURES:    EGD:     5/2023   Findings:        Mucosal changes including ringed esophagus were found in the middle        third of the esophagus. Biopsies were taken with a cold forceps for        histology.        Diffuse moderately erythematous mucosa without bleeding was found in the        stomach. Biopsies were taken with a cold forceps for Helicobacter pylori        testing.        The exam was otherwise without abnormality.                                                                                     Impression:            - Esophageal mucosal changes suspicious for                          eosinophilic esophagitis. Biopsied.                          - Erythematous mucosa in the stomach. Biopsied.                          - The examination was otherwise normal.                          4 cm hiatal hernia     Final Diagnosis   A: Stomach, antrum, biopsy:  - Gastric antrum and body mucosa without diagnostic abnormality.  - Negative for Helicobacter pylori on H&E examination.      B: Esophagus, distal, biopsy:  - Esophageal squamous mucosa without diagnostic abnormality.  - Negative for intraepithelial eosinophils.  - No columnar (gastric) component present.  - Negative for intestinal metaplasia (\"Farrell's esophagus\").  - Negative for dysplasia or malignancy.      C: Esophagus, mid and upper, " biopsy:  - Esophageal squamous mucosa without diagnostic abnormality.  - Negative for intraepithelial eosinophils.       Colonoscopy:    5/4/2023   Findings:        The perianal and digital rectal examinations were normal. Pertinent        negatives include normal sphincter tone.        A 3 mm polyp was found in the rectum. The polyp was sessile. The polyp        was removed with a cold biopsy forceps. Resection and retrieval were        complete. Verification of patient identification for the specimen was        done by the physician, nurse and technician using the patient's name,        birth date and medical record number. Estimated blood loss was minimal.        Non-bleeding external and internal hemorrhoids were found during        retroflexion. The hemorrhoids were mild and Grade I (internal        hemorrhoids that do not prolapse).        A few small-mouthed diverticula were found in the recto-sigmoid colon.                                                                                     Impression:            - One 3 mm polyp in the rectum, removed with a cold                          biopsy forceps. Resected and retrieved.                          - Non-bleeding external and internal hemorrhoids.                          - Diverticulosis in the recto-sigmoid colon.     Final Diagnosis   Large intestine, rectum, polypectomy:  -Hyperplastic polyp          2013 [facility]          6/2010                                                                                 Findings:        The colon was significantly tortuous in the entire colon. The patient        was repositioned in order to accomplish the maneuver. A        benign-appearing, intrinsic moderate stenosis measuring 2 cm (in length)        x 1.5 cm (inner diameter) was found in the mid sigmoid colon and was        traversed. The exam was otherwise without abnormality.                                                                                     Impression:          - Tortuous colon.                        - Stricture in the mid sigmoid colon.                        - The examination was otherwise normal.     EndoFLIP directed at the UES or LES (8cm (EF-325) balloon length or 16cm (EF-322) balloon length):   Date:  8cm balloon  Balloon inflation Balloon pressure CSA (mm^2) DI (mm^2/mmHg) Dmin (mm) Compliance   20 (ladmark ID)        30        40        50           16cm balloon  Balloon inflation Balloon pressure CSA (mm^2) DI (mm^2/mmHg) Dmin (mm) Compliance   30 (ladmark ID)        40        50        60        70           High Resolution Manometry:    PH/Impedance:     Bravo:    CT:    4/11/2023 CT AP W Contrast   IMPRESSION:   1.  Nondilated fluid-filled loops of ileum and colon without  surrounding inflammatory changes are nonspecific, may be related to  mild enterocolitis in the appropriate setting.  2.  No other definite acute findings in the abdomen and pelvis    Esophagram:    FL VSS:     GES:    U/S:     XRAY:    Other:       Prior medical records were reviewed including, but not limited to, notes from referring providers, lab work, radiographic tests, and other diagnostic tests. Pertinent results were summarized above.     History     Past Medical History:   Diagnosis Date    Cocaine abuse, unspecified 2003    Dysplasia of cervix, unspecified 2000    Esophageal reflux 11/13/2006    Ganglion of joint 1/22/2007    Generalized anxiety disorder 10/30/2006    Buspar prescribed 8/23/07    IBS (irritable bowel syndrome) 5/28/2008    Need for prophylactic hormone replacement therapy (postmenopausal)        Past Surgical History:   Procedure Laterality Date    ABLATE VEIN VARICOSE RADIO FREQUENCY WITHOUT PHLEBECTOMY MULTIPLE STAB  7/24/2014    Procedure: ABLATE VEIN VARICOSE RADIO FREQUENCY WITHOUT PHLEBECTOMY MULTIPLE STAB;  Surgeon: Khanh Bunch MD;  Location: [facility]    BIOPSY BREAST Left     COLONOSCOPY N/A 5/4/2023    Procedure:  COLONOSCOPY, WITH POLYPECTOMY AND BIOPSY;  Surgeon: Ning Tran MD;  Location: [facility]    ENHANCE LASER REFRACTIVE BILATERAL EXISTING PT IN PARAMETERS Bilateral     LASIK    ESOPHAGOSCOPY, GASTROSCOPY, DUODENOSCOPY (EGD), COMBINED N/A 2023    Procedure: Esophagoscopy, gastroscopy, duodenoscopy, combined;  Surgeon: Aime Mak MD;  Location: [facility]    HYSTERECTOMY SUPRACERVICAL, BILATERAL SALPINGO-OOPHORECTOMY, COMBINED      LAPAROSCOPIC ASSISTED COLECTOMY N/A 2015    Procedure: LAPAROSCOPIC ASSISTED COLECTOMY;  Surgeon: Khanh Bunch MD;  Location: [facility]    LAPAROSCOPIC CHOLECYSTECTOMY N/A 2017    Procedure: LAPAROSCOPIC CHOLECYSTECTOMY;  Surgeon: Levi Louis MD;  Location: [facility]    NERVE BLOCK PERIPHERAL Bilateral 2015    Procedure: NERVE BLOCK PERIPHERAL;  Surgeon: Generic Anesthesia Provider;  Location: [facility]    REPAIR PTOSIS BILATERAL Bilateral 10/11/2018    Procedure: REPAIR PTOSIS BILATERAL;  Bilateral Upper Eyelid Ptosis Repair;  Surgeon: Wilton Viramontes MD;  Location:  OR    SURGICAL HISTORY OF -       Breast implants    SURGICAL HISTORY OF -       Carpal  tunnel r hand    ZZC  DELIVERY ONLY  ,,    , Low Cervical       Social History     Socioeconomic History    Marital status:      Spouse name: Not on file    Number of children: 3    Years of education: 14    Highest education level: Not on file   Occupational History    Occupation: Customer Service     Employer: [employer]     Comment: 1 year   Tobacco Use    Smoking status: Every Day     Current packs/day: 1.00     Average packs/day: 1 pack/day for 20.0 years (20.0 ttl pk-yrs)     Types: Cigarettes    Smokeless tobacco: Never    Tobacco comments:     trying to quit   Vaping Use    Vaping status: Never Used   Substance and Sexual Activity    Alcohol use: Never    Drug use: No    Sexual activity: Yes     Partners: Male     Birth control/protection:  Surgical     Comment: hysterectomy   Other Topics Concern    Parent/sibling w/ CABG, MI or angioplasty before 65F 55M? No   Social History Narrative    Not on file     Social Determinants of Health     Financial Resource Strain: Not on file   Food Insecurity: Not on file   Transportation Needs: Not on file   Physical Activity: Not on file   Stress: Not on file   Social Connections: Not on file   Interpersonal Safety: Not on file   Housing Stability: Not on file       Family History   Problem Relation Age of Onset    Cancer Father         liver    Cancer Brother         lung, asbestos    Cancer Mother         uterine    Cancer Sister         uterine    Breast Cancer Other     Breast Cancer Other     Glaucoma No family hx of     Macular Degeneration No family hx of      Family history reviewed and edited as appropriate    Medications and Allergies:     Outpatient Encounter Medications as of 5/7/2024   Medication Sig Dispense Refill    albuterol (VENTOLIN HFA) 108 (90 Base) MCG/ACT inhaler Inhale 2 puffs into the lungs every 4 hours as needed for shortness of breath 18 g 4    amitriptyline (ELAVIL) 25 MG tablet TAKE ONE TABLET BY MOUTH EVERY NIGHT AT BEDTIME 90 tablet 1    butalbital-aspirin-caffeine (FIORINAL) -40 MG capsule Take 1 capsule by mouth every 6 hours as needed for headaches 30 capsule 0    cetirizine (ZYRTEC) 10 MG tablet Take 1 tablet (10 mg) by mouth daily 90 tablet 1    Cholecalciferol (VITAMIN D3 PO) Take 400 Units by mouth daily  (Patient not taking: Reported on 12/13/2023)      estradiol (ESTRACE) 1 MG tablet Take 1 tablet (1 mg) by mouth daily 90 tablet 3    estradiol (VAGIFEM) 10 MCG TABS vaginal tablet Place 1 tablet (10 mcg) vaginally twice a week 24 tablet 3    gabapentin (NEURONTIN) 300 MG capsule TAKE 2 CAPSULES BY MOUTH AT BEDTIME 180 capsule 3    hyoscyamine (LEVSIN) 0.125 MG tablet Take 1 tablet (125 mcg) by mouth every 4 hours as needed for cramping (Patient not taking: Reported on  12/13/2023) 30 tablet 4    LINZESS 290 MCG capsule       LORazepam (ATIVAN) 1 MG tablet Take 0.5-1 tablets (0.5-1 mg) by mouth every 6 hours as needed for anxiety 20 tablet 0    LORazepam (ATIVAN) 1 MG tablet Take 0.5-1 tablets (0.5-1 mg) by mouth every 6 hours as needed for anxiety 20 tablet 1    lubiprostone (AMITIZA) 24 MCG capsule Take 24 mcg by mouth 2 times daily (with meals)      montelukast (SINGULAIR) 10 MG tablet Take 1 tablet (10 mg) by mouth at bedtime 90 tablet 0    multivitamin, therapeutic (THERA-VIT) TABS Take 1 tablet by mouth daily      omeprazole 20 MG tablet Take 20 mg by mouth daily      oxyBUTYnin ER (DITROPAN XL) 5 MG 24 hr tablet Take 1 tablet (5 mg) by mouth daily 90 tablet 3    oxyCODONE (ROXICODONE) 5 MG tablet Take 0.5-1 tablets (2.5-5 mg) by mouth every 8 hours as needed for pain (Patient not taking: Reported on 12/13/2023) 21 tablet 0    sucralfate (CARAFATE) 1 GM tablet Take 1 tablet (1 g) by mouth 4 times daily 120 tablet 1    traMADol (ULTRAM) 50 MG tablet Take 50 mg by mouth every 6 hours as needed for severe pain (Patient not taking: Reported on 12/13/2023)      varenicline (CHANTIX JOHNNY) 0.5 MG X 11 & 1 MG X 42 tablet Take 0.5 mg tab daily for 3 days, THEN 0.5 mg tab twice daily for 4 days, THEN 1 mg twice daily. 53 tablet 0     No facility-administered encounter medications on file as of 5/7/2024.        Allergies   Allergen Reactions    Cephalexin Hives     Patient states she gets hives - bad reaction not listed on her H & P.    Rizatriptan Dizziness    Acetaminophen Hives    Ciprofloxacin Hives     Got rash when taken with metronidazole + Vicodin  Other reaction(s): hives    Lactose Diarrhea    Metronidazole Hives     Rash when taken with cipro + Vicodin    Unknown [No Clinical Screening - See Comments]      Pt developed hives when taking flagyl, cipro, and vicodin, doesn't know which she is allergic to     Vicodin [Hydrocodone-Acetaminophen] Hives     Rash when taken with cipro  and metronidazole        Review of systems:  A full 10 point review of systems was obtained and was negative except for the pertinent positives and negatives stated within the HPI.    Objective Findings:   Physical Exam:    Constitutional: Wt 47 kg (103 lb 9.9 oz)   BMI 19.58 kg/m    General: Alert, cooperative, no distress, well-appearing  Head: Atraumatic, normocephalic, no obvious abnormalities   Eyes: Sclera anicteric, no obvious conjunctival hemorrhage   Nose: Nares normal, no obvious malformation, no obvious rhinorrhea   Skin: No jaundice, no obvious rash  Neurologic: AAOx3, no obvious neurologic abnormality  Psychiatric: Normal Affect, appropriate mood  Extremities: No obvious edema, no obvious malformation     Labs, Radiology, Pathology     Lab Results   Component Value Date    WBC 4.2 02/05/2024    WBC 4.3 04/26/2023    WBC 6.1 04/11/2023    HGB 14.6 02/05/2024    HGB 14.0 04/26/2023    HGB 15.4 04/11/2023     02/05/2024     04/26/2023     04/11/2023    CHOL 165 12/18/2020    CHOL 157 10/11/2010    CHOL 148 09/20/2004    TRIG 149 12/18/2020    HDL 89 12/18/2020    HDL 67 10/11/2010    HDL 44 09/20/2004    ALT 14 02/05/2024    ALT 13 04/26/2023    ALT 15 04/11/2023    AST 21 02/05/2024    AST 17 04/26/2023    AST 22 04/11/2023     02/05/2024     04/26/2023     04/11/2023    BUN 9.6 02/05/2024    BUN 11.1 04/26/2023    BUN 10.4 04/11/2023    CO2 27 02/05/2024    CO2 28 04/26/2023    CO2 27 04/11/2023    TSH 0.99 02/05/2024    TSH 0.94 12/18/2020    TSH 0.82 10/13/2017        Liver Function Studies -   Recent Labs   Lab Test 04/26/23  1041   PROTTOTAL 6.8   ALBUMIN 4.4   BILITOTAL 0.3   ALKPHOS 66   AST 17   ALT 13          Patient Active Problem List    Diagnosis Date Noted    Osteopenia of multiple sites 07/06/2023     Priority: Medium    Major depression, recurrent (H24) 05/19/2023     Priority: Medium    Migraine with aura and without status migrainosus, not  intractable 10/16/2017     Priority: Medium    Diverticular disease of colon 10/07/2013     Priority: Medium    IBS (irritable bowel syndrome) 05/28/2008     Priority: Medium    Ovarian cyst 08/23/2007     Priority: Medium     Oophorectomy bilateral - premalignant tissue ? 1998  Problem list name updated by automated process. Provider to review      Esophageal reflux 11/13/2006     Priority: Medium    Generalized anxiety disorder 10/30/2006     Priority: Medium     Buspar prescribed 8/23/07      Raynaud's syndrome 01/04/2006     Priority: Medium    Tobacco use disorder 01/04/2006     Priority: Medium    Need for prophylactic hormone replacement therapy (postmenopausal) 01/04/2006     Priority: Medium      Assessment and Plan   Assessment/Plan:    Fatou Simental is a 59 year old female with significant past medical history pertinent for anxiety, multiple intraabdominal surgeries (see HPI) chronic constipation who is being seen as a follow-up patient with a chief complaint of abdominal bloating/distention and difficulty with initiating a bowel movement..    11/9/2020 seen and evaluated with [facility] with concerns for stooling patterns oscillating between constipation and diarrhea.  At the time she was taking Colace and as needed MiraLAX.  She reported sensation of incomplete evacuation as well as bloating.  Suspicion was for constipation with overflow diarrhea and she was initiated on Linzess 72 mcg daily.    5/2023 [facility] Consultation - Reported minimal benefit with the use of Linzess 290 mcg. Started on Amitiza unknown to specifics and requested a third opinion at [facility] however declined secondary to insurance coverage.  For her chronic abdominal pain symptoms it was advised that she continues amitriptyline which she is taking 25 mg nightly and gabapentin 600 mg nightly.  It was mentioned to defer to consultation to pain management to patient's primary care provider.    1/2023 normal KUB  without increased stool burden, celiac serologies/thyroid function testing within normal limits    Initially Fatou  was seen in consultation January 2024 for concerns of inconsistent bowel patterns with fluctuation between [place] 4 - 7 stools occurring on an every other day basis.  Associated symptoms included abdominal cramping that is relieved with defecation, fecal urgency, borborygmi, difficulty with initiation of the bowel movement and sensation of incomplete evacuation.     Today, Fatou reports symptoms of abdominal bloating/distention as well as early satiation for which she has been using various laxatives in attempts to provide symptomatic relief. Ultimately, this is resulting in loose/watery stools occurring on a daily basis however with transient relief of her primary concerns. Alternative etiologies for bloating/distention and early satiation should be considered including gastroparesis for which a formal gastric emptying study will be obtained.  There is less concern for gastric outlet obstruction with a normal upper endoscopy May 2023.  Decreased gastric accommodation in the setting of a moderate sized hiatal hernia should also be considered. Future considerations would be consultation to the dietician for low FODMAPs diet vs additional diagnostics including breath testing for SIBO/fructose intolerence.     Fatou previously noted that she has experienced inconsistent bowel habits throughout the entirety of her adult life however concern is for possible intra-abdominal adhesions, pelvic floor dysfunction and medication side effects contributing (amitriptyline, butalbital, gabapentin, oxybutynin and oxycodone).  Ultimately, there still remains concern for pelvic floor dysfunction and consultation will be placed once again to the pelvic floor center.     - Please refrain from additional use of Linzess  - Start MiraLAX 17g (1 capful) daily this can be increased to twice daily dosing if needed  -  Please increase your daily fiber supplementation to 2 - 3 tablespoons daily.  Ultimately the goal is for approximately 25 g of daily fiber between dietary intake/supplementation  - Consider consultation with on staff dietitian  - Consultation to the pelvic floor center   - Gastric emptying study. To schedule imaging, please call [phone]       Follow up plan:   Return to clinic 4 months and as needed.    The risks and benefits of my recommendations, as well as other treatment options were discussed with the patient and any available family today. All questions were answered.     Follow up: As planned above. Today, I personally spent 23 minutes in direct face to face time with the patient, of which greater than 50% of the time was spent in patient education and counseling as described above. Approximately 19 minutes were spent on indirect care associated with the patient's consultation including but not limited to review of: patient medical records to date, clinic visits, hospital records, lab results, imaging studies, procedural documentation, and coordinating care with other providers. The findings from this review are summarized in the above note. All of the above accounted for a cumulative time of 42 minutes and was performed on the date of service.     The patient verbalized understanding of the plan and was appreciative for the time spent and information provided during the office visit.     Documentation assisted by voice recognition and documentation system.        Again, thank you for allowing me to participate in the care of your patient.      Sincerely,    Leslie Calvin PA-C

## 2024-05-09 ENCOUNTER — TELEPHONE (OUTPATIENT)
Dept: GASTROENTEROLOGY | Facility: CLINIC | Age: 59
End: 2024-05-09
Payer: COMMERCIAL

## 2024-05-16 ENCOUNTER — PATIENT OUTREACH (OUTPATIENT)
Dept: CARE COORDINATION | Facility: CLINIC | Age: 59
End: 2024-05-16
Payer: COMMERCIAL

## 2024-05-26 NOTE — TELEPHONE ENCOUNTER
"  ED for acute condition Discharge Protocol    \"Hi, my name is Annabelle Saha RN, a registered nurse, and I am calling from [facility].  I am calling to follow up and see how things are going for you after your recent emergency visit.\"    Tell me how you are doing now that you are home?\" ''Not so good. I still have bad stomach cramps. I am taking 2 oxycodone (10) mg just to curb the pain a little\".      Discharge Instructions    \"Let's review your discharge instructions.  What is/are the follow-up recommendations?  Pt. Response: Pt has appt for an US today.    \"Has an appointment with your primary care provider been scheduled?\"  Yes. (confirm and remind to bring meds) Pt had an e visit with Gaby Shelton yesterday.    Medications    \"Tell me what changed about your medicines when you discharged?\"   See below.    \"What questions do you have about your medications?\"   Pt was ordered Levsin yesterday but is just now pickining it up at pharmacy. PATIENT WAS INSTRUCTED TO GO TO THE er IF ABDOMINAL PAIN IS SEVERE. SHE WANTS TO TRY THE LEVSIN FIRST. SHE IS PASSING GAS AND HAD A SMALL  BOWL MOVEMENT THIS MORNING AND IT WAS SOFT.       Call Summary    \"What questions or concerns do you have about your recent visit and your follow-up care?\"     Pt is requesting refill of Ativen and oxycodone.    \"If you have questions or things don't continue to improve, we encourage you contact us through the main clinic number (give number).  Even if the clinic is not open, triage nurses are available 24/7 to help you.     We would like you to know that our clinic has extended hours (provide information).  We also have urgent care (provide details on closest location and hours/contact info)\"    \"Thank you for your time and take care!\"                "
"I called pt back because she reported pain in previous phone call.  Pt says that her left sided abdominal pain is a little worse in the evenings.    Reminded pt that ED notes advised to return to ED if worse.  Advised pt to  return to ED.  Pt is unsure she will go to ED at this time.    Also, pt did not schedule follow up appt stating she \"does not have an hour to sit on the phone.\"    Routed to Dr Henry.  Can a same day appt be used for pt to be seen for follow up tomorrow with Karlene?    Christine Sethi RN    "
Covering for PCP.    Oxycodone and lorazepam are controlled medications.  She is already experiencing persistent abd pain and has been having constipation. Oxycodone can aggravate constipation and worsen the abdominal pain as a result..  It is not clear what the lorazepam is for. It was last prescribed for 5 tablets more than a month ago.    Patient needs to see a provider in person.  
I informed pt of provider's reply.    Pt says that her request for lorazepam is due to the anxiety that she is experiencing right now due to her worries for her health at this time.    Pt is getting ultrasound this afternoon.    Advised pt make appt to be seen for ER follow up left sided abdominal pain.  Discharge notes state pt to be seen in the next 3-5 days.  Pt was seen 2 days ago.    Christine Sethi RN    
Left message for the patient to call the clinic.  Mira FOX RN    
Patient called and appt is made. Mira FOX RN    
She still does need to consult with a provider regarding the request for controlled medication.  
Walk in

## 2024-06-06 ENCOUNTER — PATIENT OUTREACH (OUTPATIENT)
Dept: CARE COORDINATION | Facility: CLINIC | Age: 59
End: 2024-06-06
Payer: COMMERCIAL

## 2024-06-10 ENCOUNTER — TELEPHONE (OUTPATIENT)
Dept: FAMILY MEDICINE | Facility: CLINIC | Age: 59
End: 2024-06-10
Payer: COMMERCIAL

## 2024-06-10 NOTE — TELEPHONE ENCOUNTER
Reason for Call:  Appointment Request    Patient requesting this type of appt: Chronic Diease Management/Medication/Follow-Up    Requested provider: Gaby Shelton    Reason patient unable to be scheduled: Not within requested timeframe    When does patient want to be seen/preferred time: Same day    Comments: Pt scheduled a phone for tomorrow, but would like to be seen today if there's any cancelation    Could we send this information to you in [facility] or would you prefer to receive a phone call?:   Patient would prefer a phone call   Okay to leave a detailed message?: Yes at Cell number on file:    Telephone Information:   Mobile [phone]       Call taken on 6/10/2024 at 9:38 AM by Suze Riggs

## 2024-06-11 ENCOUNTER — VIRTUAL VISIT (OUTPATIENT)
Dept: FAMILY MEDICINE | Facility: CLINIC | Age: 59
End: 2024-06-11
Payer: COMMERCIAL

## 2024-06-11 DIAGNOSIS — F43.0 ACUTE REACTION TO STRESS: ICD-10-CM

## 2024-06-11 DIAGNOSIS — F33.41 RECURRENT MAJOR DEPRESSIVE DISORDER, IN PARTIAL REMISSION (H): ICD-10-CM

## 2024-06-11 DIAGNOSIS — F43.21 GRIEF REACTION: Primary | ICD-10-CM

## 2024-06-11 DIAGNOSIS — Z12.31 VISIT FOR SCREENING MAMMOGRAM: ICD-10-CM

## 2024-06-11 PROCEDURE — 99441 PR PHYSICIAN TELEPHONE EVALUATION 5-10 MIN: CPT | Mod: 93 | Performed by: NURSE PRACTITIONER

## 2024-06-11 RX ORDER — LORAZEPAM 1 MG/1
.5-1 TABLET ORAL EVERY 6 HOURS PRN
Qty: 20 TABLET | Refills: 1 | Status: SHIPPED | OUTPATIENT
Start: 2024-06-11 | End: 2024-09-25

## 2024-06-11 NOTE — PROGRESS NOTES
Fatou is a 59 year old who is being evaluated via a billable telephone visit.    What phone number would you like to be contacted at? [phone]  How would you like to obtain your AVS? Jose  Originating Location (pt. Location): Home    Distant Location (provider location):  On-site    Assessment & Plan     Grief reaction    - Adult Mental Health  Referral; Future    Recurrent major depressive disorder, in partial remission (H24)    - Adult Mental Health  Referral; Future    Acute reaction to stress    - LORazepam (ATIVAN) 1 MG tablet; Take 0.5-1 tablets (0.5-1 mg) by mouth every 6 hours as needed for anxiety  - Adult Mental Health  Referral; Future    Visit for screening mammogram    - MA Screening Bilateral w/ Juan Jose; Future      Nicotine/Tobacco Cessation  She reports that she has been smoking cigarettes. She has a 20 pack-year smoking history. She has never used smokeless tobacco.  Nicotine/Tobacco Cessation Plan  Information offered: Patient not interested at this time        CONSULTATION/REFERRAL to psych    FUTURE APPOINTMENTS:       - Follow-up visit in 1-2 months, sooner PRN    See Patient Instructions    Subjective   Fatou is a 59 year old, presenting for the following health issues:  No chief complaint on file.      6/11/2024     3:28 PM   Additional Questions   Roomed by Allyson HERNANDEZ     Depression and Anxiety   How are you doing with your depression since your last visit? Worsened   How are you doing with your anxiety since your last visit?  Worsened   Are you having other symptoms that might be associated with depression or anxiety? Yes:  feeling down and hopeless. Not eating well, not sleeping well. Worried about everything.   Have you had a significant life event? OTHER: dog was ran over and is requesting that her other dog be a support animal     Do you have any concerns with your use of alcohol or other drugs? No    Social History     Tobacco Use    Smoking  status: Every Day     Current packs/day: 1.00     Average packs/day: 1 pack/day for 20.0 years (20.0 ttl pk-yrs)     Types: Cigarettes    Smokeless tobacco: Never    Tobacco comments:     trying to quit   Vaping Use    Vaping status: Never Used   Substance Use Topics    Alcohol use: Never    Drug use: No         5/18/2023     2:14 PM 6/1/2023     3:10 PM 9/12/2023     1:47 PM   PHQ   PHQ-9 Total Score 9 8 7   Q9: Thoughts of better off dead/self-harm past 2 weeks Not at all Not at all Not at all         1/20/2022     9:29 AM 8/25/2022     8:45 AM 12/5/2022     5:09 PM   SOY-7 SCORE   Total Score  12 (moderate anxiety)    Total Score 14 12 16         Suicide Assessment Five-step Evaluation and Treatment (SAFE-T)    How many servings of fruits and vegetables do you eat daily?  0-1  On average, how many sweetened beverages do you drink each day (Examples: soda, juice, sweet tea, etc.  Do NOT count diet or artificially sweetened beverages)?   0  How many days per week do you exercise enough to make your heart beat faster? 3 or less  How many minutes a day do you exercise enough to make your heart beat faster? 9 or less  How many days per week do you miss taking your medication? 3  What makes it hard for you to take your medications?  remembering to take        Review of Systems  Constitutional, HEENT, cardiovascular, pulmonary, gi and gu systems are negative, except as otherwise noted.      Objective           Vitals:  No vitals were obtained today due to virtual visit.    Physical Exam   General: Alert and no distress //Respiratory: No audible wheeze, cough, or shortness of breath // Psychiatric:  Appropriate affect, tone, and pace of words            Phone call duration: 8 minutes  Signed Electronically by: ANGEL Singh CNP

## 2024-07-04 ENCOUNTER — PATIENT OUTREACH (OUTPATIENT)
Dept: CARE COORDINATION | Facility: CLINIC | Age: 59
End: 2024-07-04
Payer: COMMERCIAL

## 2024-08-06 ENCOUNTER — MYC MEDICAL ADVICE (OUTPATIENT)
Dept: FAMILY MEDICINE | Facility: CLINIC | Age: 59
End: 2024-08-06
Payer: COMMERCIAL

## 2024-08-06 NOTE — TELEPHONE ENCOUNTER
Gaby do you want to fit her in today or ok for the same day hold on Thursday? Thank you!    Ivis Boateng RN

## 2024-08-08 ENCOUNTER — OFFICE VISIT (OUTPATIENT)
Dept: FAMILY MEDICINE | Facility: CLINIC | Age: 59
End: 2024-08-08
Payer: COMMERCIAL

## 2024-08-08 VITALS
WEIGHT: 105.2 LBS | HEIGHT: 61 IN | SYSTOLIC BLOOD PRESSURE: 98 MMHG | TEMPERATURE: 98.2 F | HEART RATE: 90 BPM | BODY MASS INDEX: 19.86 KG/M2 | OXYGEN SATURATION: 98 % | DIASTOLIC BLOOD PRESSURE: 66 MMHG | RESPIRATION RATE: 16 BRPM

## 2024-08-08 DIAGNOSIS — Z12.31 SCREENING MAMMOGRAM, ENCOUNTER FOR: ICD-10-CM

## 2024-08-08 DIAGNOSIS — M25.511 ACUTE PAIN OF BOTH SHOULDERS: Primary | ICD-10-CM

## 2024-08-08 DIAGNOSIS — M25.512 ACUTE PAIN OF BOTH SHOULDERS: Primary | ICD-10-CM

## 2024-08-08 PROCEDURE — 99213 OFFICE O/P EST LOW 20 MIN: CPT | Performed by: NURSE PRACTITIONER

## 2024-08-08 RX ORDER — NAPROXEN 500 MG/1
500 TABLET ORAL 2 TIMES DAILY WITH MEALS
Qty: 180 TABLET | Refills: 0 | Status: SHIPPED | OUTPATIENT
Start: 2024-08-08

## 2024-08-08 ASSESSMENT — PATIENT HEALTH QUESTIONNAIRE - PHQ9
SUM OF ALL RESPONSES TO PHQ QUESTIONS 1-9: 5
10. IF YOU CHECKED OFF ANY PROBLEMS, HOW DIFFICULT HAVE THESE PROBLEMS MADE IT FOR YOU TO DO YOUR WORK, TAKE CARE OF THINGS AT HOME, OR GET ALONG WITH OTHER PEOPLE: SOMEWHAT DIFFICULT
SUM OF ALL RESPONSES TO PHQ QUESTIONS 1-9: 5

## 2024-08-08 NOTE — PROGRESS NOTES
Assessment & Plan     Acute pain of both shoulders  ? Impingement, see ORTHO for possible injections  - Orthopedic  Referral; Future  - naproxen (NAPROSYN) 500 MG tablet; Take 1 tablet (500 mg) by mouth 2 times daily (with meals)    Screening mammogram, encounter for  Routine- superficial cyst consistent with finding from 07/2023  - MA Screen with Implants Bilateral w/Juan Jose; Future      CONSULTATION/REFERRAL to ORTHO    FUTURE APPOINTMENTS:       - Follow-up for annual visit or as needed    See Patient Instructions    Chery Lainez is a 59 year old, presenting for the following health issues:  Breast Mass        8/8/2024     3:01 PM   Additional Questions   Roomed by Allyson TINAJERO     History of Present Illness       Reason for visit:  Lump on left breast and joint pain  Symptom onset:  1-2 weeks ago  Symptoms include:  Lump and cracking in my shouders with pain  Symptom intensity:  Moderate  Symptom progression:  Worsening  Had these symptoms before:  No  What makes it worse:  Ice helps alittle  What makes it better:  No    She eats 2-3 servings of fruits and vegetables daily.She consumes 1 sweetened beverage(s) daily.She exercises with enough effort to increase her heart rate 9 or less minutes per day.  She exercises with enough effort to increase her heart rate 3 or less days per week.   She is taking medications regularly.         Breast Concern  Onset/Duration: 1 week   Description:   Location: 3 o'clock, left breast  Pain or tenderness: No  Redness: No  Intensity: mild  Progression of Symptoms: same  Accompanying Signs & Symptoms:  Any lumps in axillary region: No  Movable: No  Nipple discharge: none   Changes in the skin or nipple: none   On Hormone therapy: YES  Does it change with menstrual cycle: N/A  Previous history of similar problem:   First degree relative with breast cancer: a negative family history for breast cancer.  Precipitating factors:           Worsened by: none   Alleviating  factors:            Improved by: none   Therapies tried and outcome: None  No LMP recorded. Patient has had a hysterectomy.      Narrative & Impression   MAMMOGRAM DIAGNOSTIC WITH IMPLANTS BILATERAL W/ PETER, ULTRASOUND  BREAST LEFT LIMITED 1-3 QUADRANTS July 6, 2023 at 0927 hours     CLINICAL HISTORY: Small mass 3 o'clock. Mass of upper outer quadrant  of left breast.     Breast Density: Scattered areas of fibroglandular density.     BILATERAL DIGITAL DIAGNOSTIC MAMMOGRAM     FINDINGS: A diagnostic bilateral mammogram was performed utilizing  tomosynthesis and pushback Binh views. There is no evidence for  spiculated masses, architectural distortion, asymmetry or suspicious  calcifications. Targeted ultrasound in the area of palpable concern  will be performed for complete evaluation.     When performed, computer-aided detection was used in the  interpretation of this study.        LEFT BREAST ULTRASOUND     FINDINGS: Ultrasound evaluation of the left breast was performed. A  very subtle hyperechoic focus in the area of the reported lump at the  2 o'clock position, 9 cm from the nipple, is noted measuring 8 x 7 x 3  mm. Although technically indeterminate, benign etiology is favored,  such as a sebaceous cyst, hematoma, or lipoma. No spiculated mass,  architectural distortion, or abnormal shadowing.                                                                      IMPRESSION: No evidence of malignancy.       RECOMMENDATION: Resume routine screening mammography. The patient was  provided with the results and recommendations at the completion of the  examination.            Pain History:  When did you first notice your pain? 2-3 weeks    Have you seen anyone else for your pain? No  How has your pain affected your ability to work? Not applicable  Where in your body do you have pain? Musculoskeletal problem/pain  Onset/Duration: 2-3 weeks   Description  Location: shoulders  - bilateral  Joint Swelling: No  Redness:  "No  Pain: YES- aching, cracking    Warmth: No  Intensity:  moderate  Progression of Symptoms:  worsening  Accompanying signs and symptoms:   Fevers: No  Numbness/tingling/weakness: YES- weakness   History  Trauma to the area: No  Recent illness:  No  Previous similar problem: No  Previous evaluation:  No  Precipitating or alleviating factors:  Aggravating factors include: laying on them, extending or lifting arms  Therapies tried and outcome: ice and Ibuprofen-somewhat effective         Review of Systems  Constitutional, HEENT, cardiovascular, pulmonary, gi and gu systems are negative, except as otherwise noted.      Objective    BP 98/66   Pulse 90   Temp 98.2  F (36.8  C) (Tympanic)   Resp 16   Ht 1.549 m (5' 1\")   Wt 47.7 kg (105 lb 3.2 oz)   SpO2 98%   BMI 19.88 kg/m    Body mass index is 19.88 kg/m .  Physical Exam   GENERAL: alert and no distress  BREAST: implant bilateral, 7 mm cystic lesion on left at 2 o'clock, 9 cm from nipple, superficial, mobile, non-tender, no erythema, edema, drainage. Consistent with previous benign findings  MS: BUE exam shows decreased ROM, weakness, no deformities, tender to palpation over anterior shoulders   NEURO: Normal strength and tone, mentation intact and speech normal  PSYCH: mentation appears normal, affect normal/bright            Signed Electronically by: ANGEL Singh CNP    "

## 2024-08-11 ENCOUNTER — HEALTH MAINTENANCE LETTER (OUTPATIENT)
Age: 59
End: 2024-08-11

## 2024-08-13 DIAGNOSIS — Z79.890 NEED FOR PROPHYLACTIC HORMONE REPLACEMENT THERAPY (POSTMENOPAUSAL): ICD-10-CM

## 2024-08-13 DIAGNOSIS — B02.29 POST HERPETIC NEURALGIA: ICD-10-CM

## 2024-08-13 DIAGNOSIS — G89.4 CHRONIC PAIN SYNDROME: ICD-10-CM

## 2024-08-13 DIAGNOSIS — J30.1 NON-SEASONAL ALLERGIC RHINITIS DUE TO POLLEN: ICD-10-CM

## 2024-08-15 ENCOUNTER — TELEPHONE (OUTPATIENT)
Dept: FAMILY MEDICINE | Facility: CLINIC | Age: 59
End: 2024-08-15

## 2024-08-15 DIAGNOSIS — G47.00 INSOMNIA, UNSPECIFIED TYPE: ICD-10-CM

## 2024-08-15 DIAGNOSIS — G43.009 MIGRAINE WITHOUT AURA AND WITHOUT STATUS MIGRAINOSUS, NOT INTRACTABLE: ICD-10-CM

## 2024-08-15 RX ORDER — GABAPENTIN 300 MG/1
CAPSULE ORAL
Qty: 180 CAPSULE | Refills: 3 | Status: SHIPPED | OUTPATIENT
Start: 2024-08-15

## 2024-08-15 RX ORDER — ESTRADIOL 1 MG/1
1 TABLET ORAL DAILY
Qty: 90 TABLET | Refills: 3 | Status: SHIPPED | OUTPATIENT
Start: 2024-08-15

## 2024-08-15 RX ORDER — MONTELUKAST SODIUM 10 MG/1
1 TABLET ORAL AT BEDTIME
Qty: 90 TABLET | Refills: 2 | Status: SHIPPED | OUTPATIENT
Start: 2024-08-15

## 2024-08-15 NOTE — TELEPHONE ENCOUNTER
Prescription approved per [facility] Refill Protocol     Mylene Colin     RN MSN

## 2024-08-16 NOTE — TELEPHONE ENCOUNTER
Prescription approved per [facility] Refill Protocol     Mylene Colin     RN MSN

## 2024-09-06 ENCOUNTER — ANCILLARY PROCEDURE (OUTPATIENT)
Dept: GENERAL RADIOLOGY | Facility: CLINIC | Age: 59
End: 2024-09-06
Attending: FAMILY MEDICINE
Payer: COMMERCIAL

## 2024-09-06 ENCOUNTER — OFFICE VISIT (OUTPATIENT)
Dept: ORTHOPEDICS | Facility: CLINIC | Age: 59
End: 2024-09-06
Attending: NURSE PRACTITIONER
Payer: COMMERCIAL

## 2024-09-06 VITALS — WEIGHT: 105 LBS | HEIGHT: 61 IN | BODY MASS INDEX: 19.83 KG/M2

## 2024-09-06 DIAGNOSIS — M25.511 ACUTE PAIN OF BOTH SHOULDERS: ICD-10-CM

## 2024-09-06 DIAGNOSIS — M25.512 ACUTE PAIN OF BOTH SHOULDERS: ICD-10-CM

## 2024-09-06 DIAGNOSIS — M67.912 TENDINOPATHY OF LEFT ROTATOR CUFF: Primary | ICD-10-CM

## 2024-09-06 DIAGNOSIS — M67.911 TENDINOPATHY OF ROTATOR CUFF, RIGHT: ICD-10-CM

## 2024-09-06 PROCEDURE — 99204 OFFICE O/P NEW MOD 45 MIN: CPT | Mod: 25 | Performed by: FAMILY MEDICINE

## 2024-09-06 PROCEDURE — 20611 DRAIN/INJ JOINT/BURSA W/US: CPT | Mod: 50 | Performed by: FAMILY MEDICINE

## 2024-09-06 PROCEDURE — 73030 X-RAY EXAM OF SHOULDER: CPT | Mod: TC | Performed by: RADIOLOGY

## 2024-09-06 RX ORDER — BETAMETHASONE SODIUM PHOSPHATE AND BETAMETHASONE ACETATE 3; 3 MG/ML; MG/ML
6 INJECTION, SUSPENSION INTRA-ARTICULAR; INTRALESIONAL; INTRAMUSCULAR; SOFT TISSUE
Status: SHIPPED | OUTPATIENT
Start: 2024-09-06

## 2024-09-06 RX ORDER — ROPIVACAINE HYDROCHLORIDE 5 MG/ML
4 INJECTION, SOLUTION EPIDURAL; INFILTRATION; PERINEURAL
Status: SHIPPED | OUTPATIENT
Start: 2024-09-06

## 2024-09-06 RX ADMIN — ROPIVACAINE HYDROCHLORIDE 4 ML: 5 INJECTION, SOLUTION EPIDURAL; INFILTRATION; PERINEURAL at 10:34

## 2024-09-06 RX ADMIN — BETAMETHASONE SODIUM PHOSPHATE AND BETAMETHASONE ACETATE 6 MG: 3; 3 INJECTION, SUSPENSION INTRA-ARTICULAR; INTRALESIONAL; INTRAMUSCULAR; SOFT TISSUE at 10:34

## 2024-09-06 NOTE — LETTER
9/6/2024      Fatou Simental  [address]  [place] [address]      Dear Colleague,    Thank you for referring your patient, Fatou Simental, to the [facility]. Please see a copy of my visit note below.    ASSESSMENT & PLAN    Fatou was seen today for pain and pain.    Diagnoses and all orders for this visit:    Tendinopathy of left rotator cuff  -     Large Joint Injection/Arthocentesis: bilateral subacromial bursa    Acute pain of both shoulders  -     Orthopedic  Referral  -     XR Shoulder Bilateral 3 Views; Future    Tendinopathy of rotator cuff, right  -     Large Joint Injection/Arthocentesis: bilateral subacromial bursa      This issue is acute on chronic and Worsening.     # Acute Bilateral Shoulder Pain: Fatou Simental  was seen today for bilateral shoulder pain. Symptoms had been going on for 1 mon in the setting of being a . On examination there are positive findings of tenderness to palpation over the rotator cuff tendons, pain with shoulder range of motion. Imaging findings showed no shoulder arthritis. Likely cause of patient's condition due to irritated rotator cuff tendons. Other possible conditions contributing to symptoms include shoulder joint pain vs biceps tendinopathy.  Counseled patient on nature of condition and treatment options.  Given this plan as below, follow-up 1 mon as needed.     Image Findings: negative bilateral shoulder x-rays  Treatment: Activities as tolerated, home exercises given today  Job: As tolerated  Medications/Injections: Limited tylenol/ibuprofen for pain for 1-2 weeks, Topical Voltaren gel, bilateral subacromial bursa steroid injections  Follow-up: In one month if symptoms do not improve, sooner if worsening  Can consider repeat evaluation, other injection, formal PT    Nathanael Melvin MD  [facility]    -----  Chief Complaint   Patient presents with  "    Right Shoulder - Pain     Left Shoulder - Pain       SUBJECTIVE  Fatou Simental is a/an 59 year old female who is seen in consultation at the request of  Gaby Shelton C.N.P. for evaluation of bilateral shoulder pain. Reviewed PCP notes.    The patient is seen by themselves.  The patient is Right handed    Onset: 1 month(s) ago. Reports insidious onset without acute precipitating event. Saw PCP 8/8/24.   Location of Pain: bilateral lateral shoulder   Worsened by: lying down, reaching behind, lifting, increased activity    Better with: rest   Treatments tried: rest/activity avoidance, ice, and ibuprofen  Associated symptoms: limited ROM,  cracking, numbness in little fingers    Pain affecting sleep cannot lie on either side  Orthopedic/Surgical history: NO  Social History/Occupation: Housekeeping     No family history pertinent to patient's problem today.      REVIEW OF SYSTEMS:  Review of Systems  Constitutional, HEENT, cardiovascular, pulmonary, gi and gu systems are negative, except as otherwise noted.    OBJECTIVE:  Ht 1.549 m (5' 1\")   Wt 47.6 kg (105 lb)   BMI 19.84 kg/m     General: healthy, alert and in no distress  HEENT: no scleral icterus or conjunctival erythema  Skin: no suspicious lesions or rash. No jaundice.  CV: distal perfusion intact    Resp: normal respiratory effort without conversational dyspnea   Psych: normal mood and affect  Gait: normal steady gait with appropriate coordination and balance    Neuro: Normal light sensory exam of bilateral upper extremities    Ortho Exam   BILATERAL SHOULDER  Inspection:    no swelling, bruising, discoloration, or obvious deformity or asymmetry  Palpation:    Tender about the supraspinatus insertion. Remainder of bony and tendinous landmarks are nontender.  Active Range of Motion:     Abduction 1350, FF 1350, , IR hip pocket.        Strength:    Scapular plane abduction 5-/5, painful, weakness,  ER 5/5, IR 5/5, biceps 5/5, triceps " 5/5  Special Tests:    Positive: Neer's and Parnell'     CERVICAL SPINE  Inspection:    normal cervical lordosis present, rounded shoulders, forward head posture  Palpation:    Nontender.  Range of Motion:     Flexion full    Extension full    Right side bend full    Left side bend full    Right rotation full    Left rotation full  Strength:    Full strength throughout all neck muscles  Special Tests:    Positive: None    Negative: Spurling's (bilateral)    RADIOLOGY:  I independently ordered, visualized and reviewed these images with the patient  Negative bilateral shoulder x-rays for fracture or arthritis      Review of external notes as documented elsewhere in note  Review of the result(s) of each unique test - bilateral shoulder x-rays       Disclaimer: This note consists of symbols derived from keyboarding, dictation and/or voice recognition software. As a result, there may be errors in the script that have gone undetected. Please consider this when interpreting information found in this chart.    Large Joint Injection/Arthocentesis: bilateral subacromial bursa    Date/Time: 9/6/2024 10:34 AM    Performed by: Nathanael Melvin MD  Authorized by: Nathanael Melvin MD    Indications:  Pain  Needle Size:  25 G  Guidance: ultrasound    Approach:  Lateral  Location:  Shoulder  Laterality:  Bilateral      Site:  Bilateral subacromial bursa  Medications (Right):  6 mg betamethasone acet & sod phos 6 (3-3) MG/ML; 4 mL ROPivacaine 5 MG/ML  Medications (Left):  6 mg betamethasone acet & sod phos 6 (3-3) MG/ML; 4 mL ROPivacaine 5 MG/ML  Outcome:  Tolerated well, no immediate complications  Procedure discussed: discussed risks, benefits, and alternatives    Consent Given by:  Patient  Timeout: timeout called immediately prior to procedure    Prep: patient was prepped and draped in usual sterile fashion     Ultrasound images of procedure were permanently stored.     Patient reported some improvement of pain after the  numbing portion bilateral subacromial bursa steroid injections.  Ultrasound guided images were permanently stored.   Aftercare instructions given to patient.  Plan to follow-up as discussed above.     Nathanael Melvin MD [facility]            Again, thank you for allowing me to participate in the care of your patient.        Sincerely,        Nathanael Melvin MD

## 2024-09-06 NOTE — PATIENT INSTRUCTIONS
# Acute Bilateral Shoulder Pain: Fatou Simental  was seen today for bilateral shoulder pain. Symptoms had been going on for 1 mon in the setting of being a . On examination there are positive findings of tenderness to palpation over the rotator cuff tendons, pain with shoulder range of motion. Imaging findings showed no shoulder arthritis. Likely cause of patient's condition due to irritated rotator cuff tendons. Other possible conditions contributing to symptoms include shoulder joint pain vs biceps tendinopathy.  Counseled patient on nature of condition and treatment options.  Given this plan as below, follow-up 1 mon as needed.     Image Findings: negative bilateral shoulder x-rays  Treatment: Activities as tolerated, home exercises given today  Job: As tolerated  Medications/Injections: Limited tylenol/ibuprofen for pain for 1-2 weeks, Topical Voltaren gel, bilateral subacromial bursa steroid injections  Follow-up: In one month if symptoms do not improve, sooner if worsening  Can consider repeat evaluation, other injection, formal PT    Please call [phone]   Ask for my team if you have any questions or concerns    If you have not yet received the influenza vaccine but would like to get one, please call  [phone] or you can schedule via Rdio    It was great seeing you today!    Nathanael Melvin MD, CAM     [facility] [place] Injection Discharge Instructions    Procedure: bilateral subacromial bursa steroid injections     You may shower, however avoid swimming, tub baths or hot tubs for 24 hours following your procedure  You may have a mild to moderate increase in pain for several days following the injection.  It may take up to 14 days for the steroid medication to start working although you may feel the effect as early as a few days after the procedure.  You may use ice packs for 10-15 minutes, 3 to 4 times a day at the injection site for comfort  You may use anti-inflammatory medications  (such as Ibuprofen or Aleve or Advil) or Tylenol for pain control if necessary  If you were fasting, you may resume your normal diet and medications after the procedure  If you have diabetes, check your blood sugar more frequently than usual as your blood sugar may be higher than normal for 10-14 days following a steroid injection. Contact your doctor who manages your diabetes if your blood sugar is higher than usual    If you experience any of the following, [place] [facility] [place] [phone] or [phone]  -Fever over 100 degree F  -Swelling, bleeding, redness, drainage, warmth at the injection site  - New or worsening pain

## 2024-09-06 NOTE — PROGRESS NOTES
ASSESSMENT & PLAN    Fatou was seen today for pain and pain.    Diagnoses and all orders for this visit:    Tendinopathy of left rotator cuff  -     Large Joint Injection/Arthocentesis: bilateral subacromial bursa    Acute pain of both shoulders  -     Orthopedic  Referral  -     XR Shoulder Bilateral 3 Views; Future    Tendinopathy of rotator cuff, right  -     Large Joint Injection/Arthocentesis: bilateral subacromial bursa      This issue is acute on chronic and Worsening.     # Acute Bilateral Shoulder Pain: Fatou Simental  was seen today for bilateral shoulder pain. Symptoms had been going on for 1 mon in the setting of being a . On examination there are positive findings of tenderness to palpation over the rotator cuff tendons, pain with shoulder range of motion. Imaging findings showed no shoulder arthritis. Likely cause of patient's condition due to irritated rotator cuff tendons. Other possible conditions contributing to symptoms include shoulder joint pain vs biceps tendinopathy.  Counseled patient on nature of condition and treatment options.  Given this plan as below, follow-up 1 mon as needed.     Image Findings: negative bilateral shoulder x-rays  Treatment: Activities as tolerated, home exercises given today  Job: As tolerated  Medications/Injections: Limited tylenol/ibuprofen for pain for 1-2 weeks, Topical Voltaren gel, bilateral subacromial bursa steroid injections  Follow-up: In one month if symptoms do not improve, sooner if worsening  Can consider repeat evaluation, other injection, formal PT    Nathanael Melvin MD  [facility]IC[facility]    -----  Chief Complaint   Patient presents with    Right Shoulder - Pain    Left Shoulder - Pain       SUBJECTIVE  Fatou Simental is a/an 59 year old female who is seen in consultation at the request of  Gaby Shelton C.N.P. for evaluation of bilateral shoulder pain. Reviewed PCP notes.    The  "patient is seen by themselves.  The patient is Right handed    Onset: 1 month(s) ago. Reports insidious onset without acute precipitating event. Saw PCP 8/8/24.   Location of Pain: bilateral lateral shoulder   Worsened by: lying down, reaching behind, lifting, increased activity    Better with: rest   Treatments tried: rest/activity avoidance, ice, and ibuprofen  Associated symptoms: limited ROM,  cracking, numbness in little fingers    Pain affecting sleep cannot lie on either side  Orthopedic/Surgical history: NO  Social History/Occupation: Housekeeping     No family history pertinent to patient's problem today.      REVIEW OF SYSTEMS:  Review of Systems  Constitutional, HEENT, cardiovascular, pulmonary, gi and gu systems are negative, except as otherwise noted.    OBJECTIVE:  Ht 1.549 m (5' 1\")   Wt 47.6 kg (105 lb)   BMI 19.84 kg/m     General: healthy, alert and in no distress  HEENT: no scleral icterus or conjunctival erythema  Skin: no suspicious lesions or rash. No jaundice.  CV: distal perfusion intact    Resp: normal respiratory effort without conversational dyspnea   Psych: normal mood and affect  Gait: normal steady gait with appropriate coordination and balance    Neuro: Normal light sensory exam of bilateral upper extremities    Ortho Exam   BILATERAL SHOULDER  Inspection:    no swelling, bruising, discoloration, or obvious deformity or asymmetry  Palpation:    Tender about the supraspinatus insertion. Remainder of bony and tendinous landmarks are nontender.  Active Range of Motion:     Abduction 1350, FF 1350, , IR hip pocket.        Strength:    Scapular plane abduction 5-/5, painful, weakness,  ER 5/5, IR 5/5, biceps 5/5, triceps 5/5  Special Tests:    Positive: Neer's and Parnlel'     CERVICAL SPINE  Inspection:    normal cervical lordosis present, rounded shoulders, forward head posture  Palpation:    Nontender.  Range of Motion:     Flexion full    Extension full    Right side bend full   "  Left side bend full    Right rotation full    Left rotation full  Strength:    Full strength throughout all neck muscles  Special Tests:    Positive: None    Negative: Spurling's (bilateral)    RADIOLOGY:  I independently ordered, visualized and reviewed these images with the patient  Negative bilateral shoulder x-rays for fracture or arthritis      Review of external notes as documented elsewhere in note  Review of the result(s) of each unique test - bilateral shoulder x-rays       Disclaimer: This note consists of symbols derived from keyboarding, dictation and/or voice recognition software. As a result, there may be errors in the script that have gone undetected. Please consider this when interpreting information found in this chart.    Large Joint Injection/Arthocentesis: bilateral subacromial bursa    Date/Time: 9/6/2024 10:34 AM    Performed by: Nathanael Melvin MD  Authorized by: Nathanael Melvin MD    Indications:  Pain  Needle Size:  25 G  Guidance: ultrasound    Approach:  Lateral  Location:  Shoulder  Laterality:  Bilateral      Site:  Bilateral subacromial bursa  Medications (Right):  6 mg betamethasone acet & sod phos 6 (3-3) MG/ML; 4 mL ROPivacaine 5 MG/ML  Medications (Left):  6 mg betamethasone acet & sod phos 6 (3-3) MG/ML; 4 mL ROPivacaine 5 MG/ML  Outcome:  Tolerated well, no immediate complications  Procedure discussed: discussed risks, benefits, and alternatives    Consent Given by:  Patient  Timeout: timeout called immediately prior to procedure    Prep: patient was prepped and draped in usual sterile fashion     Ultrasound images of procedure were permanently stored.     Patient reported some improvement of pain after the numbing portion bilateral subacromial bursa steroid injections.  Ultrasound guided images were permanently stored.   Aftercare instructions given to patient.  Plan to follow-up as discussed above.     Nathanael Melvin MD [facility]

## 2024-09-25 DIAGNOSIS — F43.0 ACUTE REACTION TO STRESS: ICD-10-CM

## 2024-09-25 RX ORDER — LORAZEPAM 1 MG/1
TABLET ORAL
Qty: 20 TABLET | Refills: 1 | Status: SHIPPED | OUTPATIENT
Start: 2024-09-25

## 2024-10-20 ENCOUNTER — HEALTH MAINTENANCE LETTER (OUTPATIENT)
Age: 59
End: 2024-10-20

## 2024-11-08 ENCOUNTER — TELEPHONE (OUTPATIENT)
Dept: FAMILY MEDICINE | Facility: CLINIC | Age: 59
End: 2024-11-08
Payer: COMMERCIAL

## 2024-11-08 NOTE — TELEPHONE ENCOUNTER
Patient's call transferred to author    Patient states she had dental surgery today - had a tooth pulled and an implant placed   Was advised by the Oral Surgeon to stop smoking     Patient hoping provider can send a prescription for a medication to stop smoking to get her through the weekend  Patient's PCP is not in Clinic and surrounding Clinics are fully booked     Preferred pharmacy - [facility]     Enrique Bonilla, Clinic RN  .[facility]

## 2024-11-08 NOTE — TELEPHONE ENCOUNTER
RN called patient.   Due to no appointments available due to end of day; RN advised patient to start an e-Visit with any available provider. Patient declined. RN offered an appointment on Monday and patient declined.     Patient said she will figure it out and call if she wants to set up an appointment or start an e-visit.     Margie Davison RN on 11/8/2024 at 4:26 PM

## 2024-11-19 ENCOUNTER — VIRTUAL VISIT (OUTPATIENT)
Dept: GASTROENTEROLOGY | Facility: CLINIC | Age: 59
End: 2024-11-19
Attending: PHYSICIAN ASSISTANT
Payer: COMMERCIAL

## 2024-11-19 DIAGNOSIS — R14.0 ABDOMINAL DISTENSION: ICD-10-CM

## 2024-11-19 DIAGNOSIS — R14.0 BLOATING: Primary | ICD-10-CM

## 2024-11-19 DIAGNOSIS — R19.8 ABNORMAL BOWEL MOVEMENT: ICD-10-CM

## 2024-11-19 ASSESSMENT — PAIN SCALES - GENERAL: PAINLEVEL_OUTOF10: NO PAIN (0)

## 2024-11-19 NOTE — PROGRESS NOTES
Virtual Visit Details    Type of service:  Video Visit   Video Start Time: 3:17 PM  Video End Time:3:37 PM    Originating Location (pt. Location): Home    Distant Location (provider location):  Off-site  Platform used for Video Visit: Silvia

## 2024-11-19 NOTE — LETTER
"11/19/2024      Fatou Simentla  [address]  [place] [address]      Dear Colleague,    Thank you for referring your patient, Fatou Simental, to the [facility] [place]. Please see a copy of my visit note below.      Gastroenterology Visit for: Fatou Simental 1965   MRN: [id number]     Reason for Visit:  chief complaint    Referred by: No ref. provider found  /   Patient Care Team:  Gaby Shelton APRN CNP as PCP - General  Sal Elias MD as MD (Gastroenterology)  Wilton Viramontes MD as MD (Ophthalmology)  Lee Ann Smith MD as MD (Cardiovascular & Thoracic Surgery)  Gaby Shelton APRN CNP as Nurse Practitioner (Family Medicine)  Gaby Shelton APRN CNP as Assigned PCP  Lee Ann Smith MD as Assigned Surgical Provider  Leslie Calvin PA-C as Assigned Gastroenterology Provider  Nathanael Melvin MD as Assigned Musculoskeletal Provider    History of Present Illness:   Fatou Simental is a 59 year old female with significant past medical history pertinent for anxiety, multiple intraabdominal surgeries (see HPI) chronic constipation who is being seen as a follow-up patient with a chief complaint of abdominal bloating/distention and irregular bowel patterns.  -----------------------------------------------------------------------------------------------------------------------------------------------------------------------------------------------------------------------------------  Interval History November 19, 2024:    Today Fatou reports \"it is not a bad day.\"     Her current bowel regimen consists of Miralax 17g 1.5 - 2 capfuls daily and Benefiber 3 tablespoons throughout the duration of the morning. She adjusts her Miralax as needed based on stool consistency. With this regimen she is stooling daily which fluctuates between [place] 4 - 6. Without the Miralax stools are described as hard.     Unfortunately " "she was unable to attend her initial pelvic floor consultation as she was feeling sick.  She is since rescheduled her appointment for 12/3/2024.    Associated symptoms include increased flatulence throughout the duration of the day. Had trailed GasX many years prior.     Denies unintentional weight loss, nausea, emesis, dysphagia, odynophagia, heartburn, melena, hematochezia and BRBR.     -----------------------------------------------------------------------------------------------------------------------------------------------------------------------------------------------------------------------------------  Interval History May 7, 2024:    Unable to palate Metamucil. Now taking Benefiber 2-3 teaspoons daily.     Without the use of laxatives Fatou states \"it hurts to pass and the stools come out thin.\" She explains \"she tries not bare down/press to hard.\" With the use Miralax 17g three capfuls as well as when she decreases to Miralax 17g BID nightly she will have a bowel movement occurring once daily that are consistent with [place] Stool Scale Type 6/7. With the use of Linzess 290mcg this results in multiple liquid stools with significant fecal urgency.  Without the use of laxatives Fatou explains \"it is not satisfying.\"     With consumption of large meals or over indulgence will experience regurgitation. Will use Omeprazole 20mg as needed with relief of symptoms.  She has also noted that she has changed her eating patterns now eating small frequent meals throughout the day and decrease portion sizes.    Denies unintentional weight loss, nausea, emesis, dysphagia, odynophagia, heartburn, nocturnal stooling, incontinence of feces, melena, hematochezia and BRBR.     ---------------------------------------------------------------------------------------------------------------------------------------------------------------------------------------------  1/2024 HPI:    Fatou presents with concerns of " "inconsistent bowel habits.  She has previously been seen by [facility] and [facility] for similar concerns.  She was prescribed Linzess 290 mcg which resulted in abdominal cramping and diarrhea with stools occurring 3-4x per day consistent with [place] stool scale type VII.  Because of this she switched to using the medication as needed. Subsequently she had trialed Amitiza however had difficulties with the twice daily dosing.  Noting she had hesitancy taking this in the afternoon especially in relation to mealtime additional did not want to experience diarrhea outside of the house.  It is unknown to the specifics regarding the quantity of Amitiza that she took. In the past she has also used Miralax 17g 2-3x throughout the week. Unknown if she had previously used Senokot although it is documented within the chart.     Now she reports that for the past two weeks has had a \"burning in the colon and watery diarrhea stools.\" Feels as there is \"a fire\" that is located to suprapubic area and can also involve the right/left lower quadrants. Yesterday had 5 stools consistent with [place] Stool Scale Type 7. The day prior to this has had 2 stools consistent with [place] Stool Scale Type 7. The burning sensation is not related to defecation however she does have cramping that will precede defecation and resolve after a bowel movement. Associated with urgency and borborygmi.  She reports that with the onset of the abdominal burning sensation her stool patterns have not changed.  The sensation that she is experiencing now is different than when she has had episodes of diverticulitis in the past.  She denies having abdominal pain at this time.    Prior to the past 2 weeks she was having stools that would fluctuate between [place] Stool Scale 4/5 - 7 occurring every other day.  She does have difficulties with initiation of the BM associated with pushing/straining and sensation of incomplete evacuation. No prior use of enemas or " suppositories.     She reports struggling with defecation concerns throughout the majority of her life.  After her sigmoidectomy symptoms had resolved for multiple years however then again gradually returned.     Currently she is taking 2 tablespoons Benefiber daily for the past 1.5 months. Noted improvement/softer stools with use and that it was easier to initiate the bowel movement.     No recent ABX, travel or sick contacts.     Denies weight loss, nausea, emesis, dysphagia, odynophagia, heartburn, regurgitation, abdominal pain, early satiation, early satiety, nocturnal stooling, incontinence of feces, rectal digitation, perianal support, vaginal splinting, steatorrhea, melena, hematochezia and BRBR.     Past surgical history for laparoscopic sigmoid colectomy  secondary to diverticulosis in and bilateral oophorectomy and laparoscopic hysterectomy (endometriosis per pt reports)  and cholecystectomy in ~[id number]. History of prior vaginal births x0/  section x3.      Will take Ibuprofen seldomly once per month.     Smokes tobacco 1/2 - 3/4 pack for 20+ years.     Denies use of ETOH. No recreational drug use.     Paternal family history is limited. No known family history or GI related malignancy (esophageal, gastric, pancreatic, liver or colon) or family history of IBD/celiac disease.         Esophageal Questionnaire(s)    BEDQ Questionnaire      10/9/2023     2:41 PM 2024    12:50 PM   BEDQ Questionnaire: How Often Have You Had the Following?   Trouble eating solid food (meat, bread, vegetables) 0 0    Trouble eating soft foods (yogurt, jello, pudding) 0 0    Trouble swallowing liquids 0 0    Pain while swallowing 0 0    Coughing or choking while swallowing foods or liquids 0 0    Total Score: 0 0       Patient-reported         10/9/2023     2:41 PM 2024    12:50 PM   BEDQ Questionnaire: Discomfort/Pain Ratings   Eating solid food (meat, bread, vegetables) 0 0    Eating soft foods  (yogurt, jello, pudding) 0 0    Drinking liquid 0 0    Total Score: 0 0       Patient-reported       Eckardt Questionnaire      10/9/2023     2:41 PM 1/30/2024    12:51 PM   Eckardt Questionnaire   Dysphagia 0 0    Regurgitation 1 1    Retrosternal Pain 0 0    Weight Loss (kg) 1 0    Total Score:  2 1       Patient-reported       Promis 10 Questionnaire      10/9/2023     2:41 PM 1/30/2024    12:52 PM   PROMIS 10 FLOWSHEET DATA   In general, would you say your health is: 1 2    In general, would you say your quality of life is: 2 2    In general, how would you rate your physical health? 2 2    In general, how would you rate your mental health, including your mood and your ability to think? 2 2    In general, how would you rate your satisfaction with your social activities and relationships? 2 2    In general, please rate how well you carry out your usual social activities and roles. (This includes activities at home, at work and in your community, and responsibilities as a parent, child, spouse, employee, friend, etc.) 2 2    To what extent are you able to carry out your everyday physical activities such as walking, climbing stairs, carrying groceries, or moving a chair? 4 4    In the past 7 days, how often have you been bothered by emotional problems such as feeling anxious, depressed, or irritable? 4 3    In the past 7 days, how would you rate your fatigue on average? 4 3    In the past 7 days, how would you rate your pain on average, where 0 means no pain, and 10 means worst imaginable pain? 4 5    Mental health question re-calculation - no clinical value 2 3   Physical health question re-calculation - no clinical value 2 3   Pain question re-calculation - no clinical value 3 3   Global Mental Health Score 8 9   Global Physical Health Score 11 12   PROMIS TOTAL - SUBSCORES 19 21       Patient-reported           STUDIES & PROCEDURES:    EGD:     5/2023   Findings:        Mucosal changes including ringed esophagus  "were found in the middle        third of the esophagus. Biopsies were taken with a cold forceps for        histology.        Diffuse moderately erythematous mucosa without bleeding was found in the        stomach. Biopsies were taken with a cold forceps for Helicobacter pylori        testing.        The exam was otherwise without abnormality.                                                                                     Impression:            - Esophageal mucosal changes suspicious for                          eosinophilic esophagitis. Biopsied.                          - Erythematous mucosa in the stomach. Biopsied.                          - The examination was otherwise normal.                          4 cm hiatal hernia     Final Diagnosis   A: Stomach, antrum, biopsy:  - Gastric antrum and body mucosa without diagnostic abnormality.  - Negative for Helicobacter pylori on H&E examination.      B: Esophagus, distal, biopsy:  - Esophageal squamous mucosa without diagnostic abnormality.  - Negative for intraepithelial eosinophils.  - No columnar (gastric) component present.  - Negative for intestinal metaplasia (\"Farrell's esophagus\").  - Negative for dysplasia or malignancy.      C: Esophagus, mid and upper, biopsy:  - Esophageal squamous mucosa without diagnostic abnormality.  - Negative for intraepithelial eosinophils.       Colonoscopy:    5/4/2023   Findings:        The perianal and digital rectal examinations were normal. Pertinent        negatives include normal sphincter tone.        A 3 mm polyp was found in the rectum. The polyp was sessile. The polyp        was removed with a cold biopsy forceps. Resection and retrieval were        complete. Verification of patient identification for the specimen was        done by the physician, nurse and technician using the patient's name,        birth date and medical record number. Estimated blood loss was minimal.        Non-bleeding external and internal " hemorrhoids were found during        retroflexion. The hemorrhoids were mild and Grade I (internal        hemorrhoids that do not prolapse).        A few small-mouthed diverticula were found in the recto-sigmoid colon.                                                                                     Impression:            - One 3 mm polyp in the rectum, removed with a cold                          biopsy forceps. Resected and retrieved.                          - Non-bleeding external and internal hemorrhoids.                          - Diverticulosis in the recto-sigmoid colon.     Final Diagnosis   Large intestine, rectum, polypectomy:  -Hyperplastic polyp          2013 [facility]          6/2010                                                                                 Findings:        The colon was significantly tortuous in the entire colon. The patient        was repositioned in order to accomplish the maneuver. A        benign-appearing, intrinsic moderate stenosis measuring 2 cm (in length)        x 1.5 cm (inner diameter) was found in the mid sigmoid colon and was        traversed. The exam was otherwise without abnormality.                                                                                    Impression:          - Tortuous colon.                        - Stricture in the mid sigmoid colon.                        - The examination was otherwise normal.     EndoFLIP directed at the UES or LES (8cm (EF-325) balloon length or 16cm (EF-322) balloon length):   Date:  8cm balloon  Balloon inflation Balloon pressure CSA (mm^2) DI (mm^2/mmHg) Dmin (mm) Compliance   20 (ladmark ID)        30        40        50           16cm balloon  Balloon inflation Balloon pressure CSA (mm^2) DI (mm^2/mmHg) Dmin (mm) Compliance   30 (ladmark ID)        40        50        60        70           High Resolution Manometry:    PH/Impedance:     Bravo:    CT:    4/11/2023 CT AP W Contrast   IMPRESSION:   1.   Nondilated fluid-filled loops of ileum and colon without  surrounding inflammatory changes are nonspecific, may be related to  mild enterocolitis in the appropriate setting.  2.  No other definite acute findings in the abdomen and pelvis    Esophagram:    FL VSS:     GES:    U/S:     XRAY:    Other:       Prior medical records were reviewed including, but not limited to, notes from referring providers, lab work, radiographic tests, and other diagnostic tests. Pertinent results were summarized above.     History     Past Medical History:   Diagnosis Date     Cocaine abuse, unspecified 2003     Dysplasia of cervix, unspecified 2000     Esophageal reflux 11/13/2006     Ganglion of joint 1/22/2007     Generalized anxiety disorder 10/30/2006    Buspar prescribed 8/23/07     IBS (irritable bowel syndrome) 5/28/2008     Need for prophylactic hormone replacement therapy (postmenopausal)        Past Surgical History:   Procedure Laterality Date     ABLATE VEIN VARICOSE RADIO FREQUENCY WITHOUT PHLEBECTOMY MULTIPLE STAB  7/24/2014    Procedure: ABLATE VEIN VARICOSE RADIO FREQUENCY WITHOUT PHLEBECTOMY MULTIPLE STAB;  Surgeon: Khanh Bunch MD;  Location: [facility]     BIOPSY BREAST Left      COLONOSCOPY N/A 5/4/2023    Procedure: COLONOSCOPY, WITH POLYPECTOMY AND BIOPSY;  Surgeon: Ning Tran MD;  Location: [facility]     ENHANCE LASER REFRACTIVE BILATERAL EXISTING PT IN PARAMETERS Bilateral     LASIK     ESOPHAGOSCOPY, GASTROSCOPY, DUODENOSCOPY (EGD), COMBINED N/A 5/31/2023    Procedure: Esophagoscopy, gastroscopy, duodenoscopy, combined;  Surgeon: Aime Mak MD;  Location: [facility]     HYSTERECTOMY SUPRACERVICAL, BILATERAL SALPINGO-OOPHORECTOMY, COMBINED  2000     LAPAROSCOPIC ASSISTED COLECTOMY N/A 2/5/2015    Procedure: LAPAROSCOPIC ASSISTED COLECTOMY;  Surgeon: Khanh Bunch MD;  Location: [facility]     LAPAROSCOPIC CHOLECYSTECTOMY N/A 4/11/2017    Procedure: LAPAROSCOPIC CHOLECYSTECTOMY;  Surgeon:  Levi Louis MD;  Location: [facility]     NERVE BLOCK PERIPHERAL Bilateral 2015    Procedure: NERVE BLOCK PERIPHERAL;  Surgeon: Generic Anesthesia Provider;  Location: [facility]     REPAIR PTOSIS BILATERAL Bilateral 10/11/2018    Procedure: REPAIR PTOSIS BILATERAL;  Bilateral Upper Eyelid Ptosis Repair;  Surgeon: Wilton Viramontes MD;  Location:  OR     SURGICAL HISTORY OF -       Breast implants     SURGICAL HISTORY OF -       Carpal  tunnel r hand     ZZC  DELIVERY ONLY  ,,    , Low Cervical       Social History     Socioeconomic History     Marital status:      Spouse name: Not on file     Number of children: 3     Years of education: 14     Highest education level: Not on file   Occupational History     Occupation: Customer Service     Employer: [employer] [place]     Comment: 1 year   Tobacco Use     Smoking status: Every Day     Current packs/day: 1.00     Average packs/day: 1 pack/day for 20.0 years (20.0 ttl pk-yrs)     Types: Cigarettes     Smokeless tobacco: Never     Tobacco comments:     trying to quit   Vaping Use     Vaping status: Never Used   Substance and Sexual Activity     Alcohol use: Never     Drug use: No     Sexual activity: Yes     Partners: Male     Birth control/protection: Surgical     Comment: hysterectomy   Other Topics Concern     Parent/sibling w/ CABG, MI or angioplasty before 65F 55M? No   Social History Narrative     Not on file     Social Drivers of Health     Financial Resource Strain: Not on file   Food Insecurity: Not on file   Transportation Needs: Not on file   Physical Activity: Not on file   Stress: Not on file   Social Connections: Not on file   Interpersonal Safety: Low Risk  (2024)    Interpersonal Safety      Do you feel physically and emotionally safe where you currently live?: Yes      Within the past 12 months, have you been hit, slapped, kicked or otherwise physically hurt by someone?: No      Within the past 12  months, have you been humiliated or emotionally abused in other ways by your partner or ex-partner?: No   Housing Stability: Not on file       Family History   Problem Relation Age of Onset     Cancer Father         liver     Cancer Brother         lung, asbestos     Cancer Mother         uterine     Cancer Sister         uterine     Breast Cancer Other      Breast Cancer Other      Glaucoma No family hx of      Macular Degeneration No family hx of      Family history reviewed and edited as appropriate    Medications and Allergies:     Outpatient Encounter Medications as of 11/19/2024   Medication Sig Dispense Refill     albuterol (VENTOLIN HFA) 108 (90 Base) MCG/ACT inhaler Inhale 2 puffs into the lungs every 4 hours as needed for shortness of breath 18 g 4     amitriptyline (ELAVIL) 25 MG tablet TAKE ONE TABLET BY MOUTH EVERY NIGHT AT BEDTIME 90 tablet 1     butalbital-aspirin-caffeine (FIORINAL) -40 MG capsule Take 1 capsule by mouth every 6 hours as needed for headaches 30 capsule 0     cetirizine (ZYRTEC) 10 MG tablet Take 1 tablet (10 mg) by mouth daily 90 tablet 1     Cholecalciferol (VITAMIN D3 PO) Take 400 Units by mouth daily       estradiol (ESTRACE) 1 MG tablet TAKE 1 TABLET BY MOUTH ONCE DAILY 90 tablet 3     estradiol (VAGIFEM) 10 MCG TABS vaginal tablet Place 1 tablet (10 mcg) vaginally twice a week 24 tablet 3     gabapentin (NEURONTIN) 300 MG capsule TAKE 2 CAPSULES BY MOUTH AT BEDTIME 180 capsule 3     LORazepam (ATIVAN) 1 MG tablet TAKE ONE-HALF TO ONE TABLET (0.5-1 MG) BY MOUTH EVERY 6 HOURS AS NEEDED FOR ANXIETY 20 tablet 1     LORazepam (ATIVAN) 1 MG tablet Take 0.5-1 tablets (0.5-1 mg) by mouth every 6 hours as needed for anxiety 20 tablet 0     montelukast (SINGULAIR) 10 MG tablet TAKE ONE TABLET BY MOUTH EVERY NIGHT AT BEDTIME 90 tablet 2     multivitamin, therapeutic (THERA-VIT) TABS Take 1 tablet by mouth daily       naproxen (NAPROSYN) 500 MG tablet Take 1 tablet (500 mg) by mouth 2  times daily (with meals) 180 tablet 0     omeprazole 20 MG tablet Take 20 mg by mouth daily       hyoscyamine (LEVSIN) 0.125 MG tablet Take 1 tablet (125 mcg) by mouth every 4 hours as needed for cramping (Patient not taking: Reported on 11/19/2024) 30 tablet 4     lubiprostone (AMITIZA) 24 MCG capsule Take 24 mcg by mouth 2 times daily (with meals) (Patient not taking: Reported on 8/8/2024)       oxyBUTYnin ER (DITROPAN XL) 5 MG 24 hr tablet Take 1 tablet (5 mg) by mouth daily (Patient not taking: Reported on 11/19/2024) 90 tablet 3     sucralfate (CARAFATE) 1 GM tablet Take 1 tablet (1 g) by mouth 4 times daily (Patient not taking: Reported on 8/8/2024) 120 tablet 1     varenicline (CHANTIX JOHNNY) 0.5 MG X 11 & 1 MG X 42 tablet Take 0.5 mg tab daily for 3 days, THEN 0.5 mg tab twice daily for 4 days, THEN 1 mg twice daily. (Patient not taking: Reported on 11/19/2024) 53 tablet 0     Facility-Administered Encounter Medications as of 11/19/2024   Medication Dose Route Frequency Provider Last Rate Last Admin     4 mL ropivacaine (NAROPIN) injection 5 mg/mL  4 mL      4 mL at 09/06/24 1034     4 mL ropivacaine (NAROPIN) injection 5 mg/mL  4 mL      4 mL at 09/06/24 1034     betamethasone acet & sod phos (CELESTONE) injection 6 mg  6 mg      6 mg at 09/06/24 1034     betamethasone acet & sod phos (CELESTONE) injection 6 mg  6 mg      6 mg at 09/06/24 1034        Allergies   Allergen Reactions     Cephalexin Hives     Patient states she gets hives - bad reaction not listed on her H & P.     Rizatriptan Dizziness     Acetaminophen Hives     Ciprofloxacin Hives     Got rash when taken with metronidazole + Vicodin  Other reaction(s): hives     Lactose Diarrhea     Metronidazole Hives     Rash when taken with cipro + Vicodin     Unknown [No Clinical Screening - See Comments]      Pt developed hives when taking flagyl, cipro, and vicodin, doesn't know which she is allergic to      Vicodin [Hydrocodone-Acetaminophen] Hives      Rash when taken with cipro and metronidazole        Review of systems:  A full 10 point review of systems was obtained and was negative except for the pertinent positives and negatives stated within the HPI.    Objective Findings:   Physical Exam:    Constitutional: There were no vitals taken for this visit.  General: Alert, cooperative, no distress, well-appearing  Head: Atraumatic, normocephalic, no obvious abnormalities   Eyes: Sclera anicteric, no obvious conjunctival hemorrhage   Nose: Nares normal, no obvious malformation, no obvious rhinorrhea   Skin: No jaundice, no obvious rash  Neurologic: AAOx3, no obvious neurologic abnormality  Psychiatric: Normal Affect, appropriate mood  Extremities: No obvious edema, no obvious malformation     Labs, Radiology, Pathology     Lab Results   Component Value Date    WBC 4.2 02/05/2024    WBC 4.3 04/26/2023    WBC 6.1 04/11/2023    HGB 14.6 02/05/2024    HGB 14.0 04/26/2023    HGB 15.4 04/11/2023     02/05/2024     04/26/2023     04/11/2023    CHOL 165 12/18/2020    CHOL 157 10/11/2010    CHOL 148 09/20/2004    TRIG 149 12/18/2020    HDL 89 12/18/2020    HDL 67 10/11/2010    HDL 44 09/20/2004    ALT 14 02/05/2024    ALT 13 04/26/2023    ALT 15 04/11/2023    AST 21 02/05/2024    AST 17 04/26/2023    AST 22 04/11/2023     02/05/2024     04/26/2023     04/11/2023    BUN 9.6 02/05/2024    BUN 11.1 04/26/2023    BUN 10.4 04/11/2023    CO2 27 02/05/2024    CO2 28 04/26/2023    CO2 27 04/11/2023    TSH 0.99 02/05/2024    TSH 0.94 12/18/2020    TSH 0.82 10/13/2017        Liver Function Studies -   Recent Labs   Lab Test 04/26/23  1041   PROTTOTAL 6.8   ALBUMIN 4.4   BILITOTAL 0.3   ALKPHOS 66   AST 17   ALT 13          Patient Active Problem List    Diagnosis Date Noted     Osteopenia of multiple sites 07/06/2023     Priority: Medium     Major depression, recurrent (H) 05/19/2023     Priority: Medium     Migraine with aura and without status  migrainosus, not intractable 10/16/2017     Priority: Medium     Diverticular disease of colon 10/07/2013     Priority: Medium     IBS (irritable bowel syndrome) 05/28/2008     Priority: Medium     Ovarian cyst 08/23/2007     Priority: Medium     Oophorectomy bilateral - premalignant tissue ? 1998  Problem list name updated by automated process. Provider to review       Esophageal reflux 11/13/2006     Priority: Medium     Generalized anxiety disorder 10/30/2006     Priority: Medium     Buspar prescribed 8/23/07       Raynaud's syndrome 01/04/2006     Priority: Medium     Tobacco use disorder 01/04/2006     Priority: Medium     Need for prophylactic hormone replacement therapy (postmenopausal) 01/04/2006     Priority: Medium      Assessment and Plan   Assessment/Plan:    Fatou Simental is a 59 year old female with significant past medical history pertinent for anxiety, multiple intraabdominal surgeries (see HPI) chronic constipation who is being seen as a follow-up patient with a chief complaint of abdominal bloating/distention and irregular bowel patterns.    11/9/2020 seen and evaluated with [facility] with concerns for stooling patterns oscillating between constipation and diarrhea.  At the time she was taking Colace and as needed MiraLAX.  She reported sensation of incomplete evacuation as well as bloating.  Suspicion was for constipation with overflow diarrhea and she was initiated on Linzess 72 mcg daily.    5/2023 [facility] Consultation - Reported minimal benefit with the use of Linzess 290 mcg. Started on Amitiza unknown to specifics and requested a third opinion at [facility] however declined secondary to insurance coverage.  For her chronic abdominal pain symptoms it was advised that she continues amitriptyline which she is taking 25 mg nightly and gabapentin 600 mg nightly.  It was mentioned to defer to consultation to pain management to patient's primary care provider.    1/2023 normal KUB  without increased stool burden, celiac serologies/thyroid function testing within normal limits    1/2024 Fatou was seen in consultation for concerns of inconsistent bowel patterns with fluctuation between Crowley 4 - 7 stools occurring on an every other day basis.  Associated symptoms included abdominal cramping that is relieved with defecation, fecal urgency, borborygmi, difficulty with initiation of the bowel movement and sensation of incomplete evacuation.     5/2024 Fatou reported symptoms of abdominal bloating/distention as well as early satiation for which she has been using various laxatives in attempts to provide symptomatic relief. Ultimately, this is resulting in loose/watery stools occurring on a daily basis however with transient relief of her primary concerns.     Today Fatou reports experiencing 1 bowel movement daily if not every other day consistent with Crowley stool scale type 4-6 with the use of MiraLAX 1.5-2 capfuls daily which she titrates as needed and 3 tablespoons of Benefiber daily.  Associated symptoms include increased bloating and associated flatulence.    Fatou again has experienced inconsistent bowel habits throughout the entirety of her adult life however concern is for possible intra-abdominal adhesions, pelvic floor dysfunction and medication side effects contributing (amitriptyline, butalbital, gabapentin, oxybutynin and oxycodone).  Future considerations would be to obtain breath testing for SIBO in light of patient's prior surgical history additional considerations would be to obtain consultation to on staff dietitian to again review a low FODMAPs diet.    - Modified low FODMAPS diet with an emphasis placed on the Fructans group.  - Trial of Simethicone (over the counter) 80 mg - 125 mg which can be used up to 4 times daily as needed after meals and at bedtime. Please do not exceed a maximum daily dose of 500 mg/day  - Continue MiraLAX 1-2 capfuls daily.  Please make  adjustments as you are based on stool consistency.    - Please continue to slowly increase your daily fiber supplementation to 2 - 3 tablespoons daily.  Ultimately the goal is for approximately 25 g of daily fiber between dietary intake/supplementation  - Consultation to the pelvic floor center as scheduled 12/3/2024      Follow up plan:   Return to clinic 4 months and as needed.    The risks and benefits of my recommendations, as well as other treatment options were discussed with the patient and any available family today. All questions were answered.     Follow up: As planned above. Today, I personally spent 20 minutes in direct face to face time with the patient, of which greater than 50% of the time was spent in patient education and counseling as described above. Approximately 6 minutes were spent on indirect care associated with the patient's consultation including but not limited to review of: patient medical records to date, clinic visits, hospital records, lab results, imaging studies, procedural documentation, and coordinating care with other providers. The findings from this review are summarized in the above note. All of the above accounted for a cumulative time of 26 minutes and was performed on the date of service.     The patient verbalized understanding of the plan and was appreciative for the time spent and information provided during the office visit.           Leslie Calvin PA-C  Division of Gastroenterology, Hepatology, and Nutrition  [facility]       Documentation assisted by voice recognition and documentation system.            Virtual Visit Details    Type of service:  Video Visit   Video Start Time: 3:17 PM  Video End Time:3:37 PM    Originating Location (pt. Location): Home    Distant Location (provider location):  Off-site  Platform used for Video Visit: AmWell      Again, thank you for allowing me to participate in the care of your patient.        Sincerely,        Leslie HAYS  FIOR Calvin

## 2024-11-19 NOTE — NURSING NOTE
Is the patient currently in the state of [place]? YES    Current patient location: [address] [place] [id number]    Visit mode:VIDEO    If the visit is dropped, the patient can be reconnected by: VIDEO VISIT: Text to cell phone:   Telephone Information:   Mobile [phone]       Will anyone else be joining the visit? No  (If patient encounters technical issues they should call [phone])    Are changes needed to the allergy or medication list? No    Are refills needed on medications prescribed by this physician? No    Rooming Documentation: Questionnaire(s) completed.    Reason for visit: [facility] Balderrama

## 2024-11-19 NOTE — PROGRESS NOTES
"  Gastroenterology Visit for: Fatou Simental 1965   MRN: [id number]     Reason for Visit:  chief complaint    Referred by: No ref. provider found  /   Patient Care Team:  Gaby Shelton APRN CNP as PCP - General  Sal Elias MD as MD (Gastroenterology)  Wilton Viramontes MD as MD (Ophthalmology)  Lee Ann Smith MD as MD (Cardiovascular & Thoracic Surgery)  Gaby Shelton APRN CNP as Nurse Practitioner (Family Medicine)  Gaby Shelton APRN CNP as Assigned PCP  Lee Ann Smith MD as Assigned Surgical Provider  Leslie Calvin PA-C as Assigned Gastroenterology Provider  Nathanael Melvin MD as Assigned Musculoskeletal Provider    History of Present Illness:   Fatou Simental is a 59 year old female with significant past medical history pertinent for anxiety, multiple intraabdominal surgeries (see HPI) chronic constipation who is being seen as a follow-up patient with a chief complaint of abdominal bloating/distention and irregular bowel patterns.  -----------------------------------------------------------------------------------------------------------------------------------------------------------------------------------------------------------------------------------  Interval History November 19, 2024:    Today Fatou reports \"it is not a bad day.\"     Her current bowel regimen consists of Miralax 17g 1.5 - 2 capfuls daily and Benefiber 3 tablespoons throughout the duration of the morning. She adjusts her Miralax as needed based on stool consistency. With this regimen she is stooling daily which fluctuates between Kulm 4 - 6. Without the Miralax stools are described as hard.     Unfortunately she was unable to attend her initial pelvic floor consultation as she was feeling sick.  She is since rescheduled her appointment for 12/3/2024.    Associated symptoms include increased flatulence throughout the duration of the day. Had trailed Gas[facility] many years " "prior.     Denies unintentional weight loss, nausea, emesis, dysphagia, odynophagia, heartburn, melena, hematochezia and BRBR.     -----------------------------------------------------------------------------------------------------------------------------------------------------------------------------------------------------------------------------------  Interval History May 7, 2024:    Unable to palate Metamucil. Now taking Benefiber 2-3 teaspoons daily.     Without the use of laxatives Fatou states \"it hurts to pass and the stools come out thin.\" She explains \"she tries not bare down/press to hard.\" With the use Miralax 17g three capfuls as well as when she decreases to Miralax 17g BID nightly she will have a bowel movement occurring once daily that are consistent with McCarr Stool Scale Type 6/7. With the use of Linzess 290mcg this results in multiple liquid stools with significant fecal urgency.  Without the use of laxatives Fatou explains \"it is not satisfying.\"     With consumption of large meals or over indulgence will experience regurgitation. Will use Omeprazole 20mg as needed with relief of symptoms.  She has also noted that she has changed her eating patterns now eating small frequent meals throughout the day and decrease portion sizes.    Denies unintentional weight loss, nausea, emesis, dysphagia, odynophagia, heartburn, nocturnal stooling, incontinence of feces, melena, hematochezia and BRBR.     ---------------------------------------------------------------------------------------------------------------------------------------------------------------------------------------------  1/2024 HPI:    Fatou presents with concerns of inconsistent bowel habits.  She has previously been seen by [facility] and [facility] for similar concerns.  She was prescribed Linzess 290 mcg which resulted in abdominal cramping and diarrhea with stools occurring 3-4x per day consistent with McCarr stool scale " "type VII.  Because of this she switched to using the medication as needed. Subsequently she had trialed Amitiza however had difficulties with the twice daily dosing.  Noting she had hesitancy taking this in the afternoon especially in relation to mealtime additional did not want to experience diarrhea outside of the house.  It is unknown to the specifics regarding the quantity of Amitiza that she took. In the past she has also used Miralax 17g 2-3x throughout the week. Unknown if she had previously used Senokot although it is documented within the chart.     Now she reports that for the past two weeks has had a \"burning in the colon and watery diarrhea stools.\" Feels as there is \"a fire\" that is located to suprapubic area and can also involve the right/left lower quadrants. Yesterday had 5 stools consistent with Pillsbury Stool Scale Type 7. The day prior to this has had 2 stools consistent with Pillsbury Stool Scale Type 7. The burning sensation is not related to defecation however she does have cramping that will precede defecation and resolve after a bowel movement. Associated with urgency and borborygmi.  She reports that with the onset of the abdominal burning sensation her stool patterns have not changed.  The sensation that she is experiencing now is different than when she has had episodes of diverticulitis in the past.  She denies having abdominal pain at this time.    Prior to the past 2 weeks she was having stools that would fluctuate between Pillsbury Stool Scale 4/5 - 7 occurring every other day.  She does have difficulties with initiation of the BM associated with pushing/straining and sensation of incomplete evacuation. No prior use of enemas or suppositories.     She reports struggling with defecation concerns throughout the majority of her life.  After her sigmoidectomy symptoms had resolved for multiple years however then again gradually returned.     Currently she is taking 2 tablespoons Benefiber daily " for the past 1.5 months. Noted improvement/softer stools with use and that it was easier to initiate the bowel movement.     No recent ABX, travel or sick contacts.     Denies weight loss, nausea, emesis, dysphagia, odynophagia, heartburn, regurgitation, abdominal pain, early satiation, early satiety, nocturnal stooling, incontinence of feces, rectal digitation, perianal support, vaginal splinting, steatorrhea, melena, hematochezia and BRBR.     Past surgical history for laparoscopic sigmoid colectomy  secondary to diverticulosis in and bilateral oophorectomy and laparoscopic hysterectomy (endometriosis per pt reports)  and cholecystectomy in ~9071-0814. History of prior vaginal births x0/  section x3.      Will take Ibuprofen seldomly once per month.     Smokes tobacco 1/2 - 3/4 pack for 20+ years.     Denies use of ETOH. No recreational drug use.     Paternal family history is limited. No known family history or GI related malignancy (esophageal, gastric, pancreatic, liver or colon) or family history of IBD/celiac disease.         Esophageal Questionnaire(s)    BEDQ Questionnaire      10/9/2023     2:41 PM 2024    12:50 PM   BEDQ Questionnaire: How Often Have You Had the Following?   Trouble eating solid food (meat, bread, vegetables) 0 0    Trouble eating soft foods (yogurt, jello, pudding) 0 0    Trouble swallowing liquids 0 0    Pain while swallowing 0 0    Coughing or choking while swallowing foods or liquids 0 0    Total Score: 0 0       Patient-reported         10/9/2023     2:41 PM 2024    12:50 PM   BEDQ Questionnaire: Discomfort/Pain Ratings   Eating solid food (meat, bread, vegetables) 0 0    Eating soft foods (yogurt, jello, pudding) 0 0    Drinking liquid 0 0    Total Score: 0 0       Patient-reported       Eckardt Questionnaire      10/9/2023     2:41 PM 2024    12:51 PM   Eckardt Questionnaire   Dysphagia 0 0    Regurgitation 1 1    Retrosternal Pain 0 0    Weight Loss  (kg) 1 0    Total Score:  2 1       Patient-reported       Promis 10 Questionnaire      10/9/2023     2:41 PM 1/30/2024    12:52 PM   PROMIS 10 FLOWSHEET DATA   In general, would you say your health is: 1 2    In general, would you say your quality of life is: 2 2    In general, how would you rate your physical health? 2 2    In general, how would you rate your mental health, including your mood and your ability to think? 2 2    In general, how would you rate your satisfaction with your social activities and relationships? 2 2    In general, please rate how well you carry out your usual social activities and roles. (This includes activities at home, at work and in your community, and responsibilities as a parent, child, spouse, employee, friend, etc.) 2 2    To what extent are you able to carry out your everyday physical activities such as walking, climbing stairs, carrying groceries, or moving a chair? 4 4    In the past 7 days, how often have you been bothered by emotional problems such as feeling anxious, depressed, or irritable? 4 3    In the past 7 days, how would you rate your fatigue on average? 4 3    In the past 7 days, how would you rate your pain on average, where 0 means no pain, and 10 means worst imaginable pain? 4 5    Mental health question re-calculation - no clinical value 2 3   Physical health question re-calculation - no clinical value 2 3   Pain question re-calculation - no clinical value 3 3   Global Mental Health Score 8 9   Global Physical Health Score 11 12   PROMIS TOTAL - SUBSCORES 19 21       Patient-reported           STUDIES & PROCEDURES:    EGD:     5/2023   Findings:        Mucosal changes including ringed esophagus were found in the middle        third of the esophagus. Biopsies were taken with a cold forceps for        histology.        Diffuse moderately erythematous mucosa without bleeding was found in the        stomach. Biopsies were taken with a cold forceps for Helicobacter  "pylori        testing.        The exam was otherwise without abnormality.                                                                                     Impression:            - Esophageal mucosal changes suspicious for                          eosinophilic esophagitis. Biopsied.                          - Erythematous mucosa in the stomach. Biopsied.                          - The examination was otherwise normal.                          4 cm hiatal hernia     Final Diagnosis   A: Stomach, antrum, biopsy:  - Gastric antrum and body mucosa without diagnostic abnormality.  - Negative for Helicobacter pylori on H&E examination.      B: Esophagus, distal, biopsy:  - Esophageal squamous mucosa without diagnostic abnormality.  - Negative for intraepithelial eosinophils.  - No columnar (gastric) component present.  - Negative for intestinal metaplasia (\"Farrell's esophagus\").  - Negative for dysplasia or malignancy.      C: Esophagus, mid and upper, biopsy:  - Esophageal squamous mucosa without diagnostic abnormality.  - Negative for intraepithelial eosinophils.       Colonoscopy:    5/4/2023   Findings:        The perianal and digital rectal examinations were normal. Pertinent        negatives include normal sphincter tone.        A 3 mm polyp was found in the rectum. The polyp was sessile. The polyp        was removed with a cold biopsy forceps. Resection and retrieval were        complete. Verification of patient identification for the specimen was        done by the physician, nurse and technician using the patient's name,        birth date and medical record number. Estimated blood loss was minimal.        Non-bleeding external and internal hemorrhoids were found during        retroflexion. The hemorrhoids were mild and Grade I (internal        hemorrhoids that do not prolapse).        A few small-mouthed diverticula were found in the recto-sigmoid colon.                                                          "                            Impression:            - One 3 mm polyp in the rectum, removed with a cold                          biopsy forceps. Resected and retrieved.                          - Non-bleeding external and internal hemorrhoids.                          - Diverticulosis in the recto-sigmoid colon.     Final Diagnosis   Large intestine, rectum, polypectomy:  -Hyperplastic polyp          2013 [facility]          6/2010                                                                                 Findings:        The colon was significantly tortuous in the entire colon. The patient        was repositioned in order to accomplish the maneuver. A        benign-appearing, intrinsic moderate stenosis measuring 2 cm (in length)        x 1.5 cm (inner diameter) was found in the mid sigmoid colon and was        traversed. The exam was otherwise without abnormality.                                                                                    Impression:          - Tortuous colon.                        - Stricture in the mid sigmoid colon.                        - The examination was otherwise normal.     EndoFLIP directed at the UES or LES (8cm (EF-325) balloon length or 16cm (EF-322) balloon length):   Date:  8cm balloon  Balloon inflation Balloon pressure CSA (mm^2) DI (mm^2/mmHg) Dmin (mm) Compliance   20 (ladmark ID)        30        40        50           16cm balloon  Balloon inflation Balloon pressure CSA (mm^2) DI (mm^2/mmHg) Dmin (mm) Compliance   30 (ladmark ID)        40        50        60        70           High Resolution Manometry:    PH/Impedance:     Bravo:    CT:    4/11/2023 CT AP W Contrast   IMPRESSION:   1.  Nondilated fluid-filled loops of ileum and colon without  surrounding inflammatory changes are nonspecific, may be related to  mild enterocolitis in the appropriate setting.  2.  No other definite acute findings in the abdomen and pelvis    Esophagram:    FL VSS:     GES:    U/S:      XRAY:    Other:       Prior medical records were reviewed including, but not limited to, notes from referring providers, lab work, radiographic tests, and other diagnostic tests. Pertinent results were summarized above.     History     Past Medical History:   Diagnosis Date    Cocaine abuse, unspecified 2003    Dysplasia of cervix, unspecified 2000    Esophageal reflux 11/13/2006    Ganglion of joint 1/22/2007    Generalized anxiety disorder 10/30/2006    Buspar prescribed 8/23/07    IBS (irritable bowel syndrome) 5/28/2008    Need for prophylactic hormone replacement therapy (postmenopausal)        Past Surgical History:   Procedure Laterality Date    ABLATE VEIN VARICOSE RADIO FREQUENCY WITHOUT PHLEBECTOMY MULTIPLE STAB  7/24/2014    Procedure: ABLATE VEIN VARICOSE RADIO FREQUENCY WITHOUT PHLEBECTOMY MULTIPLE STAB;  Surgeon: Khanh Bunch MD;  Location: [facility]    BIOPSY BREAST Left     COLONOSCOPY N/A 5/4/2023    Procedure: COLONOSCOPY, WITH POLYPECTOMY AND BIOPSY;  Surgeon: Ning Tran MD;  Location: [facility]    ENHANCE LASER REFRACTIVE BILATERAL EXISTING PT IN PARAMETERS Bilateral     LASIK    ESOPHAGOSCOPY, GASTROSCOPY, DUODENOSCOPY (EGD), COMBINED N/A 5/31/2023    Procedure: Esophagoscopy, gastroscopy, duodenoscopy, combined;  Surgeon: Aime Mak MD;  Location: [facility]    HYSTERECTOMY SUPRACERVICAL, BILATERAL SALPINGO-OOPHORECTOMY, COMBINED  2000    LAPAROSCOPIC ASSISTED COLECTOMY N/A 2/5/2015    Procedure: LAPAROSCOPIC ASSISTED COLECTOMY;  Surgeon: Khanh Bunch MD;  Location: [facility]    LAPAROSCOPIC CHOLECYSTECTOMY N/A 4/11/2017    Procedure: LAPAROSCOPIC CHOLECYSTECTOMY;  Surgeon: Levi Louis MD;  Location: [facility]    NERVE BLOCK PERIPHERAL Bilateral 2/6/2015    Procedure: NERVE BLOCK PERIPHERAL;  Surgeon: Generic Anesthesia Provider;  Location: [facility]    REPAIR PTOSIS BILATERAL Bilateral 10/11/2018    Procedure: REPAIR PTOSIS BILATERAL;  Bilateral Upper Eyelid Ptosis Repair;   Surgeon: Wilton Viramontes MD;  Location: [facility]    SURGICAL HISTORY OF -       Breast implants    SURGICAL HISTORY OF -       Carpal  tunnel r hand    ZZC  DELIVERY ONLY  ,,    , Low Cervical       Social History     Socioeconomic History    Marital status:      Spouse name: Not on file    Number of children: 3    Years of education: 14    Highest education level: Not on file   Occupational History    Occupation: Customer Service     Employer: [employer]     Comment: 1 year   Tobacco Use    Smoking status: Every Day     Current packs/day: 1.00     Average packs/day: 1 pack/day for 20.0 years (20.0 ttl pk-yrs)     Types: Cigarettes    Smokeless tobacco: Never    Tobacco comments:     trying to quit   Vaping Use    Vaping status: Never Used   Substance and Sexual Activity    Alcohol use: Never    Drug use: No    Sexual activity: Yes     Partners: Male     Birth control/protection: Surgical     Comment: hysterectomy   Other Topics Concern    Parent/sibling w/ CABG, MI or angioplasty before 65F 55M? No   Social History Narrative    Not on file     Social Drivers of Health     Financial Resource Strain: Not on file   Food Insecurity: Not on file   Transportation Needs: Not on file   Physical Activity: Not on file   Stress: Not on file   Social Connections: Not on file   Interpersonal Safety: Low Risk  (2024)    Interpersonal Safety     Do you feel physically and emotionally safe where you currently live?: Yes     Within the past 12 months, have you been hit, slapped, kicked or otherwise physically hurt by someone?: No     Within the past 12 months, have you been humiliated or emotionally abused in other ways by your partner or ex-partner?: No   Housing Stability: Not on file       Family History   Problem Relation Age of Onset    Cancer Father         liver    Cancer Brother         lung, asbestos    Cancer Mother         uterine    Cancer Sister         uterine     Breast Cancer Other     Breast Cancer Other     Glaucoma No family hx of     Macular Degeneration No family hx of      Family history reviewed and edited as appropriate    Medications and Allergies:     Outpatient Encounter Medications as of 11/19/2024   Medication Sig Dispense Refill    albuterol (VENTOLIN HFA) 108 (90 Base) MCG/ACT inhaler Inhale 2 puffs into the lungs every 4 hours as needed for shortness of breath 18 g 4    amitriptyline (ELAVIL) 25 MG tablet TAKE ONE TABLET BY MOUTH EVERY NIGHT AT BEDTIME 90 tablet 1    butalbital-aspirin-caffeine (FIORINAL) -40 MG capsule Take 1 capsule by mouth every 6 hours as needed for headaches 30 capsule 0    cetirizine (ZYRTEC) 10 MG tablet Take 1 tablet (10 mg) by mouth daily 90 tablet 1    Cholecalciferol (VITAMIN D3 PO) Take 400 Units by mouth daily      estradiol (ESTRACE) 1 MG tablet TAKE 1 TABLET BY MOUTH ONCE DAILY 90 tablet 3    estradiol (VAGIFEM) 10 MCG TABS vaginal tablet Place 1 tablet (10 mcg) vaginally twice a week 24 tablet 3    gabapentin (NEURONTIN) 300 MG capsule TAKE 2 CAPSULES BY MOUTH AT BEDTIME 180 capsule 3    LORazepam (ATIVAN) 1 MG tablet TAKE ONE-HALF TO ONE TABLET (0.5-1 MG) BY MOUTH EVERY 6 HOURS AS NEEDED FOR ANXIETY 20 tablet 1    LORazepam (ATIVAN) 1 MG tablet Take 0.5-1 tablets (0.5-1 mg) by mouth every 6 hours as needed for anxiety 20 tablet 0    montelukast (SINGULAIR) 10 MG tablet TAKE ONE TABLET BY MOUTH EVERY NIGHT AT BEDTIME 90 tablet 2    multivitamin, therapeutic (THERA-VIT) TABS Take 1 tablet by mouth daily      naproxen (NAPROSYN) 500 MG tablet Take 1 tablet (500 mg) by mouth 2 times daily (with meals) 180 tablet 0    omeprazole 20 MG tablet Take 20 mg by mouth daily      hyoscyamine (LEVSIN) 0.125 MG tablet Take 1 tablet (125 mcg) by mouth every 4 hours as needed for cramping (Patient not taking: Reported on 11/19/2024) 30 tablet 4    lubiprostone (AMITIZA) 24 MCG capsule Take 24 mcg by mouth 2 times daily (with meals)  (Patient not taking: Reported on 8/8/2024)      oxyBUTYnin ER (DITROPAN XL) 5 MG 24 hr tablet Take 1 tablet (5 mg) by mouth daily (Patient not taking: Reported on 11/19/2024) 90 tablet 3    sucralfate (CARAFATE) 1 GM tablet Take 1 tablet (1 g) by mouth 4 times daily (Patient not taking: Reported on 8/8/2024) 120 tablet 1    varenicline (CHANTIX JOHNNY) 0.5 MG X 11 & 1 MG X 42 tablet Take 0.5 mg tab daily for 3 days, THEN 0.5 mg tab twice daily for 4 days, THEN 1 mg twice daily. (Patient not taking: Reported on 11/19/2024) 53 tablet 0     Facility-Administered Encounter Medications as of 11/19/2024   Medication Dose Route Frequency Provider Last Rate Last Admin    4 mL ropivacaine (NAROPIN) injection 5 mg/mL  4 mL      4 mL at 09/06/24 1034    4 mL ropivacaine (NAROPIN) injection 5 mg/mL  4 mL      4 mL at 09/06/24 1034    betamethasone acet & sod phos (CELESTONE) injection 6 mg  6 mg      6 mg at 09/06/24 1034    betamethasone acet & sod phos (CELESTONE) injection 6 mg  6 mg      6 mg at 09/06/24 1034        Allergies   Allergen Reactions    Cephalexin Hives     Patient states she gets hives - bad reaction not listed on her H & P.    Rizatriptan Dizziness    Acetaminophen Hives    Ciprofloxacin Hives     Got rash when taken with metronidazole + Vicodin  Other reaction(s): hives    Lactose Diarrhea    Metronidazole Hives     Rash when taken with cipro + Vicodin    Unknown [No Clinical Screening - See Comments]      Pt developed hives when taking flagyl, cipro, and vicodin, doesn't know which she is allergic to     Vicodin [Hydrocodone-Acetaminophen] Hives     Rash when taken with cipro and metronidazole        Review of systems:  A full 10 point review of systems was obtained and was negative except for the pertinent positives and negatives stated within the HPI.    Objective Findings:   Physical Exam:    Constitutional: There were no vitals taken for this visit.  General: Alert, cooperative, no distress,  well-appearing  Head: Atraumatic, normocephalic, no obvious abnormalities   Eyes: Sclera anicteric, no obvious conjunctival hemorrhage   Nose: Nares normal, no obvious malformation, no obvious rhinorrhea   Skin: No jaundice, no obvious rash  Neurologic: AAOx3, no obvious neurologic abnormality  Psychiatric: Normal Affect, appropriate mood  Extremities: No obvious edema, no obvious malformation     Labs, Radiology, Pathology     Lab Results   Component Value Date    WBC 4.2 02/05/2024    WBC 4.3 04/26/2023    WBC 6.1 04/11/2023    HGB 14.6 02/05/2024    HGB 14.0 04/26/2023    HGB 15.4 04/11/2023     02/05/2024     04/26/2023     04/11/2023    CHOL 165 12/18/2020    CHOL 157 10/11/2010    CHOL 148 09/20/2004    TRIG 149 12/18/2020    HDL 89 12/18/2020    HDL 67 10/11/2010    HDL 44 09/20/2004    ALT 14 02/05/2024    ALT 13 04/26/2023    ALT 15 04/11/2023    AST 21 02/05/2024    AST 17 04/26/2023    AST 22 04/11/2023     02/05/2024     04/26/2023     04/11/2023    BUN 9.6 02/05/2024    BUN 11.1 04/26/2023    BUN 10.4 04/11/2023    CO2 27 02/05/2024    CO2 28 04/26/2023    CO2 27 04/11/2023    TSH 0.99 02/05/2024    TSH 0.94 12/18/2020    TSH 0.82 10/13/2017        Liver Function Studies -   Recent Labs   Lab Test 04/26/23  1041   PROTTOTAL 6.8   ALBUMIN 4.4   BILITOTAL 0.3   ALKPHOS 66   AST 17   ALT 13          Patient Active Problem List    Diagnosis Date Noted    Osteopenia of multiple sites 07/06/2023     Priority: Medium    Major depression, recurrent (H) 05/19/2023     Priority: Medium    Migraine with aura and without status migrainosus, not intractable 10/16/2017     Priority: Medium    Diverticular disease of colon 10/07/2013     Priority: Medium    IBS (irritable bowel syndrome) 05/28/2008     Priority: Medium    Ovarian cyst 08/23/2007     Priority: Medium     Oophorectomy bilateral - premalignant tissue ? 1998  Problem list name updated by automated process. Provider  to review      Esophageal reflux 11/13/2006     Priority: Medium    Generalized anxiety disorder 10/30/2006     Priority: Medium     Buspar prescribed 8/23/07      Raynaud's syndrome 01/04/2006     Priority: Medium    Tobacco use disorder 01/04/2006     Priority: Medium    Need for prophylactic hormone replacement therapy (postmenopausal) 01/04/2006     Priority: Medium      Assessment and Plan   Assessment/Plan:    Fatou Simental is a 59 year old female with significant past medical history pertinent for anxiety, multiple intraabdominal surgeries (see HPI) chronic constipation who is being seen as a follow-up patient with a chief complaint of abdominal bloating/distention and irregular bowel patterns.    11/9/2020 seen and evaluated with [facility] with concerns for stooling patterns oscillating between constipation and diarrhea.  At the time she was taking Colace and as needed MiraLAX.  She reported sensation of incomplete evacuation as well as bloating.  Suspicion was for constipation with overflow diarrhea and she was initiated on Linzess 72 mcg daily.    5/2023 [facility] Consultation - Reported minimal benefit with the use of Linzess 290 mcg. Started on Amitiza unknown to specifics and requested a third opinion at [facility] however declined secondary to insurance coverage.  For her chronic abdominal pain symptoms it was advised that she continues amitriptyline which she is taking 25 mg nightly and gabapentin 600 mg nightly.  It was mentioned to defer to consultation to pain management to patient's primary care provider.    1/2023 normal KUB without increased stool burden, celiac serologies/thyroid function testing within normal limits    1/2024 Fatou was seen in consultation for concerns of inconsistent bowel patterns with fluctuation between [place] 4 - 7 stools occurring on an every other day basis.  Associated symptoms included abdominal cramping that is relieved with defecation, fecal  urgency, borborygmi, difficulty with initiation of the bowel movement and sensation of incomplete evacuation.     5/2024 Fatou reported symptoms of abdominal bloating/distention as well as early satiation for which she has been using various laxatives in attempts to provide symptomatic relief. Ultimately, this is resulting in loose/watery stools occurring on a daily basis however with transient relief of her primary concerns.     Today Fatou reports experiencing 1 bowel movement daily if not every other day consistent with [place] stool scale type 4-6 with the use of MiraLAX 1.5-2 capfuls daily which she titrates as needed and 3 tablespoons of Benefiber daily.  Associated symptoms include increased bloating and associated flatulence.    Fatou again has experienced inconsistent bowel habits throughout the entirety of her adult life however concern is for possible intra-abdominal adhesions, pelvic floor dysfunction and medication side effects contributing (amitriptyline, butalbital, gabapentin, oxybutynin and oxycodone).  As for her symptoms of bloating/abdominal distention increased flatulence this could be attributed to irregular bowel patterns although decreased gastric accommodation in the setting of a moderate sized hiatal hernia should also be considered.  Symptoms are less consistent with gastroparesis and there was no evidence of outlet obstruction recent endoscopy.  Future considerations would be consultation to the dietician for low FODMAPs diet vs additional diagnostics including breath testing for SIBO (in light of patient's prior surgical history)/fructose intolerence.     - Modified low FODMAPS diet with an emphasis placed on the Fructans group.  - Trial of Simethicone (over the counter) 80 mg - 125 mg which can be used up to 4 times daily as needed after meals and at bedtime. Please do not exceed a maximum daily dose of 500 mg/day  - Continue MiraLAX 1-2 capfuls daily.  Please make adjustments as  you are based on stool consistency.    - Please continue to slowly increase your daily fiber supplementation to 2 - 3 tablespoons daily.  Ultimately the goal is for approximately 25 g of daily fiber between dietary intake/supplementation  - Consultation to the pelvic floor center as scheduled 12/3/2024      Follow up plan:   Return to clinic 4 months and as needed.    The risks and benefits of my recommendations, as well as other treatment options were discussed with the patient and any available family today. All questions were answered.     Follow up: As planned above. Today, I personally spent 20 minutes in direct face to face time with the patient, of which greater than 50% of the time was spent in patient education and counseling as described above. Approximately 6 minutes were spent on indirect care associated with the patient's consultation including but not limited to review of: patient medical records to date, clinic visits, hospital records, lab results, imaging studies, procedural documentation, and coordinating care with other providers. The findings from this review are summarized in the above note. All of the above accounted for a cumulative time of 26 minutes and was performed on the date of service.     The patient verbalized understanding of the plan and was appreciative for the time spent and information provided during the office visit.           Leslie Calvin PA-C  Division of Gastroenterology, Hepatology, and Nutrition  [facility]       Documentation assisted by voice recognition and documentation system.

## 2024-11-20 DIAGNOSIS — G43.009 MIGRAINE WITHOUT AURA AND WITHOUT STATUS MIGRAINOSUS, NOT INTRACTABLE: ICD-10-CM

## 2024-11-20 RX ORDER — BUTALBITAL, ASPIRIN, AND CAFFEINE 325; 50; 40 MG/1; MG/1; MG/1
CAPSULE ORAL
Qty: 30 CAPSULE | Refills: 0 | Status: SHIPPED | OUTPATIENT
Start: 2024-11-20

## 2024-11-20 NOTE — PATIENT INSTRUCTIONS
It was a pleasure meeting with you today and discussing your healthcare plan. Below is a summary of what we covered:    - Modified low FODMAPS diet with an emphasis placed on the Fructans group.  - Trial of Simethicone (over the counter) 80 mg - 125 mg which can be used up to 4 times daily as needed after meals and at bedtime. Please do not exceed a maximum daily dose of 500 mg/day  - Continue MiraLAX 1-2 capfuls daily.  Please make adjustments as you are based on stool consistency.    - Please continue to slowly increase your daily fiber supplementation to 2 - 3 tablespoons daily.  Ultimately the goal is for approximately 25 g of daily fiber between dietary intake/supplementation  - Consultation to the pelvic floor center as scheduled 12/3/2024    Resources:     [email or web address] Wear.Equ[employer]/    https://www.NewCell/Weijudmapdietchecklists    Fiber Recommendations:  Recommend starting supplementation with a powdered soluble fiber. When used on a daily basis, this can help regulate the consistency of your stools. Metamucil (psyllium) and Citrucel are preferred examples as these are gel forming fibers. Alternatives which are non gel forming include Benefiber, More Powder and Inulin fiber. For males the goal is to obtain 30-35g of fiber daily and for females 20-25 between supplementation and dietary intake. You can start with 1-2 teaspoons per day, with goal to gradually increase to 1 tablespoon daily. You can increase up to 1 tablespoon three times daily if needed. It is important to stay well-hydrated with use of fiber supplementation and to make sure that the fiber powder is well mixed with water as directed on the label. You may experience some bloating with initiation of fiber, which will improve over the first few weeks. A good trial to evaluate the effect is 3-6 months. Of note, many of the fiber products contain artificial sweeteners, which can cause bloating, gas, and diarrhea in those who may be  sensitive to artificial sweeteners. If this is the case, recommend trying Metamucil Premium Blend (with Stevia), Metamucil 4-in-1 without added Sweeteners, or Bellway (sweetened with Monk fruit extract).      Please call my nurse Damari ([phone]) with any questions or concerns.      See below for any additional questions and scheduling guidelines.    Sign up for [employer]: [employer] patient portal serves as a secure platform for accessing your medical records from the [facility]. Additionally, [employer] facilitates easy, timely, and secure messaging with your care team. If you have not signed up, you may do so by using the provided code or calling [phone].    Coordinating your care after your visit:  There are multiple options for scheduling your follow-up care based on your provider's recommendation.    How do I schedule a follow-up clinic appointment:   After your appointment, you may receive scheduling assistance with the Clinic Coordinators by having a seat in the waiting room and a Clinic Coordinator will call you up to schedule.  Virtual visits or after you leave the clinic:  Your provider has placed a follow-up order in the [employer] portal for scheduling your return appointment. A member of the scheduling team will contact you to schedule.  [employer] (P[employer]t Scheduling: Timely scheduling through [employer] is advised to ensure appointment availability.   Call to schedule: You may schedule your follow-up appointment(s) by calling [phone], option 1.    How do I schedule my endoscopy or colonoscopy procedure:  If a procedure, such as a colonoscopy or upper endoscopy was ordered by your provider, the scheduling team will contact you to schedule this procedure. Or you may choose to call to schedule at   [phone], option 2.  Please allow 20-30 minutes when scheduling a procedure.    How do I get my blood work done? To get your blood work done, you need to schedule a lab appointment at [facility]. There are multiple ways to schedule:   At the clinic: The Clinic Coordinator you meet after your visit can help you schedule a lab appointment.   CallGrader scheduling: CallGrader offers online lab scheduling at all [facility] laboratory locations.   Call to schedule: You can call [phone] to schedule your lab appointment.    How do I schedule my imaging study: To schedule imaging studies, such as CT scans, ultrasounds, MRIs, or X-rays, contact Imaging Services at [phone].    How do I schedule a referral to another doctor: If your provider recommended a referral to another specialist(s), the referral order was placed by your provider. You will receive a phone call to schedule this referral, or you may choose to call the number attached to the referral to self-schedule.    For Post-Visit Question(s):  For any inquiries following today's visit:  Please utilize Call[employer]ader messaging and allow 48 hours for reply or contact the Call Center during normal business hours at [phone], option 3.  For Emergent After-hours questions, contact the On-Call GI Fellow through the [facility]  at [phone].  In addition, you may contact your Nurse directly using the provided contact information.    Test Results:  Test results will be accessible via [employer] in compliance with the 21st Century Cures Act. This means that your results will be available to you at the same time as your provider. Often you may see your results before your provider does. Results are reviewed by staff within two weeks with communication follow-up. Results may be released in the patient portal prior to your care team review.    Prescription Refill(s):  Medication prescribed by your provider will be addressed during your visit. For future refills, please coordinate with your pharmacy. If you have not had a recent clinic visit or routine labs, for your safety, your provider may not be able to refill your  prescription.         Sincerely,    Leslie Calvin PA-C  Division of Gastroenterology, Hepatology, and Nutrition  [facility]

## 2024-12-03 ENCOUNTER — MYC MEDICAL ADVICE (OUTPATIENT)
Dept: FAMILY MEDICINE | Facility: CLINIC | Age: 59
End: 2024-12-03
Payer: COMMERCIAL

## 2024-12-03 DIAGNOSIS — F43.0 ACUTE REACTION TO STRESS: ICD-10-CM

## 2024-12-03 RX ORDER — LORAZEPAM 1 MG/1
TABLET ORAL
Qty: 20 TABLET | Refills: 1 | Status: SHIPPED | OUTPATIENT
Start: 2024-12-03

## 2024-12-04 ENCOUNTER — TELEPHONE (OUTPATIENT)
Dept: GASTROENTEROLOGY | Facility: CLINIC | Age: 59
End: 2024-12-04
Payer: COMMERCIAL

## 2024-12-04 NOTE — TELEPHONE ENCOUNTER
Left Voicemail (1st Attempt) and Sent Mychart (1st Attempt) for the patient to call back and schedule the following:    Appointment type: Return GI  Provider: Leslie Calvin  Return date: 4 mo (around 3/20/25)  Specialty phone number: [phone]  Additional appointment(s) needed: NA  Additonal Notes: LVM/MyC x1

## 2024-12-05 ENCOUNTER — OFFICE VISIT (OUTPATIENT)
Dept: FAMILY MEDICINE | Facility: CLINIC | Age: 59
End: 2024-12-05
Payer: COMMERCIAL

## 2024-12-05 VITALS
TEMPERATURE: 98.2 F | BODY MASS INDEX: 20.39 KG/M2 | WEIGHT: 108 LBS | RESPIRATION RATE: 16 BRPM | SYSTOLIC BLOOD PRESSURE: 116 MMHG | HEART RATE: 95 BPM | DIASTOLIC BLOOD PRESSURE: 70 MMHG | HEIGHT: 61 IN | OXYGEN SATURATION: 99 %

## 2024-12-05 DIAGNOSIS — N61.1 BREAST ABSCESS OF FEMALE: Primary | ICD-10-CM

## 2024-12-05 DIAGNOSIS — R06.00 DYSPNEA, UNSPECIFIED TYPE: ICD-10-CM

## 2024-12-05 DIAGNOSIS — Z86.19 HISTORY OF CANDIDIASIS OF VAGINA: ICD-10-CM

## 2024-12-05 RX ORDER — CLINDAMYCIN HYDROCHLORIDE 300 MG/1
300 CAPSULE ORAL 3 TIMES DAILY
Qty: 21 CAPSULE | Refills: 0 | Status: SHIPPED | OUTPATIENT
Start: 2024-12-05 | End: 2024-12-12

## 2024-12-05 RX ORDER — FLUCONAZOLE 150 MG/1
150 TABLET ORAL
Qty: 2 TABLET | Refills: 0 | Status: SHIPPED | OUTPATIENT
Start: 2024-12-05

## 2024-12-05 RX ORDER — ALBUTEROL SULFATE 90 UG/1
2 INHALANT RESPIRATORY (INHALATION) EVERY 4 HOURS PRN
Qty: 18 G | Refills: 4 | Status: SHIPPED | OUTPATIENT
Start: 2024-12-05

## 2024-12-05 NOTE — PROGRESS NOTES
Assessment & Plan     Breast abscess of female    - clindamycin (CLEOCIN) 300 MG capsule; Take 1 capsule (300 mg) by mouth 3 times daily for 7 days.    History of candidiasis of vagina    - fluconazole (DIFLUCAN) 150 MG tablet; Take 1 tablet (150 mg) by mouth every 3 days.    Dyspnea, unspecified type    - albuterol (VENTOLIN HFA) 108 (90 Base) MCG/ACT inhaler; Inhale 2 puffs into the lungs every 4 hours as needed for shortness of breath.            FUTURE APPOINTMENTS:       - We will call you on Monday to assess symptoms, if improved will then get imaging as she would not tolerate a mammogram or US. Follow up sooner for any new or worsening symptoms.     Subjective   Fatou is a 59 year old, presenting for the following health issues:  Breast Pain        12/5/2024     1:20 PM   Additional Questions   Roomed by Allyson TINAJERO     History of Present Illness       Reason for visit:  Sore breasts  Symptom onset:  3-4 weeks ago  Symptoms include:  Pain tenderness  Symptom intensity:  Moderate  Symptom progression:  Worsening  Had these symptoms before:  No  What makes it worse:  Touching  laying down  What makes it better:  No   She is taking medications regularly.         Breast Concern  Onset/Duration: 1 month   Description:   Location: right breast, under nipple   Pain or tenderness: YES  Redness: No  Intensity: moderate  Progression of Symptoms: worsening  Accompanying Signs & Symptoms:  Any lumps in axillary region: No  Movable: No  Nipple discharge: none   Changes in the skin or nipple: none   On Hormone therapy: YES- estrogen   Does it change with menstrual cycle: N/A  Previous history of similar problem: none  First degree relative with breast cancer: a negative family history for breast cancer.  Precipitating factors:           Worsened by: touching it and laying on it   Alleviating factors:            Improved by: none   Therapies tried and outcome: None  No LMP recorded. Patient has had a  "hysterectomy.        Review of Systems  Constitutional, HEENT, cardiovascular, pulmonary, gi and gu systems are negative, except as otherwise noted.      Objective    /70   Pulse 95   Temp 98.2  F (36.8  C) (Tympanic)   Resp 16   Ht 1.549 m (5' 1\")   Wt 49 kg (108 lb)   SpO2 99%   BMI 20.41 kg/m    Body mass index is 20.41 kg/m .  Physical Exam   GENERAL: alert and no distress  BREAST: implant - bilateral and very tender to lower 1/2 of the nipple on right side, no erythema or drainage, palpable fluid collection vs mass.   SKIN: no suspicious lesions or rashes  NEURO: Normal strength and tone, mentation intact and speech normal            Signed Electronically by: ANGEL Singh CNP    "

## 2024-12-06 DIAGNOSIS — K21.9 GASTROESOPHAGEAL REFLUX DISEASE WITHOUT ESOPHAGITIS: Primary | ICD-10-CM

## 2024-12-06 NOTE — TELEPHONE ENCOUNTER
Pt is requesting a refill of omeprazole, she takes it daily and forgot to ask at the appt this week.  Allyson Roy RN on 12/6/2024 at 2:47 PM

## 2024-12-09 ENCOUNTER — TELEPHONE (OUTPATIENT)
Dept: FAMILY MEDICINE | Facility: CLINIC | Age: 59
End: 2024-12-09
Payer: COMMERCIAL

## 2024-12-09 DIAGNOSIS — N64.4 NIPPLE PAIN: Primary | ICD-10-CM

## 2024-12-09 DIAGNOSIS — K21.9 GASTROESOPHAGEAL REFLUX DISEASE WITHOUT ESOPHAGITIS: Primary | ICD-10-CM

## 2024-12-09 RX ORDER — OMEPRAZOLE 20 MG/1
20 TABLET, DELAYED RELEASE ORAL DAILY
Qty: 90 TABLET | Refills: 3 | Status: SHIPPED | OUTPATIENT
Start: 2024-12-09 | End: 2024-12-09 | Stop reason: ALTCHOICE

## 2024-12-17 ENCOUNTER — MYC MEDICAL ADVICE (OUTPATIENT)
Dept: FAMILY MEDICINE | Facility: CLINIC | Age: 59
End: 2024-12-17
Payer: COMMERCIAL

## 2024-12-17 DIAGNOSIS — N61.1 BREAST ABSCESS OF FEMALE: ICD-10-CM

## 2024-12-17 DIAGNOSIS — N64.4 NIPPLE PAIN: Primary | ICD-10-CM

## 2024-12-17 RX ORDER — OXYCODONE HYDROCHLORIDE 5 MG/1
5 TABLET ORAL EVERY 12 HOURS PRN
Qty: 14 TABLET | Refills: 0 | Status: SHIPPED | OUTPATIENT
Start: 2024-12-17

## 2024-12-17 RX ORDER — IBUPROFEN 800 MG/1
800 TABLET, FILM COATED ORAL EVERY 8 HOURS PRN
Qty: 60 TABLET | Refills: 1 | Status: SHIPPED | OUTPATIENT
Start: 2024-12-17

## 2024-12-23 ENCOUNTER — HOSPITAL ENCOUNTER (OUTPATIENT)
Dept: MAMMOGRAPHY | Facility: CLINIC | Age: 59
Discharge: HOME | End: 2024-12-23
Attending: NURSE PRACTITIONER
Payer: COMMERCIAL

## 2024-12-23 ENCOUNTER — HOSPITAL ENCOUNTER (OUTPATIENT)
Dept: ULTRASOUND IMAGING | Facility: CLINIC | Age: 59
Discharge: HOME | End: 2024-12-23
Attending: NURSE PRACTITIONER
Payer: COMMERCIAL

## 2024-12-23 DIAGNOSIS — N64.4 NIPPLE PAIN: ICD-10-CM

## 2024-12-23 PROCEDURE — 76642 ULTRASOUND BREAST LIMITED: CPT | Mod: RT

## 2024-12-23 PROCEDURE — G0279 TOMOSYNTHESIS, MAMMO: HCPCS

## 2024-12-23 PROCEDURE — 77066 DX MAMMO INCL CAD BI: CPT

## 2024-12-23 NOTE — LETTER
Fatou Simental  [address]  [place] [address]              December 23, 2024  Date of Exam: 12/23/2024    Dear Fatou:    Thank you for your recent visit.  Breast Imaging Result: We are pleased to inform you that the results of your recent breast imaging show no evidence of malignancy (cancer).    Breast tissue can be either dense or not dense. Dense tissue makes it harder to find breast cancer on a mammogram and also raises the risk of developing breast cancer.  Your breast tissue is not dense. Talk to your healthcare provider about breast density, risks for breast cancer, and your individual situation.    If you are experiencing any breast problems such as a lump or localized pain we request that you discuss this with your health care team if you haven t already done so, as additional testing may be necessary.    Breast Cancer Screening Recommendation: Routine yearly mammography beginning at age 40 or as discussed with your provider.    A report of your breast imaging results was sent to: [facility]    Your breast imaging will become part of your medical file here at [facility] for at least 10 years. You are responsible for informing any new health care team or breast imaging facility of the date and location of this examination.    We appreciate the opportunity to participate in your health care.    Sincerely,  Shawn Ledesma MD  [facility]

## 2024-12-24 NOTE — RESULT ENCOUNTER NOTE
Giuliana Lainez    Your imaging appears unremarkable regarding the area of pain. I recommend to schedule with Dr. Tran who is in general surgery at [place] and see's breast patients. You will get a call to schedule, make sure to schedule with Dr. Tran. Please let us know if you have any questions.     Take care,    ANGEL Avitia CNP

## 2024-12-26 NOTE — RESULT ENCOUNTER NOTE
Giuliana Lainez    The good news- no concerning findings on your imaging regarding the area of pain. Follow up with surgery next week as planned. Let us know sooner if you have any new or worsening symptoms.  Please let us know if you have any questions.     Take care,    ANGEL Avitia CNP

## 2025-01-06 ENCOUNTER — MYC MEDICAL ADVICE (OUTPATIENT)
Dept: FAMILY MEDICINE | Facility: CLINIC | Age: 60
End: 2025-01-06

## 2025-01-06 DIAGNOSIS — N64.4 NIPPLE PAIN: Primary | ICD-10-CM

## 2025-01-10 ENCOUNTER — HOSPITAL ENCOUNTER (OUTPATIENT)
Dept: MRI IMAGING | Facility: CLINIC | Age: 60
Discharge: HOME | End: 2025-01-10
Attending: NURSE PRACTITIONER | Admitting: NURSE PRACTITIONER
Payer: COMMERCIAL

## 2025-01-10 DIAGNOSIS — N64.4 NIPPLE PAIN: ICD-10-CM

## 2025-01-10 PROCEDURE — A9585 GADOBUTROL INJECTION: HCPCS | Performed by: NURSE PRACTITIONER

## 2025-01-10 PROCEDURE — 258N000003 HC RX IP 258 OP 636: Performed by: NURSE PRACTITIONER

## 2025-01-10 PROCEDURE — 255N000002 HC RX 255 OP 636: Performed by: NURSE PRACTITIONER

## 2025-01-10 PROCEDURE — 77049 MRI BREAST C-+ W/CAD BI: CPT

## 2025-01-10 RX ORDER — GADOBUTROL 604.72 MG/ML
5 INJECTION INTRAVENOUS ONCE
Status: COMPLETED | OUTPATIENT
Start: 2025-01-10 | End: 2025-01-10

## 2025-01-10 RX ADMIN — GADOBUTROL 5 ML: 604.72 INJECTION INTRAVENOUS at 10:59

## 2025-01-10 RX ADMIN — SODIUM CHLORIDE 50 ML: 9 INJECTION, SOLUTION INTRAVENOUS at 10:49

## 2025-01-14 ENCOUNTER — OFFICE VISIT (OUTPATIENT)
Dept: SURGERY | Facility: CLINIC | Age: 60
End: 2025-01-14
Attending: NURSE PRACTITIONER
Payer: COMMERCIAL

## 2025-01-14 VITALS
HEIGHT: 61 IN | SYSTOLIC BLOOD PRESSURE: 133 MMHG | DIASTOLIC BLOOD PRESSURE: 77 MMHG | WEIGHT: 108 LBS | TEMPERATURE: 98.4 F | HEART RATE: 91 BPM | BODY MASS INDEX: 20.39 KG/M2

## 2025-01-14 DIAGNOSIS — F33.1 MODERATE RECURRENT MAJOR DEPRESSION (H): ICD-10-CM

## 2025-01-14 DIAGNOSIS — N64.4 NIPPLE PAIN: ICD-10-CM

## 2025-01-14 PROCEDURE — 99203 OFFICE O/P NEW LOW 30 MIN: CPT | Performed by: SURGERY

## 2025-01-14 NOTE — LETTER
"1/14/2025      Fatou Simental  [address]  [place] [address]      Dear Colleague,    Thank you for referring your patient, Fatou Simental, to the [facility]. Please see a copy of my visit note below.      Assessment & Plan  Problem List Items Addressed This Visit    None  Visit Diagnoses       Nipple pain, right        Moderate recurrent major depression (H)               60 yo F with right nipple pain  -This pain may be related from her encounter with her SO.    -Reassured patient there is no obvious cause based on her breasts MRI, right breast ultrasound, and right mammogram.   -I recommend continuing conservative management - ice alternate with heat, NSAIDs, and supportive bra.   -If pain does not continue to improve in the next 6-8 weeks; we will re-order imagings to re-eval.     Face to Face/patient Contact total time: 15 minutes  Pre Charting time: 10 minutes; Post charting time, communication and other activities: 10 minutes;   Total time:  35 minutes        No follow-ups on file.      Subjective   Fatou is a 59 year old, presenting for the following health issues:  No chief complaint on file.    Hot stabbing right nipple pain.    Left nipple is painful but not as much.   Been ongoing for the past 2 months  Her boyfriend did get \"rough\" during intercourse 2 months ago to the right breast  Amenable for touch; no nipple discharge  Was treated with abx by her PCP as there might possible be an infection; but already fnished abx course with no improvement.  No palpable masses  Breast work-up are all normal  Been trying OTC pain meds and ice with little improvement.    Breast tissue feel hard.   Bilateral subpectoral since 1998 (saline)  Never had them replaced.          Review of Systems  Constitutional, neuro, ENT, endocrine, pulmonary, cardiac, gastrointestinal, genitourinary, musculoskeletal, integument and psychiatric systems are negative, except as otherwise noted.    "   Objective    There were no vitals taken for this visit.  There is no height or weight on file to calculate BMI.  Physical Exam  Vitals reviewed.   Eyes:      Conjunctiva/sclera: Conjunctivae normal.   Chest:   Breasts:     Right: Tenderness present. No swelling, bleeding, inverted nipple, mass, nipple discharge or skin change.      Left: Normal. No swelling, inverted nipple, mass, nipple discharge or skin change.       Abdominal:      Palpations: Abdomen is soft.   Lymphadenopathy:      Upper Body:      Right upper body: No supraclavicular, axillary or pectoral adenopathy.      Left upper body: No supraclavicular, axillary or pectoral adenopathy.   Skin:     General: Skin is warm.   Neurological:      General: No focal deficit present.      Mental Status: She is alert.   Psychiatric:         Mood and Affect: Mood normal.          Narrative & Impression   MA DIAGNOSTIC WITH IMPLANTS BILATERAL WITH TOMOSYNTHESIS;  US BREAST BILATERAL LIMITED 1-3 QUADRANTS 12/23/2024 2:13 PM     COMPARISONS: None.     HISTORY: Pain behind both nipples and palpable abnormality slightly  lower inner to the right nipple.     FINDINGS:   Bilateral diagnostic mammogram:  Right breast: No mammographic abnormality is seen in the area of pain  and palpable abnormality in the retroareolar region. Ultrasound will  be obtained.  Left breast: No mammographic evidence for malignancy in the area of  pain. Ultrasound will be obtained.     Bilateral breast ultrasound: In the area of pain and palpable  abnormality in the right retroareolar region there is dense tissue  without evidence of mass or cyst.     In the left retroareolar region where the patient points out pain  there is no sonographic abnormality.     BREAST DENSITY: There are scattered areas of fibroglandular density.                                                                      IMPRESSION:  1. No sonographic or mammographic abnormality to correlate with the  areas of pain and  palpable abnormality in the retroareolar regions  bilaterally. Further management should be based on clinical scenario.     BI-RADS CATEGORY: 1 -  Negative.     RECOMMENDED FOLLOW-UP: Clinical Follow-up.     ELVER GORMAN MD      EXAM: Bilateral Breast MRI with and without contrast, and computer aided kinetic analysis.   LOCATION: [facility]  DATE: 1/10/2025     HISTORY/INDICATION: Nipple pain     COMPARISON: 12/23/2024, 7/6/2023     TECHNIQUE: Multiplanar, multisequence imaging performed prior to contrast administration and at multiple time points after contrast administration. Post-processing including subtractions, MIPs and color encoding of post contrast dynamic acquisitions.   IV contrast: 5 ML GADAVIST  SEDATION: None     FINDINGS:  Right Breast:  Breast composition: Scattered fibroglandular tissue  Background parenchymal enhancement: Mild  No concerning areas of enhancement.  Unremarkable subpectoral right breast implant.     Right Axilla: No lymphadenopathy.   Right Internal Mammary Chain: No lymphadenopathy.      Left Breast:   Breast composition: Scattered fibroglandular tissue  Background parenchymal enhancement: Mild  No concerning areas of enhancement.  Unremarkable subpectoral left breast implant.     Left Axilla: No lymphadenopathy.   Left Internal Mammary Chain: No lymphadenopathy.                                                                          IMPRESSION: No evidence of malignancy. An etiology for the patient's pain is not identified.     Right Breast ACR BI-RADS Category: 2 - Benign Finding(s).   Left Breast ACR BI-RADS Category: 2 - Benign Finding(s).      RECOMMENDATION: Clinical follow-up of the pain is recommended. Annual screening mammography is also recommended.        Signed Electronically by: Ning Tran MD        Again, thank you for allowing me to participate in the care of your patient.        Sincerely,        Ning Tran,  MD    Electronically signed

## 2025-01-14 NOTE — PROGRESS NOTES
"  Assessment & Plan   Problem List Items Addressed This Visit    None  Visit Diagnoses       Nipple pain, right        Moderate recurrent major depression (H)               58 yo F with right nipple pain  -This pain may be related from her encounter with her SO.    -Reassured patient there is no obvious cause based on her breasts MRI, right breast ultrasound, and right mammogram.   -I recommend continuing conservative management - ice alternate with heat, NSAIDs, and supportive bra.   -If pain does not continue to improve in the next 6-8 weeks; we will re-order imagings to re-eval.     Face to Face/patient Contact total time: 15 minutes  Pre Charting time: 10 minutes; Post charting time, communication and other activities: 10 minutes;   Total time:  35 minutes        No follow-ups on file.      Subjective   Fatou is a 59 year old, presenting for the following health issues:  No chief complaint on file.    Hot stabbing right nipple pain.    Left nipple is painful but not as much.   Been ongoing for the past 2 months  Her boyfriend did get \"rough\" during intercourse 2 months ago to the right breast  Amenable for touch; no nipple discharge  Was treated with abx by her PCP as there might possible be an infection; but already fnished abx course with no improvement.  No palpable masses  Breast work-up are all normal  Been trying OTC pain meds and ice with little improvement.    Breast tissue feel hard.   Bilateral subpectoral since 1998 (saline)  Never had them replaced.          Review of Systems  Constitutional, neuro, ENT, endocrine, pulmonary, cardiac, gastrointestinal, genitourinary, musculoskeletal, integument and psychiatric systems are negative, except as otherwise noted.      Objective    There were no vitals taken for this visit.  There is no height or weight on file to calculate BMI.  Physical Exam  Vitals reviewed.   Eyes:      Conjunctiva/sclera: Conjunctivae normal.   Chest:   Breasts:     Right: " Tenderness present. No swelling, bleeding, inverted nipple, mass, nipple discharge or skin change.      Left: Normal. No swelling, inverted nipple, mass, nipple discharge or skin change.       Abdominal:      Palpations: Abdomen is soft.   Lymphadenopathy:      Upper Body:      Right upper body: No supraclavicular, axillary or pectoral adenopathy.      Left upper body: No supraclavicular, axillary or pectoral adenopathy.   Skin:     General: Skin is warm.   Neurological:      General: No focal deficit present.      Mental Status: She is alert.   Psychiatric:         Mood and Affect: Mood normal.          Narrative & Impression   MA DIAGNOSTIC WITH IMPLANTS BILATERAL WITH TOMOSYNTHESIS;  US BREAST BILATERAL LIMITED 1-3 QUADRANTS 12/23/2024 2:13 PM     COMPARISONS: None.     HISTORY: Pain behind both nipples and palpable abnormality slightly  lower inner to the right nipple.     FINDINGS:   Bilateral diagnostic mammogram:  Right breast: No mammographic abnormality is seen in the area of pain  and palpable abnormality in the retroareolar region. Ultrasound will  be obtained.  Left breast: No mammographic evidence for malignancy in the area of  pain. Ultrasound will be obtained.     Bilateral breast ultrasound: In the area of pain and palpable  abnormality in the right retroareolar region there is dense tissue  without evidence of mass or cyst.     In the left retroareolar region where the patient points out pain  there is no sonographic abnormality.     BREAST DENSITY: There are scattered areas of fibroglandular density.                                                                      IMPRESSION:  1. No sonographic or mammographic abnormality to correlate with the  areas of pain and palpable abnormality in the retroareolar regions  bilaterally. Further management should be based on clinical scenario.     BI-RADS CATEGORY: 1 -  Negative.     RECOMMENDED FOLLOW-UP: Clinical Follow-up.     ELVER GORMAN MD       EXAM: Bilateral Breast MRI with and without contrast, and computer aided kinetic analysis.   LOCATION: [facility]  DATE: 1/10/2025     HISTORY/INDICATION: Nipple pain     COMPARISON: 12/23/2024, 7/6/2023     TECHNIQUE: Multiplanar, multisequence imaging performed prior to contrast administration and at multiple time points after contrast administration. Post-processing including subtractions, MIPs and color encoding of post contrast dynamic acquisitions.   IV contrast: 5 ML GADAVIST  SEDATION: None     FINDINGS:  Right Breast:  Breast composition: Scattered fibroglandular tissue  Background parenchymal enhancement: Mild  No concerning areas of enhancement.  Unremarkable subpectoral right breast implant.     Right Axilla: No lymphadenopathy.   Right Internal Mammary Chain: No lymphadenopathy.      Left Breast:   Breast composition: Scattered fibroglandular tissue  Background parenchymal enhancement: Mild  No concerning areas of enhancement.  Unremarkable subpectoral left breast implant.     Left Axilla: No lymphadenopathy.   Left Internal Mammary Chain: No lymphadenopathy.                                                                          IMPRESSION: No evidence of malignancy. An etiology for the patient's pain is not identified.     Right Breast ACR BI-RADS Category: 2 - Benign Finding(s).   Left Breast ACR BI-RADS Category: 2 - Benign Finding(s).      RECOMMENDATION: Clinical follow-up of the pain is recommended. Annual screening mammography is also recommended.        Signed Electronically by: Ning Tran MD

## 2025-01-14 NOTE — NURSING NOTE
"Initial /77 (BP Location: Right arm, Patient Position: Sitting, Cuff Size: Adult Regular)   Pulse 91   Temp 98.4  F (36.9  C) (Tympanic)   Ht 1.549 m (5' 0.98\")   Wt 49 kg (108 lb)   BMI 20.42 kg/m   Estimated body mass index is 20.42 kg/m  as calculated from the following:    Height as of this encounter: 1.549 m (5' 0.98\").    Weight as of this encounter: 49 kg (108 lb). .  Sowmya Pike MA    "

## 2025-01-28 DIAGNOSIS — F43.0 ACUTE REACTION TO STRESS: ICD-10-CM

## 2025-01-28 RX ORDER — LORAZEPAM 1 MG/1
.5-1 TABLET ORAL EVERY 8 HOURS PRN
Qty: 5 TABLET | Refills: 0 | Status: SHIPPED | OUTPATIENT
Start: 2025-01-28

## 2025-02-10 ASSESSMENT — ANXIETY QUESTIONNAIRES
8. IF YOU CHECKED OFF ANY PROBLEMS, HOW DIFFICULT HAVE THESE MADE IT FOR YOU TO DO YOUR WORK, TAKE CARE OF THINGS AT HOME, OR GET ALONG WITH OTHER PEOPLE?: VERY DIFFICULT
7. FEELING AFRAID AS IF SOMETHING AWFUL MIGHT HAPPEN: MORE THAN HALF THE DAYS
GAD7 TOTAL SCORE: 17
2. NOT BEING ABLE TO STOP OR CONTROL WORRYING: NEARLY EVERY DAY
4. TROUBLE RELAXING: NEARLY EVERY DAY
3. WORRYING TOO MUCH ABOUT DIFFERENT THINGS: NEARLY EVERY DAY
7. FEELING AFRAID AS IF SOMETHING AWFUL MIGHT HAPPEN: MORE THAN HALF THE DAYS
5. BEING SO RESTLESS THAT IT IS HARD TO SIT STILL: SEVERAL DAYS
6. BECOMING EASILY ANNOYED OR IRRITABLE: MORE THAN HALF THE DAYS
IF YOU CHECKED OFF ANY PROBLEMS ON THIS QUESTIONNAIRE, HOW DIFFICULT HAVE THESE PROBLEMS MADE IT FOR YOU TO DO YOUR WORK, TAKE CARE OF THINGS AT HOME, OR GET ALONG WITH OTHER PEOPLE: VERY DIFFICULT
GAD7 TOTAL SCORE: 17
1. FEELING NERVOUS, ANXIOUS, OR ON EDGE: NEARLY EVERY DAY
GAD7 TOTAL SCORE: 17

## 2025-02-10 ASSESSMENT — PATIENT HEALTH QUESTIONNAIRE - PHQ9
SUM OF ALL RESPONSES TO PHQ QUESTIONS 1-9: 7
SUM OF ALL RESPONSES TO PHQ QUESTIONS 1-9: 7
10. IF YOU CHECKED OFF ANY PROBLEMS, HOW DIFFICULT HAVE THESE PROBLEMS MADE IT FOR YOU TO DO YOUR WORK, TAKE CARE OF THINGS AT HOME, OR GET ALONG WITH OTHER PEOPLE: VERY DIFFICULT

## 2025-02-11 ENCOUNTER — VIRTUAL VISIT (OUTPATIENT)
Dept: FAMILY MEDICINE | Facility: CLINIC | Age: 60
End: 2025-02-11
Payer: COMMERCIAL

## 2025-02-11 DIAGNOSIS — F43.0 ACUTE REACTION TO STRESS: ICD-10-CM

## 2025-02-11 DIAGNOSIS — F41.1 GENERALIZED ANXIETY DISORDER: ICD-10-CM

## 2025-02-11 DIAGNOSIS — G89.4 CHRONIC PAIN SYNDROME: ICD-10-CM

## 2025-02-11 DIAGNOSIS — N64.4 NIPPLE PAIN: ICD-10-CM

## 2025-02-11 DIAGNOSIS — B02.29 POST HERPETIC NEURALGIA: Primary | ICD-10-CM

## 2025-02-11 PROCEDURE — 98005 SYNCH AUDIO-VIDEO EST LOW 20: CPT | Performed by: NURSE PRACTITIONER

## 2025-02-11 RX ORDER — LORAZEPAM 1 MG/1
.5-1 TABLET ORAL EVERY 6 HOURS PRN
Qty: 20 TABLET | Refills: 0 | Status: SHIPPED | OUTPATIENT
Start: 2025-02-11

## 2025-02-11 RX ORDER — BUSPIRONE HYDROCHLORIDE 5 MG/1
TABLET ORAL
Qty: 120 TABLET | Refills: 0 | Status: SHIPPED | OUTPATIENT
Start: 2025-02-11

## 2025-02-11 RX ORDER — PREGABALIN 50 MG/1
50 CAPSULE ORAL 2 TIMES DAILY
Qty: 60 CAPSULE | Refills: 0 | Status: SHIPPED | OUTPATIENT
Start: 2025-02-11

## 2025-02-11 NOTE — PROGRESS NOTES
Fatou is a 59 year old who is being evaluated via a billable video visit.    How would you like to obtain your AVS? MyChart  If the video visit is dropped, the invitation should be resent by: Text to cell phone: [phone]  Will anyone else be joining your video visit? No      Assessment & Plan     Generalized anxiety disorder  Start Buspar, discussed risks and suggested limited use of Lorazepam.  - LORazepam (ATIVAN) 1 MG tablet; Take 0.5-1 tablets (0.5-1 mg) by mouth every 6 hours as needed for anxiety.  - pregabalin (LYRICA) 50 MG capsule; Take 1 capsule (50 mg) by mouth 2 times daily.  - busPIRone (BUSPAR) 5 MG tablet; Take 1 tab by mouth twice daily for 1 week and then increase to 2 tabs twice daily    Acute reaction to stress    - busPIRone (BUSPAR) 5 MG tablet; Take 1 tab by mouth twice daily for 1 week and then increase to 2 tabs twice daily    Post herpetic neuralgia  Discontinue gabapentin, trial;  - pregabalin (LYRICA) 50 MG capsule; Take 1 capsule (50 mg) by mouth 2 times daily.    Chronic pain syndrome    - pregabalin (LYRICA) 50 MG capsule; Take 1 capsule (50 mg) by mouth 2 times daily.    Nipple pain  Start;  - pregabalin (LYRICA) 50 MG capsule; Take 1 capsule (50 mg) by mouth 2 times daily.        FUTURE APPOINTMENTS:       - Follow-up visit in 3-4 weeks, sooner PRN    Subjective   Fatou is a 59 year old, presenting for the following health issues:  No chief complaint on file.      2/11/2025     2:23 PM   Additional Questions   Roomed by Allyson TINAJERO     Video Start Time:  1700    History of Present Illness       Mental Health Follow-up:  Patient presents to follow-up on Anxiety.    Patient's anxiety since last visit has been:  Bad  The patient is having other symptoms associated with anxiety.  Any significant life events: health concerns  Patient is feeling anxious or having panic attacks.  Patient has no concerns about alcohol or drug use.    Reason for visit:  Medication refill. And continuing  breast pain    She eats 2-3 servings of fruits and vegetables daily.She consumes 1 sweetened beverage(s) daily.She exercises with enough effort to increase her heart rate 9 or less minutes per day.  She exercises with enough effort to increase her heart rate 3 or less days per week.   She is taking medications regularly.       Breast Concern  Onset/Duration: 3 months   Description:   Location: bilateral  Pain or tenderness: YES  Redness: No  Intensity: moderate  Progression of Symptoms: same  Accompanying Signs & Symptoms:  Any lumps in axillary region: No  Movable: No  Nipple discharge: none    Changes in the skin or nipple: none   On Hormone therapy: YES  Does it change with menstrual cycle: N/A  Previous history of similar problem: none   First degree relative with breast cancer: a negative family history for breast cancer.  Precipitating factors:           Worsened by: bumping it, using upper body muscles   Alleviating factors:            Improved by: ice  Therapies tried and outcome: ice and heat, pain meds and ibuprofen   No LMP recorded. Patient has had a hysterectomy.        Review of Systems  Constitutional, HEENT, cardiovascular, pulmonary, gi and gu systems are negative, except as otherwise noted.      Objective           Vitals:  No vitals were obtained today due to virtual visit.    Physical Exam   GENERAL: alert and no distress  EYES: Eyes grossly normal to inspection.  No discharge or erythema, or obvious scleral/conjunctival abnormalities.  RESP: No audible wheeze, cough, or visible cyanosis.    SKIN: Visible skin clear. No significant rash, abnormal pigmentation or lesions.  NEURO: Cranial nerves grossly intact.  Mentation and speech appropriate for age.  PSYCH: Appropriate affect, tone, and pace of words          Video-Visit Details    Type of service:  Video Visit   Video End Time: 0222  Originating Location (pt. Location): Home    Distant Location (provider location):  On-site  Platform used for  Video Visit: Doximity  Signed Electronically by: ANGEL Singh CNP

## 2025-02-24 ENCOUNTER — MYC MEDICAL ADVICE (OUTPATIENT)
Dept: FAMILY MEDICINE | Facility: CLINIC | Age: 60
End: 2025-02-24
Payer: COMMERCIAL

## 2025-02-24 ENCOUNTER — MYC MEDICAL ADVICE (OUTPATIENT)
Dept: FAMILY MEDICINE | Facility: CLINIC | Age: 60
End: 2025-02-24

## 2025-02-24 ENCOUNTER — E-VISIT (OUTPATIENT)
Dept: FAMILY MEDICINE | Facility: CLINIC | Age: 60
End: 2025-02-24
Payer: COMMERCIAL

## 2025-02-24 DIAGNOSIS — J40 BRONCHITIS: Primary | ICD-10-CM

## 2025-02-24 DIAGNOSIS — J06.9 VIRAL URI: Primary | ICD-10-CM

## 2025-02-24 RX ORDER — CODEINE PHOSPHATE AND GUAIFENESIN 10; 100 MG/5ML; MG/5ML
1-2 SOLUTION ORAL EVERY 4 HOURS PRN
Qty: 180 ML | Refills: 0 | Status: SHIPPED | OUTPATIENT
Start: 2025-02-24 | End: 2025-02-27

## 2025-02-24 RX ORDER — PSEUDOEPHEDRINE HCL 120 MG/1
120 TABLET, FILM COATED, EXTENDED RELEASE ORAL EVERY 12 HOURS
Qty: 15 TABLET | Refills: 0 | Status: SHIPPED | OUTPATIENT
Start: 2025-02-24

## 2025-02-24 NOTE — PATIENT INSTRUCTIONS
Acute Sinusitis: Care Instructions  Overview     Acute sinusitis is an inflammation of the mucous membranes inside the nose and sinuses. Sinuses are the hollow spaces in your skull around the eyes and nose. Acute sinusitis often follows a cold. Acute sinusitis causes thick, discolored mucus that drains from the nose or down the back of the throat. It also can cause pain and pressure in your head and face along with a stuffy or blocked nose.  In most cases, sinusitis gets better on its own in 1 to 2 weeks. But some mild symptoms may last for several weeks. Sometimes antibiotics are needed if there is a bacterial infection.  Follow-up care is a key part of your treatment and safety. Be sure to make and go to all appointments, and call your doctor if you are having problems. It's also a good idea to know your test results and keep a list of the medicines you take.  How can you care for yourself at home?  Use saline (saltwater) nasal washes. This can help keep your nasal passages open and wash out mucus and allergens.  You can buy saline nose washes at a grocery store or drugstore. Follow the instructions on the package.  You can make your own at home. Add 1 teaspoon of non-iodized salt and 1 teaspoon of baking soda to 2 cups of distilled or boiled and cooled water. Fill a squeeze bottle or a nasal cleansing pot (such as a neti pot) with the nasal wash. Then put the tip into your nostril, and lean over the sink. With your mouth open, gently squirt the liquid. Repeat on the other side.  Try a decongestant nasal spray like oxymetazoline (Afrin). Do not use it for more than 3 days in a row. Using it for more than 3 days can make your congestion worse.  If needed, take an over-the-counter pain medicine, such as acetaminophen (Tylenol), ibuprofen (Advil, Motrin), or naproxen (Aleve). Read and follow all instructions on the label.  If the doctor prescribed antibiotics, take them as directed. Do not stop taking them just  "because you feel better. You need to take the full course of antibiotics.  Be careful when taking over-the-counter cold or flu medicines and Tylenol at the same time. Many of these medicines have acetaminophen, which is Tylenol. Read the labels to make sure that you are not taking more than the recommended dose. Too much acetaminophen (Tylenol) can be harmful.  Try a steroid nasal spray. It may help with your symptoms.  Breathe warm, moist air. You can use a steamy shower, a hot bath, or a sink filled with hot water. Avoid cold, dry air. Using a humidifier in your home may help. Follow the directions for cleaning the machine.  When should you call for help?   Call your doctor now or seek immediate medical care if:    You have new or worse swelling, redness, or pain in your face or around one or both of your eyes.     You have double vision or a change in your vision.     You have a high fever.     You have a severe headache and a stiff neck.     You have mental changes, such as feeling confused or much less alert.   Watch closely for changes in your health, and be sure to contact your doctor if:    You are not getting better as expected.   Where can you learn more?  Go to https://www.[employer].net/patiented  Enter I933 in the search box to learn more about \"Acute Sinusitis: Care Instructions.\"  Current as of: September 27, 2023  Content Version: 14.3    2024 [employer].   Care instructions adapted under license by your healthcare professional. If you have questions about a medical condition or this instruction, always ask your healthcare professional. [employer] disclaims any warranty or liability for your use of this information.    Viral Respiratory Infection: Care Instructions  Overview     A viral respiratory infection is an infection of the nose, sinuses, or throat caused by a virus. Colds and the flu are common types of viral respiratory infections.  The symptoms of a viral respiratory " infection often start quickly. They include a fever, sore throat, and runny nose. You may also just not feel well. Or you may not want to eat much.  Most viral infections can be treated with home care. This may include drinking lots of fluids and taking over-the-counter pain medicine. You will probably feel better in 4 to 10 days.  Antibiotics are not used to treat a viral infection. Antibiotics don't kill viruses, so they won't help cure a viral illness.  In some cases, a doctor may prescribe antiviral medicine to help your body fight a serious viral infection.  Follow-up care is a key part of your treatment and safety. Be sure to make and go to all appointments, and call your doctor if you are having problems. It's also a good idea to know your test results and keep a list of the medicines you take.  How can you care for yourself at home?  To prevent dehydration, drink plenty of fluids. Choose water and other clear liquids until you feel better. If you have kidney, heart, or liver disease and have to limit fluids, talk with your doctor before you increase the amount of fluids you drink.  Ask your doctor if you can take an over-the-counter pain medicine, such as acetaminophen (Tylenol), ibuprofen (Advil, Motrin), or naproxen (Aleve). Be safe with medicines. Read and follow all instructions on the label. No one younger than 20 should take aspirin. It has been linked to Reye syndrome, a serious illness.  Be careful when taking over-the-counter cold or flu medicines and Tylenol at the same time. Many of these medicines have acetaminophen, which is Tylenol. Read the labels to make sure that you are not taking more than the recommended dose. Too much acetaminophen (Tylenol) can be harmful.  Get plenty of rest.  Use saline (saltwater) nasal washes to help keep your nasal passages open and wash out mucus and allergens. You can buy saline nose sprays at a grocery store or drugstore. Follow the instructions on the package.  "Or you can make your own at home. Add 1 teaspoon of non-iodized salt and 1 teaspoon of baking soda to 2 cups of distilled or boiled and cooled water. Fill a squeeze bottle or neti pot with the nasal wash. Then put the tip into your nostril, and lean over the sink. With your mouth open, gently squirt the liquid. Repeat on the other side.  Use a vaporizer or humidifier to add moisture to your bedroom. Follow the instructions for cleaning the machine.  Do not smoke or allow others to smoke around you. If you need help quitting, talk to your doctor about stop-smoking programs and medicines. These can increase your chances of quitting for good.  When should you call for help?   Call 911 anytime you think you may need emergency care. For example, call if:    You have severe trouble breathing.   Call your doctor now or seek immediate medical care if:    You have a new or higher fever.     Your fever lasts more than 48 hours.     You have trouble breathing.     You have a fever with a stiff neck or a severe headache.     You are sensitive to light.     You feel very sleepy or confused.   Watch closely for changes in your health, and be sure to contact your doctor if:    You do not get better as expected.   Where can you learn more?  Go to https://www.[employer].net/patiented  Enter Q795 in the search box to learn more about \"Viral Respiratory Infection: Care Instructions.\"  Current as of: April 30, 2024  Content Version: 14.3    2024 [employer].   Care instructions adapted under license by your healthcare professional. If you have questions about a medical condition or this instruction, always ask your healthcare professional. [employer] disclaims any warranty or liability for your use of this information.    Dear Fatou Simental    After reviewing your responses, I've been able to diagnose you with Viral URI.      Based on your responses and diagnosis, I have prescribed   Orders Placed This " Encounter   Medications     guaiFENesin-codeine (ROBITUSSIN AC) 100-10 MG/5ML solution     Sig: Take 5-10 mLs by mouth every 4 hours as needed for cough.     Dispense:  180 mL     Refill:  0     pseudoePHEDrine (SUDAFED) 120 MG 12 hr tablet     Sig: Take 1 tablet (120 mg) by mouth every 12 hours.     Dispense:  15 tablet     Refill:  0      to treat your symptoms. I have sent this to your pharmacy.?     It is also important to stay well hydrated, get lots of rest and take over-the-counter decongestants,?tylenol?or ibuprofen if you?are able to?take those medications per your primary care provider to help relieve discomfort.?     It is important that you take?all of?your prescribed medication even if your symptoms are improving after a few doses.? Taking?all of?your medicine helps prevent the symptoms from returning.?     If your symptoms worsen, you develop severe headache, vomiting, high fever (>102), or are not improving in 7 days, please contact your primary care provider for an appointment or visit any of our convenient Walk-in Care or Urgent Care Centers to be seen which can be found on our website?here.?     Thanks again for choosing?us?as your health care partner,?   ?  ANGEL Singh CNP?

## 2025-02-25 DIAGNOSIS — G43.009 MIGRAINE WITHOUT AURA AND WITHOUT STATUS MIGRAINOSUS, NOT INTRACTABLE: ICD-10-CM

## 2025-02-25 DIAGNOSIS — G47.00 INSOMNIA, UNSPECIFIED TYPE: ICD-10-CM

## 2025-02-25 NOTE — TELEPHONE ENCOUNTER
"See My chart message.     Looks like she did an E visit yesterday and her pharmacy did not have 12 hour Sudafed formula in stock. I suggested she try plain sudafed or check with another pharmacy to see if they have 12 hr formula in stock.     She is now asking \"if an antibiotic would help?\"    Please advise. Kaila Luu RN      "

## 2025-02-27 DIAGNOSIS — J06.9 VIRAL URI: ICD-10-CM

## 2025-02-27 RX ORDER — CODEINE PHOSPHATE AND GUAIFENESIN 10; 100 MG/5ML; MG/5ML
1-2 SOLUTION ORAL EVERY 4 HOURS PRN
Qty: 180 ML | Refills: 0 | Status: SHIPPED | OUTPATIENT
Start: 2025-02-27

## 2025-03-19 DIAGNOSIS — F41.1 GENERALIZED ANXIETY DISORDER: ICD-10-CM

## 2025-03-19 DIAGNOSIS — G89.4 CHRONIC PAIN SYNDROME: ICD-10-CM

## 2025-03-19 DIAGNOSIS — N64.4 NIPPLE PAIN: ICD-10-CM

## 2025-03-19 DIAGNOSIS — B02.29 POST HERPETIC NEURALGIA: ICD-10-CM

## 2025-03-19 RX ORDER — PREGABALIN 50 MG/1
50 CAPSULE ORAL 2 TIMES DAILY
Qty: 60 CAPSULE | Refills: 0 | Status: SHIPPED | OUTPATIENT
Start: 2025-03-19

## 2025-04-03 ENCOUNTER — MYC MEDICAL ADVICE (OUTPATIENT)
Dept: FAMILY MEDICINE | Facility: CLINIC | Age: 60
End: 2025-04-03
Payer: COMMERCIAL

## 2025-04-03 DIAGNOSIS — M67.912 TENDINOPATHY OF LEFT ROTATOR CUFF: Primary | ICD-10-CM

## 2025-04-03 DIAGNOSIS — M67.911 TENDINOPATHY OF ROTATOR CUFF, RIGHT: ICD-10-CM

## 2025-04-07 ENCOUNTER — PATIENT OUTREACH (OUTPATIENT)
Dept: CARE COORDINATION | Facility: CLINIC | Age: 60
End: 2025-04-07
Payer: COMMERCIAL

## 2025-04-08 ENCOUNTER — TELEPHONE (OUTPATIENT)
Dept: ORTHOPEDICS | Facility: CLINIC | Age: 60
End: 2025-04-08
Payer: COMMERCIAL

## 2025-04-08 NOTE — TELEPHONE ENCOUNTER
Scheduled for bilateral subacromial bursa injection on 4/18.     Closing encounter. No further action needed.     Carly, ATC

## 2025-04-17 DIAGNOSIS — F41.1 GENERALIZED ANXIETY DISORDER: ICD-10-CM

## 2025-04-17 DIAGNOSIS — F43.0 ACUTE REACTION TO STRESS: ICD-10-CM

## 2025-04-17 RX ORDER — BUSPIRONE HYDROCHLORIDE 10 MG/1
10 TABLET ORAL 2 TIMES DAILY
Qty: 180 TABLET | Refills: 3 | Status: SHIPPED | OUTPATIENT
Start: 2025-04-17

## 2025-04-22 DIAGNOSIS — B02.29 POST HERPETIC NEURALGIA: ICD-10-CM

## 2025-04-22 DIAGNOSIS — G89.4 CHRONIC PAIN SYNDROME: ICD-10-CM

## 2025-04-22 DIAGNOSIS — F41.1 GENERALIZED ANXIETY DISORDER: ICD-10-CM

## 2025-04-22 DIAGNOSIS — N64.4 NIPPLE PAIN: ICD-10-CM

## 2025-04-23 RX ORDER — LORAZEPAM 1 MG/1
.5-1 TABLET ORAL EVERY 12 HOURS PRN
Qty: 10 TABLET | Refills: 0 | Status: SHIPPED | OUTPATIENT
Start: 2025-04-23

## 2025-04-23 RX ORDER — PREGABALIN 50 MG/1
50 CAPSULE ORAL 2 TIMES DAILY
Qty: 180 CAPSULE | Refills: 1 | Status: SHIPPED | OUTPATIENT
Start: 2025-04-23

## 2025-05-14 ENCOUNTER — NURSE TRIAGE (OUTPATIENT)
Dept: FAMILY MEDICINE | Facility: CLINIC | Age: 60
End: 2025-05-14
Payer: COMMERCIAL

## 2025-05-14 NOTE — TELEPHONE ENCOUNTER
"See MyChart message, writer called pt to follow up. Pt says that she's had this type of rash before, and it seemed to go away after a couple of days. Current symptoms started 5 or 6 days ago. See triage assessment below; pt describes the rash on her neck as looking like spider or mosquito bites and itchy. Says that the pain on the left side of her back feels like \"I popped a rib out on the left side\". Per triage protocol recommendation, writer advised that she be seen in clinic today or tomorrow for evaluation; pt's agreeable with this plan. Appt scheduled.    Reason for Disposition    Shingles rash and onset > 72 hours ago    Additional Information    Negative: Difficult to awaken or acting confused (e.g., disoriented, slurred speech)    Negative: Sounds like a life-threatening emergency to the triager    Negative: Localized rash and doesn't match the SYMPTOMS of shingles    Negative: Back pain and doesn't match the SYMPTOMS of shingles    Negative: Shingles Vaccine (Recombinant Zoster Vaccine; RZV; Shingrix), questions about    Negative: Shingles rash of face and eye pain or blurred vision    Negative: Shingles rash on the eyelid or tip of the nose    Negative: Shingles rash and spots start appearing other places on body    Negative: Patient sounds very sick or weak to the triager    Negative: Shingles rash (matches SYMPTOMS) and weak immune system (e.g., HIV positive,  cancer chemotherapy, chronic steroid treatment, splenectomy) and NOT taking antiviral medication    Negative: Shingles rash of face and facial weakness    Negative: Shingles rash of face or ear and earache or ringing in the ear    Negative: Fever > 100.4 F (38.0 C)    Negative: SEVERE pain (e.g., excruciating)    Negative: Shingles rash (matches SYMPTOMS) and onset within past 72 hours    Negative: Looks infected (spreading redness, pus) and no fever    Negative: Patient wants to be seen    Answer Assessment - Initial Assessment Questions  1. " "APPEARANCE of RASH: \"Describe the rash.\"       Can't see it, is on the back of her neck. Her SO says it looks like spider or mosquito bites.   2. LOCATION: \"Where is the rash located?\"       Back of neck.  3. ONSET: \"When did the rash start?\"       5 or 6 days ago  4. ITCHING: \"Does the rash itch?\" If so, ask: \"How bad is the itch?\"  (Scale 1-10; or mild, moderate, severe)      Yes  5. PAIN: \"Does the rash hurt?\" If so, ask: \"How bad is the pain?\"  (Scale 1-10; or mild, moderate, severe)      No  6. OTHER SYMPTOMS: \"Do you have any other symptoms?\" (e.g., fever)      No  7. PREGNANCY: \"Is there any chance you are pregnant?\" \"When was your last menstrual period?\"      N/A    Protocols used: Mikey Gauthier RN  [facility]  "

## 2025-05-15 ENCOUNTER — OFFICE VISIT (OUTPATIENT)
Dept: FAMILY MEDICINE | Facility: CLINIC | Age: 60
End: 2025-05-15
Payer: COMMERCIAL

## 2025-05-15 VITALS
SYSTOLIC BLOOD PRESSURE: 124 MMHG | OXYGEN SATURATION: 97 % | RESPIRATION RATE: 16 BRPM | HEIGHT: 61 IN | BODY MASS INDEX: 20.39 KG/M2 | HEART RATE: 88 BPM | TEMPERATURE: 98.5 F | WEIGHT: 108 LBS | DIASTOLIC BLOOD PRESSURE: 82 MMHG

## 2025-05-15 DIAGNOSIS — L01.00 IMPETIGO: Primary | ICD-10-CM

## 2025-05-15 DIAGNOSIS — F41.1 GENERALIZED ANXIETY DISORDER: ICD-10-CM

## 2025-05-15 DIAGNOSIS — F43.0 ACUTE REACTION TO STRESS: ICD-10-CM

## 2025-05-15 DIAGNOSIS — R21 RASH AND NONSPECIFIC SKIN ERUPTION: ICD-10-CM

## 2025-05-15 DIAGNOSIS — K21.9 GASTROESOPHAGEAL REFLUX DISEASE WITHOUT ESOPHAGITIS: ICD-10-CM

## 2025-05-15 RX ORDER — OMEPRAZOLE 20 MG/1
20 CAPSULE, DELAYED RELEASE ORAL DAILY
Qty: 90 CAPSULE | Refills: 3 | Status: SHIPPED | OUTPATIENT
Start: 2025-05-15

## 2025-05-15 RX ORDER — LORAZEPAM 1 MG/1
.5-1 TABLET ORAL EVERY 12 HOURS PRN
Qty: 10 TABLET | Refills: 1 | Status: SHIPPED | OUTPATIENT
Start: 2025-05-15

## 2025-05-15 RX ORDER — TRIAMCINOLONE ACETONIDE 1 MG/G
CREAM TOPICAL 2 TIMES DAILY
Qty: 30 G | Refills: 0 | Status: SHIPPED | OUTPATIENT
Start: 2025-05-15

## 2025-05-15 RX ORDER — BUSPIRONE HYDROCHLORIDE 10 MG/1
20 TABLET ORAL 2 TIMES DAILY
Qty: 360 TABLET | Refills: 1 | Status: SHIPPED | OUTPATIENT
Start: 2025-05-15

## 2025-05-15 ASSESSMENT — PATIENT HEALTH QUESTIONNAIRE - PHQ9
10. IF YOU CHECKED OFF ANY PROBLEMS, HOW DIFFICULT HAVE THESE PROBLEMS MADE IT FOR YOU TO DO YOUR WORK, TAKE CARE OF THINGS AT HOME, OR GET ALONG WITH OTHER PEOPLE: SOMEWHAT DIFFICULT
SUM OF ALL RESPONSES TO PHQ QUESTIONS 1-9: 8
SUM OF ALL RESPONSES TO PHQ QUESTIONS 1-9: 8

## 2025-05-15 NOTE — PROGRESS NOTES
Assessment & Plan     Impetigo    - amoxicillin-clavulanate (AUGMENTIN) 875-125 MG tablet; Take 1 tablet by mouth 2 times daily for 7 days.    Rash and nonspecific skin eruption    - triamcinolone (KENALOG) 0.1 % external cream; Apply topically 2 times daily. Max 14 consecutive days to rash on neck.    Gastroesophageal reflux disease without esophagitis  refilled  - omeprazole (PRILOSEC) 20 MG DR capsule; Take 1 capsule (20 mg) by mouth daily.    Generalized anxiety disorder  Trial increase in Buspar dose. Cautioned on overuse of the Ativan  - busPIRone (BUSPAR) 10 MG tablet; Take 2 tablets (20 mg) by mouth 2 times daily.  - LORazepam (ATIVAN) 1 MG tablet; Take 0.5-1 tablets (0.5-1 mg) by mouth every 12 hours as needed for anxiety.    Acute reaction to stress    - busPIRone (BUSPAR) 10 MG tablet; Take 2 tablets (20 mg) by mouth 2 times daily.          Nicotine/Tobacco Cessation  She reports that she has been smoking cigarettes. She has a 20 pack-year smoking history. She has never used smokeless tobacco.  Nicotine/Tobacco Cessation Plan  Information offered: Patient not interested at this time      Follow up in 1 week for persistent rash symptoms, sooner for new or worsening symptoms.         Subjective   Fatou is a 60 year old, presenting for the following health issues:  Derm Problem        5/15/2025     1:17 PM   Additional Questions   Roomed by Allyson HERNANDEZ        Rash  Onset/Duration: 7 days   Description  Location: back of neck   Character: round, raised, red  Itching: moderate  Intensity:  moderate  Progression of Symptoms:  improving  Accompanying signs and symptoms:   Fever: No  Body aches or joint pain: YES  Sore throat symptoms: No  Recent cold symptoms: No  History:           Previous episodes of similar rash: YES  New exposures:  None  Recent travel: No  Exposure to similar rash: No  Precipitating or alleviating factors: used to get hives with certain foods  Therapies tried and outcome: calamine  "lotion-slight improvement     Anxiety   How are you doing with your anxiety since your last visit? No change  Are you having other symptoms that might be associated with anxiety? No  Have you had a significant life event? Grief or Loss   Are you feeling depressed? No  Do you have any concerns with your use of alcohol or other drugs? No    Social History     Tobacco Use    Smoking status: Every Day     Current packs/day: 1.00     Average packs/day: 1 pack/day for 20.0 years (20.0 ttl pk-yrs)     Types: Cigarettes    Smokeless tobacco: Never    Tobacco comments:     trying to quit   Vaping Use    Vaping status: Never Used   Substance Use Topics    Alcohol use: Never    Drug use: No         8/25/2022     8:45 AM 12/5/2022     5:09 PM 2/10/2025     9:43 AM   SOY-7 SCORE   Total Score 12 (moderate anxiety)  17 (severe anxiety)   Total Score 12 16 17        Patient-reported         8/8/2024     2:57 PM 2/10/2025     9:39 AM 5/15/2025     1:11 PM   PHQ   PHQ-9 Total Score 5 7  8    Q9: Thoughts of better off dead/self-harm past 2 weeks Not at all Not at all Not at all       Patient-reported       Medication Followup of Omeprazole  Taking Medication as prescribed: yes  Side Effects:  None  Medication Helping Symptoms:  yes      Review of Systems  Constitutional, HEENT, cardiovascular, pulmonary, gi and gu systems are negative, except as otherwise noted.      Objective    /82   Pulse 88   Temp 98.5  F (36.9  C) (Tympanic)   Resp 16   Ht 1.549 m (5' 1\")   Wt 49 kg (108 lb)   SpO2 97%   BMI 20.41 kg/m    Body mass index is 20.41 kg/m .  Physical Exam   GENERAL: alert and no distress  SKIN: erythematous tender papules along bilateral posterior neck extending up into hair  NEURO: Normal strength and tone, mentation intact and speech normal  PSYCH: mentation appears normal, tearful, and judgement and insight intact            Signed Electronically by: ANGEL Singh CNP    "

## 2025-06-10 ENCOUNTER — MYC MEDICAL ADVICE (OUTPATIENT)
Dept: FAMILY MEDICINE | Facility: CLINIC | Age: 60
End: 2025-06-10
Payer: COMMERCIAL

## 2025-06-10 DIAGNOSIS — G43.009 MIGRAINE WITHOUT AURA AND WITHOUT STATUS MIGRAINOSUS, NOT INTRACTABLE: ICD-10-CM

## 2025-06-10 RX ORDER — BUTALBITAL, ASPIRIN, AND CAFFEINE 325; 50; 40 MG/1; MG/1; MG/1
CAPSULE ORAL
Qty: 30 CAPSULE | Refills: 2 | Status: SHIPPED | OUTPATIENT
Start: 2025-06-10

## 2025-06-18 ENCOUNTER — MYC MEDICAL ADVICE (OUTPATIENT)
Dept: FAMILY MEDICINE | Facility: CLINIC | Age: 60
End: 2025-06-18
Payer: COMMERCIAL

## 2025-06-18 DIAGNOSIS — G43.009 MIGRAINE WITHOUT AURA AND WITHOUT STATUS MIGRAINOSUS, NOT INTRACTABLE: ICD-10-CM

## 2025-06-18 DIAGNOSIS — G47.00 INSOMNIA, UNSPECIFIED TYPE: ICD-10-CM

## 2025-06-18 NOTE — TELEPHONE ENCOUNTER
Please see my chart message.     Patient having issue with migraine    Appointment 6/23, question on medications until then    Xochilt Peñaloza RN on 6/18/2025 at 8:45 AM

## 2025-06-24 DIAGNOSIS — G43.009 MIGRAINE WITHOUT AURA AND WITHOUT STATUS MIGRAINOSUS, NOT INTRACTABLE: ICD-10-CM

## 2025-06-24 DIAGNOSIS — G47.00 INSOMNIA, UNSPECIFIED TYPE: ICD-10-CM

## 2025-07-14 NOTE — TELEPHONE ENCOUNTER
Hello,    Is it ok to change the tabs to capsules? Insurance will cover the capsules.    Thank you,    Carmina Young  Certified Pharmacy Technician II, [facility]  Ph: [phone]  Fx: [phone]     Lab Facility: [id number] Bill For Surgical Tray: no Expected Date Of Service: 07/14/2025 Billing Type: Third-Party Bill Performing Laboratory: [id number]

## 2025-07-23 DIAGNOSIS — F41.1 GENERALIZED ANXIETY DISORDER: ICD-10-CM

## 2025-07-23 RX ORDER — LORAZEPAM 1 MG/1
TABLET ORAL
Qty: 10 TABLET | Refills: 1 | Status: SHIPPED | OUTPATIENT
Start: 2025-07-23

## 2025-08-17 ENCOUNTER — HEALTH MAINTENANCE LETTER (OUTPATIENT)
Age: 60
End: 2025-08-17

## 2025-08-20 DIAGNOSIS — F41.1 GENERALIZED ANXIETY DISORDER: ICD-10-CM

## 2025-08-20 DIAGNOSIS — Z90.711 S/P LAPAROSCOPIC SUPRACERVICAL HYSTERECTOMY: Primary | ICD-10-CM

## 2025-08-20 RX ORDER — LORAZEPAM 1 MG/1
TABLET ORAL
Qty: 10 TABLET | Refills: 1 | Status: SHIPPED | OUTPATIENT
Start: 2025-08-20

## 2025-08-20 RX ORDER — PROGESTERONE 100 MG/1
100 CAPSULE ORAL AT BEDTIME
Qty: 90 CAPSULE | Refills: 0 | Status: SHIPPED | OUTPATIENT
Start: 2025-08-20

## (undated) DEVICE — SOL NACL 0.9% IRRIG 3000ML BAG 07972-08

## (undated) DEVICE — PEN MARKING SKIN TYCO DEVON DUAL TIP 31145868

## (undated) DEVICE — STOCKING SLEEVE COMPRESSION CALF LG

## (undated) DEVICE — CLIP APPLIER ENDO 10MM M/L 176657

## (undated) DEVICE — ENDO POUCH GOLD 10MM ECATCH 173050G

## (undated) DEVICE — SOL WATER IRRIG 500ML BOTTLE 2F7113

## (undated) DEVICE — ADHESIVE SWIFTSET 0.8ML OCTYL SS6

## (undated) DEVICE — SU VICRYL 0 UR-6 27" J603H

## (undated) DEVICE — SURGICEL HEMOSTAT 2X3" 1953

## (undated) DEVICE — SU VICRYL 4-0 FS-2 27" J422-H

## (undated) DEVICE — ESU HOLSTER PLASTIC DISP E2400

## (undated) DEVICE — DECANTER VIAL 2006S

## (undated) DEVICE — SURGICEL HEMOSTAT 4X8" 1952

## (undated) DEVICE — ENDO FORCEP ENDOJAW BIOPSY 2.8MMX230CM FB-220U

## (undated) DEVICE — GOWN XLG DISP 9545

## (undated) DEVICE — ENDO TROCAR BLADED 11MM STD VERSAONE B11STF

## (undated) DEVICE — SU PLAIN 6-0 G-1DA 18" 770G

## (undated) DEVICE — SYR 03ML LL W/O NDL 309657

## (undated) DEVICE — LINEN TOWEL PACK X5 5464

## (undated) DEVICE — GLOVE PROTEXIS MICRO 7.5  2D73PM75

## (undated) DEVICE — ENDO TROCAR 05MM VERSAONE BLADED W/STD FIX CANNULA B5STF

## (undated) DEVICE — PACK MINOR EYE CUSTOM ASC

## (undated) DEVICE — ENDO CANNULA 05MM VERSAONE UNIVERSAL UNVCA5STF

## (undated) DEVICE — BLADE KNIFE SURG 15 371115

## (undated) DEVICE — ENDO TROCAR BLUNT 100MM W/THRD ANCHOR BLUNTPORT BPT12STS

## (undated) DEVICE — ENDO SHEARS RENEW LAP ENDOCUT SCISSOR TIP 16.5MM 3142

## (undated) DEVICE — EYE PREP BETADINE 5% SOLUTION 30ML 0065-0411-30

## (undated) DEVICE — PREP CHLORAPREP 26ML TINTED ORANGE  260815

## (undated) DEVICE — TUBING HYDRO-SURG PLUS LAP IRRIGATOR SUCTION 0026870

## (undated) DEVICE — DRAPE POUCH INSTRUMENT 3 POCKET 1018L

## (undated) DEVICE — ADH FLOSEAL W/HUMAN THROMBIN 5ML

## (undated) DEVICE — Device

## (undated) DEVICE — SOL WATER IRRIG 1000ML BOTTLE 07139-09

## (undated) DEVICE — GLOVE PROTEXIS W/NEU-THERA 7.5  2D73TE75

## (undated) DEVICE — ESU EYE HIGH TEMP 65410-183

## (undated) DEVICE — SOL NACL 0.9% IRRIG 1000ML BOTTLE 07138-09

## (undated) DEVICE — ENDO FLOSEAL APPLICATOR 1500181

## (undated) DEVICE — NDL 30GA 0.5" 305106

## (undated) RX ORDER — SCOLOPAMINE TRANSDERMAL SYSTEM 1 MG/1
PATCH, EXTENDED RELEASE TRANSDERMAL
Status: DISPENSED
Start: 2017-04-11

## (undated) RX ORDER — NEOSTIGMINE METHYLSULFATE 5 MG/5 ML
SYRINGE (ML) INTRAVENOUS
Status: DISPENSED
Start: 2017-04-11

## (undated) RX ORDER — FENTANYL CITRATE 50 UG/ML
INJECTION, SOLUTION INTRAMUSCULAR; INTRAVENOUS
Status: DISPENSED
Start: 2017-04-11

## (undated) RX ORDER — DEXAMETHASONE SODIUM PHOSPHATE 4 MG/ML
INJECTION, SOLUTION INTRA-ARTICULAR; INTRALESIONAL; INTRAMUSCULAR; INTRAVENOUS; SOFT TISSUE
Status: DISPENSED
Start: 2017-04-11

## (undated) RX ORDER — LIDOCAINE HYDROCHLORIDE 10 MG/ML
INJECTION, SOLUTION EPIDURAL; INFILTRATION; INTRACAUDAL; PERINEURAL
Status: DISPENSED
Start: 2017-04-11

## (undated) RX ORDER — ACETAMINOPHEN 325 MG/1
TABLET ORAL
Status: DISPENSED
Start: 2018-10-11

## (undated) RX ORDER — PROPOFOL 10 MG/ML
INJECTION, EMULSION INTRAVENOUS
Status: DISPENSED
Start: 2017-04-11

## (undated) RX ORDER — ONDANSETRON 2 MG/ML
INJECTION INTRAMUSCULAR; INTRAVENOUS
Status: DISPENSED
Start: 2017-04-11

## (undated) RX ORDER — EPINEPHRINE 1 MG/ML
INJECTION, SOLUTION, CONCENTRATE INTRAVENOUS
Status: DISPENSED
Start: 2021-12-10

## (undated) RX ORDER — LIDOCAINE HYDROCHLORIDE 10 MG/ML
INJECTION, SOLUTION EPIDURAL; INFILTRATION; INTRACAUDAL; PERINEURAL
Status: DISPENSED
Start: 2021-12-10

## (undated) RX ORDER — OXYCODONE AND ACETAMINOPHEN 10; 325 MG/1; MG/1
TABLET ORAL
Status: DISPENSED
Start: 2017-04-11

## (undated) RX ORDER — OXYCODONE HYDROCHLORIDE 5 MG/1
TABLET ORAL
Status: DISPENSED
Start: 2018-10-11

## (undated) RX ORDER — GLYCOPYRROLATE 0.2 MG/ML
INJECTION, SOLUTION INTRAMUSCULAR; INTRAVENOUS
Status: DISPENSED
Start: 2017-04-11

## (undated) RX ORDER — PROPOFOL 10 MG/ML
INJECTION, EMULSION INTRAVENOUS
Status: DISPENSED
Start: 2018-10-11

## (undated) RX ORDER — LIDOCAINE HYDROCHLORIDE 20 MG/ML
JELLY TOPICAL
Status: DISPENSED
Start: 2023-10-09

## (undated) RX ORDER — GABAPENTIN 300 MG/1
CAPSULE ORAL
Status: DISPENSED
Start: 2018-10-11

## (undated) RX ORDER — BUPIVACAINE HYDROCHLORIDE AND EPINEPHRINE 2.5; 5 MG/ML; UG/ML
INJECTION, SOLUTION INFILTRATION; PERINEURAL
Status: DISPENSED
Start: 2017-04-11

## (undated) RX ORDER — PHENYLEPHRINE HCL IN 0.9% NACL 1 MG/10 ML
SYRINGE (ML) INTRAVENOUS
Status: DISPENSED
Start: 2018-10-11

## (undated) RX ORDER — PROPOFOL 10 MG/ML
INJECTION, EMULSION INTRAVENOUS
Status: DISPENSED
Start: 2023-05-31

## (undated) RX ORDER — KETOROLAC TROMETHAMINE 30 MG/ML
INJECTION, SOLUTION INTRAMUSCULAR; INTRAVENOUS
Status: DISPENSED
Start: 2017-04-11